# Patient Record
Sex: MALE | Race: WHITE | NOT HISPANIC OR LATINO | Employment: OTHER | URBAN - METROPOLITAN AREA
[De-identification: names, ages, dates, MRNs, and addresses within clinical notes are randomized per-mention and may not be internally consistent; named-entity substitution may affect disease eponyms.]

---

## 2017-01-23 ENCOUNTER — GENERIC CONVERSION - ENCOUNTER (OUTPATIENT)
Dept: OTHER | Facility: OTHER | Age: 62
End: 2017-01-23

## 2017-02-21 ENCOUNTER — ALLSCRIPTS OFFICE VISIT (OUTPATIENT)
Dept: OTHER | Facility: OTHER | Age: 62
End: 2017-02-21

## 2017-02-21 DIAGNOSIS — Z12.5 ENCOUNTER FOR SCREENING FOR MALIGNANT NEOPLASM OF PROSTATE: ICD-10-CM

## 2017-02-21 DIAGNOSIS — J45.909 UNCOMPLICATED ASTHMA: ICD-10-CM

## 2017-02-21 DIAGNOSIS — E78.2 MIXED HYPERLIPIDEMIA: ICD-10-CM

## 2017-02-22 ENCOUNTER — ALLSCRIPTS OFFICE VISIT (OUTPATIENT)
Dept: OTHER | Facility: OTHER | Age: 62
End: 2017-02-22

## 2017-05-09 ENCOUNTER — LAB CONVERSION - ENCOUNTER (OUTPATIENT)
Dept: OTHER | Facility: OTHER | Age: 62
End: 2017-05-09

## 2017-05-09 LAB
A/G RATIO (HISTORICAL): 2.2 (CALC) (ref 1–2.5)
ALBUMIN SERPL BCP-MCNC: 4.4 G/DL (ref 3.6–5.1)
ALP SERPL-CCNC: 66 U/L (ref 40–115)
ALT SERPL W P-5'-P-CCNC: 25 U/L (ref 9–46)
AST SERPL W P-5'-P-CCNC: 17 U/L (ref 10–35)
BASOPHILS # BLD AUTO: 0.7 %
BASOPHILS # BLD AUTO: 48 CELLS/UL (ref 0–200)
BILIRUB SERPL-MCNC: 0.6 MG/DL (ref 0.2–1.2)
BUN SERPL-MCNC: 15 MG/DL (ref 7–25)
BUN/CREA RATIO (HISTORICAL): NORMAL (CALC) (ref 6–22)
CALCIUM SERPL-MCNC: 9.7 MG/DL (ref 8.6–10.3)
CHLORIDE SERPL-SCNC: 104 MMOL/L (ref 98–110)
CHOLEST SERPL-MCNC: 188 MG/DL (ref 125–200)
CHOLEST/HDLC SERPL: 4.7 (CALC)
CO2 SERPL-SCNC: 27 MMOL/L (ref 20–31)
CREAT SERPL-MCNC: 0.86 MG/DL (ref 0.7–1.33)
DEPRECATED RDW RBC AUTO: 13.6 % (ref 11–15)
EGFR AFRICAN AMERICAN (HISTORICAL): 110 ML/MIN/1.73M2
EGFR-AMERICAN CALC (HISTORICAL): 95 ML/MIN/1.73M2
EOSINOPHIL # BLD AUTO: 173 CELLS/UL (ref 15–500)
EOSINOPHIL # BLD AUTO: 2.5 %
GAMMA GLOBULIN (HISTORICAL): 2 G/DL (CALC) (ref 1.9–3.7)
GLUCOSE (HISTORICAL): 99 MG/DL (ref 65–99)
HCT VFR BLD AUTO: 44.5 % (ref 38.5–50)
HDLC SERPL-MCNC: 40 MG/DL
HGB BLD-MCNC: 14.6 G/DL (ref 13.2–17.1)
LDL CHOLESTEROL (HISTORICAL): 108 MG/DL (CALC)
LYMPHOCYTES # BLD AUTO: 1318 CELLS/UL (ref 850–3900)
LYMPHOCYTES # BLD AUTO: 19.1 %
MCH RBC QN AUTO: 29.7 PG (ref 27–33)
MCHC RBC AUTO-ENTMCNC: 32.9 G/DL (ref 32–36)
MCV RBC AUTO: 90.2 FL (ref 80–100)
MONOCYTES # BLD AUTO: 559 CELLS/UL (ref 200–950)
MONOCYTES (HISTORICAL): 8.1 %
NEUTROPHILS # BLD AUTO: 4802 CELLS/UL (ref 1500–7800)
NEUTROPHILS # BLD AUTO: 69.6 %
NON-HDL-CHOL (CHOL-HDL) (HISTORICAL): 148 MG/DL (CALC)
PLATELET # BLD AUTO: 185 THOUSAND/UL (ref 140–400)
PMV BLD AUTO: 9.4 FL (ref 7.5–12.5)
POTASSIUM SERPL-SCNC: 4.4 MMOL/L (ref 3.5–5.3)
PROSTATE SPECIFIC ANTIGEN TOTAL (HISTORICAL): 3.8 NG/ML
RBC # BLD AUTO: 4.94 MILLION/UL (ref 4.2–5.8)
SODIUM SERPL-SCNC: 140 MMOL/L (ref 135–146)
TOTAL PROTEIN (HISTORICAL): 6.4 G/DL (ref 6.1–8.1)
TRIGL SERPL-MCNC: 201 MG/DL
WBC # BLD AUTO: 6.9 THOUSAND/UL (ref 3.8–10.8)

## 2017-05-31 ENCOUNTER — GENERIC CONVERSION - ENCOUNTER (OUTPATIENT)
Dept: OTHER | Facility: OTHER | Age: 62
End: 2017-05-31

## 2017-06-13 ENCOUNTER — ALLSCRIPTS OFFICE VISIT (OUTPATIENT)
Dept: OTHER | Facility: OTHER | Age: 62
End: 2017-06-13

## 2017-06-27 ENCOUNTER — ALLSCRIPTS OFFICE VISIT (OUTPATIENT)
Dept: OTHER | Facility: OTHER | Age: 62
End: 2017-06-27

## 2017-06-28 ENCOUNTER — GENERIC CONVERSION - ENCOUNTER (OUTPATIENT)
Dept: OTHER | Facility: OTHER | Age: 62
End: 2017-06-28

## 2017-07-12 ENCOUNTER — ALLSCRIPTS OFFICE VISIT (OUTPATIENT)
Dept: OTHER | Facility: OTHER | Age: 62
End: 2017-07-12

## 2017-07-25 ENCOUNTER — ALLSCRIPTS OFFICE VISIT (OUTPATIENT)
Dept: OTHER | Facility: OTHER | Age: 62
End: 2017-07-25

## 2017-08-08 ENCOUNTER — ALLSCRIPTS OFFICE VISIT (OUTPATIENT)
Dept: OTHER | Facility: OTHER | Age: 62
End: 2017-08-08

## 2017-08-22 ENCOUNTER — ALLSCRIPTS OFFICE VISIT (OUTPATIENT)
Dept: OTHER | Facility: OTHER | Age: 62
End: 2017-08-22

## 2017-09-05 ENCOUNTER — ALLSCRIPTS OFFICE VISIT (OUTPATIENT)
Dept: OTHER | Facility: OTHER | Age: 62
End: 2017-09-05

## 2017-09-19 ENCOUNTER — ALLSCRIPTS OFFICE VISIT (OUTPATIENT)
Dept: OTHER | Facility: OTHER | Age: 62
End: 2017-09-19

## 2017-10-03 ENCOUNTER — ALLSCRIPTS OFFICE VISIT (OUTPATIENT)
Dept: OTHER | Facility: OTHER | Age: 62
End: 2017-10-03

## 2017-10-10 ENCOUNTER — GENERIC CONVERSION - ENCOUNTER (OUTPATIENT)
Dept: OTHER | Facility: OTHER | Age: 62
End: 2017-10-10

## 2017-10-17 ENCOUNTER — ALLSCRIPTS OFFICE VISIT (OUTPATIENT)
Dept: OTHER | Facility: OTHER | Age: 62
End: 2017-10-17

## 2018-01-10 NOTE — PROGRESS NOTES
Chief Complaint  Patient was here today for allergy shot      Active Problems    1  Acid reflux disease (530 81) (K21 9)   2  Actinic keratosis (702 0) (L57 0)   3  Allergic rhinitis (477 9) (J30 9)   4  Anxiety, generalized (300 02) (F41 1)   5  Asthma (493 90) (J45 909)   6  Basal cell carcinoma of right forehead (173 31) (C44 319)   7  Benign prostatic hypertrophy (600 00) (N40 0)   8  Changing skin lesion (709 9) (L98 9)   9  Diverticulosis (562 10) (K57 90)   10  History of hip replacement (V43 64) (Z96 649)   11  Mixed hyperlipidemia (272 2) (E78 2)   12  Need for diphtheria-tetanus-pertussis (Tdap) vaccine (V06 1) (Z23)   13  Need for influenza vaccination (V04 81) (Z23)   14  Prostate cancer screening (V76 44) (Z12 5)   15  Pure hypercholesterolemia (272 0) (E78 00)   16  Screening for skin condition (V82 0) (Z13 89)   17  Seborrheic keratosis (702 19) (L82 1)    Current Meds   1  Aspirin 81 MG TABS; Therapy: (Recorded:91Hhq1619) to Recorded   2  Atorvastatin Calcium 20 MG Oral Tablet; Take 1 tablet daily; Therapy: 85ARK3192 to (Last Rx:02Wfl7048)  Requested for: 76BMS5934 Ordered   3  Calcium TABS; Therapy: (Carey Piedra) to Recorded   4  Citalopram Hydrobromide 20 MG Oral Tablet; Take 1 tablet daily; Therapy: 98TNL6797 to (Last TO:93TNW7457)  Requested for: 04ODK1493 Ordered   5  EpiPen 2-Papi 0 3 MG/0 3ML Injection Solution Auto-injector; use as directed; Therapy: 63Tcl5228 to (Evaluate:29Kvi4247)  Requested for: 58Nrs4194; Last   Rx:74Uvq2851 Ordered   6  Fluticasone Propionate 50 MCG/ACT Nasal Suspension; Therapy: (Demaris Fouzia) to Recorded   7  Ipratropium Bromide 0 06 % Nasal Solution; Therapy: (Demaris Fouzia) to Recorded   8  LORazepam 0 5 MG Oral Tablet; 1 tid prn anxiety; Therapy: 24PCV0039 to (Last Rx:11Apr2017) Ordered   9  Omeprazole 40 MG Oral Capsule Delayed Release; take 1 capsule daily;    Therapy: 00QWA3249 to (Last Rx:24Apr2017)  Requested for: 24Apr2017 Ordered   10  Oxycodone-Acetaminophen 5-325 MG Oral Tablet; TAKE 1 TABLET EVERY 4 TO 6    HOURS AS NEEDED FOR PAIN; Last Rx:68Nhs7030 Ordered   11  Pulmicort 0 5 MG/2ML Inhalation Suspension; Therapy: (Reji Jo) to Recorded   12  Pulmicort Flexhaler 180 MCG/ACT Inhalation Aerosol Powder Breath Activated; Therapy: 68YMX7896 to (Evaluate:28Xfg1316) Recorded   13  Singulair 10 MG Oral Tablet; Therapy: (Reji Deysi) to Recorded   14  Tamsulosin HCl - 0 4 MG Oral Capsule Recorded    Allergies    1  No Known Drug Allergies    2  Dust   3  Dust Mite   4  Grass   5  Mold   6  Ragweed   7  Trees    Results/Data  Allergy Injection single injection 43Rxk5605 10:40AM Evalee Areas     Test Name Result Flag Reference   Allergy Injection #1 0 55cc     Allergy Injection #1 Site right upper arm     Allergy Inj #1 Reaction no reaction     Allergy Injection #1 0 55cc     Allergy Injection #1 Site right upper arm     Allergy Inj #1 Reaction no reaction               Plan  Allergic rhinitis    · Allergy Injection single injection; Status:Resulted - Requires Verification,Retrospective  By Protocol Authorization;   Done: 35WHA6960 10:40AM    Future Appointments    Date/Time Provider Specialty Site   07/25/2017 10:15 AM [facility], Nurse Schedule  [address]   07/26/2017 09:15 AM MANNY Traore   Dermatology [facility] INPATIENT REHABILITATION     Signatures   Electronically signed by : MANNY Dickerson ; Jul 12 2017 11:34AM EST

## 2018-01-10 NOTE — PROGRESS NOTES
Chief Complaint  Pt  in office today for his allergy injection  Active Problems    1  Acid reflux disease (530 81) (K21 9)   2  Actinic keratosis (702 0) (L57 0)   3  Allergic rhinitis (477 9) (J30 9)   4  Anxiety, generalized (300 02) (F41 1)   5  Asthma (493 90) (J45 909)   6  Basal cell carcinoma of right forehead (173 31) (C44 319)   7  Benign prostatic hypertrophy (600 00) (N40 0)   8  Changing skin lesion (709 9) (L98 9)   9  Diverticulosis (562 10) (K57 90)   10  History of hip replacement (V43 64) (Z96 649)   11  Mixed hyperlipidemia (272 2) (E78 2)   12  Need for diphtheria-tetanus-pertussis (Tdap) vaccine (V06 1) (Z23)   13  Need for influenza vaccination (V04 81) (Z23)   14  Prostate cancer screening (V76 44) (Z12 5)   15  Pure hypercholesterolemia (272 0) (E78 00)   16  Screening for skin condition (V82 0) (Z13 89)   17  Seborrheic keratosis (702 19) (L82 1)    Current Meds   1  Aspirin 81 MG TABS; Therapy: (Recorded:31Wun4082) to Recorded   2  Atorvastatin Calcium 20 MG Oral Tablet; Take 1 tablet daily; Therapy: 96JSL6855 to (Last Rx:91Dke9240)  Requested for: 31JXJ3159 Ordered   3  Calcium TABS; Therapy: (Shereen Dubois) to Recorded   4  Citalopram Hydrobromide 20 MG Oral Tablet; Take 1 tablet daily; Therapy: 85WQH4119 to (Last Rx:57Fko3025)  Requested for: 69Cvi3748 Ordered   5  EpiPen 2-Papi 0 3 MG/0 3ML Injection Solution Auto-injector; use as directed; Therapy: 55Zht4358 to (Evaluate:67Cxf3074)  Requested for: 66Ppt1007; Last   Rx:39Jbe3884 Ordered   6  Fluticasone Propionate 50 MCG/ACT Nasal Suspension; Therapy: (Mellisa Jolly) to Recorded   7  Ipratropium Bromide 0 06 % Nasal Solution; Therapy: (Mellisa Jolly) to Recorded   8  LORazepam 0 5 MG Oral Tablet; 1 tid prn anxiety; Therapy: 44YVL5770 to (Last Rx:11Apr2017) Ordered   9  Omeprazole 40 MG Oral Capsule Delayed Release; take 1 capsule daily;    Therapy: 60BMY1773 to (Last Rx:24Apr2017)  Requested for: 66Uxu0053 Ordered   10  Oxycodone-Acetaminophen 5-325 MG Oral Tablet; TAKE 1 TABLET EVERY 4 TO 6    HOURS AS NEEDED FOR PAIN; Last Rx:09Kga0061 Ordered   11  Pulmicort 0 5 MG/2ML Inhalation Suspension (Budesonide); Therapy: (Glena Catching) to Recorded   12  Pulmicort Flexhaler 180 MCG/ACT Inhalation Aerosol Powder Breath Activated; Therapy: 61YLC0646 to (Evaluate:69Hza0480) Recorded   13  Singulair 10 MG Oral Tablet (Montelukast Sodium); Therapy: (Glena Catching) to Recorded   14  Tamsulosin HCl - 0 4 MG Oral Capsule Recorded    Allergies    1  No Known Drug Allergies    2  Dust   3  Dust Mite   4  Grass   5  Mold   6  Ragweed   7  Trees    Results/Data  Allergy Injection single injection 10VSA5619 11:02AM Jackson Frame     Test Name Result Flag Reference   Allergy Injection #1 Vial 1     Allergy Injection #1 Site R upper arm     Allergy Inj #1 Reaction none while in office     Allergy Injection #1 Vial 1     Allergy Injection #1 Site R upper arm     Allergy Inj #1 Reaction none while in office               Plan  Allergic rhinitis    · Allergy Injection single injection; Status:Resulted - Requires Verification,Retrospective  By Protocol Authorization;   Done: 44JSF5849 11:02AM    Future Appointments    Date/Time Provider Specialty Site   08/08/2017 10:15 AM [facility], Nurse Schedule  [address]   07/26/2017 09:15 AM MANNY Partida   Dermatology [facility] INPATIENT REHABILITATION     Signatures   Electronically signed by : MANNY Gonzalez ; Jul 25 2017 11:23AM EST

## 2018-01-10 NOTE — PROGRESS NOTES
Chief Complaint  allergy injection      Active Problems    1  Acid reflux disease (530 81) (K21 9)   2  Actinic keratosis (702 0) (L57 0)   3  Allergic rhinitis (477 9) (J30 9)   4  Anxiety, generalized (300 02) (F41 1)   5  Asthma (493 90) (J45 909)   6  Basal cell carcinoma of right forehead (173 31) (C44 319)   7  Benign prostatic hypertrophy (600 00) (N40 0)   8  Changing skin lesion (709 9) (L98 9)   9  Diverticulosis (562 10) (K57 90)   10  History of hip replacement (V43 64) (Z96 649)   11  Mixed hyperlipidemia (272 2) (E78 2)   12  Need for diphtheria-tetanus-pertussis (Tdap) vaccine (V06 1) (Z23)   13  Need for influenza vaccination (V04 81) (Z23)   14  Prostate cancer screening (V76 44) (Z12 5)   15  Pure hypercholesterolemia (272 0) (E78 00)   16  Screening for skin condition (V82 0) (Z13 89)   17  Seborrheic keratosis (702 19) (L82 1)    Current Meds   1  Aspirin 81 MG TABS; Therapy: (Recorded:69Lro1014) to Recorded   2  Atorvastatin Calcium 20 MG Oral Tablet; Take 1 tablet daily; Therapy: 38NGM8187 to (Last Rx:27Ghw3289)  Requested for: 22SPX5350 Ordered   3  Calcium TABS; Therapy: (Eugenie Flavors) to Recorded   4  Citalopram Hydrobromide 20 MG Oral Tablet; Take 1 tablet daily; Therapy: 84UOE2173 to (Last Rx:35Bzu0758)  Requested for: 68Pno5492 Ordered   5  EpiPen 2-Papi 0 3 MG/0 3ML Injection Solution Auto-injector; use as directed; Therapy: 03Hto0381 to (Evaluate:86Szs4749)  Requested for: 43Bbw5452; Last   Rx:91Ojo3734 Ordered   6  Fluticasone Propionate 50 MCG/ACT Nasal Suspension; Therapy: (Orlean Drop) to Recorded   7  Ipratropium Bromide 0 06 % Nasal Solution; Therapy: (Orlean Drop) to Recorded   8  LORazepam 0 5 MG Oral Tablet; 1 tid prn anxiety; Therapy: 64HUR4473 to (Last Rx:78Kqp3368) Ordered   9  Omeprazole 40 MG Oral Capsule Delayed Release; take 1 capsule daily; Therapy: 24JON2948 to (Last Rx:24Apr2017)  Requested for: 24Apr2017 Ordered   10  Oxycodone-Acetaminophen 5-325 MG Oral Tablet; TAKE 1 TABLET EVERY 4 TO 6    HOURS AS NEEDED FOR PAIN; Last Rx:22Ltc2251 Ordered   11  Pulmicort 0 5 MG/2ML Inhalation Suspension (Budesonide); Therapy: (Jaime Aid) to Recorded   12  Pulmicort Flexhaler 180 MCG/ACT Inhalation Aerosol Powder Breath Activated; Therapy: 17BVK6939 to (Evaluate:34Pmi5562) Recorded   13  Singulair 10 MG Oral Tablet (Montelukast Sodium); Therapy: (Jaime Aid) to Recorded   14  Tamsulosin HCl - 0 4 MG Oral Capsule Recorded    Allergies    1  No Known Drug Allergies    2  Dust   3  Dust Mite   4  Grass   5  Mold   6  Ragweed   7  Trees    Results/Data  Allergy Injection single injection 60Ghr1800 10:33AM Ehsan Brown     Test Name Result Flag Reference   Allergy Injection #1 Vial 1     Allergy Injection #1 Site R upper arm     Allergy Inj #1 Reaction no reaction         Plan  Allergic rhinitis    · Allergy Injection single injection; Status:Complete - Retrospective By Protocol  Authorization;   Done: 42XDS4079 10:33AM    Future Appointments    Date/Time Provider Specialty Site   09/19/2017 10:15 AM [facility], Nurse Schedule  [address]   02/26/2018 03:15 PM MANNY Galeana   Dermatology  [address] [facility] INPATIENT REHABILITATION     Signatures   Electronically signed by : MANNY Cates ; Sep  5 2017 11:22AM EST

## 2018-01-11 NOTE — PROGRESS NOTES
Chief Complaint  PATIENT IN OFFICE TODAY FOR ALLERGY INJECTION      Active Problems    1  Acid reflux disease (530 81) (K21 9)   2  Actinic keratosis (702 0) (L57 0)   3  Allergic rhinitis (477 9) (J30 9)   4  Anxiety, generalized (300 02) (F41 1)   5  Asthma (493 90) (J45 909)   6  Basal cell carcinoma of right forehead (173 31) (C44 319)   7  Benign prostatic hypertrophy (600 00) (N40 0)   8  Changing skin lesion (709 9) (L98 9)   9  Diverticulosis (562 10) (K57 90)   10  History of hip replacement (V43 64) (Z96 649)   11  Mixed hyperlipidemia (272 2) (E78 2)   12  Need for diphtheria-tetanus-pertussis (Tdap) vaccine (V06 1) (Z23)   13  Need for influenza vaccination (V04 81) (Z23)   14  Prostate cancer screening (V76 44) (Z12 5)   15  Pure hypercholesterolemia (272 0) (E78 00)   16  Screening for skin condition (V82 0) (Z13 89)   17  Seborrheic keratosis (702 19) (L82 1)    Current Meds   1  Aspirin 81 MG TABS; Therapy: (Recorded:57Ikj5909) to Recorded   2  Atorvastatin Calcium 20 MG Oral Tablet; Take 1 tablet daily; Therapy: 24TJI0341 to (Last Rx:90Owq8316)  Requested for: 79CSL3955 Ordered   3  Calcium TABS; Therapy: (Jake Jarrell) to Recorded   4  Citalopram Hydrobromide 20 MG Oral Tablet; Take 1 tablet daily; Therapy: 54FAB7991 to (Last GH:12OSK2644)  Requested for: 02RPH1702 Ordered   5  EpiPen 2-Papi 0 3 MG/0 3ML Injection Solution Auto-injector; use as directed; Therapy: 10Tbu5937 to (Evaluate:19Fyc8316)  Requested for: 54Cwc5971; Last   Rx:10Ypp9924 Ordered   6  Fluticasone Propionate 50 MCG/ACT Nasal Suspension; Therapy: (Drury Jeans) to Recorded   7  Ipratropium Bromide 0 06 % Nasal Solution; Therapy: (Drury Jeans) to Recorded   8  LORazepam 0 5 MG Oral Tablet; 1 tid prn anxiety; Therapy: 52NCO6046 to (Last Rx:11Apr2017) Ordered   9  Omeprazole 40 MG Oral Capsule Delayed Release; take 1 capsule daily;    Therapy: 51KXL1125 to (Last Rx:24Apr2017)  Requested for: 24Apr2017 Ordered   10  Oxycodone-Acetaminophen 5-325 MG Oral Tablet; TAKE 1 TABLET EVERY 4 TO 6    HOURS AS NEEDED FOR PAIN; Last Rx:26Ybc5377 Ordered   11  Pulmicort 0 5 MG/2ML Inhalation Suspension (Budesonide); Therapy: (Charmian Cart) to Recorded   12  Pulmicort Flexhaler 180 MCG/ACT Inhalation Aerosol Powder Breath Activated; Therapy: 14GDC1953 to (Evaluate:41Brz3809) Recorded   13  Singulair 10 MG Oral Tablet (Montelukast Sodium); Therapy: (Charmian Cart) to Recorded   14  Tamsulosin HCl - 0 4 MG Oral Capsule Recorded    Allergies    1  No Known Drug Allergies    2  Dust   3  Dust Mite   4  Grass   5  Mold   6  Ragweed   7  Trees    Results/Data  Allergy Injection single injection 27Jun2017 10:22AM Velasquez Minium     Test Name Result Flag Reference   Allergy Injection #1 T-G-RW-M-MM     Allergy Injection #1 Site LEFT UPPER ARM     Allergy Injection #1 T-G-RW-M-MM     Allergy Injection #1 Site LEFT UPPER ARM               Plan  Allergic rhinitis    · Allergy Injection single injection; Status:Resulted - Requires Verification,Retrospective  By Protocol Authorization;   Done: 58HRD1569 10:22AM    Future Appointments    Date/Time Provider Specialty Site   07/11/2017 10:15 AM [address] [facility], Nurse Schedule  [address]   07/26/2017 09:15 AM MANNY Prescott   Dermatology  [facility] REHABILITATION     Signatures   Electronically signed by : MANNY Sánchez ; Jun 27 2017 11:03AM EST

## 2018-01-13 VITALS
BODY MASS INDEX: 28.14 KG/M2 | WEIGHT: 190 LBS | TEMPERATURE: 98.3 F | DIASTOLIC BLOOD PRESSURE: 64 MMHG | HEIGHT: 69 IN | HEART RATE: 60 BPM | OXYGEN SATURATION: 98 % | SYSTOLIC BLOOD PRESSURE: 112 MMHG

## 2018-01-13 NOTE — PROGRESS NOTES
Chief Complaint  Pt  in office today for an allergy injection  Active Problems    1  Acid reflux disease (530 81) (K21 9)   2  Actinic keratosis (702 0) (L57 0)   3  Allergic rhinitis (477 9) (J30 9)   4  Anxiety, generalized (300 02) (F41 1)   5  Asthma (493 90) (J45 909)   6  Basal cell carcinoma of right forehead (173 31) (C44 319)   7  Benign prostatic hypertrophy (600 00) (N40 0)   8  Changing skin lesion (709 9) (L98 9)   9  Diverticulosis (562 10) (K57 90)   10  History of hip replacement (V43 64) (Z96 649)   11  Mixed hyperlipidemia (272 2) (E78 2)   12  Need for diphtheria-tetanus-pertussis (Tdap) vaccine (V06 1) (Z23)   13  Need for influenza vaccination (V04 81) (Z23)   14  Prostate cancer screening (V76 44) (Z12 5)   15  Pure hypercholesterolemia (272 0) (E78 00)   16  Screening for skin condition (V82 0) (Z13 89)   17  Seborrheic keratosis (702 19) (L82 1)    Current Meds   1  Aspirin 81 MG TABS; Therapy: (Recorded:86Fdo7419) to Recorded   2  Atorvastatin Calcium 20 MG Oral Tablet; Take 1 tablet daily; Therapy: 84XHE4543 to (Last Rx:81Sfj7987)  Requested for: 61ABL9226 Ordered   3  Calcium TABS; Therapy: (Carol Gonzales) to Recorded   4  Citalopram Hydrobromide 20 MG Oral Tablet; Take 1 tablet daily; Therapy: 23VST1263 to (Last HM:77MJA9140)  Requested for: 79ARW2541 Ordered   5  EpiPen 2-Papi 0 3 MG/0 3ML Injection Solution Auto-injector; use as directed; Therapy: 97Isi3189 to (Evaluate:01Nru8071)  Requested for: 52Iba8809; Last   Rx:86Kgb7446 Ordered   6  Fluticasone Propionate 50 MCG/ACT Nasal Suspension; Therapy: (Ester Wetzel) to Recorded   7  Ipratropium Bromide 0 06 % Nasal Solution; Therapy: (Thersa Damari) to Recorded   8  LORazepam 0 5 MG Oral Tablet; 1 tid prn anxiety; Therapy: 46KQY6678 to (Last Rx:11Apr2017) Ordered   9  Omeprazole 40 MG Oral Capsule Delayed Release; take 1 capsule daily;    Therapy: 10AZY7236 to (Last Rx:24Apr2017)  Requested for: 27Wro3747 Ordered   10  Oxycodone-Acetaminophen 5-325 MG Oral Tablet; TAKE 1 TABLET EVERY 4 TO 6    HOURS AS NEEDED FOR PAIN; Last Rx:73Tle4905 Ordered   11  Pulmicort 0 5 MG/2ML Inhalation Suspension; Therapy: (Hallie Vinayak) to Recorded   12  Pulmicort Flexhaler 180 MCG/ACT Inhalation Aerosol Powder Breath Activated; Therapy: 83ABQ6057 to (Evaluate:07Cpf7636) Recorded   13  Singulair 10 MG Oral Tablet; Therapy: (Hallie Vinayak) to Recorded   14  Tamsulosin HCl - 0 4 MG Oral Capsule Recorded    Allergies    1  No Known Drug Allergies    2  Dust   3  Dust Mite   4  Grass   5  Mold   6  Ragweed   7  Trees    Results/Data  Allergy Injection single injection 13Jun2017 12:00AM Funmilayo Omerland     Test Name Result Flag Reference   Allergy Injection #1 T-G-RW-M     Allergy Injection #1 Site right upper arm     Allergy Inj #1 Reaction      no reaction while in office   Allergy Injection #1 T-G-RW-M     Allergy Injection #1 Site right upper arm     Allergy Inj #1 Reaction      no reaction while in office             Plan  Allergic rhinitis    · Allergy Injection single injection; Status:Resulted - Requires Verification,Retrospective  By Protocol Authorization;   Done: 80GPV9548 12:00AM    Future Appointments    Date/Time Provider Specialty Site   06/27/2017 10:15 AM [facility], Nurse Schedule  [address]   07/26/2017 09:15 AM MANNY Watters   Dermatology [facility]     Signatures   Electronically signed by : Magda Grant DO; Jun 13 2017 11:35AM EST                       (Co-author)

## 2018-01-14 NOTE — PROGRESS NOTES
Chief Complaint  PT  IN OFFICE TODAY FOR HIS ALLERGY INJECTION  Active Problems    1  Acid reflux disease (530 81) (K21 9)   2  Actinic keratosis (702 0) (L57 0)   3  Allergic rhinitis (477 9) (J30 9)   4  Anxiety, generalized (300 02) (F41 1)   5  Asthma (493 90) (J45 909)   6  Basal cell carcinoma of right forehead (173 31) (C44 319)   7  Benign prostatic hypertrophy (600 00) (N40 0)   8  Changing skin lesion (709 9) (L98 9)   9  Diverticulosis (562 10) (K57 90)   10  History of hip replacement (V43 64) (Z96 649)   11  Mixed hyperlipidemia (272 2) (E78 2)   12  Need for diphtheria-tetanus-pertussis (Tdap) vaccine (V06 1) (Z23)   13  Need for influenza vaccination (V04 81) (Z23)   14  Prostate cancer screening (V76 44) (Z12 5)   15  Pure hypercholesterolemia (272 0) (E78 00)   16  Screening for skin condition (V82 0) (Z13 89)   17  Seborrheic keratosis (702 19) (L82 1)    Current Meds   1  Aspirin 81 MG TABS; Therapy: (Recorded:48Zax5679) to Recorded   2  Atorvastatin Calcium 20 MG Oral Tablet; Take 1 tablet daily; Therapy: 41DZP5031 to (Last Rx:11Hmu5161)  Requested for: 54NKB3639 Ordered   3  Calcium TABS; Therapy: (Shereen Dubois) to Recorded   4  Citalopram Hydrobromide 20 MG Oral Tablet; Take 1 tablet daily; Therapy: 64XIN1525 to (Last Rx:56Tcn4442)  Requested for: 55Wbx7445 Ordered   5  EpiPen 2-Papi 0 3 MG/0 3ML Injection Solution Auto-injector; use as directed; Therapy: 81Nau3373 to (Evaluate:73Fuu4333)  Requested for: 78Ypx2387; Last   Rx:53Ghi8993 Ordered   6  Fluticasone Propionate 50 MCG/ACT Nasal Suspension; Therapy: (Sjuata Herrera) to Recorded   7  Ipratropium Bromide 0 06 % Nasal Solution; Therapy: (Sujata Herrera) to Recorded   8  LORazepam 0 5 MG Oral Tablet; 1 tid prn anxiety; Therapy: 36TQB4003 to (Last Rx:11Apr2017) Ordered   9  Omeprazole 40 MG Oral Capsule Delayed Release; take 1 capsule daily;    Therapy: 53HAM2328 to (Last Rx:24Apr2017)  Requested for: 36Dhd3642 Ordered   10  Oxycodone-Acetaminophen 5-325 MG Oral Tablet; TAKE 1 TABLET EVERY 4 TO 6    HOURS AS NEEDED FOR PAIN; Last Rx:78Gxn7564 Ordered   11  Pulmicort 0 5 MG/2ML Inhalation Suspension; Therapy: (0486 61 38 26) to Recorded   12  Pulmicort Flexhaler 180 MCG/ACT Inhalation Aerosol Powder Breath Activated; Therapy: 46CXK1652 to (Evaluate:23Kwr7998) Recorded   13  Singulair 10 MG Oral Tablet; Therapy: (0486 61 38 26) to Recorded   14  Tamsulosin HCl - 0 4 MG Oral Capsule Recorded    Allergies    1  No Known Drug Allergies    2  Dust   3  Dust Mite   4  Grass   5  Mold   6  Ragweed   7  Trees    Results/Data  Allergy Injection single injection 18Fzh6092 10:08AM Herlinda Vaughn     Test Name Result Flag Reference   Allergy Injection #1 T-G-RW-MM     Allergy Injection #1 Site RIGHT UPPER ARM     Allergy Inj #1 Reaction NONE WHILE IN OFFICE     Allergy Injection #1 T-G-RW-MM     Allergy Injection #1 Site RIGHT UPPER ARM     Allergy Inj #1 Reaction NONE WHILE IN OFFICE               Plan  Allergic rhinitis    · Allergy Injection single injection; Status:Resulted - Requires Verification,Retrospective  By Protocol Authorization;   Done: 02VWV6767 10:08AM    Future Appointments    Date/Time Provider Specialty Site   08/22/2017 10:15 AM [facility], Nurse Schedule  [address]   08/22/2017 03:45 PM MANNY Abad   Dermatology [facility] INPATIENT REHABILITATION     Signatures   Electronically signed by : MANNY Jimenez ; Aug  8 2017 11:09AM EST

## 2018-01-15 NOTE — PROGRESS NOTES
Chief Complaint  Patient is here today for allergy injection  Reaction resulted in chart  No complaint of pain  Active Problems    1  Acid reflux disease (530 81) (K21 9)   2  Actinic keratosis (702 0) (L57 0)   3  Allergic rhinitis (477 9) (J30 9)   4  Anxiety, generalized (300 02) (F41 1)   5  Asthma (493 90) (J45 909)   6  Basal cell carcinoma of right forehead (173 31) (C44 319)   7  Benign prostatic hypertrophy (600 00) (N40 0)   8  Changing skin lesion (709 9) (L98 9)   9  Diverticulosis (562 10) (K57 90)   10  History of hip replacement (V43 64) (Z96 649)   11  Mixed hyperlipidemia (272 2) (E78 2)   12  Need for diphtheria-tetanus-pertussis (Tdap) vaccine (V06 1) (Z23)   13  Need for influenza vaccination (V04 81) (Z23)   14  Prostate cancer screening (V76 44) (Z12 5)   15  Pure hypercholesterolemia (272 0) (E78 00)   16  Screening for skin condition (V82 0) (Z13 89)   17  Seborrheic keratosis (702 19) (L82 1)    Current Meds   1  Aspirin 81 MG TABS; Therapy: (Recorded:44Eti2460) to Recorded   2  Atorvastatin Calcium 20 MG Oral Tablet; Take 1 tablet daily; Therapy: 43OIW9385 to (Last Rx:59Ucy2135)  Requested for: 15GBU0462 Ordered   3  Calcium TABS; Therapy: (Jake Jarrell) to Recorded   4  Citalopram Hydrobromide 20 MG Oral Tablet; Take 1 tablet daily; Therapy: 87MXC7479 to (Last Rx:24Ael0142)  Requested for: 30Rdy6216 Ordered   5  EpiPen 2-Papi 0 3 MG/0 3ML Injection Solution Auto-injector; use as directed; Therapy: 07Pwa3469 to (Evaluate:10Gey9269)  Requested for: 91Auo2019; Last   Rx:82Itx3363 Ordered   6  Fluticasone Propionate 50 MCG/ACT Nasal Suspension; Therapy: (Drury Jeans) to Recorded   7  Ipratropium Bromide 0 06 % Nasal Solution; Therapy: (Drury Jeans) to Recorded   8  LORazepam 0 5 MG Oral Tablet; 1 tid prn anxiety; Therapy: 68KDU5956 to (Last Rx:11Apr2017) Ordered   9  Omeprazole 40 MG Oral Capsule Delayed Release; take 1 capsule daily;    Therapy: 58YFG1077 to (Last Rx:24Apr2017)  Requested for: 24Apr2017 Ordered   10  Oxycodone-Acetaminophen 5-325 MG Oral Tablet; TAKE 1 TABLET EVERY 4 TO 6    HOURS AS NEEDED FOR PAIN; Last Rx:47Pzu6882 Ordered   11  Pulmicort 0 5 MG/2ML Inhalation Suspension; Therapy: (Viola Lydia) to Recorded   12  Pulmicort Flexhaler 180 MCG/ACT Inhalation Aerosol Powder Breath Activated; Therapy: 42XCW7513 to (Evaluate:13Viv2926) Recorded   13  Singulair 10 MG Oral Tablet; Therapy: (Viola Lydia) to Recorded   14  Tamsulosin HCl - 0 4 MG Oral Capsule Recorded    Allergies    1  No Known Drug Allergies    2  Dust   3  Dust Mite   4  Grass   5  Mold   6  Ragweed   7  Trees    Results/Data  Allergy Injection single injection 05Zbv7654 10:31AM Guru Dumont     Test Name Result Flag Reference   Allergy Injection #1 T-GRASS-RW-MOLD-MITE     Allergy Injection #1 Site L UPPER ARM     Allergy Inj #1 Reaction      NO REACTION WHILE IN OFFICE       Plan  Allergic rhinitis    · Allergy Injection single injection; Status:Complete;   Done: 43Ett7977 10:31AM    Future Appointments    Date/Time Provider Specialty Site   09/05/2017 10:15 AM [facility], Nurse Schedule  [address]   08/22/2017 03:45 PM MANNY Alicea   Dermatology [facility]     Signatures   Electronically signed by : Ragini Man DO; Aug 22 2017 11:19AM EST                       (Co-author)

## 2018-01-15 NOTE — PROGRESS NOTES
Assessment   1  Actinic keratosis (702 0) (L57 0)   2  Screening for skin condition (V82 0) (Z13 89)   3  Seborrheic keratosis (702 19) (L82 1)   4  H/O nonmelanoma skin cancer (V10 83) (Z85 828)    Plan    · Use a sun block product with an SPF of 15 or more ; Status:Complete;   Done:    71Wyj0318   · Wound care as instructed ; Status:Complete;   Done: 23RCB5936   · Follow-up visit in 6 months Evaluation and Treatment  Follow-up  Status: Complete     Done: 54Rnu5111    Discussion/Summary   Discussion Summary- [facility]:      Assessment #1: Actinic keratosis  Care Plan:      Patient advised that these are precancers should resolve cryosurgery and not to let us know sunblock recommended  Assessment #2: Seborrheic keratosis  Care Plan:      Patient reassured these are normal growths acquire with age no treatment needed  Assessment #3: History of skin cancer  Care Plan:      No recurrence nothing else atypical noted sunblock recommended followup in 6 months  Assessment #4: Screening for dermatologic disorders  Care Plan:      Nothing else noted on complete exam followup in 6 months  Chief Complaint   Chief Complaint Free Text Note Form: 6 month full body exam      History of Present Illness   HPI: 71-year-old male presents for follow-up for previous history of skin cancer concerned regarding some scaling areas on his forehead and scalp      Review of Systems   Complete Male Dermatology [facility] Patient:      Constitutional: Denies constitutional symptoms  Eyes: Denies eye symptoms  ENT:  denies ear symptoms, nasal symptoms, mouth or throat symptoms  Cardiovascular: Denies cardiovascular symptoms  Respiratory: Denies respiratory symptoms  Gastrointestinal: Denies gastrointestinal symptoms  Musculoskeletal: Denies musculoskeletal symptoms  Integumentary: Denies skin, hair and nail symptoms  Neurological: Denies neurologic symptoms  Psychiatric: Denies psychiatric symptoms  Endocrine: Denies endocrine symptoms  Hematologic/Lymphatic: Denies hematologic symptoms  Active Problems   1  Acid reflux disease (530 81) (K21 9)   2  Actinic keratosis (702 0) (L57 0)   3  Allergic rhinitis (477 9) (J30 9)   4  Anxiety, generalized (300 02) (F41 1)   5  Asthma (493 90) (J45 909)   6  Basal cell carcinoma of right forehead (173 31) (C44 319)   7  Benign prostatic hypertrophy (600 00) (N40 0)   8  Changing skin lesion (709 9) (L98 9)   9  Diverticulosis (562 10) (K57 90)   10  History of hip replacement (V43 64) (Z96 649)   11  Mixed hyperlipidemia (272 2) (E78 2)   12  Need for diphtheria-tetanus-pertussis (Tdap) vaccine (V06 1) (Z23)   13  Need for influenza vaccination (V04 81) (Z23)   14  Prostate cancer screening (V76 44) (Z12 5)   15  Pure hypercholesterolemia (272 0) (E78 00)   16  Screening for skin condition (V82 0) (Z13 89)   17  Seborrheic keratosis (702 19) (L82 1)    Past Medical History   1  History of Cardiovascular Stress Test   2  History of Colon polyp (211 3) (K63 5)   3  H/O nonmelanoma skin cancer (V10 83) (L21 135)   4  History of colonoscopy (V45 89) (Z98 890)   5  History of endoscopy (V45 89) (Z98 890)   6  History of neoplasm of uncertain behavior of skin (V13 3) (Z86 03)   7  History of sebaceous cyst (V13 3) (Z87 2)   8  History of seborrheic keratosis (V13 3) (Z87 2)   9  History of viral warts (V12 09) (Z86 19)   10  History of Inflamed seborrheic keratosis (702 11) (L82 0)   11  History of Malignant Skin Neoplasm (V10 83)   12  History of Nocturia (788 43) (R35 1)   13  History of Otitis media (382 9) (H66 90)   14  History of Pneumothorax, left (512 89) (J93 9)   15  History of Screening for skin condition (V82 0) (Z13 89)   16  History of Screening for skin condition (V82 0) (Z13 89)   17  History of Seasonal allergies (477 9) (J30 2)   18  History of Skin Cancer (V10 83)   19   History of Squamous Cell Carcinoma Of The Skin Of The Neck (173 42)   20  History of Testicular hypofunction (257 2) (E29 1)  Past Medical History Reviewed- Derm:    The past medical history was reviewed  Surgical History   1  History of Cardiac Cath Procedure Summary   2  History of Rhinoplasty  Surgical History Reviewed Barrett Allen- Derm:    Surgical History reviewed      Family History   Mother    1  Denied: Family history of mental disorder   2  Denied: Family history of substance abuse  Father    3  Family history of Colon cancer   4  Denied: Family history of mental disorder   5  Denied: Family history of substance abuse  Family History Reviewed- Derm:    Family History was reviewed      Social History    · Never A Smoker   · No alcohol use   · No drug use  Social History Reviewed Barrett Allen- Derm: The social history was reviewed      Current Meds    1  Aspirin 81 MG TABS; Therapy: (Recorded:08Ijc9953) to Recorded   2  Atorvastatin Calcium 20 MG Oral Tablet; Take 1 tablet daily; Therapy: 09AOC8428 to (Last Rx:62Lbd7773)  Requested for: 08ZCA8727 Ordered   3  Calcium TABS; Therapy: (Ti Rudolph) to Recorded   4  Citalopram Hydrobromide 20 MG Oral Tablet; Take 1 tablet daily; Therapy: 68RBT2627 to (Last Rx:23Zsl9038)  Requested for: 28Bdy8608 Ordered   5  EpiPen 2-Papi 0 3 MG/0 3ML Injection Solution Auto-injector; use as directed; Therapy: 25Ajt4049 to (Evaluate:65Cvf4070)  Requested for: 20Xtc2703; Last     Rx:08Eha7070 Ordered   6  Fluticasone Propionate 50 MCG/ACT Nasal Suspension; Therapy: (Ori Damon) to Recorded   7  Ipratropium Bromide 0 06 % Nasal Solution; Therapy: (Ori Damon) to Recorded   8  LORazepam 0 5 MG Oral Tablet; 1 tid prn anxiety; Therapy: 76BZO8530 to (Last Rx:01Efj1160) Ordered   9  Omeprazole 40 MG Oral Capsule Delayed Release; take 1 capsule daily; Therapy: 24KKA5533 to (Last Rx:19Hbb7127)  Requested for: 24Apr2017 Ordered   10   Oxycodone-Acetaminophen 5-325 MG Oral Tablet; TAKE 1 TABLET EVERY 4 TO 6      HOURS AS NEEDED FOR PAIN; Last Rx:81Yfi7105 Ordered   11  Pulmicort 0 5 MG/2ML Inhalation Suspension; Therapy: (Faith Torre) to Recorded   12  Pulmicort Flexhaler 180 MCG/ACT Inhalation Aerosol Powder Breath Activated; Therapy: 75FIF6575 to (Evaluate:87Lgp1220) Recorded   13  Singulair 10 MG Oral Tablet; Therapy: (Faith Torre) to Recorded   14  Tamsulosin HCl - 0 4 MG Oral Capsule Recorded  Medication List Reviewed: The medication list was reviewed and updated today  Allergies   1  No Known Drug Allergies  2  Dust   3  Dust Mite   4  Grass   5  Mold   6  Ragweed   7  Trees    Physical Exam        Constitutional      General appearance: Appears healthy and well developed  Lymphatic      No visible disturbance  Musculoskeletal      Digits and nails: No clubbing, cyanosis or edema  Cutaneous and nail exam normal        Skin      Scalp skin texture and hair distribution: Abnormal        Head: Abnormal        Neck: Normal turgor, no rashes, no lesions  Chest: Normal turgor, no rashes, no lesions  Abdomen: Normal turgor, no rashes, no lesions  Back: Normal turgor, no rashes, no lesions  Right upper extremity: Normal turgor, no rashes, no lesions  Left upper extremity: Normal turgor, no rashes, no lesions  Right lower extremity: Normal turgor, no rashes, no lesions  Left lower extremity: Normal turgor, no rashes, no lesions  Examination for skin lesions: Abnormal   Skin Lesions: Actinic Keratosis: on area number 2 on the diagram       Neuro/Psych      Alert and oriented x 3  Displays comfort and cooperation during encounterl   Affect is normal        Finding Scaling erythematous areas noted above normal keratotic papules with greasy stuck on appearance previous sites of skin cancer well-healed without recurrence nothing else atypical noted on exam       Procedure Procedure: destruction of lesion  Indications for the procedure include actinic keratosis  Risks, benefits, alternatives, infection risk, bleeding risk, risk of allergic reaction and the risk of scarring were discussed with the patient--   verbal consent was obtained prior to the procedure  Procedure Note:      The lesion location: See Map  Destruction Technique: cryotherapy with liquid nitrogen application-- and-- 89-65 seconds via cryospray--   Destruction of 2 lesions  Post-Procedure:      Patient Status: the patient tolerated the procedure well  Complications: there were no complications  Future Appointments      Date/Time Provider Specialty Site   09/05/2017 10:15 AM [facility], Nurse Schedule  [address]   02/26/2018 03:15 PM MANNY Mahmood   Dermatology [facility]     Signatures    Electronically signed by : MANNY Tyler ; Aug 22 2017  5:03PM EST                       (Author)

## 2018-01-16 NOTE — PROGRESS NOTES
Chief Complaint  Patient here today for allergy injection  Administered in office      Active Problems    1  Acid reflux disease (530 81) (K21 9)   2  Actinic keratosis (702 0) (L57 0)   3  Allergic rhinitis (477 9) (J30 9)   4  Anxiety, generalized (300 02) (F41 1)   5  Asthma (493 90) (J45 909)   6  Basal cell carcinoma of right forehead (173 31) (C44 319)   7  Benign prostatic hypertrophy (600 00) (N40 0)   8  Changing skin lesion (709 9) (L98 9)   9  Diverticulosis (562 10) (K57 90)   10  History of hip replacement (V43 64) (Z96 649)   11  Mixed hyperlipidemia (272 2) (E78 2)   12  Need for diphtheria-tetanus-pertussis (Tdap) vaccine (V06 1) (Z23)   13  Need for influenza vaccination (V04 81) (Z23)   14  Prostate cancer screening (V76 44) (Z12 5)   15  Pure hypercholesterolemia (272 0) (E78 00)   16  Screening for skin condition (V82 0) (Z13 89)   17  Seborrheic keratosis (702 19) (L82 1)    Current Meds   1  Aspirin 81 MG TABS; Therapy: (Recorded:34Ewa3606) to Recorded   2  Atorvastatin Calcium 20 MG Oral Tablet; Take 1 tablet daily; Therapy: 14TPT9153 to (Last Rx:44Tkx5135)  Requested for: 92FDI2360 Ordered   3  Calcium TABS; Therapy: (Wil Luna) to Recorded   4  Citalopram Hydrobromide 20 MG Oral Tablet; Take 1 tablet daily; Therapy: 61EMV0660 to (Last Rx:06Yot0176)  Requested for: 40Yzz4031 Ordered   5  EpiPen 2-Papi 0 3 MG/0 3ML Injection Solution Auto-injector; use as directed; Therapy: 58Pno5169 to (Evaluate:06Bvr2371)  Requested for: 71Mnr8747; Last   Rx:15Ggo2416 Ordered   6  Fluticasone Propionate 50 MCG/ACT Nasal Suspension; Therapy: (Ten Jimenez) to Recorded   7  Ipratropium Bromide 0 06 % Nasal Solution; Therapy: (Ten Jimenez) to Recorded   8  LORazepam 0 5 MG Oral Tablet; 1 tid prn anxiety; Therapy: 14QHF5579 to (Last Rx:30Aug2017) Ordered   9  Omeprazole 40 MG Oral Capsule Delayed Release; take 1 capsule daily;    Therapy: 66FQV8033 to (Last Rx:24Apr2017) Requested for: 82Rao9214 Ordered   10  Oxycodone-Acetaminophen 5-325 MG Oral Tablet; TAKE 1 TABLET EVERY 4 TO 6    HOURS AS NEEDED FOR PAIN; Last Rx:21Rfs0228 Ordered   11  Pulmicort 0 5 MG/2ML Inhalation Suspension; Therapy: (Rosi Cedillo) to Recorded   12  Pulmicort Flexhaler 180 MCG/ACT Inhalation Aerosol Powder Breath Activated; Therapy: 84QQO9549 to (Evaluate:87Hth0120) Recorded   13  Singulair 10 MG Oral Tablet; Therapy: (Rosi Cedillo) to Recorded   14  Tamsulosin HCl - 0 4 MG Oral Capsule Recorded    Allergies    1  No Known Drug Allergies    2  Dust   3  Dust Mite   4  Grass   5  Mold   6  Ragweed   7  Trees    Future Appointments    Date/Time Provider Specialty Site   02/26/2018 03:15 PM MANNY Traore   Dermatology [facility]     Signatures   Electronically signed by : MANNY Dickerson ; Sep 19 2017 11:13AM EST

## 2018-01-18 NOTE — PROGRESS NOTES
Chief Complaint  Pt  in office today for his allergy shot and his influenza vaccine      Active Problems    1  Acid reflux disease (530 81) (K21 9)   2  Actinic keratosis (702 0) (L57 0)   3  Allergic rhinitis (477 9) (J30 9)   4  Anxiety, generalized (300 02) (F41 1)   5  Asthma (493 90) (J45 909)   6  Basal cell carcinoma of right forehead (173 31) (C44 319)   7  Benign prostatic hypertrophy (600 00) (N40 0)   8  Changing skin lesion (709 9) (L98 9)   9  Diverticulosis (562 10) (K57 90)   10  History of hip replacement (V43 64) (Z96 649)   11  Mixed hyperlipidemia (272 2) (E78 2)   12  Need for diphtheria-tetanus-pertussis (Tdap) vaccine (V06 1) (Z23)   13  Need for influenza vaccination (V04 81) (Z23)   14  Prostate cancer screening (V76 44) (Z12 5)   15  Pure hypercholesterolemia (272 0) (E78 00)   16  Screening for skin condition (V82 0) (Z13 89)   17  Seborrheic keratosis (702 19) (L82 1)    Current Meds   1  Aspirin 81 MG TABS; Therapy: (Recorded:77Kht7255) to Recorded   2  Atorvastatin Calcium 20 MG Oral Tablet; Take 1 tablet daily; Therapy: 84XQL8528 to (Last Rx:90Gus4438)  Requested for: 85RIS7833 Ordered   3  Calcium TABS; Therapy: (Tori Tejeda) to Recorded   4  Citalopram Hydrobromide 20 MG Oral Tablet; Take 1 tablet daily; Therapy: 05DNL6109 to (Last Rx:06Fpf5481)  Requested for: 99Eny2037 Ordered   5  EpiPen 2-Papi 0 3 MG/0 3ML Injection Solution Auto-injector; use as directed; Therapy: 53Ayo0017 to (Evaluate:77Tqq0631)  Requested for: 08Vil6646; Last   Rx:25Tvv8889 Ordered   6  Fluticasone Propionate 50 MCG/ACT Nasal Suspension; Therapy: (Spenser Ceja) to Recorded   7  Ipratropium Bromide 0 06 % Nasal Solution; Therapy: (Spenser Ceja) to Recorded   8  LORazepam 0 5 MG Oral Tablet; 1 tid prn anxiety; Therapy: 29EWT6355 to (Last Rx:30Aug2017) Ordered   9  Omeprazole 40 MG Oral Capsule Delayed Release; take 1 capsule daily;    Therapy: 29FHA4140 to (Last Rx:24Apr2017) Requested for: 24Apr2017 Ordered   10  Oxycodone-Acetaminophen 5-325 MG Oral Tablet; TAKE 1 TABLET EVERY 4 TO 6    HOURS AS NEEDED FOR PAIN; Last Rx:87Paa6543 Ordered   11  Pulmicort 0 5 MG/2ML Inhalation Suspension (Budesonide); Therapy: (Demetrioa Eric) to Recorded   12  Pulmicort Flexhaler 180 MCG/ACT Inhalation Aerosol Powder Breath Activated; Therapy: 36FBZ6913 to (Evaluate:94Jby4591) Recorded   13  Singulair 10 MG Oral Tablet (Montelukast Sodium); Therapy: (Demetrioa [place]) to Recorded   14  Tamsulosin HCl - 0 4 MG Oral Capsule Recorded    Allergies    1  No Known Drug Allergies    2  Dust   3  Dust Mite   4  Grass   5  Mold   6  Ragweed   7  Trees    Results/Data  Allergy Injection single injection 15KBF6593 12:00AM Escalon Eddy     Test Name Result Flag Reference   Allergy Injection #1 Vial 1     Allergy Injection #1 Site Right upper arm     Allergy Inj #1 Reaction      no reaction while in office   Allergy Injection #1 Vial 1     Allergy Injection #1 Site Right upper arm     Allergy Inj #1 Reaction      no reaction while in office             Plan  Allergic rhinitis    · Allergy Injection single injection; Status:Resulted - Requires Verification,Retrospective  By Protocol Authorization;   Done: 19RGG7019 12:00AM  Need for influenza vaccination    · Fluzone Quadrivalent 0 5 ML Intramuscular Suspension Prefilled Syringe    Future Appointments    Date/Time Provider Specialty Site   02/26/2018 03:15 PM MANNY Mcpherson   Dermatology  [facility]     Signatures   Electronically signed by : MANNY Munson ; Oct 17 2017  1:19PM EST

## 2018-01-18 NOTE — PROGRESS NOTES
Chief Complaint  Patient is here today for allergy injection  Administered in right upper arm SQ  Last dose completed and new vial is needed  Patient aware  Active Problems    1  Acid reflux disease (530 81) (K21 9)   2  Actinic keratosis (702 0) (L57 0)   3  Allergic rhinitis (477 9) (J30 9)   4  Anxiety, generalized (300 02) (F41 1)   5  Asthma (493 90) (J45 909)   6  Basal cell carcinoma of right forehead (173 31) (C44 319)   7  Benign prostatic hypertrophy (600 00) (N40 0)   8  Changing skin lesion (709 9) (L98 9)   9  Diverticulosis (562 10) (K57 90)   10  History of hip replacement (V43 64) (Z96 649)   11  Mixed hyperlipidemia (272 2) (E78 2)   12  Need for diphtheria-tetanus-pertussis (Tdap) vaccine (V06 1) (Z23)   13  Need for influenza vaccination (V04 81) (Z23)   14  Prostate cancer screening (V76 44) (Z12 5)   15  Pure hypercholesterolemia (272 0) (E78 00)   16  Screening for skin condition (V82 0) (Z13 89)   17  Seborrheic keratosis (702 19) (L82 1)    Current Meds   1  Aspirin 81 MG TABS; Therapy: (Recorded:32Ngj9006) to Recorded   2  Atorvastatin Calcium 20 MG Oral Tablet; Take 1 tablet daily; Therapy: 34AIA7020 to (Last Rx:72Qws1730)  Requested for: 38NEG5885 Ordered   3  Calcium TABS; Therapy: (Melissa Gambino) to Recorded   4  Citalopram Hydrobromide 20 MG Oral Tablet; Take 1 tablet daily; Therapy: 76YPV6522 to (Last Rx:70Eub8221)  Requested for: 75Qwb0510 Ordered   5  EpiPen 2-Papi 0 3 MG/0 3ML Injection Solution Auto-injector; use as directed; Therapy: 85Elb7404 to (Evaluate:06Bih5891)  Requested for: 94Qxx2686; Last   Rx:99Yud4186 Ordered   6  Fluticasone Propionate 50 MCG/ACT Nasal Suspension; Therapy: (407 906 542) to Recorded   7  Ipratropium Bromide 0 06 % Nasal Solution; Therapy: (787 426 542) to Recorded   8  LORazepam 0 5 MG Oral Tablet; 1 tid prn anxiety; Therapy: 51NAM5751 to (Last Rx:30Aug2017) Ordered   9   Omeprazole 40 MG Oral Capsule Delayed Release; take 1 capsule daily; Therapy: 92EUR9214 to (Last Rx:82Xwj0164)  Requested for: 14Dbe5336 Ordered   10  Oxycodone-Acetaminophen 5-325 MG Oral Tablet; TAKE 1 TABLET EVERY 4 TO 6    HOURS AS NEEDED FOR PAIN; Last Rx:92Vhq1030 Ordered   11  Pulmicort 0 5 MG/2ML Inhalation Suspension; Therapy: (Kelsey Linear) to Recorded   12  Pulmicort Flexhaler 180 MCG/ACT Inhalation Aerosol Powder Breath Activated; Therapy: 72LDU2514 to (Evaluate:04Ddk7653) Recorded   13  Singulair 10 MG Oral Tablet; Therapy: (Kelsey Linear) to Recorded   14  Tamsulosin HCl - 0 4 MG Oral Capsule Recorded    Allergies    1  No Known Drug Allergies    2  Dust   3  Dust Mite   4  Grass   5  Mold   6  Ragweed   7  Trees    Plan   Allergy Injection single injection; Status:Resulted - Requires Verification,Retrospective By Protocol Authorization;   Done: 51LWF3612 12:00AM  HLP:58EPR5155; Last Updated By:Shannan Dozier; 10/3/2017 1:20:14 PM;Ordered; For:Allergic rhinitis; Ordered By:Ishmael Bella; Future Appointments    Date/Time Provider Specialty Site   10/17/2017 10:15 AM [facility], Nurse Schedule  [address]   02/26/2018 03:15 PM MANNY Smith   Dermatology [address] INPATIENT REHABILITATION     Signatures   Electronically signed by : MANNY Muir ; Oct  3 2017  1:25PM EST                       (Author)

## 2018-02-26 ENCOUNTER — OFFICE VISIT (OUTPATIENT)
Dept: DERMATOLOGY | Facility: CLINIC | Age: 63
End: 2018-02-26
Payer: COMMERCIAL

## 2018-02-26 DIAGNOSIS — Z85.828 HISTORY OF SKIN CANCER: ICD-10-CM

## 2018-02-26 DIAGNOSIS — L57.0 ACTINIC KERATOSIS: ICD-10-CM

## 2018-02-26 DIAGNOSIS — Z13.89 SCREENING FOR SKIN CONDITION: Primary | ICD-10-CM

## 2018-02-26 DIAGNOSIS — L82.1 SEBORRHEIC KERATOSIS: ICD-10-CM

## 2018-02-26 PROCEDURE — 17000 DESTRUCT PREMALG LESION: CPT | Performed by: DERMATOLOGY

## 2018-02-26 PROCEDURE — 17003 DESTRUCT PREMALG LES 2-14: CPT | Performed by: DERMATOLOGY

## 2018-02-26 PROCEDURE — 99213 OFFICE O/P EST LOW 20 MIN: CPT | Performed by: DERMATOLOGY

## 2018-02-26 RX ORDER — EPINEPHRINE 0.3 MG/.3ML
INJECTION SUBCUTANEOUS
COMMUNITY
Start: 2016-08-08 | End: 2021-03-23 | Stop reason: ALTCHOICE

## 2018-02-26 RX ORDER — LORAZEPAM 0.5 MG/1
TABLET ORAL
COMMUNITY
Start: 2014-10-13 | End: 2018-04-23 | Stop reason: SDUPTHER

## 2018-02-26 RX ORDER — MONTELUKAST SODIUM 10 MG/1
TABLET ORAL
COMMUNITY
Start: 2018-01-21 | End: 2018-09-25 | Stop reason: CLARIF

## 2018-02-26 RX ORDER — OMEPRAZOLE 40 MG/1
1 CAPSULE, DELAYED RELEASE ORAL DAILY
COMMUNITY
Start: 2014-10-13 | End: 2018-04-20 | Stop reason: SDUPTHER

## 2018-02-26 RX ORDER — IPRATROPIUM BROMIDE 42 UG/1
SPRAY, METERED NASAL
COMMUNITY
End: 2022-05-10 | Stop reason: SDUPTHER

## 2018-02-26 RX ORDER — MONTELUKAST SODIUM 10 MG/1
10 TABLET ORAL
COMMUNITY
End: 2021-12-09

## 2018-02-26 RX ORDER — FLUTICASONE PROPIONATE 50 MCG
1 SPRAY, SUSPENSION (ML) NASAL DAILY
COMMUNITY

## 2018-02-26 RX ORDER — BACLOFEN 10 MG/1
5 TABLET ORAL
COMMUNITY
Start: 2016-10-20 | End: 2021-03-23 | Stop reason: ALTCHOICE

## 2018-02-26 RX ORDER — TAMSULOSIN HYDROCHLORIDE 0.4 MG/1
0.4 CAPSULE ORAL 2 TIMES DAILY
COMMUNITY

## 2018-02-26 RX ORDER — BUDESONIDE 0.5 MG/2ML
INHALANT ORAL
COMMUNITY
End: 2021-02-04 | Stop reason: ALTCHOICE

## 2018-02-26 RX ORDER — ATORVASTATIN CALCIUM 20 MG/1
1 TABLET, FILM COATED ORAL DAILY
COMMUNITY
Start: 2014-10-13 | End: 2018-03-26 | Stop reason: SDUPTHER

## 2018-02-26 RX ORDER — CITALOPRAM 20 MG/1
1 TABLET ORAL DAILY
COMMUNITY
Start: 2014-10-20 | End: 2018-04-23 | Stop reason: SDUPTHER

## 2018-02-26 NOTE — PROGRESS NOTES
[address] [facility] OF [address] [facility] DERMATOLOGY  [address] [place] [address]     MRN: [id number] : 1955  Encounter: [id number]  Patient Information: Mal Memory  Chief complaint:  6 month skin check    History of present illness:  46-year-old male with previous history of both skin cancer and actinic keratosis presents for overall checkup  No specific complaints noted  No past medical history on file  No past surgical history on file  Social History   History   Alcohol use Not on file     History   Drug use: Unknown     History   Smoking Status    Never Smoker   Smokeless Tobacco    Never Used     No family history on file  Meds/Allergies   Allergies   Allergen Reactions    Dust Mite Extract     Molds & Smuts     Pollen Extract     Short Ragweed Pollen Ext     Tree Extract        Meds:  Prior to Admission medications    Medication Sig Start Date End Date Taking?  Authorizing Provider   aspirin 81 MG tablet Take by mouth   Yes Historical Provider, MD   atorvastatin (LIPITOR) 20 mg tablet Take 1 tablet by mouth daily 10/13/14  Yes Historical Provider, MD   baclofen 10 mg tablet Take 5 mg by mouth 10/20/16  Yes Historical Provider, MD   budesonide (PULMICORT) 0 5 mg/2 mL nebulizer solution Inhale   Yes Historical Provider, MD   budesonide (PULMICORT) 180 MCG/ACT inhaler Inhale 2 puffs   Yes Historical Provider, MD   Calcium Carb-Cholecalciferol (CALCIUM 1000 + D) 1000-800 MG-UNIT TABS Take by mouth   Yes Historical Provider, MD   citalopram (CeleXA) 20 mg tablet Take 1 tablet by mouth daily 10/20/14  Yes Historical Provider, MD   EPINEPHrine (EPIPEN 2-LEENA) 0 3 mg/0 3 mL SOAJ Inject as directed 16  Yes Historical Provider, MD   fluticasone (FLONASE) 50 mcg/act nasal spray into each nostril   Yes Historical Provider, MD   ipratropium (ATROVENT) 0 06 % nasal spray into each nostril   Yes Historical Provider, MD   LORazepam (ATIVAN) 0 5 mg tablet Take by mouth 10/13/14  Yes Historical Provider, MD   montelukast (SINGULAIR) 10 mg tablet  1/21/18  Yes Historical Provider, MD   montelukast (SINGULAIR) 10 mg tablet Take by mouth   Yes Historical Provider, MD   omeprazole (PriLOSEC) 40 MG capsule Take 1 capsule by mouth daily 10/13/14  Yes Historical Provider, MD   tamsulosin (FLOMAX) 0 4 mg Take by mouth   Yes Historical Provider, MD       Subjective:     Review of Systems:    General: negative for - chills, fatigue, fever,  weight gain or weight loss  Psychological: negative for - anxiety, behavioral disorder, concentration difficulties, decreased libido, depression, irritability, memory difficulties, mood swings, sleep disturbances or suicidal ideation  ENT: negative for - hearing difficulties , nasal congestion, nasal discharge, oral lesions, sinus pain, sneezing, sore throat  Allergy and Immunology: negative for - hives, insect bite sensitivity,  Hematological and Lymphatic: negative for - bleeding problems, blood clots,bruising, swollen lymph nodes  Endocrine: negative for - hair pattern changes, hot flashes, malaise/lethargy, mood swings, palpitations, polydipsia/polyuria, skin changes, temperature intolerance or unexpected weight change  Respiratory: negative for - cough, hemoptysis, orthopnea, shortness of breath, or wheezing  Cardiovascular: negative for - chest pain, dyspnea on exertion, edema,  Gastrointestinal: negative for - abdominal pain, nausea/vomiting  Genito-Urinary: negative for - dysuria, incontinence, irregular/heavy menses or urinary frequency/urgency  Musculoskeletal: negative for - gait disturbance, joint pain, joint stiffness, joint swelling, muscle pain, muscular weakness  Dermatological:  As in HPI  Neurological: negative for confusion, dizziness, headaches, impaired coordination/balance, memory loss, numbness/tingling, seizures, speech problems, tremors or weakness       Objective: There were no vitals taken for this visit      Physical Exam:    General Appearance:    Alert, cooperative, no distress   Head:    Normocephalic, without obvious abnormality, atraumatic           Skin:   A full skin exam was performed including scalp, head scalp, eyes, ears, nose, lips, neck, chest, axilla, abdomen, back, buttocks, bilateral upper extremities, bilateral lower extremities, hands, feet, fingers, toes, fingernails, and toenails previous site of skin cancer well healed without recurrence scaling erythematous areas noted below normal keratotic papules with greasy stuck on appearance nothing else remarkable noted on complete exam   Physical Exam   HENT:   Head:         Cryotherapy Procedure Note    Pre-operative Diagnosis: actinic keratosis    Plan:  1  Instructed to keep the area dry and clean thereafter  Apply petrolatum if area gets crusty  2  Recommended that the patient use acetaminophen  as needed for pain  Locations:  Scalp and forehead    Indications: Destruction of actinic keratosis X2  Patient informed of risks (permanent scarring, infection, light or dark discoloration, bleeding, infection, weakness, numbness and recurrence of the lesion) and benefits of the procedure and verbal informed consent obtained  The areas are treated with liquid nitrogen therapy, frozen until ice ball extended 2 mm beyond lesion, allowed to thaw, and treated again  The patient tolerated procedure well  The patient was instructed on post-op care, warned that there may be blister formation, redness and pain  Recommend OTC analgesia as needed for pain  Condition:  Stable    Complications:  none  Assessment:     1  Screening for skin condition     2  History of skin cancer     3  Actinic keratosis     4  Seborrheic keratosis           Plan:   Actinic Keratosis:  Patient advised lesions are precancers  These should resolve with cryosurgery if not to let us know sun block recommended    Seborrheic keratosis patient reassured these are normal growths we acquire with age no treatment needed  History of skin cancer in no recurrence nothing else atypical sunblock recommended follow-up in 6 months  Screening for dermatologic disorders nothing else of concern noted on complete exam follow-up in 6 months    Candy Blankenship MD  2/26/2018,3:48 PM    Portions of the record may have been created with voice recognition software   Occasional wrong word or "sound a like" substitutions may have occurred due to the inherent limitations of voice recognition software   Read the chart carefully and recognize, using context, where substitutions have occurred

## 2018-02-26 NOTE — PATIENT INSTRUCTIONS
Actinic Keratosis:  Patient advised lesions are precancers  These should resolve with cryosurgery if not to let us know sun block recommended    Seborrheic keratosis patient reassured these are normal growths we acquire with age no treatment needed  History of skin cancer in no recurrence nothing else atypical sunblock recommended follow-up in 6 months  Screening for dermatologic disorders nothing else of concern noted on complete exam follow-up in 6 months

## 2018-03-26 DIAGNOSIS — E78.5 HYPERLIPIDEMIA, UNSPECIFIED HYPERLIPIDEMIA TYPE: Primary | ICD-10-CM

## 2018-03-26 RX ORDER — ATORVASTATIN CALCIUM 20 MG/1
TABLET, FILM COATED ORAL
Qty: 90 TABLET | Refills: 2 | Status: SHIPPED | OUTPATIENT
Start: 2018-03-26 | End: 2018-12-23 | Stop reason: SDUPTHER

## 2018-04-20 DIAGNOSIS — K21.9 GASTROESOPHAGEAL REFLUX DISEASE, ESOPHAGITIS PRESENCE NOT SPECIFIED: Primary | ICD-10-CM

## 2018-04-20 RX ORDER — OMEPRAZOLE 40 MG/1
CAPSULE, DELAYED RELEASE ORAL
Qty: 90 CAPSULE | Refills: 1 | Status: SHIPPED | OUTPATIENT
Start: 2018-04-20 | End: 2018-10-17 | Stop reason: SDUPTHER

## 2018-04-23 ENCOUNTER — TELEPHONE (OUTPATIENT)
Dept: FAMILY MEDICINE CLINIC | Facility: CLINIC | Age: 63
End: 2018-04-23

## 2018-04-23 DIAGNOSIS — F41.9 ANXIETY: Primary | ICD-10-CM

## 2018-04-23 RX ORDER — CITALOPRAM 20 MG/1
20 TABLET ORAL DAILY
Qty: 90 TABLET | Refills: 0 | Status: SHIPPED | OUTPATIENT
Start: 2018-04-23 | End: 2018-07-30 | Stop reason: SDUPTHER

## 2018-04-23 RX ORDER — LORAZEPAM 0.5 MG/1
0.5 TABLET ORAL EVERY 6 HOURS PRN
Qty: 30 TABLET | Refills: 0 | Status: SHIPPED | OUTPATIENT
Start: 2018-04-23 | End: 2018-05-29 | Stop reason: SDUPTHER

## 2018-04-23 NOTE — TELEPHONE ENCOUNTER
Please refill citalopram HBR 20 mg 1 daily  90 day supply to [facility] and can you call in a week to [facility]  Lorazepam patient wants 10 mg instead of 5 mg and [facility] now?

## 2018-05-29 DIAGNOSIS — F41.9 ANXIETY: ICD-10-CM

## 2018-05-29 RX ORDER — LORAZEPAM 0.5 MG/1
0.5 TABLET ORAL EVERY 6 HOURS PRN
Qty: 30 TABLET | Refills: 0 | Status: SHIPPED | OUTPATIENT
Start: 2018-05-29 | End: 2018-06-28 | Stop reason: SDUPTHER

## 2018-05-29 RX ORDER — LORAZEPAM 0.5 MG/1
1 TABLET ORAL EVERY 6 HOURS PRN
Qty: 90 TABLET | Refills: 0 | OUTPATIENT
Start: 2018-05-29

## 2018-05-29 NOTE — TELEPHONE ENCOUNTER
Pt scheduled an appt for Thursday  He would like to get his prescriptions send to [facility] pharmacy

## 2018-05-29 NOTE — TELEPHONE ENCOUNTER
Pt called and needs a refill for lorazepam  Pt also ask for a 90-day supply and wants to know if he can increase to 1mg  Site check-dileep husain

## 2018-05-31 ENCOUNTER — OFFICE VISIT (OUTPATIENT)
Dept: FAMILY MEDICINE CLINIC | Facility: CLINIC | Age: 63
End: 2018-05-31
Payer: COMMERCIAL

## 2018-05-31 VITALS
HEIGHT: 69 IN | BODY MASS INDEX: 27.31 KG/M2 | HEART RATE: 57 BPM | DIASTOLIC BLOOD PRESSURE: 90 MMHG | TEMPERATURE: 97.8 F | SYSTOLIC BLOOD PRESSURE: 130 MMHG | WEIGHT: 184.4 LBS | OXYGEN SATURATION: 97 %

## 2018-05-31 DIAGNOSIS — F41.9 ANXIETY: ICD-10-CM

## 2018-05-31 DIAGNOSIS — E78.5 HYPERLIPIDEMIA, UNSPECIFIED HYPERLIPIDEMIA TYPE: Primary | ICD-10-CM

## 2018-05-31 DIAGNOSIS — J30.9 ALLERGIC RHINITIS, UNSPECIFIED SEASONALITY, UNSPECIFIED TRIGGER: ICD-10-CM

## 2018-05-31 DIAGNOSIS — K21.9 GASTROESOPHAGEAL REFLUX DISEASE, ESOPHAGITIS PRESENCE NOT SPECIFIED: ICD-10-CM

## 2018-05-31 DIAGNOSIS — F41.1 ANXIETY, GENERALIZED: ICD-10-CM

## 2018-05-31 DIAGNOSIS — J45.909 UNCOMPLICATED ASTHMA, UNSPECIFIED ASTHMA SEVERITY, UNSPECIFIED WHETHER PERSISTENT: ICD-10-CM

## 2018-05-31 DIAGNOSIS — Z12.5 PROSTATE CANCER SCREENING: ICD-10-CM

## 2018-05-31 PROCEDURE — 99214 OFFICE O/P EST MOD 30 MIN: CPT | Performed by: FAMILY MEDICINE

## 2018-05-31 PROCEDURE — 3008F BODY MASS INDEX DOCD: CPT | Performed by: FAMILY MEDICINE

## 2018-05-31 PROCEDURE — 1036F TOBACCO NON-USER: CPT | Performed by: FAMILY MEDICINE

## 2018-05-31 RX ORDER — ASPIRIN 325 MG
325 TABLET ORAL DAILY
COMMUNITY
Start: 2016-04-25 | End: 2021-02-04 | Stop reason: SDUPTHER

## 2018-05-31 RX ORDER — OXYCODONE HYDROCHLORIDE AND ACETAMINOPHEN 5; 325 MG/1; MG/1
1 TABLET ORAL
COMMUNITY
End: 2021-12-01 | Stop reason: ALTCHOICE

## 2018-05-31 RX ORDER — ERGOCALCIFEROL (VITAMIN D2) 1250 MCG
50000 CAPSULE ORAL
COMMUNITY
Start: 2016-04-25 | End: 2019-08-06

## 2018-05-31 RX ORDER — OXYCODONE HYDROCHLORIDE 5 MG/1
5-10 TABLET ORAL EVERY 4 HOURS
COMMUNITY
Start: 2016-04-25 | End: 2021-12-01 | Stop reason: ALTCHOICE

## 2018-05-31 NOTE — PROGRESS NOTES
Assessment/Plan:           Problem List Items Addressed This Visit     Acid reflux disease     Continue Prilosec         Allergic rhinitis     Continue Singulair         Anxiety, generalized     Continue Ativan as needed         Asthma     Continue Pulmicort         HLD (hyperlipidemia) - Primary    Relevant Orders    CBC and differential    Comprehensive metabolic panel    Lipid panel      Other Visit Diagnoses     Anxiety        Prostate cancer screening        Relevant Orders    PSA, Total Screen            Subjective:      Patient ID: Milana Whaley is a 63 y o  male  Patient comes in for annual physical         The following portions of the patient's history were reviewed and updated as appropriate: allergies, current medications, past family history, past medical history, past social history, past surgical history and problem list     Review of Systems   Constitutional: Negative  HENT: Positive for rhinorrhea and sinus pressure  Respiratory: Negative  Cardiovascular: Negative  Objective:      /90   Pulse 57   Temp 97 8 °F (36 6 °C)   Ht 5' 8 5" (1 74 m)   Wt 83 6 kg (184 lb 6 4 oz)   SpO2 97%   BMI 27 63 kg/m²          Physical Exam   Constitutional: He is oriented to person, place, and time  He appears well-developed and well-nourished  HENT:   Head: Normocephalic and atraumatic  Right Ear: Tympanic membrane normal    Left Ear: Tympanic membrane normal    Mouth/Throat: Oropharynx is clear and moist    Paranasal sinuses pale and edematous bilateral   Eyes: EOM are normal  Pupils are equal, round, and reactive to light  Neck: Neck supple  Cardiovascular: Normal rate, regular rhythm and normal heart sounds  Pulmonary/Chest: Effort normal and breath sounds normal    Abdominal: Soft  Bowel sounds are normal    Musculoskeletal: Normal range of motion  Neurological: He is alert and oriented to person, place, and time  Skin: Skin is warm and dry     Psychiatric: He has a normal mood and affect   Thought content normal

## 2018-06-26 ENCOUNTER — APPOINTMENT (OUTPATIENT)
Dept: LAB | Facility: CLINIC | Age: 63
End: 2018-06-26
Payer: COMMERCIAL

## 2018-06-26 DIAGNOSIS — Z12.5 PROSTATE CANCER SCREENING: ICD-10-CM

## 2018-06-26 DIAGNOSIS — E78.5 HYPERLIPIDEMIA, UNSPECIFIED HYPERLIPIDEMIA TYPE: ICD-10-CM

## 2018-06-26 LAB
ALBUMIN SERPL BCP-MCNC: 4 G/DL (ref 3.5–5)
ALP SERPL-CCNC: 58 U/L (ref 46–116)
ALT SERPL W P-5'-P-CCNC: 41 U/L (ref 12–78)
ANION GAP SERPL CALCULATED.3IONS-SCNC: 7 MMOL/L (ref 4–13)
AST SERPL W P-5'-P-CCNC: 19 U/L (ref 5–45)
BASOPHILS # BLD AUTO: 0.05 THOUSANDS/ΜL (ref 0–0.1)
BASOPHILS NFR BLD AUTO: 1 % (ref 0–1)
BILIRUB SERPL-MCNC: 0.4 MG/DL (ref 0.2–1)
BUN SERPL-MCNC: 16 MG/DL (ref 5–25)
CALCIUM SERPL-MCNC: 9.2 MG/DL (ref 8.3–10.1)
CHLORIDE SERPL-SCNC: 106 MMOL/L (ref 100–108)
CHOLEST SERPL-MCNC: 185 MG/DL (ref 50–200)
CO2 SERPL-SCNC: 25 MMOL/L (ref 21–32)
CREAT SERPL-MCNC: 0.84 MG/DL (ref 0.6–1.3)
EOSINOPHIL # BLD AUTO: 0.16 THOUSAND/ΜL (ref 0–0.61)
EOSINOPHIL NFR BLD AUTO: 3 % (ref 0–6)
ERYTHROCYTE [DISTWIDTH] IN BLOOD BY AUTOMATED COUNT: 12.9 % (ref 11.6–15.1)
GFR SERPL CREATININE-BSD FRML MDRD: 95 ML/MIN/1.73SQ M
GLUCOSE P FAST SERPL-MCNC: 94 MG/DL (ref 65–99)
HCT VFR BLD AUTO: 43 % (ref 36.5–49.3)
HDLC SERPL-MCNC: 45 MG/DL (ref 40–60)
HGB BLD-MCNC: 14.2 G/DL (ref 12–17)
IMM GRANULOCYTES # BLD AUTO: 0.01 THOUSAND/UL (ref 0–0.2)
IMM GRANULOCYTES NFR BLD AUTO: 0 % (ref 0–2)
LDLC SERPL CALC-MCNC: 117 MG/DL (ref 0–100)
LYMPHOCYTES # BLD AUTO: 1.21 THOUSANDS/ΜL (ref 0.6–4.47)
LYMPHOCYTES NFR BLD AUTO: 20 % (ref 14–44)
MCH RBC QN AUTO: 29.5 PG (ref 26.8–34.3)
MCHC RBC AUTO-ENTMCNC: 33 G/DL (ref 31.4–37.4)
MCV RBC AUTO: 89 FL (ref 82–98)
MONOCYTES # BLD AUTO: 0.71 THOUSAND/ΜL (ref 0.17–1.22)
MONOCYTES NFR BLD AUTO: 12 % (ref 4–12)
NEUTROPHILS # BLD AUTO: 3.87 THOUSANDS/ΜL (ref 1.85–7.62)
NEUTS SEG NFR BLD AUTO: 64 % (ref 43–75)
NONHDLC SERPL-MCNC: 140 MG/DL
NRBC BLD AUTO-RTO: 0 /100 WBCS
PLATELET # BLD AUTO: 194 THOUSANDS/UL (ref 149–390)
PMV BLD AUTO: 10.6 FL (ref 8.9–12.7)
POTASSIUM SERPL-SCNC: 4 MMOL/L (ref 3.5–5.3)
PROT SERPL-MCNC: 6.9 G/DL (ref 6.4–8.2)
PSA SERPL-MCNC: 4 NG/ML (ref 0–4)
RBC # BLD AUTO: 4.82 MILLION/UL (ref 3.88–5.62)
SODIUM SERPL-SCNC: 138 MMOL/L (ref 136–145)
TRIGL SERPL-MCNC: 117 MG/DL
WBC # BLD AUTO: 6.01 THOUSAND/UL (ref 4.31–10.16)

## 2018-06-26 PROCEDURE — 80053 COMPREHEN METABOLIC PANEL: CPT

## 2018-06-26 PROCEDURE — 85025 COMPLETE CBC W/AUTO DIFF WBC: CPT

## 2018-06-26 PROCEDURE — G0103 PSA SCREENING: HCPCS

## 2018-06-26 PROCEDURE — 80061 LIPID PANEL: CPT

## 2018-06-26 PROCEDURE — 36415 COLL VENOUS BLD VENIPUNCTURE: CPT

## 2018-06-28 DIAGNOSIS — F41.9 ANXIETY: ICD-10-CM

## 2018-06-28 RX ORDER — LORAZEPAM 0.5 MG/1
0.5 TABLET ORAL EVERY 6 HOURS PRN
Qty: 30 TABLET | Refills: 0 | Status: SHIPPED | OUTPATIENT
Start: 2018-06-28 | End: 2018-09-13 | Stop reason: SDUPTHER

## 2018-07-30 DIAGNOSIS — F41.9 ANXIETY: ICD-10-CM

## 2018-07-30 RX ORDER — CITALOPRAM 20 MG/1
20 TABLET ORAL DAILY
Qty: 90 TABLET | Refills: 0 | Status: SHIPPED | OUTPATIENT
Start: 2018-07-30 | End: 2018-10-18 | Stop reason: SDUPTHER

## 2018-09-13 ENCOUNTER — TELEPHONE (OUTPATIENT)
Dept: FAMILY MEDICINE CLINIC | Facility: CLINIC | Age: 63
End: 2018-09-13

## 2018-09-13 DIAGNOSIS — F41.9 ANXIETY: ICD-10-CM

## 2018-09-13 RX ORDER — LORAZEPAM 0.5 MG/1
0.5 TABLET ORAL EVERY 6 HOURS PRN
Qty: 30 TABLET | Refills: 3 | Status: SHIPPED | OUTPATIENT
Start: 2018-09-13 | End: 2019-02-28 | Stop reason: SDUPTHER

## 2018-09-25 ENCOUNTER — OFFICE VISIT (OUTPATIENT)
Dept: DERMATOLOGY | Facility: CLINIC | Age: 63
End: 2018-09-25
Payer: COMMERCIAL

## 2018-09-25 DIAGNOSIS — Z13.89 SCREENING FOR SKIN CONDITION: ICD-10-CM

## 2018-09-25 DIAGNOSIS — Z85.828 HISTORY OF SKIN CANCER: ICD-10-CM

## 2018-09-25 DIAGNOSIS — L82.1 SEBORRHEIC KERATOSIS: Primary | ICD-10-CM

## 2018-09-25 PROCEDURE — 99213 OFFICE O/P EST LOW 20 MIN: CPT | Performed by: DERMATOLOGY

## 2018-09-25 NOTE — PATIENT INSTRUCTIONS
Seborrheic keratosis patient reassured these are normal growths we acquire with age no treatment needed  History of skin cancer in no recurrence nothing else atypical sunblock recommended follow-up in 6 months  Screening for dermatologic disorders nothing else of concern noted on complete exam follow-up in 6 months if alls well at next visit will plan yearly checkups after that

## 2018-09-25 NOTE — PROGRESS NOTES
[address] [facility] DERMATOLOGY  [address] [place] [facility] [address]  [phone]     MRN: [id number] : 1955  Encounter: [id number]  Patient Information: Lesa Amanda  Chief complaint:Six-month checkup    History of present illness:  66year old male presents for overall skin check concerned regarding potential skin cancer with previous history no specific concerns noted  Past Medical History:   Diagnosis Date    Colon polyp     H/O nonmelanoma skin cancer     last assessed-2017    Inflamed seborrheic keratosis     Malignant neoplasm of skin     last assessed-2014    Neoplasm of uncertain behavior of skin     Nocturia     Pneumothorax, left     Seasonal allergies     Sebaceous cyst     Squamous cell carcinoma of skin of neck     Testicular hypofunction     Viral warts      Past Surgical History:   Procedure Laterality Date    CARDIAC CATHETERIZATION      CARDIOVASCULAR STRESS TEST  2010    COLONOSCOPY  10/08/2008    RHINOPLASTY       Social History   History   Alcohol Use No     History   Drug Use No     History   Smoking Status    Never Smoker   Smokeless Tobacco    Never Used     Family History   Problem Relation Age of Onset    Colon cancer Father      Meds/Allergies   Allergies   Allergen Reactions    Dust Mite Extract     Molds & Smuts     Pollen Extract     Short Ragweed Pollen Ext     Tree Extract        Meds:  Prior to Admission medications    Medication Sig Start Date End Date Taking?  Authorizing Provider   aspirin 81 MG tablet Take by mouth   Yes Historical Provider, MD   atorvastatin (LIPITOR) 20 mg tablet TAKE 1 TABLET DAILY 3/26/18  Yes Brendon Sauceda MD   budesonide (PULMICORT) 0 5 mg/2 mL nebulizer solution Inhale   Yes Historical Provider, MD   budesonide (PULMICORT) 180 MCG/ACT inhaler Inhale 2 puffs   Yes Historical Provider, MD   Calcium Carb-Cholecalciferol (CALCIUM 1000 + D) 1000-800 MG-UNIT TABS Take by mouth   Yes Historical Provider, MD   citalopram (CeleXA) 20 mg tablet Take 1 tablet (20 mg total) by mouth daily 7/30/18  Yes Sadi Mccartney MD   EPINEPHrine (EPIPEN 2-LEENA) 0 3 mg/0 3 mL SOAJ Inject as directed 8/8/16  Yes Historical Provider, MD   fluticasone (FLONASE) 50 mcg/act nasal spray into each nostril   Yes Historical Provider, MD   ipratropium (ATROVENT) 0 06 % nasal spray into each nostril   Yes Historical Provider, MD   LORazepam (ATIVAN) 0 5 mg tablet Take 1 tablet (0 5 mg total) by mouth every 6 (six) hours as needed for anxiety 9/13/18  Yes Sadi Mccartney MD   montelukast (SINGULAIR) 10 mg tablet Take by mouth   Yes Historical Provider, MD   omeprazole (PriLOSEC) 40 MG capsule TAKE 1 CAPSULE DAILY 4/20/18  Yes Sadi Mccartney MD   tamsulosin (FLOMAX) 0 4 mg Take by mouth   Yes Historical Provider, MD   aspirin 325 mg tablet Take 325 mg by mouth 4/25/16   Historical Provider, MD   baclofen 10 mg tablet Take 5 mg by mouth 10/20/16   Historical Provider, MD   ergocalciferol (ERGOCALCIFEROL) 65551 units capsule Take 50,000 Units by mouth 4/25/16   Historical Provider, MD   oxyCODONE (ROXICODONE) 5 mg immediate release tablet Take 5-10 mg by mouth every 4 (four) hours 4/25/16   Historical Provider, MD   oxyCODONE-acetaminophen (PERCOCET) 5-325 mg per tablet Take 1 tablet by mouth    Historical Provider, MD   montelukast (SINGULAIR) 10 mg tablet  1/21/18 9/25/18  Historical Provider, MD       Subjective:     Review of Systems:    General: negative for - chills, fatigue, fever,  weight gain or weight loss  Psychological: negative for - anxiety, behavioral disorder, concentration difficulties, decreased libido, depression, irritability, memory difficulties, mood swings, sleep disturbances or suicidal ideation  ENT: negative for - hearing difficulties , nasal congestion, nasal discharge, oral lesions, sinus pain, sneezing, sore throat  Allergy and Immunology: negative for - hives, insect bite sensitivity,  Hematological and Lymphatic: negative for - bleeding problems, blood clots,bruising, swollen lymph nodes  Endocrine: negative for - hair pattern changes, hot flashes, malaise/lethargy, mood swings, palpitations, polydipsia/polyuria, skin changes, temperature intolerance or unexpected weight change  Respiratory: negative for - cough, hemoptysis, orthopnea, shortness of breath, or wheezing  Cardiovascular: negative for - chest pain, dyspnea on exertion, edema,  Gastrointestinal: negative for - abdominal pain, nausea/vomiting  Genito-Urinary: negative for - dysuria, incontinence, irregular/heavy menses or urinary frequency/urgency  Musculoskeletal: negative for - gait disturbance, joint pain, joint stiffness, joint swelling, muscle pain, muscular weakness  Dermatological:  As in HPI  Neurological: negative for confusion, dizziness, headaches, impaired coordination/balance, memory loss, numbness/tingling, seizures, speech problems, tremors or weakness       Objective: There were no vitals taken for this visit  Physical Exam:    General Appearance:    Alert, cooperative, no distress   Head:    Normocephalic, without obvious abnormality, atraumatic           Skin:   A full skin exam was performed including scalp, head scalp, eyes, ears, nose, lips, neck, chest, axilla, abdomen, back, buttocks, bilateral upper extremities, bilateral lower extremities, hands, feet, fingers, toes, fingernails, and toenails  Normal keratotic papules greasy stuck on appearance nothing else atypical noted on exam previous sites of skin cancer well healed withou     Assessment:     1  Seborrheic keratosis     2  Screening for skin condition     3   History of skin cancer           Plan:   Seborrheic keratosis patient reassured these are normal growths we acquire with age no treatment needed  History of skin cancer in no recurrence nothing else atypical sunblock recommended follow-up in 6 months  Screening for dermatologic disorders nothing else of concern noted on complete exam follow-up in 6 months if alls well at next visit will plan yearly checkups after that    Kellie Arauz MD  9/25/2018,3:02 PM    Portions of the record may have been created with voice recognition software   Occasional wrong word or "sound a like" substitutions may have occurred due to the inherent limitations of voice recognition software   Read the chart carefully and recognize, using context, where substitutions have occurred

## 2018-10-17 DIAGNOSIS — K21.9 GASTROESOPHAGEAL REFLUX DISEASE, ESOPHAGITIS PRESENCE NOT SPECIFIED: ICD-10-CM

## 2018-10-17 RX ORDER — OMEPRAZOLE 40 MG/1
CAPSULE, DELAYED RELEASE ORAL
Qty: 90 CAPSULE | Refills: 1 | Status: SHIPPED | OUTPATIENT
Start: 2018-10-17 | End: 2019-04-15 | Stop reason: SDUPTHER

## 2018-10-18 DIAGNOSIS — F41.9 ANXIETY: ICD-10-CM

## 2018-10-18 RX ORDER — CITALOPRAM 20 MG/1
20 TABLET ORAL DAILY
Qty: 90 TABLET | Refills: 0 | Status: SHIPPED | OUTPATIENT
Start: 2018-10-18 | End: 2019-01-15 | Stop reason: SDUPTHER

## 2018-12-23 DIAGNOSIS — E78.5 HYPERLIPIDEMIA, UNSPECIFIED HYPERLIPIDEMIA TYPE: ICD-10-CM

## 2018-12-24 RX ORDER — ATORVASTATIN CALCIUM 20 MG/1
TABLET, FILM COATED ORAL
Qty: 90 TABLET | Refills: 2 | Status: SHIPPED | OUTPATIENT
Start: 2018-12-24 | End: 2019-09-23 | Stop reason: SDUPTHER

## 2019-01-15 DIAGNOSIS — F41.9 ANXIETY: ICD-10-CM

## 2019-01-15 RX ORDER — CITALOPRAM 20 MG/1
20 TABLET ORAL DAILY
Qty: 90 TABLET | Refills: 0 | Status: SHIPPED | OUTPATIENT
Start: 2019-01-15 | End: 2019-04-18 | Stop reason: SDUPTHER

## 2019-02-28 DIAGNOSIS — F41.9 ANXIETY: ICD-10-CM

## 2019-02-28 RX ORDER — LORAZEPAM 0.5 MG/1
0.5 TABLET ORAL EVERY 6 HOURS PRN
Qty: 30 TABLET | Refills: 3 | Status: SHIPPED | OUTPATIENT
Start: 2019-02-28 | End: 2019-08-06 | Stop reason: SDUPTHER

## 2019-02-28 RX ORDER — LORAZEPAM 0.5 MG/1
0.5 TABLET ORAL EVERY 6 HOURS PRN
Qty: 30 TABLET | Refills: 3 | Status: SHIPPED | OUTPATIENT
Start: 2019-02-28 | End: 2019-02-28 | Stop reason: SDUPTHER

## 2019-02-28 NOTE — TELEPHONE ENCOUNTER
Patient left message on RX line at 121  Patient requested a call from office when message was rec'd  Patient was aware refill is being forwarded to Dr Lindsay Confer

## 2019-03-25 ENCOUNTER — OFFICE VISIT (OUTPATIENT)
Dept: DERMATOLOGY | Facility: CLINIC | Age: 64
End: 2019-03-25
Payer: COMMERCIAL

## 2019-03-25 DIAGNOSIS — Z13.89 SCREENING FOR SKIN CONDITION: ICD-10-CM

## 2019-03-25 DIAGNOSIS — L21.9 SEBORRHEIC DERMATITIS: ICD-10-CM

## 2019-03-25 DIAGNOSIS — L82.1 SEBORRHEIC KERATOSIS: Primary | ICD-10-CM

## 2019-03-25 DIAGNOSIS — Z85.828 HISTORY OF SKIN CANCER: ICD-10-CM

## 2019-03-25 PROCEDURE — 99213 OFFICE O/P EST LOW 20 MIN: CPT | Performed by: DERMATOLOGY

## 2019-03-25 RX ORDER — DESONIDE 0.5 MG/G
CREAM TOPICAL 2 TIMES DAILY
Qty: 15 G | Refills: 3 | Status: SHIPPED | OUTPATIENT
Start: 2019-03-25 | End: 2021-02-04 | Stop reason: ALTCHOICE

## 2019-03-25 NOTE — PROGRESS NOTES
[address] [facility] DERMATOLOGY  [address]  [phone]  [phone]     MRN: [id number] : 1955  Encounter: [id number]  Patient Information: Arletta Canal  Chief complaint:  Itchy area on left cheek and six-month checkup for skin cancer    History of present illness:  62-year-old male presents for concerns regarding itching on his left cheek that has been going on for about a month patient denies anything new anything that he has been putting on it that may have contributed to this area patient notes that the area has been rashy to some extent  Patient has been using some Eucerin cream on the area  Past Medical History:   Diagnosis Date    Colon polyp     H/O nonmelanoma skin cancer     last assessed-2017    Inflamed seborrheic keratosis     Malignant neoplasm of skin     last assessed-2014    Neoplasm of uncertain behavior of skin     Nocturia     Pneumothorax, left     Seasonal allergies     Sebaceous cyst     Squamous cell carcinoma of skin of neck     Testicular hypofunction     Viral warts      Past Surgical History:   Procedure Laterality Date    CARDIAC CATHETERIZATION      CARDIOVASCULAR STRESS TEST  2010    COLONOSCOPY  10/08/2008    RHINOPLASTY       Social History   Social History     Substance and Sexual Activity   Alcohol Use No     Social History     Substance and Sexual Activity   Drug Use No     Social History     Tobacco Use   Smoking Status Never Smoker   Smokeless Tobacco Never Used     Family History   Problem Relation Age of Onset    Colon cancer Father      Meds/Allergies   Allergies   Allergen Reactions    Dust Mite Extract     Molds & Smuts     Pollen Extract     Short Ragweed Pollen Ext     Tree Extract        Meds:  Prior to Admission medications    Medication Sig Start Date End Date Taking?  Authorizing Provider   aspirin 81 MG tablet Take by mouth   Yes Historical Provider, MD atorvastatin (LIPITOR) 20 mg tablet TAKE 1 TABLET DAILY 12/24/18  Yes Abimael Orr MD   budesonide (PULMICORT) 0 5 mg/2 mL nebulizer solution Inhale   Yes Historical Provider, MD   budesonide (PULMICORT) 180 MCG/ACT inhaler Inhale 2 puffs   Yes Historical Provider, MD   Calcium Carb-Cholecalciferol (CALCIUM 1000 + D) 1000-800 MG-UNIT TABS Take by mouth   Yes Historical Provider, MD   citalopram (CeleXA) 20 mg tablet Take 1 tablet (20 mg total) by mouth daily 1/15/19  Yes Abimael Orr MD   EPINEPHrine (EPIPEN 2-LEENA) 0 3 mg/0 3 mL SOAJ Inject as directed 8/8/16  Yes Historical Provider, MD   fluticasone (FLONASE) 50 mcg/act nasal spray into each nostril   Yes Historical Provider, MD   ipratropium (ATROVENT) 0 06 % nasal spray into each nostril   Yes Historical Provider, MD   montelukast (SINGULAIR) 10 mg tablet Take by mouth   Yes Historical Provider, MD   omeprazole (PriLOSEC) 40 MG capsule TAKE 1 CAPSULE DAILY 10/17/18  Yes Abimael Orr MD   tamsulosin (FLOMAX) 0 4 mg Take by mouth   Yes Historical Provider, MD   aspirin 325 mg tablet Take 325 mg by mouth 4/25/16   Historical Provider, MD   baclofen 10 mg tablet Take 5 mg by mouth 10/20/16   Historical Provider, MD   ergocalciferol (ERGOCALCIFEROL) 81145 units capsule Take 50,000 Units by mouth 4/25/16   Historical Provider, MD   LORazepam (ATIVAN) 0 5 mg tablet Take 1 tablet (0 5 mg total) by mouth every 6 (six) hours as needed for anxiety  Patient not taking: Reported on 3/25/2019 2/28/19   Abimael Orr MD   oxyCODONE (ROXICODONE) 5 mg immediate release tablet Take 5-10 mg by mouth every 4 (four) hours 4/25/16   Historical Provider, MD   oxyCODONE-acetaminophen (PERCOCET) 5-325 mg per tablet Take 1 tablet by mouth    Historical Provider, MD       Subjective:     Review of Systems:    General: negative for - chills, fatigue, fever,  weight gain or weight loss  Psychological: negative for - anxiety, behavioral disorder, concentration difficulties, decreased libido, depression, irritability, memory difficulties, mood swings, sleep disturbances or suicidal ideation  ENT: negative for - hearing difficulties , nasal congestion, nasal discharge, oral lesions, sinus pain, sneezing, sore throat  Allergy and Immunology: negative for - hives, insect bite sensitivity,  Hematological and Lymphatic: negative for - bleeding problems, blood clots,bruising, swollen lymph nodes  Endocrine: negative for - hair pattern changes, hot flashes, malaise/lethargy, mood swings, palpitations, polydipsia/polyuria, skin changes, temperature intolerance or unexpected weight change  Respiratory: negative for - cough, hemoptysis, orthopnea, shortness of breath, or wheezing  Cardiovascular: negative for - chest pain, dyspnea on exertion, edema,  Gastrointestinal: negative for - abdominal pain, nausea/vomiting  Genito-Urinary: negative for - dysuria, incontinence, irregular/heavy menses or urinary frequency/urgency  Musculoskeletal: negative for - gait disturbance, joint pain, joint stiffness, joint swelling, muscle pain, muscular weakness  Dermatological:  As in HPI  Neurological: negative for confusion, dizziness, headaches, impaired coordination/balance, memory loss, numbness/tingling, seizures, speech problems, tremors or weakness       Objective: There were no vitals taken for this visit      Physical Exam:    General Appearance:    Alert, cooperative, no distress   Head:    Normocephalic, without obvious abnormality, atraumatic           Skin:   A full skin exam was performed including scalp, head scalp, eyes, ears, nose, lips, neck, chest, axilla, abdomen, back, buttocks, bilateral upper extremities, bilateral lower extremities, hands, feet, fingers, toes, fingernails, and toenails previous site of skin cancers well healed without recurrence mild erythema scaling noted on the left cheek also little bit in the meatus of the ear normal keratotic papules greasy stuck on appearance nothing else remarkable noted on exam     Assessment:     1  Seborrheic keratosis     2  Seborrheic dermatitis     3  Screening for skin condition     4  History of skin cancer           Plan:   Seborrheic dermatitis versus irritant dermatitis will go ahead treat with topical steroids if no improvement will consider other option  Seborrheic keratosis patient reassured these are normal growths we acquire with age no treatment needed  History of skin cancer in no recurrence nothing else atypical sunblock recommended follow-up in 1 year  Screening for dermatologic disorders nothing else of concern noted on complete exam follow-up in 1 year    Glenn Nascimento MD  3/25/2019,1:08 PM    Portions of the record may have been created with voice recognition software   Occasional wrong word or "sound a like" substitutions may have occurred due to the inherent limitations of voice recognition software   Read the chart carefully and recognize, using context, where substitutions have occurred

## 2019-04-15 DIAGNOSIS — K21.9 GASTROESOPHAGEAL REFLUX DISEASE, ESOPHAGITIS PRESENCE NOT SPECIFIED: ICD-10-CM

## 2019-04-15 RX ORDER — OMEPRAZOLE 40 MG/1
CAPSULE, DELAYED RELEASE ORAL
Qty: 90 CAPSULE | Refills: 1 | Status: SHIPPED | OUTPATIENT
Start: 2019-04-15 | End: 2019-10-14 | Stop reason: SDUPTHER

## 2019-04-18 DIAGNOSIS — F41.9 ANXIETY: ICD-10-CM

## 2019-04-18 RX ORDER — CITALOPRAM 20 MG/1
20 TABLET ORAL DAILY
Qty: 90 TABLET | Refills: 0 | Status: SHIPPED | OUTPATIENT
Start: 2019-04-18 | End: 2019-07-24 | Stop reason: SDUPTHER

## 2019-07-22 DIAGNOSIS — F41.9 ANXIETY: ICD-10-CM

## 2019-07-22 RX ORDER — CITALOPRAM 20 MG/1
20 TABLET ORAL DAILY
Qty: 90 TABLET | Refills: 0 | OUTPATIENT
Start: 2019-07-22

## 2019-07-24 DIAGNOSIS — F41.9 ANXIETY: ICD-10-CM

## 2019-07-24 RX ORDER — CITALOPRAM 20 MG/1
20 TABLET ORAL DAILY
Qty: 30 TABLET | Refills: 0 | Status: SHIPPED | OUTPATIENT
Start: 2019-07-24 | End: 2019-08-06 | Stop reason: SDUPTHER

## 2019-07-24 NOTE — TELEPHONE ENCOUNTER
T/c from pt wondering why citalopram was denied refill  It is because an appt is needed for refill  Attempted to tell patient but lost him on his cell phone  Just heard static  Asked pt if he could hear me to call back

## 2019-08-06 ENCOUNTER — OFFICE VISIT (OUTPATIENT)
Dept: FAMILY MEDICINE CLINIC | Facility: CLINIC | Age: 64
End: 2019-08-06
Payer: COMMERCIAL

## 2019-08-06 VITALS
SYSTOLIC BLOOD PRESSURE: 118 MMHG | HEART RATE: 68 BPM | OXYGEN SATURATION: 97 % | DIASTOLIC BLOOD PRESSURE: 70 MMHG | HEIGHT: 69 IN | WEIGHT: 186 LBS | TEMPERATURE: 96.8 F | BODY MASS INDEX: 27.55 KG/M2

## 2019-08-06 DIAGNOSIS — E78.5 HYPERLIPIDEMIA, UNSPECIFIED HYPERLIPIDEMIA TYPE: ICD-10-CM

## 2019-08-06 DIAGNOSIS — Z00.00 HEALTH MAINTENANCE EXAMINATION: Primary | ICD-10-CM

## 2019-08-06 DIAGNOSIS — F41.9 ANXIETY: ICD-10-CM

## 2019-08-06 DIAGNOSIS — R97.20 ELEVATED PSA: ICD-10-CM

## 2019-08-06 DIAGNOSIS — J45.909 UNCOMPLICATED ASTHMA, UNSPECIFIED ASTHMA SEVERITY, UNSPECIFIED WHETHER PERSISTENT: ICD-10-CM

## 2019-08-06 DIAGNOSIS — N40.1 BENIGN PROSTATIC HYPERPLASIA WITH URINARY FREQUENCY: ICD-10-CM

## 2019-08-06 DIAGNOSIS — E55.9 VITAMIN D DEFICIENCY: ICD-10-CM

## 2019-08-06 DIAGNOSIS — K21.9 GASTROESOPHAGEAL REFLUX DISEASE, ESOPHAGITIS PRESENCE NOT SPECIFIED: ICD-10-CM

## 2019-08-06 DIAGNOSIS — J30.9 ALLERGIC RHINITIS, UNSPECIFIED SEASONALITY, UNSPECIFIED TRIGGER: ICD-10-CM

## 2019-08-06 DIAGNOSIS — R35.0 BENIGN PROSTATIC HYPERPLASIA WITH URINARY FREQUENCY: ICD-10-CM

## 2019-08-06 PROCEDURE — 99396 PREV VISIT EST AGE 40-64: CPT | Performed by: FAMILY MEDICINE

## 2019-08-06 RX ORDER — CITALOPRAM 40 MG/1
40 TABLET ORAL DAILY
Qty: 90 TABLET | Refills: 5 | Status: SHIPPED | OUTPATIENT
Start: 2019-08-06 | End: 2019-08-23 | Stop reason: SDUPTHER

## 2019-08-06 RX ORDER — LORAZEPAM 0.5 MG/1
0.5 TABLET ORAL EVERY 6 HOURS PRN
Qty: 90 TABLET | Refills: 3 | Status: SHIPPED | OUTPATIENT
Start: 2019-08-06 | End: 2021-02-10 | Stop reason: SDUPTHER

## 2019-08-06 NOTE — PROGRESS NOTES
Assessment/Plan:  Return visit in 1 year for annual physical   We will call regarding results of his blood testing  He is current with colonoscopy     Diagnoses and all orders for this visit:    Health maintenance examination    Hyperlipidemia, unspecified hyperlipidemia type  -     CBC and differential; Future  -     Comprehensive metabolic panel; Future  -     Lipid panel; Future    Uncomplicated asthma, unspecified asthma severity, unspecified whether persistent    Allergic rhinitis, unspecified seasonality, unspecified trigger    Gastroesophageal reflux disease, esophagitis presence not specified    Benign prostatic hyperplasia with urinary frequency    Elevated PSA    Vitamin D deficiency  -     Vitamin D 25 hydroxy; Future    Anxiety  -     LORazepam (ATIVAN) 0 5 mg tablet; Take 1 tablet (0 5 mg total) by mouth every 6 (six) hours as needed for anxiety  -     citalopram (CeleXA) 40 mg tablet; Take 1 tablet (40 mg total) by mouth daily        Acid reflux disease  Continue Prilosec 40 mg daily    Anxiety  Celexa is increased to 40 mg daily    Hyperlipidemia  Continue atorvastatin 20 mg daily    Benign prostatic hyperplasia  Continue Flomax 0 4 mg daily        Subjective:      Patient ID: Burton Antoine is a 60 y o  male  Patient comes in for annual checkup  He complains of increased anxiety  He recently saw Urology for BPH and elevated PSA  He continues to follow with allergist for asthma and allergies  The following portions of the patient's history were reviewed and updated as appropriate:   He has a past medical history of Colon polyp, H/O nonmelanoma skin cancer, Inflamed seborrheic keratosis, Malignant neoplasm of skin, Neoplasm of uncertain behavior of skin, Nocturia, Pneumothorax, left, Seasonal allergies, Sebaceous cyst, Squamous cell carcinoma of skin of neck, Testicular hypofunction, and Viral warts  ,  does not have any pertinent problems on file  ,   has a past surgical history that includes Cardiovascular stress test (08/27/2010); Colonoscopy (10/08/2008); Rhinoplasty; and Cardiac catheterization  ,  family history includes Colon cancer in his father  ,   reports that he has never smoked  He has never used smokeless tobacco  He reports that he does not drink alcohol or use drugs  ,  is allergic to dust mite extract; molds & smuts; pollen extract; short ragweed pollen ext; and tree extract     Current Outpatient Medications   Medication Sig Dispense Refill    aspirin 325 mg tablet Take 325 mg by mouth daily       atorvastatin (LIPITOR) 20 mg tablet TAKE 1 TABLET DAILY 90 tablet 2    budesonide (PULMICORT) 180 MCG/ACT inhaler Inhale 2 puffs      Calcium Carb-Cholecalciferol (CALCIUM 1000 + D) 1000-800 MG-UNIT TABS Take by mouth      citalopram (CeleXA) 40 mg tablet Take 1 tablet (40 mg total) by mouth daily 90 tablet 5    desonide (DESOWEN) 0 05 % cream Apply topically 2 (two) times a day Applied to rash twice a day until resolved 15 g 3    fluticasone (FLONASE) 50 mcg/act nasal spray into each nostril      ipratropium (ATROVENT) 0 06 % nasal spray into each nostril      LORazepam (ATIVAN) 0 5 mg tablet Take 1 tablet (0 5 mg total) by mouth every 6 (six) hours as needed for anxiety 90 tablet 3    montelukast (SINGULAIR) 10 mg tablet Take 10 mg by mouth daily at bedtime       omeprazole (PriLOSEC) 40 MG capsule TAKE 1 CAPSULE DAILY 90 capsule 1    tamsulosin (FLOMAX) 0 4 mg Take 0 4 mg by mouth daily with dinner       aspirin 81 MG tablet Take by mouth      baclofen 10 mg tablet Take 5 mg by mouth      budesonide (PULMICORT) 0 5 mg/2 mL nebulizer solution Inhale      EPINEPHrine (EPIPEN 2-LEENA) 0 3 mg/0 3 mL SOAJ Inject as directed      oxyCODONE (ROXICODONE) 5 mg immediate release tablet Take 5-10 mg by mouth every 4 (four) hours      oxyCODONE-acetaminophen (PERCOCET) 5-325 mg per tablet Take 1 tablet by mouth       No current facility-administered medications for this visit  Review of Systems   HENT: Positive for rhinorrhea  Respiratory: Positive for cough  Cardiovascular: Negative  Gastrointestinal: Negative  Psychiatric/Behavioral: The patient is nervous/anxious  Objective:  Vitals:    08/06/19 1047   BP: 118/70   Pulse: 68   Temp: (!) 96 8 °F (36 °C)   SpO2: 97%   Weight: 84 4 kg (186 lb)   Height: 5' 8 5" (1 74 m)     Body mass index is 27 87 kg/m²  Physical Exam   Constitutional: He is oriented to person, place, and time  He appears well-developed and well-nourished  HENT:   Head: Normocephalic and atraumatic  Right Ear: Tympanic membrane and external ear normal    Left Ear: Tympanic membrane and external ear normal    Mouth/Throat: Oropharynx is clear and moist    Eyes: Pupils are equal, round, and reactive to light  EOM are normal    Neck: Neck supple  Cardiovascular: Normal rate, regular rhythm and normal heart sounds  Pulmonary/Chest: Effort normal and breath sounds normal    Abdominal: Soft  Bowel sounds are normal    Musculoskeletal: Normal range of motion  Neurological: He is alert and oriented to person, place, and time  Skin: Skin is warm and dry  Psychiatric: He has a normal mood and affect  Thought content normal      BMI Counseling: Body mass index is 27 87 kg/m²  Discussed the patient's BMI with him  The BMI is above average  BMI counseling and education was provided to the patient  Nutrition recommendations include decreasing overall calorie intake

## 2019-08-23 DIAGNOSIS — F41.9 ANXIETY: ICD-10-CM

## 2019-08-23 RX ORDER — CITALOPRAM 40 MG/1
40 TABLET ORAL DAILY
Qty: 90 TABLET | Refills: 5 | Status: SHIPPED | OUTPATIENT
Start: 2019-08-23 | End: 2020-11-04 | Stop reason: SDUPTHER

## 2019-08-31 LAB
25(OH)D3 SERPL-MCNC: 25 NG/ML (ref 30–100)
ALBUMIN SERPL-MCNC: 4.4 G/DL (ref 3.6–5.1)
ALBUMIN/GLOB SERPL: 2.1 (CALC) (ref 1–2.5)
ALP SERPL-CCNC: 66 U/L (ref 40–115)
ALT SERPL-CCNC: 32 U/L (ref 9–46)
AST SERPL-CCNC: 18 U/L (ref 10–35)
BASOPHILS # BLD AUTO: 60 CELLS/UL (ref 0–200)
BASOPHILS NFR BLD AUTO: 0.8 %
BILIRUB SERPL-MCNC: 0.6 MG/DL (ref 0.2–1.2)
BUN SERPL-MCNC: 14 MG/DL (ref 7–25)
BUN/CREAT SERPL: NORMAL (CALC) (ref 6–22)
CALCIUM SERPL-MCNC: 9.7 MG/DL (ref 8.6–10.3)
CHLORIDE SERPL-SCNC: 103 MMOL/L (ref 98–110)
CHOLEST SERPL-MCNC: 186 MG/DL
CHOLEST/HDLC SERPL: 4.4 (CALC)
CO2 SERPL-SCNC: 25 MMOL/L (ref 20–32)
CREAT SERPL-MCNC: 0.96 MG/DL (ref 0.7–1.25)
EOSINOPHIL # BLD AUTO: 158 CELLS/UL (ref 15–500)
EOSINOPHIL NFR BLD AUTO: 2.1 %
ERYTHROCYTE [DISTWIDTH] IN BLOOD BY AUTOMATED COUNT: 12.8 % (ref 11–15)
GLOBULIN SER CALC-MCNC: 2.1 G/DL (CALC) (ref 1.9–3.7)
GLUCOSE SERPL-MCNC: 88 MG/DL (ref 65–99)
HCT VFR BLD AUTO: 45 % (ref 38.5–50)
HDLC SERPL-MCNC: 42 MG/DL
HGB BLD-MCNC: 15.1 G/DL (ref 13.2–17.1)
LDLC SERPL CALC-MCNC: 116 MG/DL (CALC)
LYMPHOCYTES # BLD AUTO: 1328 CELLS/UL (ref 850–3900)
LYMPHOCYTES NFR BLD AUTO: 17.7 %
MCH RBC QN AUTO: 29.7 PG (ref 27–33)
MCHC RBC AUTO-ENTMCNC: 33.6 G/DL (ref 32–36)
MCV RBC AUTO: 88.4 FL (ref 80–100)
MONOCYTES # BLD AUTO: 668 CELLS/UL (ref 200–950)
MONOCYTES NFR BLD AUTO: 8.9 %
NEUTROPHILS # BLD AUTO: 5288 CELLS/UL (ref 1500–7800)
NEUTROPHILS NFR BLD AUTO: 70.5 %
NONHDLC SERPL-MCNC: 144 MG/DL (CALC)
PLATELET # BLD AUTO: 203 THOUSAND/UL (ref 140–400)
PMV BLD REES-ECKER: 10.9 FL (ref 7.5–12.5)
POTASSIUM SERPL-SCNC: 4.4 MMOL/L (ref 3.5–5.3)
PROT SERPL-MCNC: 6.5 G/DL (ref 6.1–8.1)
RBC # BLD AUTO: 5.09 MILLION/UL (ref 4.2–5.8)
SL AMB EGFR AFRICAN AMERICAN: 98 ML/MIN/1.73M2
SL AMB EGFR NON AFRICAN AMERICAN: 84 ML/MIN/1.73M2
SODIUM SERPL-SCNC: 137 MMOL/L (ref 135–146)
TRIGL SERPL-MCNC: 160 MG/DL
WBC # BLD AUTO: 7.5 THOUSAND/UL (ref 3.8–10.8)

## 2019-09-23 DIAGNOSIS — E78.5 HYPERLIPIDEMIA, UNSPECIFIED HYPERLIPIDEMIA TYPE: ICD-10-CM

## 2019-09-23 RX ORDER — ATORVASTATIN CALCIUM 20 MG/1
TABLET, FILM COATED ORAL
Qty: 90 TABLET | Refills: 4 | Status: SHIPPED | OUTPATIENT
Start: 2019-09-23 | End: 2020-11-23

## 2019-10-14 DIAGNOSIS — K21.9 GASTROESOPHAGEAL REFLUX DISEASE, ESOPHAGITIS PRESENCE NOT SPECIFIED: ICD-10-CM

## 2019-10-14 RX ORDER — OMEPRAZOLE 40 MG/1
CAPSULE, DELAYED RELEASE ORAL
Qty: 90 CAPSULE | Refills: 4 | Status: SHIPPED | OUTPATIENT
Start: 2019-10-14 | End: 2020-11-23

## 2020-01-30 ENCOUNTER — TELEPHONE (OUTPATIENT)
Dept: INTERNAL MEDICINE CLINIC | Facility: CLINIC | Age: 65
End: 2020-01-30

## 2020-01-30 NOTE — TELEPHONE ENCOUNTER
Patient requested his dermatology path reports be sent to [facility]    Which have been faxed & confirmed received

## 2020-11-04 DIAGNOSIS — F41.9 ANXIETY: ICD-10-CM

## 2020-11-05 RX ORDER — CITALOPRAM 40 MG/1
40 TABLET ORAL DAILY
Qty: 90 TABLET | Refills: 0 | Status: SHIPPED | OUTPATIENT
Start: 2020-11-05 | End: 2021-02-15 | Stop reason: SDUPTHER

## 2020-11-23 DIAGNOSIS — E78.5 HYPERLIPIDEMIA, UNSPECIFIED HYPERLIPIDEMIA TYPE: ICD-10-CM

## 2020-11-23 DIAGNOSIS — K21.9 GASTROESOPHAGEAL REFLUX DISEASE: ICD-10-CM

## 2020-11-23 RX ORDER — OMEPRAZOLE 40 MG/1
CAPSULE, DELAYED RELEASE ORAL
Qty: 90 CAPSULE | Refills: 0 | Status: SHIPPED | OUTPATIENT
Start: 2020-11-23 | End: 2021-01-14 | Stop reason: SDUPTHER

## 2020-11-23 RX ORDER — ATORVASTATIN CALCIUM 20 MG/1
TABLET, FILM COATED ORAL
Qty: 90 TABLET | Refills: 0 | Status: SHIPPED | OUTPATIENT
Start: 2020-11-23 | End: 2020-11-27 | Stop reason: SDUPTHER

## 2020-11-27 ENCOUNTER — TELEPHONE (OUTPATIENT)
Dept: FAMILY MEDICINE CLINIC | Facility: CLINIC | Age: 65
End: 2020-11-27

## 2020-11-27 DIAGNOSIS — E78.5 HYPERLIPIDEMIA, UNSPECIFIED HYPERLIPIDEMIA TYPE: ICD-10-CM

## 2020-11-27 RX ORDER — ATORVASTATIN CALCIUM 20 MG/1
20 TABLET, FILM COATED ORAL DAILY
Qty: 30 TABLET | Refills: 0 | Status: SHIPPED | OUTPATIENT
Start: 2020-11-27 | End: 2020-12-07

## 2020-12-05 DIAGNOSIS — E78.5 HYPERLIPIDEMIA, UNSPECIFIED HYPERLIPIDEMIA TYPE: ICD-10-CM

## 2020-12-07 RX ORDER — ATORVASTATIN CALCIUM 20 MG/1
TABLET, FILM COATED ORAL
Qty: 30 TABLET | Refills: 0 | Status: SHIPPED | OUTPATIENT
Start: 2020-12-07 | End: 2021-03-05 | Stop reason: SDUPTHER

## 2021-01-14 ENCOUNTER — OFFICE VISIT (OUTPATIENT)
Dept: FAMILY MEDICINE CLINIC | Facility: CLINIC | Age: 66
End: 2021-01-14
Payer: COMMERCIAL

## 2021-01-14 VITALS
DIASTOLIC BLOOD PRESSURE: 82 MMHG | HEART RATE: 76 BPM | WEIGHT: 182 LBS | OXYGEN SATURATION: 98 % | SYSTOLIC BLOOD PRESSURE: 124 MMHG | TEMPERATURE: 97.8 F | BODY MASS INDEX: 26.96 KG/M2 | HEIGHT: 69 IN

## 2021-01-14 DIAGNOSIS — Z12.11 COLON CANCER SCREENING: ICD-10-CM

## 2021-01-14 DIAGNOSIS — F41.9 ANXIETY: ICD-10-CM

## 2021-01-14 DIAGNOSIS — R97.20 ELEVATED PSA: ICD-10-CM

## 2021-01-14 DIAGNOSIS — Z00.00 HEALTH MAINTENANCE EXAMINATION: Primary | ICD-10-CM

## 2021-01-14 DIAGNOSIS — Z13.1 SCREENING FOR DIABETES MELLITUS (DM): ICD-10-CM

## 2021-01-14 DIAGNOSIS — K21.9 GASTROESOPHAGEAL REFLUX DISEASE: ICD-10-CM

## 2021-01-14 DIAGNOSIS — R35.0 BENIGN PROSTATIC HYPERPLASIA WITH URINARY FREQUENCY: ICD-10-CM

## 2021-01-14 DIAGNOSIS — R10.9 ABDOMINAL PAIN, UNSPECIFIED ABDOMINAL LOCATION: ICD-10-CM

## 2021-01-14 DIAGNOSIS — J45.909 UNCOMPLICATED ASTHMA, UNSPECIFIED ASTHMA SEVERITY, UNSPECIFIED WHETHER PERSISTENT: ICD-10-CM

## 2021-01-14 DIAGNOSIS — E78.5 HYPERLIPIDEMIA, UNSPECIFIED HYPERLIPIDEMIA TYPE: ICD-10-CM

## 2021-01-14 DIAGNOSIS — K21.9 GASTROESOPHAGEAL REFLUX DISEASE, UNSPECIFIED WHETHER ESOPHAGITIS PRESENT: ICD-10-CM

## 2021-01-14 DIAGNOSIS — E55.9 VITAMIN D DEFICIENCY: ICD-10-CM

## 2021-01-14 DIAGNOSIS — N40.1 BENIGN PROSTATIC HYPERPLASIA WITH URINARY FREQUENCY: ICD-10-CM

## 2021-01-14 PROCEDURE — 3725F SCREEN DEPRESSION PERFORMED: CPT | Performed by: FAMILY MEDICINE

## 2021-01-14 PROCEDURE — 99396 PREV VISIT EST AGE 40-64: CPT | Performed by: FAMILY MEDICINE

## 2021-01-14 RX ORDER — OMEPRAZOLE 40 MG/1
40 CAPSULE, DELAYED RELEASE ORAL DAILY
Qty: 90 CAPSULE | Refills: 0 | Status: ON HOLD | OUTPATIENT
Start: 2021-01-14 | End: 2021-05-06

## 2021-01-14 NOTE — PROGRESS NOTES
BMI Counseling: Body mass index is 27 27 kg/m²  The BMI is above normal  Nutrition recommendations include decreasing portion sizes and moderation in carbohydrate intake  Exercise recommendations include exercising 3-5 times per week  No pharmacotherapy was ordered  Assessment/Plan:  Return visit in 1 year's time for annual physical   We will call with results of his testing  Problem List Items Addressed This Visit        Digestive    Acid reflux disease    Relevant Medications    omeprazole (PriLOSEC) 40 MG capsule       Respiratory    Asthma     Continue Pulmicort            Genitourinary    Benign prostatic hyperplasia     Continue Flomax 0 4 mg daily            Other    Hyperlipidemia     Continue Lipitor 20 mg daily         Relevant Orders    CBC and differential    Comprehensive metabolic panel    Lipid panel    Health maintenance examination - Primary    Elevated PSA     Continue follow-up with Urology         Vitamin D deficiency     Continue vitamin D3 1000 units daily         Relevant Orders    Vitamin D 25 hydroxy    Anxiety      Other Visit Diagnoses     Gastroesophageal reflux disease        Relevant Medications    omeprazole (PriLOSEC) 40 MG capsule    Abdominal pain, unspecified abdominal location        Relevant Orders    Ambulatory referral to Gastroenterology    US abdomen complete    Colon cancer screening        Relevant Orders    Ambulatory referral to Gastroenterology    Screening for diabetes mellitus (DM)        Relevant Orders    Hemoglobin A1C            Subjective:      Patient ID: Jinny Stewart is a 63 y o  male  Patient comes in for annual checkup  He complains of mostly upper abdominal pain with constipation  Spicy foods tends to make his pain worse despite taking Prilosec 40 mg daily        The following portions of the patient's history were reviewed and updated as appropriate:   Past Medical History:  He has a past medical history of Colon polyp, H/O nonmelanoma skin cancer, Inflamed seborrheic keratosis, Malignant neoplasm of skin, Neoplasm of uncertain behavior of skin, Nocturia, Pneumothorax, left, Seasonal allergies, Sebaceous cyst, Squamous cell carcinoma of skin of neck, Testicular hypofunction, and Viral warts  ,  _______________________________________________________________________  Medical Problems:  does not have any pertinent problems on file ,  _______________________________________________________________________  Past Surgical History:   has a past surgical history that includes Cardiovascular stress test (08/27/2010); Colonoscopy (10/08/2008); Rhinoplasty; and Cardiac catheterization  ,  _______________________________________________________________________  Family History:  family history includes Colon cancer in his father ,  _______________________________________________________________________  Social History:   reports that he has never smoked  He has never used smokeless tobacco  He reports that he does not drink alcohol or use drugs  ,  _______________________________________________________________________  Allergies:  is allergic to dust mite extract; molds & smuts; pollen extract; short ragweed pollen ext; and tree extract     _______________________________________________________________________  Current Outpatient Medications   Medication Sig Dispense Refill    aspirin 81 MG tablet Take by mouth      atorvastatin (LIPITOR) 20 mg tablet TAKE 1 TABLET BY MOUTH EVERY DAY 30 tablet 0    budesonide (PULMICORT) 180 MCG/ACT inhaler Inhale 2 puffs      Calcium Carb-Cholecalciferol (CALCIUM 1000 + D) 1000-800 MG-UNIT TABS Take by mouth      citalopram (CeleXA) 40 mg tablet Take 1 tablet (40 mg total) by mouth daily 90 tablet 0    desonide (DESOWEN) 0 05 % cream Apply topically 2 (two) times a day Applied to rash twice a day until resolved 15 g 3    fluticasone (FLONASE) 50 mcg/act nasal spray into each nostril      ipratropium (ATROVENT) 0 06 % nasal spray into each nostril      LORazepam (ATIVAN) 0 5 mg tablet Take 1 tablet (0 5 mg total) by mouth every 6 (six) hours as needed for anxiety 90 tablet 3    montelukast (SINGULAIR) 10 mg tablet Take 10 mg by mouth daily at bedtime       omeprazole (PriLOSEC) 40 MG capsule Take 1 capsule (40 mg total) by mouth daily 90 capsule 0    tamsulosin (FLOMAX) 0 4 mg Take 0 4 mg by mouth daily with dinner       aspirin 325 mg tablet Take 325 mg by mouth daily       baclofen 10 mg tablet Take 5 mg by mouth      budesonide (PULMICORT) 0 5 mg/2 mL nebulizer solution Inhale      EPINEPHrine (EPIPEN 2-LEENA) 0 3 mg/0 3 mL SOAJ Inject as directed      oxyCODONE (ROXICODONE) 5 mg immediate release tablet Take 5-10 mg by mouth every 4 (four) hours      oxyCODONE-acetaminophen (PERCOCET) 5-325 mg per tablet Take 1 tablet by mouth       No current facility-administered medications for this visit       _______________________________________________________________________  Review of Systems   Constitutional: Negative  Respiratory: Negative  Gastrointestinal: Positive for abdominal pain, anal bleeding and constipation  Neurological: Negative  Objective:  Vitals:    01/14/21 1308   BP: 124/82   Pulse: 76   Temp: 97 8 °F (36 6 °C)   SpO2: 98%   Weight: 82 6 kg (182 lb)   Height: 5' 8 5" (1 74 m)     Body mass index is 27 27 kg/m²  Physical Exam  Constitutional:       Appearance: Normal appearance  He is well-developed  HENT:      Head: Normocephalic and atraumatic  Right Ear: External ear normal       Left Ear: External ear normal    Eyes:      Extraocular Movements: Extraocular movements intact  Pupils: Pupils are equal, round, and reactive to light  Neck:      Musculoskeletal: Neck supple  Cardiovascular:      Rate and Rhythm: Normal rate and regular rhythm  Heart sounds: Normal heart sounds     Pulmonary:      Effort: Pulmonary effort is normal       Breath sounds: Normal breath sounds  Abdominal:      General: Bowel sounds are normal       Palpations: Abdomen is soft  Comments: Mild diffuse abdominal tenderness   Musculoskeletal: Normal range of motion  Skin:     General: Skin is warm and dry  Neurological:      Mental Status: He is alert and oriented to person, place, and time  Psychiatric:         Thought Content:  Thought content normal

## 2021-01-16 ENCOUNTER — LAB (OUTPATIENT)
Dept: LAB | Facility: CLINIC | Age: 66
End: 2021-01-16
Payer: COMMERCIAL

## 2021-01-16 DIAGNOSIS — Z13.1 SCREENING FOR DIABETES MELLITUS (DM): ICD-10-CM

## 2021-01-16 DIAGNOSIS — E55.9 VITAMIN D DEFICIENCY: ICD-10-CM

## 2021-01-16 DIAGNOSIS — E78.5 HYPERLIPIDEMIA, UNSPECIFIED HYPERLIPIDEMIA TYPE: ICD-10-CM

## 2021-01-16 LAB
25(OH)D3 SERPL-MCNC: 34.9 NG/ML (ref 30–100)
ALBUMIN SERPL BCP-MCNC: 3.9 G/DL (ref 3.5–5)
ALP SERPL-CCNC: 65 U/L (ref 46–116)
ALT SERPL W P-5'-P-CCNC: 29 U/L (ref 12–78)
ANION GAP SERPL CALCULATED.3IONS-SCNC: 4 MMOL/L (ref 4–13)
AST SERPL W P-5'-P-CCNC: 13 U/L (ref 5–45)
BASOPHILS # BLD AUTO: 0.05 THOUSANDS/ΜL (ref 0–0.1)
BASOPHILS NFR BLD AUTO: 1 % (ref 0–1)
BILIRUB SERPL-MCNC: 0.55 MG/DL (ref 0.2–1)
BUN SERPL-MCNC: 17 MG/DL (ref 5–25)
CALCIUM SERPL-MCNC: 9.5 MG/DL (ref 8.3–10.1)
CHLORIDE SERPL-SCNC: 111 MMOL/L (ref 100–108)
CHOLEST SERPL-MCNC: 170 MG/DL (ref 50–200)
CO2 SERPL-SCNC: 26 MMOL/L (ref 21–32)
CREAT SERPL-MCNC: 0.82 MG/DL (ref 0.6–1.3)
EOSINOPHIL # BLD AUTO: 0.16 THOUSAND/ΜL (ref 0–0.61)
EOSINOPHIL NFR BLD AUTO: 2 % (ref 0–6)
ERYTHROCYTE [DISTWIDTH] IN BLOOD BY AUTOMATED COUNT: 12.4 % (ref 11.6–15.1)
EST. AVERAGE GLUCOSE BLD GHB EST-MCNC: 97 MG/DL
GFR SERPL CREATININE-BSD FRML MDRD: 94 ML/MIN/1.73SQ M
GLUCOSE P FAST SERPL-MCNC: 93 MG/DL (ref 65–99)
HBA1C MFR BLD: 5 %
HCT VFR BLD AUTO: 46 % (ref 36.5–49.3)
HDLC SERPL-MCNC: 41 MG/DL
HGB BLD-MCNC: 15.1 G/DL (ref 12–17)
IMM GRANULOCYTES # BLD AUTO: 0.03 THOUSAND/UL (ref 0–0.2)
IMM GRANULOCYTES NFR BLD AUTO: 0 % (ref 0–2)
LDLC SERPL CALC-MCNC: 95 MG/DL (ref 0–100)
LYMPHOCYTES # BLD AUTO: 1.27 THOUSANDS/ΜL (ref 0.6–4.47)
LYMPHOCYTES NFR BLD AUTO: 16 % (ref 14–44)
MCH RBC QN AUTO: 30.3 PG (ref 26.8–34.3)
MCHC RBC AUTO-ENTMCNC: 32.8 G/DL (ref 31.4–37.4)
MCV RBC AUTO: 92 FL (ref 82–98)
MONOCYTES # BLD AUTO: 0.78 THOUSAND/ΜL (ref 0.17–1.22)
MONOCYTES NFR BLD AUTO: 10 % (ref 4–12)
NEUTROPHILS # BLD AUTO: 5.5 THOUSANDS/ΜL (ref 1.85–7.62)
NEUTS SEG NFR BLD AUTO: 71 % (ref 43–75)
NONHDLC SERPL-MCNC: 129 MG/DL
NRBC BLD AUTO-RTO: 0 /100 WBCS
PLATELET # BLD AUTO: 193 THOUSANDS/UL (ref 149–390)
PMV BLD AUTO: 11.4 FL (ref 8.9–12.7)
POTASSIUM SERPL-SCNC: 3.9 MMOL/L (ref 3.5–5.3)
PROT SERPL-MCNC: 6.8 G/DL (ref 6.4–8.2)
RBC # BLD AUTO: 4.99 MILLION/UL (ref 3.88–5.62)
SODIUM SERPL-SCNC: 141 MMOL/L (ref 136–145)
TRIGL SERPL-MCNC: 172 MG/DL
WBC # BLD AUTO: 7.79 THOUSAND/UL (ref 4.31–10.16)

## 2021-01-16 PROCEDURE — 80061 LIPID PANEL: CPT

## 2021-01-16 PROCEDURE — 82306 VITAMIN D 25 HYDROXY: CPT

## 2021-01-16 PROCEDURE — 83036 HEMOGLOBIN GLYCOSYLATED A1C: CPT

## 2021-01-16 PROCEDURE — 85025 COMPLETE CBC W/AUTO DIFF WBC: CPT

## 2021-01-16 PROCEDURE — 80053 COMPREHEN METABOLIC PANEL: CPT

## 2021-01-16 PROCEDURE — 36415 COLL VENOUS BLD VENIPUNCTURE: CPT

## 2021-01-21 ENCOUNTER — PREP FOR PROCEDURE (OUTPATIENT)
Dept: GASTROENTEROLOGY | Facility: CLINIC | Age: 66
End: 2021-01-21

## 2021-01-21 ENCOUNTER — HOSPITAL ENCOUNTER (OUTPATIENT)
Dept: ULTRASOUND IMAGING | Facility: CLINIC | Age: 66
Discharge: HOME | End: 2021-01-21
Payer: COMMERCIAL

## 2021-01-21 ENCOUNTER — OFFICE VISIT (OUTPATIENT)
Dept: GASTROENTEROLOGY | Facility: CLINIC | Age: 66
End: 2021-01-21
Payer: COMMERCIAL

## 2021-01-21 VITALS
BODY MASS INDEX: 26.66 KG/M2 | DIASTOLIC BLOOD PRESSURE: 64 MMHG | WEIGHT: 180 LBS | HEART RATE: 60 BPM | SYSTOLIC BLOOD PRESSURE: 110 MMHG | HEIGHT: 69 IN

## 2021-01-21 DIAGNOSIS — K59.04 CHRONIC IDIOPATHIC CONSTIPATION: ICD-10-CM

## 2021-01-21 DIAGNOSIS — K21.9 GASTROESOPHAGEAL REFLUX DISEASE, UNSPECIFIED WHETHER ESOPHAGITIS PRESENT: ICD-10-CM

## 2021-01-21 DIAGNOSIS — R10.33 PERIUMBILICAL ABDOMINAL PAIN: ICD-10-CM

## 2021-01-21 DIAGNOSIS — K86.2 PANCREATIC CYST: Primary | ICD-10-CM

## 2021-01-21 DIAGNOSIS — R10.9 ABDOMINAL PAIN, UNSPECIFIED ABDOMINAL LOCATION: ICD-10-CM

## 2021-01-21 DIAGNOSIS — K62.5 RECTAL BLEED: ICD-10-CM

## 2021-01-21 PROCEDURE — 1036F TOBACCO NON-USER: CPT | Performed by: PHYSICIAN ASSISTANT

## 2021-01-21 PROCEDURE — 76700 US EXAM ABDOM COMPLETE: CPT

## 2021-01-21 PROCEDURE — 3008F BODY MASS INDEX DOCD: CPT | Performed by: PHYSICIAN ASSISTANT

## 2021-01-21 PROCEDURE — 99203 OFFICE O/P NEW LOW 30 MIN: CPT | Performed by: PHYSICIAN ASSISTANT

## 2021-01-21 NOTE — PROGRESS NOTES
[facility] - Outpatient Consultation  [facility] 63 y o  male MRN: [id number]  Encounter: [id number]          ASSESSMENT AND PLAN:      1  Pancreatic cyst  Likely benign  Will f/u US with MRI    2  Gastroesophageal reflux disease, unspecified whether esophagitis present  Controlled with Omeprazole 40mg daily - has never had an EGD - will schedule  Began the discussion of long term PPI side effects - will have a more in depth discussion after his procedure    3  Chronic idiopathic constipation  Mild  Recommended increase water, fiber and exercise  Start Colace 100mg daily  He is due for a colonoscopy - will schedule    4  Periumbilical abdominal pain  Likely related to #3  US reviewed    5  Rectal bleeding  3 episodes in the past 2 days  Suspect hemorrhoidal  Will update colonoscopy      ______________________________________________________________________    HPI:  82-year-old male with mild constipation now complicated by rectal bleeding presents for evaluation  He reports a long history of constipation  He states he has a bowel movement every 1-2 days but always has to strain  He was eating more salads recently and this seemed to help his bowel movements but he stopped as he noticed increased bloating  He recently has been having some periumbilical abdominal pain  He describes it as a mild ache  It is exacerbated by eating  Because of this his family doctor ordered an ultrasound of his abdomen  This showed 2 small pancreatic cysts  His gallbladder was reported as normal   He has a long history of acid reflux for which he takes omeprazole 40 mg daily  This does seem to control the symptom  He has never had an upper endoscopy  He denies dysphagia, hematemesis or melena  He reports over the past 2 days he has had 2 bowel movements with straining both of which he noted blood    The 1st bowel movement had a large amount of blood in the stool and around the stool in the water   The 2nd and 3rd bowel movements had smaller amounts of blood  He has never had rectal bleeding in the past   His last colonoscopy was approximately 7 years ago and he believes that he had polyps removed at that time  He denies any recent unexpected weight loss, fevers or chills  REVIEW OF SYSTEMS:    CONSTITUTIONAL: Denies any fever, chills, rigors, and weight loss  HEENT: No earache or tinnitus  Denies hearing loss or visual disturbances  CARDIOVASCULAR: No chest pain or palpitations  RESPIRATORY: Denies any cough, hemoptysis, shortness of breath or dyspnea on exertion  GASTROINTESTINAL: As noted in the History of Present Illness  GENITOURINARY: No problems with urination  Denies any hematuria or dysuria  NEUROLOGIC: No dizziness or vertigo, denies headaches  MUSCULOSKELETAL: Denies any muscle or joint pain  SKIN: Denies skin rashes or itching  ENDOCRINE: Denies excessive thirst  Denies intolerance to heat or cold  PSYCHOSOCIAL: Denies depression or anxiety  Denies any recent memory loss         Historical Information   Past Medical History:   Diagnosis Date    Colon polyp     Fatty liver     H/O nonmelanoma skin cancer     last assessed-8/22/2017    Inflamed seborrheic keratosis     Malignant neoplasm of skin     last assessed-1/21/2014    Neoplasm of uncertain behavior of skin     Nocturia     Pneumothorax, left     Seasonal allergies     Sebaceous cyst     Squamous cell carcinoma of skin of neck     Testicular hypofunction     Viral warts      Past Surgical History:   Procedure Laterality Date    CARDIAC CATHETERIZATION      CARDIOVASCULAR STRESS TEST  08/27/2010    COLONOSCOPY  10/08/2008    RHINOPLASTY       Social History   Social History     Substance and Sexual Activity   Alcohol Use No     Social History     Substance and Sexual Activity   Drug Use No     Social History     Tobacco Use   Smoking Status Never Smoker   Smokeless Tobacco Never Used     Family History   Problem Relation Age of Onset    Colon cancer Father        Meds/Allergies       Current Outpatient Medications:     aspirin 325 mg tablet    aspirin 81 MG tablet    atorvastatin (LIPITOR) 20 mg tablet    baclofen 10 mg tablet    budesonide (PULMICORT) 0 5 mg/2 mL nebulizer solution    budesonide (PULMICORT) 180 MCG/ACT inhaler    Calcium Carb-Cholecalciferol (CALCIUM 1000 + D) 1000-800 MG-UNIT TABS    citalopram (CeleXA) 40 mg tablet    desonide (DESOWEN) 0 05 % cream    EPINEPHrine (EPIPEN 2-LEENA) 0 3 mg/0 3 mL SOAJ    fluticasone (FLONASE) 50 mcg/act nasal spray    ipratropium (ATROVENT) 0 06 % nasal spray    LORazepam (ATIVAN) 0 5 mg tablet    montelukast (SINGULAIR) 10 mg tablet    omeprazole (PriLOSEC) 40 MG capsule    oxyCODONE (ROXICODONE) 5 mg immediate release tablet    oxyCODONE-acetaminophen (PERCOCET) 5-325 mg per tablet    tamsulosin (FLOMAX) 0 4 mg    Allergies   Allergen Reactions    Dust Mite Extract     Molds & Smuts     Pollen Extract     Short Ragweed Pollen Ext     Tree Extract            Objective     Blood pressure 110/64, pulse 60, height 5' 8 5" (1 74 m), weight 81 6 kg (180 lb)  Body mass index is 26 97 kg/m²  PHYSICAL EXAM:      General Appearance:   Alert, cooperative, no distress   HEENT:   Normocephalic, atraumatic, anicteric      Neck:  Supple, symmetrical, trachea midline   Lungs:   Clear to auscultation bilaterally; no rales, rhonchi or wheezing; respirations unlabored    Heart[de-identified]   Regular rate and rhythm; no murmur, rub, or gallop  Abdomen:   Soft, non-tender, non-distended; normal bowel sounds; no masses, no organomegaly    Genitalia:   Deferred    Rectal:   Deferred    Extremities:  No cyanosis, clubbing or edema    Pulses:  2+ and symmetric    Skin:  No jaundice, rashes, or lesions    Lymph nodes:  No palpable cervical lymphadenopathy        Lab Results:   No visits with results within 1 Day(s) from this visit     Latest known visit with results is:   Lab on 01/16/2021   Component Date Value    Vit D, 25-Hydroxy 01/16/2021 34 9     WBC 01/16/2021 7 79     RBC 01/16/2021 4 99     Hemoglobin 01/16/2021 15 1     Hematocrit 01/16/2021 46 0     MCV 01/16/2021 92     MCH 01/16/2021 30 3     MCHC 01/16/2021 32 8     RDW 01/16/2021 12 4     MPV 01/16/2021 11 4     Platelets 47/10/4052 193     nRBC 01/16/2021 0     Neutrophils Relative 01/16/2021 71     Immat GRANS % 01/16/2021 0     Lymphocytes Relative 01/16/2021 16     Monocytes Relative 01/16/2021 10     Eosinophils Relative 01/16/2021 2     Basophils Relative 01/16/2021 1     Neutrophils Absolute 01/16/2021 5 50     Immature Grans Absolute 01/16/2021 0 03     Lymphocytes Absolute 01/16/2021 1 27     Monocytes Absolute 01/16/2021 0 78     Eosinophils Absolute 01/16/2021 0 16     Basophils Absolute 01/16/2021 0 05     Sodium 01/16/2021 141     Potassium 01/16/2021 3 9     Chloride 01/16/2021 111*    CO2 01/16/2021 26     ANION GAP 01/16/2021 4     BUN 01/16/2021 17     Creatinine 01/16/2021 0 82     Glucose, Fasting 01/16/2021 93     Calcium 01/16/2021 9 5     AST 01/16/2021 13     ALT 01/16/2021 29     Alkaline Phosphatase 01/16/2021 65     Total Protein 01/16/2021 6 8     Albumin 01/16/2021 3 9     Total Bilirubin 01/16/2021 0 55     eGFR 01/16/2021 94     Cholesterol 01/16/2021 170     Triglycerides 01/16/2021 172*    HDL, Direct 01/16/2021 41     LDL Calculated 01/16/2021 95     Non-HDL-Chol (CHOL-HDL) 01/16/2021 129     Hemoglobin A1C 01/16/2021 5 0     EAG 01/16/2021 97          Radiology Results:   Us Abdomen Complete    Result Date: 1/21/2021  Narrative: ABDOMEN ULTRASOUND, COMPLETE INDICATION:   R10 9: Unspecified abdominal pain  COMPARISON: None TECHNIQUE:   Real-time ultrasound of the abdomen was performed with a curvilinear transducer with both volumetric sweeps and still imaging techniques   FINDINGS: PANCREAS:  1 1 x 0 8 cm thinly septated cyst versus 2 smaller adjacent cysts in the head of the pancreas  AORTA AND IVC:  Visualized portions are normal for patient age  LIVER: Size:  Within normal range  The liver measures 17 8 cm in the midclavicular line  Contour:  Surface contour is smooth  Parenchyma: There is mild diffuse increased echogenicity with smooth echotexture, without significant beam attenuation or loss of periportal echogenicity  Most consistent with mild hepatic steatosis  Minimal fat sparing adjacent to the gallbladder fossa  No evidence of suspicious mass  Limited imaging of the main portal vein shows it to be patent and hepatopetal  BILIARY: The gallbladder is normal in caliber  No wall thickening or pericholecystic fluid  No stones or sludge identified  No sonographic Walter's sign  No intrahepatic biliary dilatation  CBD measures 5 mm  No choledocholithiasis  KIDNEY: Right kidney measures 11 8 x 5 7 cm  Within normal limits  Left kidney measures 13 3 x 5 6 cm  Several simple cysts, the largest of which measures 4 3 cm  Otherwise within normal limits  SPLEEN: Measures 10 9 x 11 1 x 5 cm  Within normal limits  ASCITES:  None  Impression: Hepatic steatosis  Left renal simple cysts, the largest of which measures 4 3 cm  1 1 cm thinly septated cyst or 2 smaller adjacent cysts in the head of the pancreas  For simple cyst(s) less than 1 5 cm, recommend yearly followup 5 times, then every 2 year for 2 times  If cyst(s) stable after 9 years, no further followups  Recommend next followup in 1 year   Preferred imaging modality: abdomen MRI and MRCP with and without IV contrast, or triple phase abdomen CT with IV contrast, or abdomen MRI and MRCP without IV contrast  The recommendations regarding pancreatic findings assumes that patient does not have family history of pancreatic cancer nor have any symptoms potentially attributable to pancreatic cystic lesions (hyperamylasemia, recent-onset diabetes, severe epigastric pain, weight loss, steatorrhea, or jaundice ) If these conditions are not true, then management should be deferred to judgement of specialists such as gastroenterologists or oncologic surgeons  Recommendations are based on recent consensus statements on management of pancreatic cystic lesions from [address] [place] [address] [employer], [employer], the journal Pancreatology, and our own institutional consensus  This study demonstrates a finding requiring imaging follow-up and was documented as such in Epic   Workstation performed: [id number]

## 2021-02-01 ENCOUNTER — HOSPITAL ENCOUNTER (OUTPATIENT)
Dept: MRI IMAGING | Facility: HOSPITAL | Age: 66
Discharge: HOME | End: 2021-02-01
Payer: COMMERCIAL

## 2021-02-01 DIAGNOSIS — K86.2 PANCREATIC CYST: ICD-10-CM

## 2021-02-01 PROCEDURE — G1004 CDSM NDSC: HCPCS

## 2021-02-01 PROCEDURE — 74183 MRI ABD W/O CNTR FLWD CNTR: CPT

## 2021-02-01 PROCEDURE — A9585 GADOBUTROL INJECTION: HCPCS | Performed by: PHYSICIAN ASSISTANT

## 2021-02-01 PROCEDURE — 76377 3D RENDER W/INTRP POSTPROCES: CPT

## 2021-02-01 RX ADMIN — GADOBUTROL 8 ML: 604.72 INJECTION INTRAVENOUS at 10:40

## 2021-02-03 ENCOUNTER — ANESTHESIA EVENT (OUTPATIENT)
Dept: GASTROENTEROLOGY | Facility: HOSPITAL | Age: 66
End: 2021-02-03

## 2021-02-04 ENCOUNTER — TELEPHONE (OUTPATIENT)
Dept: GASTROENTEROLOGY | Facility: CLINIC | Age: 66
End: 2021-02-04

## 2021-02-04 ENCOUNTER — HOSPITAL ENCOUNTER (OUTPATIENT)
Dept: GASTROENTEROLOGY | Facility: HOSPITAL | Age: 66
Setting detail: OUTPATIENT SURGERY
Discharge: HOME | End: 2021-02-04
Attending: INTERNAL MEDICINE | Admitting: INTERNAL MEDICINE
Payer: COMMERCIAL

## 2021-02-04 ENCOUNTER — ANESTHESIA (OUTPATIENT)
Dept: GASTROENTEROLOGY | Facility: HOSPITAL | Age: 66
End: 2021-02-04

## 2021-02-04 ENCOUNTER — TELEPHONE (OUTPATIENT)
Dept: CARDIOLOGY CLINIC | Facility: CLINIC | Age: 66
End: 2021-02-04

## 2021-02-04 VITALS — HEART RATE: 67 BPM

## 2021-02-04 VITALS
HEART RATE: 65 BPM | TEMPERATURE: 97.8 F | RESPIRATION RATE: 16 BRPM | WEIGHT: 175.49 LBS | HEIGHT: 68 IN | BODY MASS INDEX: 26.6 KG/M2 | OXYGEN SATURATION: 97 % | DIASTOLIC BLOOD PRESSURE: 65 MMHG | SYSTOLIC BLOOD PRESSURE: 141 MMHG

## 2021-02-04 DIAGNOSIS — R00.1 BRADYCARDIA: Primary | ICD-10-CM

## 2021-02-04 DIAGNOSIS — I34.1 MVP (MITRAL VALVE PROLAPSE): Primary | ICD-10-CM

## 2021-02-04 DIAGNOSIS — K86.2 PANCREATIC CYST: Primary | ICD-10-CM

## 2021-02-04 DIAGNOSIS — I48.91 ATRIAL FIBRILLATION, NEW ONSET (HCC): Primary | ICD-10-CM

## 2021-02-04 DIAGNOSIS — K62.5 RECTAL BLEED: ICD-10-CM

## 2021-02-04 DIAGNOSIS — R42 DIZZINESS: ICD-10-CM

## 2021-02-04 DIAGNOSIS — K86.2 PANCREATIC CYST: ICD-10-CM

## 2021-02-04 PROCEDURE — 88305 TISSUE EXAM BY PATHOLOGIST: CPT | Performed by: PATHOLOGY

## 2021-02-04 PROCEDURE — 43239 EGD BIOPSY SINGLE/MULTIPLE: CPT | Performed by: INTERNAL MEDICINE

## 2021-02-04 PROCEDURE — 88112 CYTOPATH CELL ENHANCE TECH: CPT | Performed by: PATHOLOGY

## 2021-02-04 PROCEDURE — 93005 ELECTROCARDIOGRAM TRACING: CPT

## 2021-02-04 PROCEDURE — 45385 COLONOSCOPY W/LESION REMOVAL: CPT | Performed by: INTERNAL MEDICINE

## 2021-02-04 RX ORDER — LIDOCAINE HYDROCHLORIDE 10 MG/ML
INJECTION, SOLUTION EPIDURAL; INFILTRATION; INTRACAUDAL; PERINEURAL AS NEEDED
Status: DISCONTINUED | OUTPATIENT
Start: 2021-02-04 | End: 2021-02-04

## 2021-02-04 RX ORDER — SODIUM CHLORIDE, SODIUM LACTATE, POTASSIUM CHLORIDE, CALCIUM CHLORIDE 600; 310; 30; 20 MG/100ML; MG/100ML; MG/100ML; MG/100ML
INJECTION, SOLUTION INTRAVENOUS CONTINUOUS PRN
Status: DISCONTINUED | OUTPATIENT
Start: 2021-02-04 | End: 2021-02-04

## 2021-02-04 RX ORDER — PROPOFOL 10 MG/ML
INJECTION, EMULSION INTRAVENOUS AS NEEDED
Status: DISCONTINUED | OUTPATIENT
Start: 2021-02-04 | End: 2021-02-04

## 2021-02-04 RX ORDER — EPHEDRINE SULFATE 50 MG/ML
INJECTION INTRAVENOUS AS NEEDED
Status: DISCONTINUED | OUTPATIENT
Start: 2021-02-04 | End: 2021-02-04

## 2021-02-04 RX ORDER — LIDOCAINE HYDROCHLORIDE 20 MG/ML
INJECTION, SOLUTION EPIDURAL; INFILTRATION; INTRACAUDAL; PERINEURAL AS NEEDED
Status: DISCONTINUED | OUTPATIENT
Start: 2021-02-04 | End: 2021-02-04

## 2021-02-04 RX ADMIN — PROPOFOL 30 MG: 10 INJECTION, EMULSION INTRAVENOUS at 14:49

## 2021-02-04 RX ADMIN — EPHEDRINE SULFATE 5 MG: 50 INJECTION INTRAVENOUS at 14:53

## 2021-02-04 RX ADMIN — PROPOFOL 30 MG: 10 INJECTION, EMULSION INTRAVENOUS at 15:05

## 2021-02-04 RX ADMIN — PROPOFOL 30 MG: 10 INJECTION, EMULSION INTRAVENOUS at 14:59

## 2021-02-04 RX ADMIN — SODIUM CHLORIDE, SODIUM LACTATE, POTASSIUM CHLORIDE, AND CALCIUM CHLORIDE: .6; .31; .03; .02 INJECTION, SOLUTION INTRAVENOUS at 14:36

## 2021-02-04 RX ADMIN — LIDOCAINE HYDROCHLORIDE 100 MG: 20 INJECTION, SOLUTION EPIDURAL; INFILTRATION; INTRACAUDAL; PERINEURAL at 14:40

## 2021-02-04 RX ADMIN — PHENYLEPHRINE HYDROCHLORIDE 100 MCG: 10 INJECTION INTRAVENOUS at 15:22

## 2021-02-04 RX ADMIN — PROPOFOL 30 MG: 10 INJECTION, EMULSION INTRAVENOUS at 14:51

## 2021-02-04 RX ADMIN — PROPOFOL 20 MG: 10 INJECTION, EMULSION INTRAVENOUS at 14:42

## 2021-02-04 RX ADMIN — PHENYLEPHRINE HYDROCHLORIDE 100 MCG: 10 INJECTION INTRAVENOUS at 15:06

## 2021-02-04 RX ADMIN — PROPOFOL 30 MG: 10 INJECTION, EMULSION INTRAVENOUS at 15:08

## 2021-02-04 RX ADMIN — PHENYLEPHRINE HYDROCHLORIDE 100 MCG: 10 INJECTION INTRAVENOUS at 15:09

## 2021-02-04 RX ADMIN — PROPOFOL 30 MG: 10 INJECTION, EMULSION INTRAVENOUS at 14:53

## 2021-02-04 RX ADMIN — PROPOFOL 30 MG: 10 INJECTION, EMULSION INTRAVENOUS at 14:56

## 2021-02-04 RX ADMIN — PROPOFOL 30 MG: 10 INJECTION, EMULSION INTRAVENOUS at 15:11

## 2021-02-04 RX ADMIN — PROPOFOL 30 MG: 10 INJECTION, EMULSION INTRAVENOUS at 15:02

## 2021-02-04 RX ADMIN — PROPOFOL 20 MG: 10 INJECTION, EMULSION INTRAVENOUS at 14:45

## 2021-02-04 RX ADMIN — PROPOFOL 180 MG: 10 INJECTION, EMULSION INTRAVENOUS at 14:40

## 2021-02-04 NOTE — ANESTHESIA PREPROCEDURE EVALUATION
Procedure:  COLONOSCOPY  EGD    Relevant Problems   CARDIO   (+) Hyperlipidemia      GI/HEPATIC   (+) Acid reflux disease      /RENAL   (+) Benign prostatic hyperplasia      NEURO/PSYCH   (+) Anxiety   (+) Anxiety, generalized   (+) History of skin cancer      PULMONARY   (+) Asthma        Physical Exam    Airway    Mallampati score: III  TM Distance: >3 FB  Neck ROM: full     Dental       Cardiovascular  Cardiovascular exam normal    Pulmonary  Pulmonary exam normal     Other Findings       History of skin cancer   Acid reflux disease   Allergic rhinitis   Anxiety, generalized   Asthma   Benign prostatic hyperplasia   Hyperlipidemia   Health maintenance examination   Elevated PSA   Vitamin D deficiency   Anxiety       Anesthesia Plan  ASA Score- 2     Anesthesia Type- IV sedation with anesthesia with ASA Monitors  Additional Monitors:   Airway Plan:     Comment: Patient had a irregular HR seemed very irregularly irregular was captured by 12 lead EKG seemed like frequent PVC's with RBBB, patient was also reporting some presyncopal episodes , cardiology was ok with his rhythm for now after hydration, patient is now in SB with HR of 50 with stable BP, Patient need to F/U with cardiology later after the procedure , so for now he was cleared by cardiology for today    Plan Factors-    Chart reviewed  EKG reviewed  Existing labs reviewed  Patient summary reviewed  Induction- intravenous  Postoperative Plan-     Informed Consent- Anesthetic plan and risks discussed with patient  I personally reviewed this patient with the CRNA  Discussed and agreed on the Anesthesia Plan with the CRNA             1   Pancreatic cyst  Likely benign  Will f/u US with MRI     2  Gastroesophageal reflux disease, unspecified whether esophagitis present  Controlled with Omeprazole 40mg daily - has never had an EGD - will schedule  Began the discussion of long term PPI side effects - will have a more in depth discussion after his procedure     3  Chronic idiopathic constipation  Mild  Recommended increase water, fiber and exercise  Start Colace 100mg daily  He is due for a colonoscopy - will schedule     4  Periumbilical abdominal pain  Likely related to #3  US reviewed     5   Rectal bleeding  3 episodes in the past 2 days  Suspect hemorrhoidal  Will update colonoscopy

## 2021-02-04 NOTE — TELEPHONE ENCOUNTER
----- Message from BREANNA Teran sent at 2/4/2021  2:03 PM EST -----  I ordered event monitor, 2 weeks also echocardiogram

## 2021-02-04 NOTE — H&P
History and Physical -  [facility]  Bhavesh Lopez 63 y o  male MRN: [id number]      HPI: Bhavesh Lopez is a 63y o  year old male who presents for evaluation of gastroesophageal reflux disease, constipation, rectal bleeding      REVIEW OF SYSTEMS: Per the HPI, and otherwise unremarkable  Historical Information   Past Medical History:   Diagnosis Date    Colon polyp     Fatty liver     H/O nonmelanoma skin cancer     last assessed-8/22/2017    Inflamed seborrheic keratosis     Malignant neoplasm of skin     last assessed-1/21/2014    Neoplasm of uncertain behavior of skin     Nocturia     Pneumothorax, left     Seasonal allergies     Sebaceous cyst     Squamous cell carcinoma of skin of neck     Testicular hypofunction     Viral warts      Past Surgical History:   Procedure Laterality Date    CARDIAC CATHETERIZATION      CARDIOVASCULAR STRESS TEST  08/27/2010    COLONOSCOPY  10/08/2008    RHINOPLASTY       Social History   Social History     Substance and Sexual Activity   Alcohol Use No     Social History     Substance and Sexual Activity   Drug Use No     Social History     Tobacco Use   Smoking Status Never Smoker   Smokeless Tobacco Never Used     Family History   Problem Relation Age of Onset    Colon cancer Father        Meds/Allergies     (Not in a hospital admission)      Allergies   Allergen Reactions    Dust Mite Extract     Molds & Smuts     Pollen Extract     Short Ragweed Pollen Ext     Tree Extract        Objective     There were no vitals taken for this visit  PHYSICAL EXAM    Gen: NAD  CV: RRR  CHEST: Clear  ABD: soft, NT/ND  EXT: no edema      ASSESSMENT/PLAN:  This is a 63y o  year old male here for esophagogastroduodenoscopy, colonoscopy, and he is stable and optimized for his procedure

## 2021-02-04 NOTE — DISCHARGE INSTRUCTIONS
PLEASE FOLLOW UP WITH CARDIOLOGIST ABOUT YOU EKG IRREGULARITY   MAKE AN APPOINTMENT WITH DR MANISHA DICKINSON  [phone]  [facility]  [address]  [address]  [place] [address]            Upper Endoscopy   WHAT YOU NEED TO KNOW:   An upper endoscopy is also called an upper gastrointestinal (GI) endoscopy, or an esophagogastroduodenoscopy (EGD)  You may feel bloated, gassy, or have some abdominal discomfort after your procedure  Your throat may be sore for 24 to 36 hours  You may burp or pass gas from air that is still inside your body  DISCHARGE INSTRUCTIONS:   Call 911 for any of the following:   · You have sudden chest pain or trouble breathing  Seek care immediately if:   · You feel dizzy or faint  · You have trouble swallowing  · Your bowel movements are very dark or black  · Your abdomen is hard and firm and you have severe pain  · You vomit blood  Contact your healthcare provider if:   · You feel full or bloated and cannot burp or pass gas  · You have not had a bowel movement for 3 days after your procedure  · You have neck pain  · You have a fever or chills  · You have nausea or are vomiting  · You have a rash or hives  · You have questions or concerns about your endoscopy  Relieve a sore throat:  Suck on throat lozenges or crushed ice  Gargle with a small amount of warm salt water  Mix 1 teaspoon of salt and 1 cup of warm water to make salt water  Relieve gas and discomfort from bloating:  Lie on your right side with a heating pad on your abdomen  Take short walks to help pass gas  Eat small meals until bloating is relieved  Rest after your procedure: You have been given medicine to relax you  Do not  drive or make important decisions until the day after your procedure  Return to your normal activity as directed  You can usually return to work the day after your procedure    Follow up with your healthcare provider as directed: Write down your questions so you remember to ask them during your visits  © 2017 [employer] is for End User's use only and may not be sold, redistributed or otherwise used for commercial purposes  All illustrations and images included in CareNotes® are the copyrighted property of [employer]  or [employer]aden [employer]  The above information is an  only  It is not intended as medical advice for individual conditions or treatments  Talk to your doctor, nurse or pharmacist before following any medical regimen to see if it is safe and effective for you  Colonoscopy   WHAT YOU NEED TO KNOW:   A colonoscopy is a procedure to examine the inside of your colon (intestine) with a scope  Polyps or tissue growths may have been removed during your colonoscopy  It is normal to feel bloated and to have some abdominal discomfort  You should be passing gas  If you have hemorrhoids or you had polyps removed, you may have a small amount of bleeding  DISCHARGE INSTRUCTIONS:   Seek care immediately if:   · You have a large amount of bright red blood in your bowel movements  · Your abdomen is hard and firm and you have severe pain  · You have sudden trouble breathing  Contact your healthcare provider if:   · You develop a rash or hives  · You have a fever within 24 hours of your procedure       · You have not had a bowel movement for 3 days after your procedure  · You have questions or concerns about your condition or care  Activity:   · Do not lift, strain, or run  for 3 days after your procedure  · Rest after your procedure  You have been given medicine to relax you  Do not  drive or make important decisions until the day after your procedure  Return to your normal activity as directed  · Relieve gas and discomfort from bloating  by lying on your right side with a heating pad on your abdomen  You may need to take short walks to help the gas move out   Eat small meals until bloating is relieved  If you had polyps removed: For 7 days after your procedure:  · Do not  take aspirin  · Do not  go on long car rides  Follow up with your healthcare provider as directed:  Write down your questions so you remember to ask them during your visits  © 2017 [employer] is for End User's use only and may not be sold, redistributed or otherwise used for commercial purposes  All illustrations and images included in CareNotes® are the copyrighted property of [employer]  or Jaden Grace  The above information is an  only  It is not intended as medical advice for individual conditions or treatments  Talk to your doctor, nurse or pharmacist before following any medical regimen to see if it is safe and effective for you

## 2021-02-04 NOTE — ANESTHESIA POSTPROCEDURE EVALUATION
Post-Op Assessment Note    CV Status:  Stable  Pain Score: 0    Pain management: adequate     Mental Status:  Alert and awake   Hydration Status:  Euvolemic   PONV Controlled:  Controlled   Airway Patency:  Patent and adequate      Post Op Vitals Reviewed: Yes      Staff: CRNA         No complications documented      BP   101/61   Temp      Pulse 64   Resp   20   SpO2   98

## 2021-02-04 NOTE — PERIOPERATIVE NURSING NOTE
Pt cardiac monitor presented with irregular rhythm  Anesthesia involved  EKG taken, stated heart block     Umberto Irineo, RN  02/04/21

## 2021-02-04 NOTE — TELEPHONE ENCOUNTER
----- Message from Swetha Harris PA-C sent at 2/4/2021  9:31 AM EST -----  pls place 1 year mri/mrcp recall thanks

## 2021-02-04 NOTE — CONSULTS
Consultation - Cardiology   Saniya Brower 63 y o  male MRN: [id number]  Unit/Bed#:  Encounter: [id number]    Assessment/Plan   1  Sinus bradycardia with bifascicular block   Complains of dizziness  No syncope  EKG showed sinus bradycardia with bifascicular block  No significant event on telemetry  Reported cardiac catheterization in 15 years ago was normal    Order cardiac event monitor  Advised to follow-up with cardiology as an outpatient in 2 weeks  2  Mitral valve prolapse  Last echocardiogram was 15 years ago  Will follow-up with echocardiogram as an outpatient  3  Hyperlipidemia  Continue statin and diet control      History of Present Illness   Physician Requesting Consult: Yovanny Prom, DO  Reason for Consult / Principal Problem:  irregular rhythm  HPI: Saniya Brower is a 63y o  year old male with past medical history of hyperlipidemia, BPH, asthma and GERD who was found to have irregular rhythm on cardiac monitor  Patient reports he has dizziness and lightheadedness when he gets up quickly, resolved by sitting down  Denies any chest pain, palpitation, diaphoresis, presyncopal or syncopal episodes  He had similar episode in his previous colonoscopy which he attributes to prep  He states he does not drink enough water  He has SOB which he attributes to asthma  He reports he is not physically active so that he is not sure his SOB is related exertion or not  He does not have significant smoking history  He reports he had cardiac catheterization in 15 years ago which was normal  He had Echocardiogram at the same time which reported he had mitral valve prolapse without regurgitation  No history of hypertension or CAD  Consults    Review of Systems   Constitutional: Positive for activity change  Negative for appetite change, diaphoresis, fever and unexpected weight change  HENT: Negative  Eyes: Negative  Respiratory: Positive for shortness of breath   Negative for apnea, cough, choking, chest tightness, wheezing and stridor  Cardiovascular: Negative for chest pain, palpitations and leg swelling  Gastrointestinal: Negative  Genitourinary: Negative  Musculoskeletal: Negative  Skin: Negative  Neurological: Positive for dizziness and light-headedness  Negative for tremors, syncope, weakness and numbness  Hematological: Negative  Psychiatric/Behavioral: Negative          Historical Information   Past Medical History:   Diagnosis Date    Asthma     allergy induced    Colon polyp     Fatty liver     H/O nonmelanoma skin cancer     last assessed-8/22/2017    Hyperlipidemia     Inflamed seborrheic keratosis     Malignant neoplasm of skin     last assessed-1/21/2014    Neoplasm of uncertain behavior of skin     Nocturia     Pneumothorax, left     Seasonal allergies     Sebaceous cyst     Squamous cell carcinoma of skin of neck     Testicular hypofunction     Viral warts      Past Surgical History:   Procedure Laterality Date    CARDIAC CATHETERIZATION      CARDIOVASCULAR STRESS TEST  08/27/2010    COLONOSCOPY  10/08/2008    LUNG SURGERY      for pneumothorax    PARTIAL HIP ARTHROPLASTY      RHINOPLASTY       Social History     Substance and Sexual Activity   Alcohol Use No    Comment: quit march 2020     Social History     Substance and Sexual Activity   Drug Use No     E-Cigarette/Vaping    E-Cigarette Use Never User      E-Cigarette/Vaping Substances    Nicotine No     THC No     CBD No     Flavoring No     Other No     Unknown No      Social History     Tobacco Use   Smoking Status Never Smoker   Smokeless Tobacco Never Used     Family History:   Family History   Problem Relation Age of Onset    Colon cancer Father        Meds/Allergies   all current active meds have been reviewed  Allergies   Allergen Reactions    Dust Mite Extract     Molds & Smuts     Pollen Extract     Short Ragweed Pollen Ext     Tree Extract        Objective   Vitals: Blood pressure 147/75, pulse (!) 52, temperature 97 8 °F (36 6 °C), temperature source Temporal, resp  rate 22, height 5' 8" (1 727 m), weight 79 6 kg (175 lb 7 8 oz), SpO2 97 %  Orthostatic Blood Pressures      Most Recent Value   Blood Pressure  147/75 filed at 02/04/2021 1415          No intake or output data in the 24 hours ending 02/04/21 1423    Invasive Devices     Peripheral Intravenous Line            Peripheral IV 02/04/21 Right Antecubital less than 1 day                Physical Exam  Vitals signs and nursing note reviewed  Constitutional:       Appearance: Normal appearance  He is well-developed  HENT:      Head: Normocephalic and atraumatic  Eyes:      General: No scleral icterus  Conjunctiva/sclera: Conjunctivae normal    Neck:      Musculoskeletal: Neck supple  Cardiovascular:      Rate and Rhythm: Normal rate and regular rhythm  Pulses: Normal pulses  Heart sounds: Normal heart sounds  No murmur  Pulmonary:      Effort: Pulmonary effort is normal  No respiratory distress  Breath sounds: Normal breath sounds  Abdominal:      General: There is no distension  Palpations: Abdomen is soft  Tenderness: There is no abdominal tenderness  Musculoskeletal:      Right lower leg: No edema  Left lower leg: No edema  Skin:     General: Skin is warm and dry  Neurological:      General: No focal deficit present  Mental Status: He is alert  Psychiatric:         Mood and Affect: Mood normal          Behavior: Behavior normal          Lab Results: I have personally reviewed pertinent lab results  Imaging: I have personally reviewed pertinent reports  EKG:  Sinus bradycardia with bifascicular block    Code Status: No Order  Advance Directive and Living Will:      Power of :    POLST:      Counseling / Coordination of Care  Total floor / unit time spent today 30 minutes    Greater than 50% of total time was spent with the patient and / or family counseling and / or coordination of care  A description of the counseling / coordination of care: as above

## 2021-02-04 NOTE — PERIOPERATIVE NURSING NOTE
Patient evaluated by Dr Texie Hashimoto for irregular heart rhythm  Per Dr Texie Hashimoto, patient okay to have egd and colonoscopy today  Patient transferred to [facility] for procedure  Dr Stephanie Aleman and Dr De Jesus Needs aware

## 2021-02-05 ENCOUNTER — TELEPHONE (OUTPATIENT)
Dept: CARDIOLOGY CLINIC | Facility: CLINIC | Age: 66
End: 2021-02-05

## 2021-02-05 ENCOUNTER — TELEPHONE (OUTPATIENT)
Dept: GASTROENTEROLOGY | Facility: CLINIC | Age: 66
End: 2021-02-05

## 2021-02-05 NOTE — TELEPHONE ENCOUNTER
Leigh, I ordered the Event Monitor today for the patient and had it sent to the patient's house  Thank you

## 2021-02-05 NOTE — TELEPHONE ENCOUNTER
Spoke with pt and gave him the number to have his echo scheduled  Clinical~Has pt been signed up with [facility] for the monitor?

## 2021-02-05 NOTE — TELEPHONE ENCOUNTER
----- Message from River Reynolds PA-C sent at 2/4/2021  2:03 PM EST -----  I ordered event monitor, echocardiogram    Per Dr Ike Ley okay to overbook 2-3 weeks after patient has event monitor and echocardiogram

## 2021-02-05 NOTE — TELEPHONE ENCOUNTER
Your first available is in April, patient is wanting to know if he can wait that long  He feels it was more urgent  Please advise

## 2021-02-05 NOTE — TELEPHONE ENCOUNTER
Dr Keely Banuelos - patient called for results of colonoscopy and MRI, an other questions   Please call Gustavo Mendezr at [phone] ty

## 2021-02-05 NOTE — TELEPHONE ENCOUNTER
----- Message from Joana Leonard MD sent at 2/4/2021 10:21 PM EST -----  Hi please schedule this patient an appt with me first available   Thanks

## 2021-02-05 NOTE — TELEPHONE ENCOUNTER
Called pt to schedule first available which is April & he said that he thought it was more urgent than that  He would like to know if he can wait this long?  Please advise

## 2021-02-05 NOTE — TELEPHONE ENCOUNTER
Two different messages were sent for pt  The one from Ba Ely states he can be overbooked after testing  Will call back & makes sure he has testing scheduled & then make appt

## 2021-02-08 ENCOUNTER — TREATMENT (OUTPATIENT)
Dept: GASTROENTEROLOGY | Facility: CLINIC | Age: 66
End: 2021-02-08

## 2021-02-08 ENCOUNTER — TELEPHONE (OUTPATIENT)
Dept: GASTROENTEROLOGY | Facility: CLINIC | Age: 66
End: 2021-02-08

## 2021-02-08 DIAGNOSIS — B37.81 CANDIDA ESOPHAGITIS (HCC): Primary | ICD-10-CM

## 2021-02-08 LAB
ATRIAL RATE: 57 BPM
PR INTERVAL: 168 MS
QRS AXIS: -61 DEGREES
QRSD INTERVAL: 158 MS
QT INTERVAL: 496 MS
QTC INTERVAL: 482 MS
T WAVE AXIS: 14 DEGREES
VENTRICULAR RATE: 57 BPM

## 2021-02-08 PROCEDURE — 93010 ELECTROCARDIOGRAM REPORT: CPT | Performed by: INTERNAL MEDICINE

## 2021-02-08 RX ORDER — FLUCONAZOLE 100 MG/1
100 TABLET ORAL DAILY
Qty: 7 TABLET | Refills: 0 | Status: SHIPPED | OUTPATIENT
Start: 2021-02-08 | End: 2021-02-15

## 2021-02-08 NOTE — TELEPHONE ENCOUNTER
----- Message from Mckenna Diallo DO sent at 2/8/2021 10:34 AM EST -----  Please call results to the patient  The patient has Candida esophagitis  I have called in fluconazole to the patient's pharmacy  He will take 100 mg daily for 7 days

## 2021-02-08 NOTE — TELEPHONE ENCOUNTER
Called pt and advised    Pt wanted to know results of tissue exam and MRI  Advised pt tissue exam did not result yet and will route a message to the provider for the results of the MRI  Routing to pa

## 2021-02-09 ENCOUNTER — TELEPHONE (OUTPATIENT)
Dept: GASTROENTEROLOGY | Facility: CLINIC | Age: 66
End: 2021-02-09

## 2021-02-09 NOTE — TELEPHONE ENCOUNTER
Results in, contacted pt about tissue exam result, pt wants MRI results and wants to speak to Lee Ann Barbosa only  Routing to pa

## 2021-02-09 NOTE — TELEPHONE ENCOUNTER
Called pt and advised  Pt wants to know results of MRI and wants to speak to Terrie Boas only    Routing to pa  Thank you

## 2021-02-09 NOTE — TELEPHONE ENCOUNTER
----- Message from Graciela Ramirez DO sent at 2/8/2021  3:38 PM EST -----  Please call the patient with the biopsy and polyp results  Biopsies of the stomach was benign and negative for Helicobacter pylori  The polyp of the stomach was a benign polyp  The polyps of the colon were precancerous polyps and removed completely    The patient will be due for repeat colonoscopy in 3 years

## 2021-02-09 NOTE — TELEPHONE ENCOUNTER
Ilene Avila - patient is waiting to hear from Ilene Avila about his results   Please call Radha Allison at [phone] ty

## 2021-02-09 NOTE — TELEPHONE ENCOUNTER
Informed of all results  He is still tending towards constipation - he is only taking colace every other day - I asked him to increase this to daily or start miralax

## 2021-02-10 DIAGNOSIS — F41.9 ANXIETY: ICD-10-CM

## 2021-02-10 RX ORDER — LORAZEPAM 0.5 MG/1
0.5 TABLET ORAL EVERY 6 HOURS PRN
Qty: 90 TABLET | Refills: 3 | Status: SHIPPED | OUTPATIENT
Start: 2021-02-10 | End: 2022-05-31 | Stop reason: SDUPTHER

## 2021-02-10 NOTE — TELEPHONE ENCOUNTER
Medication:  PDMP   08/06/2019  1  08/06/2019  LORAZEPAM 0 5 MG TABLET  90 0  22  BR LINDA  [id number]  PENNS ([id number])  0   [place]    07/29/2019  1  02/28/2019  LORAZEPAM 0 5 MG TABLET  30 0  7  BR LINDA  [id number]  PENNS ([id number])  3   [place]    06/04/2019  1  02/28/2019  LORAZEPAM 0 5 MG TABLET  30 0  7  BR LINDA  [id number]  PENNS ([id number])  2   Comm [facility]    04/07/2019  1  02/28/2019  LORAZEPAM 0 5 MG TABLET  30 0  7  BR LINDA  [id number]  PENNS ([id number])  1   Comm [facility]       Active agreement on file -No

## 2021-02-11 LAB
ATRIAL RATE: 110 BPM
P AXIS: 87 DEGREES
PR INTERVAL: 216 MS
QRS AXIS: -59 DEGREES
QRSD INTERVAL: 162 MS
QT INTERVAL: 500 MS
QTC INTERVAL: 500 MS
T WAVE AXIS: 17 DEGREES
VENTRICULAR RATE: 60 BPM

## 2021-02-11 PROCEDURE — 93010 ELECTROCARDIOGRAM REPORT: CPT | Performed by: INTERNAL MEDICINE

## 2021-02-15 ENCOUNTER — TELEPHONE (OUTPATIENT)
Dept: CARDIOLOGY CLINIC | Facility: CLINIC | Age: 66
End: 2021-02-15

## 2021-02-15 DIAGNOSIS — F41.9 ANXIETY: ICD-10-CM

## 2021-02-15 RX ORDER — CITALOPRAM 40 MG/1
40 TABLET ORAL DAILY
Qty: 90 TABLET | Refills: 0 | Status: SHIPPED | OUTPATIENT
Start: 2021-02-15 | End: 2021-05-07 | Stop reason: HOSPADM

## 2021-02-18 ENCOUNTER — HOSPITAL ENCOUNTER (OUTPATIENT)
Dept: NON INVASIVE DIAGNOSTICS | Facility: CLINIC | Age: 66
Discharge: HOME | End: 2021-02-18
Payer: COMMERCIAL

## 2021-02-18 DIAGNOSIS — I34.1 MVP (MITRAL VALVE PROLAPSE): ICD-10-CM

## 2021-02-18 PROCEDURE — 93306 TTE W/DOPPLER COMPLETE: CPT

## 2021-02-18 PROCEDURE — 93306 TTE W/DOPPLER COMPLETE: CPT | Performed by: INTERNAL MEDICINE

## 2021-02-19 ENCOUNTER — TELEPHONE (OUTPATIENT)
Dept: CARDIOLOGY CLINIC | Facility: CLINIC | Age: 66
End: 2021-02-19

## 2021-03-03 ENCOUNTER — CLINICAL SUPPORT (OUTPATIENT)
Dept: CARDIOLOGY CLINIC | Facility: CLINIC | Age: 66
End: 2021-03-03
Payer: COMMERCIAL

## 2021-03-03 ENCOUNTER — TELEPHONE (OUTPATIENT)
Dept: CARDIOLOGY CLINIC | Facility: CLINIC | Age: 66
End: 2021-03-03

## 2021-03-03 DIAGNOSIS — R00.1 BRADYCARDIA: Primary | ICD-10-CM

## 2021-03-03 PROCEDURE — 99211 OFF/OP EST MAY X REQ PHY/QHP: CPT

## 2021-03-03 NOTE — PROGRESS NOTES
Patient came into the office to have a Biotel Holter Monitor put on  Patient denies any chest pain, SOB, Numbness, Dizziness and leg swelling     WT: 175 lb   BP: 136/70  Oxygen: 97  Pulse: 70    The Biotel Holter Monitor was install and patient was given instructions on how to use the device        Thank you

## 2021-03-03 NOTE — TELEPHONE ENCOUNTER
Spoke with the patient and informed him to bring the device into the office so we can install it properly and patient made an appointment for 3pm today  Thank you

## 2021-03-03 NOTE — Clinical Note
Dr Albaro Fontenot,     This patient was seen our office for a nurse visit to have a Holter Monitor and Denita Back was the one that ordered it on your behalf  I just need this nurse visit signed by you because Live Robb was not able to sign it      Thank you,    Jaskaran

## 2021-03-05 DIAGNOSIS — E78.5 HYPERLIPIDEMIA, UNSPECIFIED HYPERLIPIDEMIA TYPE: ICD-10-CM

## 2021-03-05 RX ORDER — ATORVASTATIN CALCIUM 20 MG/1
20 TABLET, FILM COATED ORAL DAILY
Qty: 30 TABLET | Refills: 0 | Status: SHIPPED | OUTPATIENT
Start: 2021-03-05 | End: 2021-03-29

## 2021-03-11 ENCOUNTER — TELEPHONE (OUTPATIENT)
Dept: CARDIOLOGY CLINIC | Facility: CLINIC | Age: 66
End: 2021-03-11

## 2021-03-11 NOTE — TELEPHONE ENCOUNTER
----- Message from Nena Jeffrey MD sent at 3/11/2021 11:42 AM EST -----      ----- Message -----  From: Tabatha Quiñones MA  Sent: 3/11/2021  11:30 AM EST  To: MD Dr Zbigniew Dorado, This patient was seen our office for a nurse visit to have a Holter Monitor and Denita [id number] was the one that ordered it on your behalf  I just need this nurse visit signed by you because Fidel Martínez was not able to sign it  Thank Rhiannon Eng

## 2021-03-17 ENCOUNTER — CLINICAL SUPPORT (OUTPATIENT)
Dept: CARDIOLOGY CLINIC | Facility: CLINIC | Age: 66
End: 2021-03-17
Payer: COMMERCIAL

## 2021-03-17 DIAGNOSIS — R00.1 BRADYCARDIA: ICD-10-CM

## 2021-03-17 PROCEDURE — 93228 REMOTE 30 DAY ECG REV/REPORT: CPT | Performed by: INTERNAL MEDICINE

## 2021-03-21 NOTE — RESULT ENCOUNTER NOTE
Justin Dubon,     When you get a chance can you please review this patient's event monitor  Looks like he is having runs of AF/FL vs AT with block and wenckebach with 2:1 conduction  I think he'll need a device and rate control and likely anticoagulation  He has no prior documented history of AF and not on rate control  If you agree I can have my staff get him an appt with you       Thanks,   Laisha Castro

## 2021-03-21 NOTE — RESULT ENCOUNTER NOTE
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

## 2021-03-22 ENCOUNTER — TELEPHONE (OUTPATIENT)
Dept: CARDIOLOGY CLINIC | Facility: CLINIC | Age: 66
End: 2021-03-22

## 2021-03-22 NOTE — TELEPHONE ENCOUNTER
----- Message from Deretha Cowden, MD sent at 3/19/2021  4:23 PM EDT -----  Please schedule this patient an appt with me to review his recent event monitor results  Please place in a cancellation spot or overbook next week

## 2021-03-23 ENCOUNTER — OFFICE VISIT (OUTPATIENT)
Dept: CARDIOLOGY CLINIC | Facility: CLINIC | Age: 66
End: 2021-03-23
Payer: COMMERCIAL

## 2021-03-23 VITALS
HEIGHT: 68 IN | DIASTOLIC BLOOD PRESSURE: 82 MMHG | BODY MASS INDEX: 27.46 KG/M2 | SYSTOLIC BLOOD PRESSURE: 116 MMHG | WEIGHT: 181.2 LBS | HEART RATE: 52 BPM | OXYGEN SATURATION: 97 % | RESPIRATION RATE: 18 BRPM

## 2021-03-23 DIAGNOSIS — I71.2 THORACIC AORTIC ANEURYSM WITHOUT RUPTURE (HCC): ICD-10-CM

## 2021-03-23 DIAGNOSIS — I48.92 ATRIAL FLUTTER, UNSPECIFIED TYPE (HCC): Primary | ICD-10-CM

## 2021-03-23 PROCEDURE — 99215 OFFICE O/P EST HI 40 MIN: CPT | Performed by: INTERNAL MEDICINE

## 2021-03-23 PROCEDURE — 3008F BODY MASS INDEX DOCD: CPT | Performed by: INTERNAL MEDICINE

## 2021-03-23 PROCEDURE — 1036F TOBACCO NON-USER: CPT | Performed by: INTERNAL MEDICINE
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
## 2021-03-24 ENCOUNTER — TELEPHONE (OUTPATIENT)
Dept: CARDIOLOGY CLINIC | Facility: CLINIC | Age: 66
End: 2021-03-24

## 2021-03-24 NOTE — TELEPHONE ENCOUNTER
Pt called and would like to come  a copy of his heart monitor report     Please call pt when report is ready for pickup

## 2021-03-25 ENCOUNTER — HOSPITAL ENCOUNTER (OUTPATIENT)
Dept: CT IMAGING | Facility: CLINIC | Age: 66
Discharge: HOME | End: 2021-03-25
Payer: COMMERCIAL

## 2021-03-25 DIAGNOSIS — I71.2 THORACIC AORTIC ANEURYSM WITHOUT RUPTURE (HCC): ICD-10-CM

## 2021-03-25 PROCEDURE — G1004 CDSM NDSC: HCPCS

## 2021-03-25 PROCEDURE — 71250 CT THORAX DX C-: CPT

## 2021-03-29 DIAGNOSIS — E78.5 HYPERLIPIDEMIA, UNSPECIFIED HYPERLIPIDEMIA TYPE: ICD-10-CM

## 2021-03-29 RX ORDER — ATORVASTATIN CALCIUM 20 MG/1
TABLET, FILM COATED ORAL
Qty: 30 TABLET | Refills: 0 | Status: SHIPPED | OUTPATIENT
Start: 2021-03-29 | End: 2021-04-22

## 2021-04-02 ENCOUNTER — TELEPHONE (OUTPATIENT)
Dept: CARDIOLOGY CLINIC | Facility: CLINIC | Age: 66
End: 2021-04-02

## 2021-04-02 NOTE — TELEPHONE ENCOUNTER
----- Message from Harpal Venegas MD sent at 4/1/2021  4:04 PM EDT -----  Please let him know there was no evidence of aneurysm on his ultrasound

## 2021-04-02 NOTE — TELEPHONE ENCOUNTER
Spoke with the patient and gave him the results to the Ultrasound and patient understood  Thank you

## 2021-04-02 NOTE — TELEPHONE ENCOUNTER
----- Message from David Son MD sent at 4/1/2021  4:04 PM EDT -----  Please let him know there was no evidence of aneurysm on his ultrasound

## 2021-04-06 ENCOUNTER — CONSULT (OUTPATIENT)
Dept: CARDIOLOGY CLINIC | Facility: CLINIC | Age: 66
End: 2021-04-06
Payer: COMMERCIAL

## 2021-04-06 VITALS
OXYGEN SATURATION: 98 % | BODY MASS INDEX: 27.16 KG/M2 | DIASTOLIC BLOOD PRESSURE: 60 MMHG | SYSTOLIC BLOOD PRESSURE: 112 MMHG | WEIGHT: 179.2 LBS | HEART RATE: 71 BPM | HEIGHT: 68 IN

## 2021-04-06 DIAGNOSIS — I47.1 ATRIAL TACHYCARDIA (HCC): Primary | ICD-10-CM

## 2021-04-06 DIAGNOSIS — R00.1 SYMPTOMATIC SINUS BRADYCARDIA: ICD-10-CM

## 2021-04-06 DIAGNOSIS — J45.909 UNCOMPLICATED ASTHMA, UNSPECIFIED ASTHMA SEVERITY, UNSPECIFIED WHETHER PERSISTENT: ICD-10-CM

## 2021-04-06 DIAGNOSIS — I45.2 BIFASCICULAR BLOCK: ICD-10-CM

## 2021-04-06 DIAGNOSIS — I48.92 ATRIAL FLUTTER, UNSPECIFIED TYPE (HCC): ICD-10-CM

## 2021-04-06 DIAGNOSIS — E78.2 MIXED HYPERLIPIDEMIA: ICD-10-CM

## 2021-04-06 DIAGNOSIS — I49.5 SICK SINUS SYNDROME (HCC): ICD-10-CM

## 2021-04-06 PROBLEM — I47.19 ATRIAL TACHYCARDIA: Status: ACTIVE | Noted: 2021-04-06

## 2021-04-06 PROCEDURE — 99205 OFFICE O/P NEW HI 60 MIN: CPT | Performed by: INTERNAL MEDICINE

## 2021-04-06 PROCEDURE — 3008F BODY MASS INDEX DOCD: CPT | Performed by: INTERNAL MEDICINE

## 2021-04-06 PROCEDURE — 1036F TOBACCO NON-USER: CPT | Performed by: INTERNAL MEDICINE

## 2021-04-06 PROCEDURE — 93000 ELECTROCARDIOGRAM COMPLETE: CPT | Performed by: INTERNAL MEDICINE
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
## 2021-04-12 ENCOUNTER — TELEPHONE (OUTPATIENT)
Dept: CARDIOLOGY CLINIC | Facility: CLINIC | Age: 66
End: 2021-04-12

## 2021-04-12 NOTE — TELEPHONE ENCOUNTER
Dr Drew Backbone, on your recommendation for this patient for Dual chamber PM implant, left side you mentioned "make dure of left side venous patency" means the patient needs to have a venogram?   Please let us know to include the referral for that appointment  Thank you

## 2021-04-13 ENCOUNTER — PREP FOR PROCEDURE (OUTPATIENT)
Dept: INTERVENTIONAL RADIOLOGY/VASCULAR | Facility: CLINIC | Age: 66
End: 2021-04-13

## 2021-04-13 DIAGNOSIS — I49.5 SICK SINUS SYNDROME (HCC): Primary | ICD-10-CM

## 2021-04-13 DIAGNOSIS — R00.1 SYMPTOMATIC SINUS BRADYCARDIA: ICD-10-CM

## 2021-04-13 DIAGNOSIS — I47.1 ATRIAL TACHYCARDIA (HCC): Primary | ICD-10-CM

## 2021-04-14 ENCOUNTER — TELEPHONE (OUTPATIENT)
Dept: CARDIOLOGY CLINIC | Facility: CLINIC | Age: 66
End: 2021-04-14

## 2021-04-14 DIAGNOSIS — I49.5 SICK SINUS SYNDROME (HCC): Primary | ICD-10-CM

## 2021-04-14 NOTE — TELEPHONE ENCOUNTER
COVID Pre-Visit Screening     1  Is this a family member screening? Yes  2  Have you traveled outside of your state in the past 2 weeks? No  3  Do you presently have a fever or flu-like symptoms? No  4  Do you have symptoms of an upper respiratory infection like runny nose, sore throat, or cough? No  5  Are you suffering from new headache that you have not had in the past?  No  6  Do you have/have you experienced any new shortness of breath recently? No  7  Do you have any new diarrhea, nausea or vomiting? No  8  Have you been in contact with anyone who has been sick or diagnosed with COVID-19? No  9  Do you have any new loss of taste or smell? No  10  Are you able to wear a mask without a valve for the entire visit? Yes       Patient schedule for Dual chamber PM/medication admision  on 5/5/21 at [facility] with Dr Shyla Lake  Patient aware of general instructions and blood test require  Please Real Reasons can you check for insurance approval     Kamaljit Barefoot can you please check price for Dofetilide/sotalol for admission after procedure  Thank you

## 2021-04-15 ENCOUNTER — HOSPITAL ENCOUNTER (OUTPATIENT)
Dept: INTERVENTIONAL RADIOLOGY/VASCULAR | Facility: HOSPITAL | Age: 66
Discharge: HOME | End: 2021-04-15
Attending: STUDENT IN AN ORGANIZED HEALTH CARE EDUCATION/TRAINING PROGRAM
Payer: COMMERCIAL

## 2021-04-15 DIAGNOSIS — I47.1 ATRIAL TACHYCARDIA (HCC): ICD-10-CM

## 2021-04-15 PROCEDURE — 75820 VEIN X-RAY ARM/LEG: CPT

## 2021-04-15 PROCEDURE — 36005 INJECTION EXT VENOGRAPHY: CPT

## 2021-04-15 PROCEDURE — NC001 PR NO CHARGE: Performed by: STUDENT IN AN ORGANIZED HEALTH CARE EDUCATION/TRAINING PROGRAM

## 2021-04-15 RX ADMIN — IOHEXOL 30 ML: 350 INJECTION, SOLUTION INTRAVENOUS at 14:10

## 2021-04-15 NOTE — TELEPHONE ENCOUNTER
Dofetilide 500 mcg BID no prior authorization needed   180 for 90  $225 00        Sotalol 160 mg BID No prior authorzation needed    180 for 90 days $0

## 2021-04-15 NOTE — BRIEF OP NOTE (RAD/CATH)
INTERVENTIONAL RADIOLOGY PROCEDURE NOTE    Date: 4/15/2021    Procedure: IR OTHER    Preoperative diagnosis:   1  Atrial tachycardia (HCC)         Postoperative diagnosis: Same  Surgeon: Barrington Weathers MD     Assistant: None  No qualified resident was available  Blood loss: 0 ml    Specimens: none    Findings:   Venogram documenting L central venous patency  Complications: None immediate      Anesthesia: local

## 2021-04-19 ENCOUNTER — TRANSCRIBE ORDERS (OUTPATIENT)
Dept: LAB | Facility: CLINIC | Age: 66
End: 2021-04-19

## 2021-04-19 DIAGNOSIS — E66.9 OBESITY, UNSPECIFIED CLASSIFICATION, UNSPECIFIED OBESITY TYPE, UNSPECIFIED WHETHER SERIOUS COMORBIDITY PRESENT: ICD-10-CM

## 2021-04-19 DIAGNOSIS — H34.233 RETINAL ARTERY BRANCH OCCLUSION OF BOTH EYES: ICD-10-CM

## 2021-04-19 DIAGNOSIS — E78.5 HYPERLIPIDEMIA, UNSPECIFIED HYPERLIPIDEMIA TYPE: ICD-10-CM

## 2021-04-19 DIAGNOSIS — F17.200 TOBACCO USE DISORDER: ICD-10-CM

## 2021-04-19 DIAGNOSIS — F41.9 ANXIETY: ICD-10-CM

## 2021-04-20 ENCOUNTER — APPOINTMENT (OUTPATIENT)
Dept: LAB | Facility: CLINIC | Age: 66
End: 2021-04-20
Payer: COMMERCIAL

## 2021-04-20 LAB
ALBUMIN SERPL BCP-MCNC: 4 G/DL (ref 3.5–5)
ALP SERPL-CCNC: 68 U/L (ref 46–116)
ALT SERPL W P-5'-P-CCNC: 29 U/L (ref 12–78)
ANION GAP SERPL CALCULATED.3IONS-SCNC: 4 MMOL/L (ref 4–13)
AST SERPL W P-5'-P-CCNC: 12 U/L (ref 5–45)
BASOPHILS # BLD AUTO: 0.07 THOUSANDS/ΜL (ref 0–0.1)
BASOPHILS NFR BLD AUTO: 1 % (ref 0–1)
BILIRUB SERPL-MCNC: 0.62 MG/DL (ref 0.2–1)
BUN SERPL-MCNC: 20 MG/DL (ref 5–25)
CALCIUM SERPL-MCNC: 9.5 MG/DL (ref 8.3–10.1)
CHLORIDE SERPL-SCNC: 108 MMOL/L (ref 100–108)
CO2 SERPL-SCNC: 27 MMOL/L (ref 21–32)
CREAT SERPL-MCNC: 0.86 MG/DL (ref 0.6–1.3)
EOSINOPHIL # BLD AUTO: 0.18 THOUSAND/ΜL (ref 0–0.61)
EOSINOPHIL NFR BLD AUTO: 2 % (ref 0–6)
ERYTHROCYTE [DISTWIDTH] IN BLOOD BY AUTOMATED COUNT: 12.7 % (ref 11.6–15.1)
GFR SERPL CREATININE-BSD FRML MDRD: 92 ML/MIN/1.73SQ M
GLUCOSE P FAST SERPL-MCNC: 104 MG/DL (ref 65–99)
HCT VFR BLD AUTO: 47.6 % (ref 36.5–49.3)
HGB BLD-MCNC: 15.8 G/DL (ref 12–17)
IMM GRANULOCYTES # BLD AUTO: 0.04 THOUSAND/UL (ref 0–0.2)
IMM GRANULOCYTES NFR BLD AUTO: 1 % (ref 0–2)
LYMPHOCYTES # BLD AUTO: 1.68 THOUSANDS/ΜL (ref 0.6–4.47)
LYMPHOCYTES NFR BLD AUTO: 20 % (ref 14–44)
MCH RBC QN AUTO: 30.9 PG (ref 26.8–34.3)
MCHC RBC AUTO-ENTMCNC: 33.2 G/DL (ref 31.4–37.4)
MCV RBC AUTO: 93 FL (ref 82–98)
MONOCYTES # BLD AUTO: 0.91 THOUSAND/ΜL (ref 0.17–1.22)
MONOCYTES NFR BLD AUTO: 11 % (ref 4–12)
NEUTROPHILS # BLD AUTO: 5.63 THOUSANDS/ΜL (ref 1.85–7.62)
NEUTS SEG NFR BLD AUTO: 65 % (ref 43–75)
NRBC BLD AUTO-RTO: 0 /100 WBCS
PLATELET # BLD AUTO: 215 THOUSANDS/UL (ref 149–390)
PMV BLD AUTO: 10.8 FL (ref 8.9–12.7)
POTASSIUM SERPL-SCNC: 4 MMOL/L (ref 3.5–5.3)
PROT SERPL-MCNC: 7.3 G/DL (ref 6.4–8.2)
RBC # BLD AUTO: 5.12 MILLION/UL (ref 3.88–5.62)
SODIUM SERPL-SCNC: 139 MMOL/L (ref 136–145)
WBC # BLD AUTO: 8.51 THOUSAND/UL (ref 4.31–10.16)

## 2021-04-20 PROCEDURE — 85025 COMPLETE CBC W/AUTO DIFF WBC: CPT

## 2021-04-20 PROCEDURE — 80053 COMPREHEN METABOLIC PANEL: CPT

## 2021-04-20 PROCEDURE — 36415 COLL VENOUS BLD VENIPUNCTURE: CPT

## 2021-04-22 DIAGNOSIS — E78.5 HYPERLIPIDEMIA, UNSPECIFIED HYPERLIPIDEMIA TYPE: ICD-10-CM

## 2021-04-22 RX ORDER — ATORVASTATIN CALCIUM 20 MG/1
TABLET, FILM COATED ORAL
Qty: 30 TABLET | Refills: 0 | Status: SHIPPED | OUTPATIENT
Start: 2021-04-22 | End: 2021-05-14 | Stop reason: SDUPTHER

## 2021-04-27 NOTE — TELEPHONE ENCOUNTER
He does not need auth for his Pacer Implant [id number] per Renata Avelar at [address] [place] [address] Road  Ref# [id number]    His Sotalol admission was approved    Auth# [id number]  5/5/21-5/9/21 per Leola Sterling at [address] [place] [address]

## 2021-05-04 ENCOUNTER — TELEPHONE (OUTPATIENT)
Dept: SURGERY | Facility: HOSPITAL | Age: 66
End: 2021-05-04

## 2021-05-04 RX ORDER — CEFAZOLIN SODIUM 2 G/50ML
2000 SOLUTION INTRAVENOUS ONCE
Status: CANCELLED | OUTPATIENT
Start: 2021-05-04 | End: 2021-05-04

## 2021-05-05 ENCOUNTER — APPOINTMENT (INPATIENT)
Dept: RADIOLOGY | Facility: HOSPITAL | Age: 66
DRG: 243 | End: 2021-05-05
Payer: COMMERCIAL

## 2021-05-05 ENCOUNTER — ANESTHESIA EVENT (OUTPATIENT)
Dept: NON INVASIVE DIAGNOSTICS | Facility: HOSPITAL | Age: 66
DRG: 243 | End: 2021-05-05
Payer: COMMERCIAL

## 2021-05-05 ENCOUNTER — HOSPITAL ENCOUNTER (INPATIENT)
Dept: NON INVASIVE DIAGNOSTICS | Facility: HOSPITAL | Age: 66
LOS: 2 days | Discharge: HOME | DRG: 243 | End: 2021-05-07
Attending: INTERNAL MEDICINE | Admitting: INTERNAL MEDICINE
Payer: COMMERCIAL

## 2021-05-05 DIAGNOSIS — F41.1 ANXIETY, GENERALIZED: ICD-10-CM

## 2021-05-05 DIAGNOSIS — I47.1 ATRIAL TACHYCARDIA (HCC): ICD-10-CM

## 2021-05-05 DIAGNOSIS — I49.5 SICK SINUS SYNDROME (HCC): ICD-10-CM

## 2021-05-05 DIAGNOSIS — I49.5 TACHY-BRADY SYNDROME (HCC): Primary | ICD-10-CM

## 2021-05-05 DIAGNOSIS — R00.1 SYMPTOMATIC SINUS BRADYCARDIA: ICD-10-CM

## 2021-05-05 LAB
ANION GAP SERPL CALCULATED.3IONS-SCNC: 4 MMOL/L (ref 4–13)
ATRIAL RATE: 122 BPM
ATRIAL RATE: 75 BPM
BUN SERPL-MCNC: 14 MG/DL (ref 5–25)
CALCIUM SERPL-MCNC: 9.6 MG/DL (ref 8.3–10.1)
CHLORIDE SERPL-SCNC: 109 MMOL/L (ref 100–108)
CO2 SERPL-SCNC: 26 MMOL/L (ref 21–32)
CREAT SERPL-MCNC: 0.78 MG/DL (ref 0.6–1.3)
ERYTHROCYTE [DISTWIDTH] IN BLOOD BY AUTOMATED COUNT: 12.8 % (ref 11.6–15.1)
GFR SERPL CREATININE-BSD FRML MDRD: 96 ML/MIN/1.73SQ M
GLUCOSE P FAST SERPL-MCNC: 97 MG/DL (ref 65–99)
GLUCOSE SERPL-MCNC: 97 MG/DL (ref 65–140)
HCT VFR BLD AUTO: 46.1 % (ref 36.5–49.3)
HGB BLD-MCNC: 15.3 G/DL (ref 12–17)
INR PPP: 1.01 (ref 0.84–1.19)
MCH RBC QN AUTO: 30.1 PG (ref 26.8–34.3)
MCHC RBC AUTO-ENTMCNC: 33.2 G/DL (ref 31.4–37.4)
MCV RBC AUTO: 91 FL (ref 82–98)
PLATELET # BLD AUTO: 177 THOUSANDS/UL (ref 149–390)
PMV BLD AUTO: 10.4 FL (ref 8.9–12.7)
POTASSIUM SERPL-SCNC: 3.9 MMOL/L (ref 3.5–5.3)
PROTHROMBIN TIME: 13.3 SECONDS (ref 11.6–14.5)
QRS AXIS: -61 DEGREES
QRS AXIS: -65 DEGREES
QRSD INTERVAL: 154 MS
QRSD INTERVAL: 156 MS
QT INTERVAL: 462 MS
QT INTERVAL: 472 MS
QTC INTERVAL: 515 MS
QTC INTERVAL: 527 MS
RBC # BLD AUTO: 5.08 MILLION/UL (ref 3.88–5.62)
SODIUM SERPL-SCNC: 139 MMOL/L (ref 136–145)
T WAVE AXIS: 4 DEGREES
T WAVE AXIS: 8 DEGREES
VENTRICULAR RATE: 75 BPM
VENTRICULAR RATE: 75 BPM
WBC # BLD AUTO: 7.57 THOUSAND/UL (ref 4.31–10.16)

## 2021-05-05 PROCEDURE — 02HK3JZ INSERTION OF PACEMAKER LEAD INTO RIGHT VENTRICLE, PERCUTANEOUS APPROACH: ICD-10-PCS | Performed by: INTERNAL MEDICINE

## 2021-05-05 PROCEDURE — NC001 PR NO CHARGE: Performed by: INTERNAL MEDICINE

## 2021-05-05 PROCEDURE — C1898 LEAD, PMKR, OTHER THAN TRANS: HCPCS

## 2021-05-05 PROCEDURE — 93005 ELECTROCARDIOGRAM TRACING: CPT

## 2021-05-05 PROCEDURE — 33208 INSRT HEART PM ATRIAL & VENT: CPT | Performed by: INTERNAL MEDICINE

## 2021-05-05 PROCEDURE — 85610 PROTHROMBIN TIME: CPT | Performed by: PHYSICIAN ASSISTANT

## 2021-05-05 PROCEDURE — 80048 BASIC METABOLIC PNL TOTAL CA: CPT | Performed by: PHYSICIAN ASSISTANT

## 2021-05-05 PROCEDURE — C1769 GUIDE WIRE: HCPCS | Performed by: INTERNAL MEDICINE

## 2021-05-05 PROCEDURE — 02H63JZ INSERTION OF PACEMAKER LEAD INTO RIGHT ATRIUM, PERCUTANEOUS APPROACH: ICD-10-PCS | Performed by: INTERNAL MEDICINE

## 2021-05-05 PROCEDURE — 0JH606Z INSERTION OF PACEMAKER, DUAL CHAMBER INTO CHEST SUBCUTANEOUS TISSUE AND FASCIA, OPEN APPROACH: ICD-10-PCS | Performed by: INTERNAL MEDICINE

## 2021-05-05 PROCEDURE — C1892 INTRO/SHEATH,FIXED,PEEL-AWAY: HCPCS | Performed by: INTERNAL MEDICINE

## 2021-05-05 PROCEDURE — 71045 X-RAY EXAM CHEST 1 VIEW: CPT

## 2021-05-05 PROCEDURE — 93010 ELECTROCARDIOGRAM REPORT: CPT | Performed by: INTERNAL MEDICINE

## 2021-05-05 PROCEDURE — 85027 COMPLETE CBC AUTOMATED: CPT | Performed by: PHYSICIAN ASSISTANT

## 2021-05-05 PROCEDURE — C1785 PMKR, DUAL, RATE-RESP: HCPCS

## 2021-05-05 RX ORDER — FLUTICASONE PROPIONATE 50 MCG
1 SPRAY, SUSPENSION (ML) NASAL DAILY
Status: DISCONTINUED | OUTPATIENT
Start: 2021-05-05 | End: 2021-05-07 | Stop reason: HOSPADM

## 2021-05-05 RX ORDER — CITALOPRAM 20 MG/1
20 TABLET ORAL DAILY
Status: DISCONTINUED | OUTPATIENT
Start: 2021-05-05 | End: 2021-05-05

## 2021-05-05 RX ORDER — CEFAZOLIN SODIUM 2 G/50ML
2000 SOLUTION INTRAVENOUS ONCE
Status: DISCONTINUED | OUTPATIENT
Start: 2021-05-05 | End: 2021-05-07 | Stop reason: HOSPADM

## 2021-05-05 RX ORDER — SODIUM CHLORIDE 9 MG/ML
INJECTION, SOLUTION INTRAVENOUS CONTINUOUS PRN
Status: DISCONTINUED | OUTPATIENT
Start: 2021-05-05 | End: 2021-05-05

## 2021-05-05 RX ORDER — PANTOPRAZOLE SODIUM 40 MG/1
40 TABLET, DELAYED RELEASE ORAL
Status: DISCONTINUED | OUTPATIENT
Start: 2021-05-05 | End: 2021-05-07 | Stop reason: HOSPADM

## 2021-05-05 RX ORDER — LORAZEPAM 0.5 MG/1
0.5 TABLET ORAL EVERY 6 HOURS PRN
Status: DISCONTINUED | OUTPATIENT
Start: 2021-05-05 | End: 2021-05-07 | Stop reason: HOSPADM

## 2021-05-05 RX ORDER — CEFAZOLIN SODIUM 2 G/50ML
SOLUTION INTRAVENOUS AS NEEDED
Status: DISCONTINUED | OUTPATIENT
Start: 2021-05-05 | End: 2021-05-05

## 2021-05-05 RX ORDER — ASPIRIN 81 MG/1
81 TABLET, CHEWABLE ORAL DAILY
Status: DISCONTINUED | OUTPATIENT
Start: 2021-05-05 | End: 2021-05-07 | Stop reason: HOSPADM

## 2021-05-05 RX ORDER — SOTALOL HYDROCHLORIDE 80 MG/1
120 TABLET ORAL EVERY 12 HOURS SCHEDULED
Status: DISCONTINUED | OUTPATIENT
Start: 2021-05-05 | End: 2021-05-05

## 2021-05-05 RX ORDER — ACETAMINOPHEN 325 MG/1
650 TABLET ORAL EVERY 4 HOURS PRN
Status: DISCONTINUED | OUTPATIENT
Start: 2021-05-05 | End: 2021-05-07 | Stop reason: HOSPADM

## 2021-05-05 RX ORDER — MONTELUKAST SODIUM 10 MG/1
10 TABLET ORAL
Status: DISCONTINUED | OUTPATIENT
Start: 2021-05-05 | End: 2021-05-07 | Stop reason: HOSPADM

## 2021-05-05 RX ORDER — OXYCODONE HYDROCHLORIDE 5 MG/1
5 TABLET ORAL EVERY 4 HOURS
Status: DISCONTINUED | OUTPATIENT
Start: 2021-05-05 | End: 2021-05-07 | Stop reason: HOSPADM

## 2021-05-05 RX ORDER — FLUTICASONE PROPIONATE 110 UG/1
2 AEROSOL, METERED RESPIRATORY (INHALATION) EVERY 12 HOURS SCHEDULED
Status: DISCONTINUED | OUTPATIENT
Start: 2021-05-05 | End: 2021-05-07 | Stop reason: HOSPADM

## 2021-05-05 RX ORDER — CITALOPRAM 20 MG/1
20 TABLET ORAL DAILY
Status: DISCONTINUED | OUTPATIENT
Start: 2021-05-05 | End: 2021-05-07 | Stop reason: HOSPADM

## 2021-05-05 RX ORDER — TAMSULOSIN HYDROCHLORIDE 0.4 MG/1
0.4 CAPSULE ORAL
Status: DISCONTINUED | OUTPATIENT
Start: 2021-05-05 | End: 2021-05-07 | Stop reason: HOSPADM

## 2021-05-05 RX ORDER — LIDOCAINE HYDROCHLORIDE 10 MG/ML
INJECTION, SOLUTION EPIDURAL; INFILTRATION; INTRACAUDAL; PERINEURAL CODE/TRAUMA/SEDATION MEDICATION
Status: COMPLETED | OUTPATIENT
Start: 2021-05-05 | End: 2021-05-05

## 2021-05-05 RX ORDER — IPRATROPIUM BROMIDE 42 UG/1
1 SPRAY, METERED NASAL 2 TIMES DAILY
Status: DISCONTINUED | OUTPATIENT
Start: 2021-05-05 | End: 2021-05-05

## 2021-05-05 RX ORDER — SOTALOL HYDROCHLORIDE 80 MG/1
120 TABLET ORAL EVERY 12 HOURS SCHEDULED
Status: DISCONTINUED | OUTPATIENT
Start: 2021-05-05 | End: 2021-05-06

## 2021-05-05 RX ORDER — ATORVASTATIN CALCIUM 20 MG/1
20 TABLET, FILM COATED ORAL DAILY
Status: DISCONTINUED | OUTPATIENT
Start: 2021-05-05 | End: 2021-05-07 | Stop reason: HOSPADM

## 2021-05-05 RX ORDER — PROPOFOL 10 MG/ML
INJECTION, EMULSION INTRAVENOUS CONTINUOUS PRN
Status: DISCONTINUED | OUTPATIENT
Start: 2021-05-05 | End: 2021-05-05

## 2021-05-05 RX ORDER — GENTAMICIN SULFATE 40 MG/ML
INJECTION, SOLUTION INTRAMUSCULAR; INTRAVENOUS CODE/TRAUMA/SEDATION MEDICATION
Status: COMPLETED | OUTPATIENT
Start: 2021-05-05 | End: 2021-05-05

## 2021-05-05 RX ADMIN — LIDOCAINE HYDROCHLORIDE 30 ML: 10 INJECTION, SOLUTION EPIDURAL; INFILTRATION; INTRACAUDAL; PERINEURAL at 08:21

## 2021-05-05 RX ADMIN — CITALOPRAM HYDROBROMIDE 20 MG: 20 TABLET ORAL at 18:24

## 2021-05-05 RX ADMIN — ACETAMINOPHEN 650 MG: 325 TABLET, FILM COATED ORAL at 22:25

## 2021-05-05 RX ADMIN — TAMSULOSIN HYDROCHLORIDE 0.4 MG: 0.4 CAPSULE ORAL at 18:23

## 2021-05-05 RX ADMIN — ACETAMINOPHEN 650 MG: 325 TABLET, FILM COATED ORAL at 14:33

## 2021-05-05 RX ADMIN — IOHEXOL 20 ML: 350 INJECTION, SOLUTION INTRAVENOUS at 08:27

## 2021-05-05 RX ADMIN — ACETAMINOPHEN 650 MG: 325 TABLET, FILM COATED ORAL at 18:41

## 2021-05-05 RX ADMIN — GENTAMICIN SULFATE 80 MG: 40 INJECTION, SOLUTION INTRAMUSCULAR; INTRAVENOUS at 08:56

## 2021-05-05 RX ADMIN — MONTELUKAST SODIUM 10 MG: 10 TABLET, FILM COATED ORAL at 22:25

## 2021-05-05 RX ADMIN — ATORVASTATIN CALCIUM 20 MG: 20 TABLET, FILM COATED ORAL at 18:21

## 2021-05-05 RX ADMIN — LORAZEPAM 0.5 MG: 0.5 TABLET ORAL at 22:25

## 2021-05-05 RX ADMIN — SOTALOL HYDROCHLORIDE TABLES AF 120 MG: 80 TABLET ORAL at 18:21

## 2021-05-05 RX ADMIN — CEFAZOLIN SODIUM 2000 MG: 2 SOLUTION INTRAVENOUS at 07:49

## 2021-05-05 RX ADMIN — ASPIRIN 81 MG: 81 TABLET, CHEWABLE ORAL at 18:23

## 2021-05-05 RX ADMIN — PROPOFOL 60 MCG/KG/MIN: 10 INJECTION, EMULSION INTRAVENOUS at 07:49

## 2021-05-05 RX ADMIN — SODIUM CHLORIDE: 9 INJECTION, SOLUTION INTRAVENOUS at 07:49

## 2021-05-05 NOTE — ANESTHESIA POSTPROCEDURE EVALUATION
Post-Op Assessment Note    CV Status:  Stable  Pain Score: 0    Pain management: adequate     Mental Status:  Alert   Hydration Status:  Euvolemic   PONV Controlled:  None   Airway Patency:  Patent      Post Op Vitals Reviewed: Yes      Staff: CRNA, Anesthesiologist         No complications documented      BP   105/64   Temp   97 5   Pulse  60   Resp   12   SpO2   97

## 2021-05-05 NOTE — INTERVAL H&P NOTE
Please see recent consultation with Dr Quin Aguiar for full details  Briefly this patient is a pleasant 61-year-old male with preserved LV systolic function and hyperlipidemia  In 02/2021, he presented for an elective EGD and was noted to have an irregular rhythm on telemetry  He was later seen in cardiac consultation by Dr Jordy Birmingham, and review of rhythm showed sinus bradycardia, bifascicular block, and PACs  He underwent an outpatient event monitor, which showed SVT with atrial tachycardia versus atrial flutter, as well as sinus bradycardia, pauses, and periods of 2:1 heart block  He was thus seen in EP consultation to discuss ongoing rhythm management  After discussing different treatment options, dual-chamber pacemaker implantation was recommended along with sotalol initiation to help suppress his tachyarrhythmias  He presents today to undergo this procedure  No significant changes since he was last seen, physical exam unchanged  He continues to have an irregular rhythm with ectopy noted on telemetry      Vitals:    05/05/21 0655   BP: 138/72   Pulse: 82   Resp: 18   Temp: 97 6 °F (36 4 °C)   SpO2: 97%

## 2021-05-05 NOTE — PROCEDURES
PPM - His lead in LPF region    History and physical were reviewed  Patient was examined and history was reviewed  No change in patient's condition Since history and physical has been completed        The pre- operative diagnosis:  Sick sinus syndrome, Tachy-juwan syndrome  Wenckebach, Two-to-one heart block  RBBB+ LAFB-bifascicular block     Atrial arrhythmia-paroxysmal atrial tachycardia  Mixed hyperlipidemia  Asthma  Morning fatigue and daytime sleepiness          Postoperative diagnosis:  Sick sinus syndrome, Tachy-juwan syndrome  Wenckebach, Two-to-one heart block  RBBB+ LAFB-bifascicular block     Atrial arrhythmia-paroxysmal atrial tachycardia  Mixed hyperlipidemia  Asthma  Morning fatigue and daytime sleepiness        Procedure:  Dual chamber PPM  RA lead   RV lead - His lead - with LPF capture         Surgeon: Vitaly Bearden    Assistants -none    Specimens - none    Estimated blood loss- 10 ml    Findings-none    Complications none    Anesthesia-  Anesthesia by anaesthesiologist , local lidocaine by myself      Details of the device                Description of procedure: The patient was seen before the procedure  The details of the procedure was explained and patient agreed to the same  Appropriate consent was signed  The patient was brought to the electrophysiologic laboratory  Proper time out was done  Sterile dressing and draping was done  Local lidocaine was infiltrated, In the infraclavicular region at the site of device implant  Initial incision was made by a sharp number 10 blade  Thereafter electrocautery and blunt dissection was used to form a prepectoral pocket  Appropriate hemostasis was taken care of      20 mL of radiocontrast, followed by 20 mL of saline, was injected in a peripheral vein on the side of the implant  Under direct visualization, the axillary vein was  Punctured,extra-thoracic   A single guidewire was inserted, and taking all the way to the inferior vena cava to confirm that it is on the right side  We also confirmed by the left anterior oblique view that the wire is in the right side  Thereafter a second access was obtained using the fluoroscopic image of the venogram as a roadmap  Wire was once again taken to the inferior vena cava, and position checked in Greek views To confirm the appropriate position  A 7F sheath passed over first wire  The atrial lead was placed in the ventricle for back up pacing if needed  A 7F sheath was passed over the second wire  A His sheath and lead was passed through the sheath  Mapping of His bundle done  Then went distally and inferiorly and left posterior fascicle (LPF) was mapped   Position of the lead was confirmed in both WOODY and Greek views Appropriate sensing and thresholds were noted  Lead was screwed in LPF position with non selective capture  The sheath was slit and removed  Once again the lead was tested for appropriate sensing, impedance and threshold  The lead was tied down to the prepectoral fascia and muscle  The patient was paced from the LPF  for second part of procedure       The atrial lead in the ventricle was pulled back into the atrium  Wth the curved stylet in it, was maneuvered into the right atrial appendage  Appropriate sensing and thresholds were noted  The lead was screwed in  The sheath was removed  The lead was sutured to the pectoral muscle and fascia      The pocket was washed with large amounts of antibiotic saline  Appropriate hemostasis was taken care of  The lead was connected to the generator  The generator and leads were placed inside the pocket  The generator was tied down  Thereafter the device was interrogated and proper sensing and thresholds through the device were confirmed  The pocket was closed in 3 separate layers with intermittent sutures  Dressing was done with Steri-Strips, Aquacell         Summary of the procedure:   The patient came in to the laboratory for dual -chamber device placement   The device has been placed and patient tolerated the procedure well

## 2021-05-05 NOTE — PROGRESS NOTES
17-Jul-2019 Patient was complaining that his incision site was pulsating but it has resolved  Tylenol administered and ice pack for some soreness  Bebeto Valdivia with EP aware  Will continue to monitor

## 2021-05-05 NOTE — ANESTHESIA PREPROCEDURE EVALUATION
Procedure:  CARDIAC EPS/PACER IMPLANT    Relevant Problems   CARDIO   (+) Atrial tachycardia (HCC)   (+) Bifascicular block   (+) Hyperlipidemia   (+) Sick sinus syndrome (HCC)   (+) Symptomatic sinus bradycardia      GI/HEPATIC   (+) Acid reflux disease      /RENAL   (+) Benign prostatic hyperplasia      NEURO/PSYCH   (+) Anxiety   (+) Anxiety, generalized   (+) History of skin cancer      PULMONARY   (+) Asthma      2/2021: normal biventricular systolic function, DD1    Cr 0 78, hgb 15 3, plt 177       Anesthesia Plan  ASA Score- 3     Anesthesia Type- IV sedation with anesthesia with ASA Monitors  Additional Monitors:   Airway Plan:     Comment: IV sedation, GA back up; standard ASA monitors  Risks and benefits discussed with patient; patient consented and agrees to proceed  I saw and evaluated the patient  If seen with CRNA, we have discussed the anesthetic plan and I am in agreement that the plan is appropriate for the patient          Plan Factors-    Chart reviewed  Imaging results reviewed  Existing labs reviewed  Induction- intravenous  Postoperative Plan-     Informed Consent- Anesthetic plan and risks discussed with patient  I personally reviewed this patient with the CRNA  Discussed and agreed on the Anesthesia Plan with the CRNA  Jes Samson

## 2021-05-06 ENCOUNTER — APPOINTMENT (INPATIENT)
Dept: RADIOLOGY | Facility: HOSPITAL | Age: 66
DRG: 243 | End: 2021-05-06
Payer: COMMERCIAL

## 2021-05-06 DIAGNOSIS — K21.9 GASTROESOPHAGEAL REFLUX DISEASE: ICD-10-CM

## 2021-05-06 LAB
ANION GAP SERPL CALCULATED.3IONS-SCNC: 4 MMOL/L (ref 4–13)
ATRIAL RATE: 102 BPM
ATRIAL RATE: 42 BPM
ATRIAL RATE: 62 BPM
BUN SERPL-MCNC: 14 MG/DL (ref 5–25)
CALCIUM SERPL-MCNC: 9.1 MG/DL (ref 8.3–10.1)
CHLORIDE SERPL-SCNC: 105 MMOL/L (ref 100–108)
CO2 SERPL-SCNC: 29 MMOL/L (ref 21–32)
CREAT SERPL-MCNC: 0.68 MG/DL (ref 0.6–1.3)
ERYTHROCYTE [DISTWIDTH] IN BLOOD BY AUTOMATED COUNT: 12.9 % (ref 11.6–15.1)
GFR SERPL CREATININE-BSD FRML MDRD: 102 ML/MIN/1.73SQ M
GLUCOSE SERPL-MCNC: 91 MG/DL (ref 65–140)
HCT VFR BLD AUTO: 44.3 % (ref 36.5–49.3)
HGB BLD-MCNC: 14.9 G/DL (ref 12–17)
MCH RBC QN AUTO: 30.4 PG (ref 26.8–34.3)
MCHC RBC AUTO-ENTMCNC: 33.6 G/DL (ref 31.4–37.4)
MCV RBC AUTO: 90 FL (ref 82–98)
P AXIS: 88 DEGREES
PLATELET # BLD AUTO: 164 THOUSANDS/UL (ref 149–390)
PMV BLD AUTO: 10.6 FL (ref 8.9–12.7)
POTASSIUM SERPL-SCNC: 3.8 MMOL/L (ref 3.5–5.3)
PR INTERVAL: 188 MS
QRS AXIS: -53 DEGREES
QRS AXIS: -54 DEGREES
QRS AXIS: -65 DEGREES
QRSD INTERVAL: 154 MS
QRSD INTERVAL: 158 MS
QRSD INTERVAL: 164 MS
QT INTERVAL: 432 MS
QT INTERVAL: 458 MS
QT INTERVAL: 518 MS
QTC INTERVAL: 469 MS
QTC INTERVAL: 504 MS
QTC INTERVAL: 550 MS
RBC # BLD AUTO: 4.9 MILLION/UL (ref 3.88–5.62)
SODIUM SERPL-SCNC: 138 MMOL/L (ref 136–145)
T WAVE AXIS: -14 DEGREES
T WAVE AXIS: 102 DEGREES
T WAVE AXIS: 109 DEGREES
VENTRICULAR RATE: 68 BPM
VENTRICULAR RATE: 71 BPM
VENTRICULAR RATE: 73 BPM
WBC # BLD AUTO: 7.46 THOUSAND/UL (ref 4.31–10.16)

## 2021-05-06 PROCEDURE — 85027 COMPLETE CBC AUTOMATED: CPT | Performed by: PHYSICIAN ASSISTANT

## 2021-05-06 PROCEDURE — 93010 ELECTROCARDIOGRAM REPORT: CPT | Performed by: INTERNAL MEDICINE

## 2021-05-06 PROCEDURE — 80048 BASIC METABOLIC PNL TOTAL CA: CPT | Performed by: PHYSICIAN ASSISTANT

## 2021-05-06 PROCEDURE — 93005 ELECTROCARDIOGRAM TRACING: CPT

## 2021-05-06 PROCEDURE — 71046 X-RAY EXAM CHEST 2 VIEWS: CPT

## 2021-05-06 PROCEDURE — 99024 POSTOP FOLLOW-UP VISIT: CPT | Performed by: INTERNAL MEDICINE

## 2021-05-06 RX ORDER — OMEPRAZOLE 40 MG/1
CAPSULE, DELAYED RELEASE ORAL
Qty: 90 CAPSULE | Refills: 0 | Status: SHIPPED | OUTPATIENT
Start: 2021-05-06 | End: 2021-07-28

## 2021-05-06 RX ORDER — SOTALOL HYDROCHLORIDE 80 MG/1
80 TABLET ORAL EVERY 12 HOURS SCHEDULED
Status: DISCONTINUED | OUTPATIENT
Start: 2021-05-06 | End: 2021-05-07 | Stop reason: HOSPADM

## 2021-05-06 RX ADMIN — ATORVASTATIN CALCIUM 20 MG: 20 TABLET, FILM COATED ORAL at 09:23

## 2021-05-06 RX ADMIN — PANTOPRAZOLE SODIUM 40 MG: 40 TABLET, DELAYED RELEASE ORAL at 06:40

## 2021-05-06 RX ADMIN — ENOXAPARIN SODIUM 40 MG: 40 INJECTION SUBCUTANEOUS at 16:24

## 2021-05-06 RX ADMIN — SOTALOL HYDROCHLORIDE TABLES AF 120 MG: 80 TABLET ORAL at 06:40

## 2021-05-06 RX ADMIN — FLUTICASONE PROPIONATE 1 SPRAY: 50 SPRAY, METERED NASAL at 09:26

## 2021-05-06 RX ADMIN — ACETAMINOPHEN 650 MG: 325 TABLET, FILM COATED ORAL at 11:39

## 2021-05-06 RX ADMIN — ACETAMINOPHEN 650 MG: 325 TABLET, FILM COATED ORAL at 06:40

## 2021-05-06 RX ADMIN — ACETAMINOPHEN 650 MG: 325 TABLET, FILM COATED ORAL at 21:19

## 2021-05-06 RX ADMIN — LORAZEPAM 0.5 MG: 0.5 TABLET ORAL at 21:19

## 2021-05-06 RX ADMIN — ASPIRIN 81 MG: 81 TABLET, CHEWABLE ORAL at 09:23

## 2021-05-06 RX ADMIN — TAMSULOSIN HYDROCHLORIDE 0.4 MG: 0.4 CAPSULE ORAL at 16:24

## 2021-05-06 RX ADMIN — MONTELUKAST SODIUM 10 MG: 10 TABLET, FILM COATED ORAL at 21:19

## 2021-05-06 RX ADMIN — ACETAMINOPHEN 650 MG: 325 TABLET, FILM COATED ORAL at 16:24

## 2021-05-06 RX ADMIN — SOTALOL HYDROCHLORIDE TABLES AF 80 MG: 80 TABLET ORAL at 19:11

## 2021-05-06 RX ADMIN — CITALOPRAM HYDROBROMIDE 20 MG: 20 TABLET ORAL at 09:23

## 2021-05-06 NOTE — UTILIZATION REVIEW
Initial Clinical Review    Admission: Date/Time/Statement:   Admission Orders (From admission, onward)     Ordered        05/05/21 1011  Inpatient Admission  Once                   Orders Placed This Encounter   Procedures    Inpatient Admission     Standing Status:   Standing     Number of Occurrences:   1     Order Specific Question:   Level of Care     Answer:   Med Surg [16]     Order Specific Question:   Bed request comments     Answer:   telemetry     Order Specific Question:   Estimated length of stay     Answer:   More than 2 Midnights     Order Specific Question:   Certification     Answer:   I certify that inpatient services are medically necessary for this patient for a duration of greater than two midnights  See H&P and MD Progress Notes for additional information about the patient's course of treatment  Initial Presentation: 63year old male, presented to [facility] from home via walk in  Admitted as Inpatient due to  SSS, Tachy-Roberto Syndrome  > Dual Chamber pacemaker implantation along with Initiation of Sotalol therapy to help suppress tachyarrhythmias  Date: 05/05/2021   Procedure Date:  05/05/2021  Procedure:  Dual chamber PPM  RA lead   RV lead - His lead - with LPF capture   Anesthesia-  Anesthesia by anaesthesiologist , local lidocaine by attending  Summary of the procedure: The patient came in to the laboratory for dual -chamber device placement  The device has been placed and patient tolerated the procedure well    Day 2: 05/06/2021  QTc is slightly prolonged after dose of sotalol this morning, thus will decrease to 80 mg Q12 hours beginning this evening  Monitor on telemetry         Triage Vitals   Temperature Pulse Respirations Blood Pressure SpO2   05/05/21 0655 05/05/21 0655 05/05/21 0655 05/05/21 0655 05/05/21 0655   97 6 °F (36 4 °C) 82 18 138/72 97 %      Temp Source Heart Rate Source Patient Position - Orthostatic VS BP Location FiO2 (%)   05/05/21 0655 05/05/21 3905 -- -- --   Tympanic Monitor         Pain Score       21 1433       3          Wt Readings from Last 1 Encounters:   21 81 3 kg (179 lb 3 2 oz)     Additional Vital Signs:   Date/Time  Temp  Pulse  Resp  BP  MAP (mmHg)  SpO2  O2 Device   21 06:45:59  97 8 °F (36 6 °C)  56  --  132/64  87  95 %  --   21 03:21:08  97 9 °F (36 6 °C)  47Abnormal   20  126/77  93  95 %  --   21 19:22:54  --  54Abnormal   --  117/76  90  94 %  None (Room air)   21 16:01:42  98 8 °F (37 1 °C)  42Abnormal   --  126/77  93  96 %  --   21 1350  --  --  --  136/71  93  --  --   21 1340  --  --  --  127/70  89  --  --   21 1310  --  --  --  129/84  99  --  --   21 1245  --  --  --  126/85  99  --  --   21 1215  --  --  --  124/73  90  --  --   21 1120  --  36Abnormal   --  --  --  94 %  --   21 1115  --  49Abnormal   --  --  --  94 %  --   21 1110  --  40Abnormal   --  --  --  95 %  --   21 1100  --  56  --  138/74  95  95 %  --   21 1045  --  36Abnormal   --  88/43Abnormal   58  95 %  --   21 1030  --  37Abnormal   --  111/96  101  94 %  --   21 1020  --  --  --  124/106Abnormal   112  --  --   21 1015  --  41Abnormal   --  --  --  93 %  --   21 1010  --  59  --  112/78  89  93 %  --   21 1000  --  36Abnormal   --  --  --  93 %  --   21 0950  --  38Abnormal   --  --  --  93 %  --   21 0945  --  37Abnormal   --  --  --  94 %  --   21 0940  --  36Abnormal   --  111/63  79  94 %  --     2021 @ 1414  Chest X:  No acute cardiopulmonary disease  2021 @ 0703  EC, Sinus rhythm with 2nd degree A-V block (Mobitz I),  QTc 527  Left axis deviation   Right bundle branch block  2021 @ 0846  EC  Atrial-paced rhythm with frequent Premature atrial complexes, [id number]  Right bundle branch block, Left anterior fascicular block, ** Bifascicular block **  2021 @ 0825  EC, AV dual-paced rhythm with occasional ventricular-paced complexes, QTc 550    Pertinent Labs/Diagnostic Test Results:     Results from last 7 days   Lab Units 05/06/21  0538 05/05/21  0647   WBC Thousand/uL 7 46 7 57   HEMOGLOBIN g/dL 14 9 15 3   HEMATOCRIT % 44 3 46 1   PLATELETS Thousands/uL 164 177     Results from last 7 days   Lab Units 05/06/21  0538 05/05/21  0647   SODIUM mmol/L 138 139   POTASSIUM mmol/L 3 8 3 9   CHLORIDE mmol/L 105 109*   CO2 mmol/L 29 26   ANION GAP mmol/L 4 4   BUN mg/dL 14 14   CREATININE mg/dL 0 68 0 78   EGFR ml/min/1 73sq m 102 96   CALCIUM mg/dL 9 1 9 6     Results from last 7 days   Lab Units 05/06/21  0538 05/05/21  0647   GLUCOSE RANDOM mg/dL 91 97     Results from last 7 days   Lab Units 05/05/21  0647   PROTIME seconds 13 3   INR  1 01     Past Medical History:   Diagnosis Date    Asthma     allergy induced    Colon polyp     Fatty liver     H/O nonmelanoma skin cancer     last assessed-8/22/2017    Hyperlipidemia     Inflamed seborrheic keratosis     Malignant neoplasm of skin     last assessed-1/21/2014    Neoplasm of uncertain behavior of skin     Nocturia     Pneumothorax, left     Seasonal allergies     Sebaceous cyst     Squamous cell carcinoma of skin of neck     Testicular hypofunction     Viral warts      Admitting Diagnosis: Sick sinus syndrome (HCC) [I49 5]  Age/Sex: 63 y o  male  Admission Orders:  Scheduled Medications:  aspirin, 81 mg, Oral, Daily  atorvastatin, 20 mg, Oral, Daily  cefazolin, 2,000 mg, Intravenous, Once  citalopram, 20 mg, Oral, Daily  fluticasone, 1 spray, Each Nare, Daily  fluticasone, 2 puff, Inhalation, Q12H WENDI  montelukast, 10 mg, Oral, HS  oxyCODONE, 5 mg, Oral, Q4H  pantoprazole, 40 mg, Oral, Early Morning  sotalol AF, 80 mg, Oral, Q12H WENDI  tamsulosin, 0 4 mg, Oral, Daily With Dinner      Continuous IV Infusions:     PRN Meds:  acetaminophen, 650 mg, Oral, Q4H PRN  LORazepam, 0 5 mg, Oral, Q6H PRN      Telemetry  Alex[facility]re SCDs      Network Utilization Review Department  ATTENTION: Please call with any questions or concerns to [phone] and carefully listen to the prompts so that you are directed to the right person  All voicemails are confidential   Lora Turner all requests for admission clinical reviews, approved or denied determinations and any other requests to dedicated fax number below belonging to the campus where the patient is receiving treatment   List of dedicated fax numbers for the Facilities:  [address] DENIALS (Administrative/Medical Necessity) [phone]   [address] (Maternity/NICU/Pediatrics) [phone]   [address] [place] [address] [address] [place] [facility] [place] [address] [id number] [place] [address] Jada Kasandra King [address] [facility] [phone]

## 2021-05-06 NOTE — PLAN OF CARE
Problem: PAIN - ADULT  Goal: Verbalizes/displays adequate comfort level or baseline comfort level  Description: Interventions:  - Encourage patient to monitor pain and request assistance  - Assess pain using appropriate pain scale  - Administer analgesics based on type and severity of pain and evaluate response  - Implement non-pharmacological measures as appropriate and evaluate response  - Consider cultural and social influences on pain and pain management  - Notify physician/advanced practitioner if interventions unsuccessful or patient reports new pain  Outcome: Progressing     Problem: INFECTION - ADULT  Goal: Absence or prevention of progression during hospitalization  Description: INTERVENTIONS:  - Assess and monitor for signs and symptoms of infection  - Monitor lab/diagnostic results  - Monitor all insertion sites, i e  indwelling lines, tubes, and drains  - Monitor endotracheal if appropriate and nasal secretions for changes in amount and color  - [place] appropriate cooling/warming therapies per order  - Administer medications as ordered  - Instruct and encourage patient and family to use good hand hygiene technique  - Identify and instruct in appropriate isolation precautions for identified infection/condition  Outcome: Progressing  Goal: Absence of fever/infection during neutropenic period  Description: INTERVENTIONS:  - Monitor WBC    Outcome: Progressing     Problem: SAFETY ADULT  Goal: Patient will remain free of falls  Description: INTERVENTIONS:  - Assess patient frequently for physical needs  -  Identify cognitive and physical deficits and behaviors that affect risk of falls    -  [place] fall precautions as indicated by assessment   - Educate patient/family on patient safety including physical limitations  - Instruct patient to call for assistance with activity based on assessment  - Modify environment to reduce risk of injury  - Consider OT/PT consult to assist with strengthening/mobility  Outcome: Progressing  Goal: Maintain or return to baseline ADL function  Description: INTERVENTIONS:  -  Assess patient's ability to carry out ADLs; assess patient's baseline for ADL function and identify physical deficits which impact ability to perform ADLs (bathing, care of mouth/teeth, toileting, grooming, dressing, etc )  - Assess/evaluate cause of self-care deficits   - Assess range of motion  - Assess patient's mobility; develop plan if impaired  - Assess patient's need for assistive devices and provide as appropriate  - Encourage maximum independence but intervene and supervise when necessary  - Involve family in performance of ADLs  - Assess for home care needs following discharge   - Consider OT consult to assist with ADL evaluation and planning for discharge  - Provide patient education as appropriate  Outcome: Progressing  Goal: Maintain or return mobility status to optimal level  Description: INTERVENTIONS:  - Assess patient's baseline mobility status (ambulation, transfers, stairs, etc )    - Identify cognitive and physical deficits and behaviors that affect mobility  - Identify mobility aids required to assist with transfers and/or ambulation (gait belt, sit-to-stand, lift, walker, cane, etc )  - [place] fall precautions as indicated by assessment  - Record patient progress and toleration of activity level on Mobility SBAR; progress patient to next Phase/Stage  - Instruct patient to call for assistance with activity based on assessment  - Consider rehabilitation consult to assist with strengthening/weightbearing, etc   Outcome: Progressing     Problem: DISCHARGE PLANNING  Goal: Discharge to home or other facility with appropriate resources  Description: INTERVENTIONS:  - Identify barriers to discharge w/patient and caregiver  - Arrange for needed discharge resources and transportation as appropriate  - Identify discharge learning needs (meds, wound care, etc )  - Arrange for interpretive services to assist at discharge as needed  - Refer to Case Management Department for coordinating discharge planning if the patient needs post-hospital services based on physician/advanced practitioner order or complex needs related to functional status, cognitive ability, or social support system  Outcome: Progressing     Problem: Knowledge Deficit  Goal: Patient/family/caregiver demonstrates understanding of disease process, treatment plan, medications, and discharge instructions  Description: Complete learning assessment and assess knowledge base    Interventions:  - Provide teaching at level of understanding  - Provide teaching via preferred learning methods  Outcome: Progressing

## 2021-05-06 NOTE — PROGRESS NOTES
Progress Note - Electrophysiology-Cardiology (EP)   Dodie Dinero 63 y o  male MRN: [id number]  Unit/Bed#: CW2 214-01 Encounter: [id number]      Assessment:  1  Tachy-juwan syndrome with baseline sinus bradycardia as well as frequent SVT/PACs, status post [employer]onic dual-chamber pacemaker implantation 5/5/2021   A ) no clear history of atrial fibrillation, possible atrial flutter versus atrial tachycardia noted on prior monitor   B ) being started on sotalol antiarrhythmic therapy this admission  2  Preserved LV systolic function with EF 60% per echo 02/2021  3  Hyperlipidemia  4  Anxiety      Plan:  QTc is slightly prolonged after dose of sotalol this morning, thus will decrease to 80 mg Q12 hours beginning this evening  Continue to monitor telemetry, vital signs, and electrolytes throughout admission  Device interrogation today shows appropriate device function, including lead sensing, thresholds, and impedances  Incision is clean, dry, intact without swelling, hematoma, redness, bleeding, drainage, or signs of infection  Chest xray this morning shows appropriate lead placement without obvious pneumothorax, official read still pending  Review of telemetry shows somewhat frequent RV pacing  While he is programmed in MVP mode, his frequent atrial ectopy may be throwing off this algorithm  Thus, will reprogram to DDDR mode to try to minimize RV pacing  Will add subQ Lovenox for DVT prophylaxis  Subjective/Objective   Chief Complaint: No acute complaints    Subjective:  Patient feeling well today, denies significant chest pain or pressure, shortness of breath, dizziness, lightheadedness, or palpitations  He has mild soreness at the device site  Objective:     Vitals: /64   Pulse 56   Temp 97 8 °F (36 6 °C)   Resp 20   SpO2 95%   There were no vitals filed for this visit    Orthostatic Blood Pressures      Most Recent Value   Blood Pressure  132/64 filed at 05/06/2021 6888 Intake/Output Summary (Last 24 hours) at 5/6/2021 1012  Last data filed at 5/6/2021 0831  Gross per 24 hour   Intake 160 ml   Output 950 ml   Net -790 ml       Invasive Devices     None                             Scheduled Meds:  Current Facility-Administered Medications   Medication Dose Route Frequency Provider Last Rate    acetaminophen  650 mg Oral Q4H PRN Deliah Cons, BREANNA      aspirin  81 mg Oral Daily Deliah Cons, Gregjose de jesus JamesPallavi      atorvastatin  20 mg Oral Daily Deliah Cons, Gregjose de jesus JamesPallavi      cefazolin  2,000 mg Intravenous Once Deliah Cons, Gregjose de jesus JamesPallavi      citalopram  20 mg Oral Daily Deliah Cons, BREANNA      fluticasone  1 spray Each Nare Daily Deliah Cons, Gregjose de jesus [place]      fluticasone  2 puff Inhalation Q12H [address] Deliah Cons, Sarah Beth Olivo      LORazepam  0 5 mg Oral Q6H PRN Deliah Cons, BREANNA      montelukast  10 mg Oral HS Deliah Cons, Sarah Beth Jamesamento      oxyCODONE  5 mg Oral Q4H Deliah Cons, Sarah Beth Jamesamento      pantoprazole  40 mg Oral Early Morning Deliah Cons, BREANNA      sotalol AF  80 mg Oral Q12H [address] Deliah Cons, Sarah Beth Jamesamento      tamsulosin  0 4 mg Oral Daily With BREANNA Camilo       Continuous Infusions:   PRN Meds:   acetaminophen    LORazepam    Review of Systems: Cardiovascular ROS: positive for - pain at device site  negative for - dyspnea on exertion or palpitations    Physical Exam:   GEN: NAD, alert and oriented x 3, well appearing  SKIN: dry without significant lesions or rashes  HEENT: NCAT, PERRL, EOMs intact  NECK: No JVD appreciated  CARDIOVASCULAR: irregular, normal S1, S2 without murmurs, rubs, or gallops appreciated  LUNGS: Clear to auscultation bilaterally without wheezes, rhonchi, or rales  ABDOMEN: Soft, nontender, nondistended  EXTREMITIES/VASCULAR: perfused without clubbing, cyanosis, or edema b/l  PSYCH: Normal mood and affect  NEURO: CN ll-Xll grossly intact                Lab Results: I have personally reviewed pertinent lab results      Results from last 7 days   Lab Units 21  0538 21  0647   WBC Thousand/uL 7 46 7 57   HEMOGLOBIN g/dL 14 9 15 3   HEMATOCRIT % 44 3 46 1   PLATELETS Thousands/uL 164 177     Results from last 7 days   Lab Units 21  0538 21  0647   POTASSIUM mmol/L 3 8 3 9   CHLORIDE mmol/L 105 109*   CO2 mmol/L 29 26   BUN mg/dL 14 14   CREATININE mg/dL 0 68 0 78   CALCIUM mg/dL 9 1 9 6     Results from last 7 days   Lab Units 21  0647   INR  1 01             Imaging: I have personally reviewed pertinent reports  Results for orders placed during the hospital encounter of 21   Echo complete with contrast if indicated    Narrative [facility] [address], [address] [facility]  [phone]    Transthoracic Echocardiogram  2D, M-mode, Doppler, and Color Doppler    Study date:  2021    Patient: Humberto Noe  MR number: [id number]  Account number: [de-identified]  : 1955  Age: 63 years  Gender: Male  Status: Outpatient  Location: [facility]  Height: 68 in  Weight: 174 7 lb  BP: 141/ 65 mmHg    Indications: Mitral Valve Prolapse  Diagnoses: I34 1 - Nonrheumatic mitral (valve) prolapse    Sonographer:  ANI Sofia  Primary Physician:  Rodrigue Ruiz MD  Referring Physician:  BREANNA Rodriguez  Group:  [facility]  Interpreting Physician:  Kathrine Cam MD    SUMMARY    LEFT VENTRICLE:  Systolic function was normal  Ejection fraction was estimated to be 60 %  There were no regional wall motion abnormalities  Doppler parameters were consistent with abnormal left ventricular relaxation (grade 1 diastolic dysfunction)  RIGHT VENTRICLE:  The ventricle was mildly to moderately dilated  MITRAL VALVE:  There was trace regurgitation  TRICUSPID VALVE:  There was trace regurgitation  PULMONIC VALVE:  There was trace regurgitation  AORTA:  The root exhibited dilatation   (4 3 cm)    HISTORY: PRIOR HISTORY: Anxiety, Asthma, Bradycardia  PROCEDURE: The study was performed in the [address] [place] [address]  This was a routine study  The transthoracic approach was used  The study included complete 2D imaging, M-mode, complete spectral Doppler, and color Doppler  The  heart rate was 75 bpm, at the start of the study  Images were obtained from the parasternal, apical, subcostal, and suprasternal notch acoustic windows  Image quality was adequate  LEFT VENTRICLE: Size was normal  Systolic function was normal  Ejection fraction was estimated to be 60 %  There were no regional wall motion abnormalities  Wall thickness was normal  DOPPLER: Doppler parameters were consistent with  abnormal left ventricular relaxation (grade 1 diastolic dysfunction)  RIGHT VENTRICLE: The ventricle was mildly to moderately dilated  Systolic function was normal  Wall thickness was normal     LEFT ATRIUM: Size was normal     RIGHT ATRIUM: Size was normal     MITRAL VALVE: Valve structure was normal  There was normal leaflet separation  DOPPLER: The transmitral velocity was within the normal range  There was no evidence for stenosis  There was trace regurgitation  AORTIC VALVE: The valve was trileaflet  Leaflets exhibited normal thickness and normal cuspal separation  DOPPLER: Transaortic velocity was within the normal range  There was no evidence for stenosis  There was no regurgitation  TRICUSPID VALVE: The valve structure was normal  There was normal leaflet separation  DOPPLER: The transtricuspid velocity was within the normal range  There was no evidence for stenosis  There was trace regurgitation  PULMONIC VALVE: Leaflets exhibited normal thickness, no calcification, and normal cuspal separation  DOPPLER: The transpulmonic velocity was within the normal range  There was trace regurgitation  PERICARDIUM: There was no pericardial effusion  The pericardium was normal in appearance  AORTA: The root exhibited dilatation   (4 3 cm)    SYSTEMIC VEINS: IVC: The inferior vena cava was normal in size and course   Respirophasic changes were normal     SYSTEM MEASUREMENT TABLES    2D  %FS: 37 %  Ao Diam: 4 29 cm  EDV(Teich): 121 82 ml  EF(Teich): 66 61 %  ESV(Teich): 40 68 ml  IVSd: 1 13 cm  LA Area: 16 85 cm2  LA Diam: 4 3 cm  LVEDV MOD A4C: 86 06 ml  LVEF MOD A4C: 56 88 %  LVESV MOD A4C: 37 11 ml  LVIDd: 5 06 cm  LVIDs: 3 19 cm  LVLd A4C: 7 41 cm  LVLs A4C: 6 33 cm  LVPWd: 0 96 cm  RA Area: 14 72 cm2  RVIDd: 3 57 cm  SV MOD A4C: 48 95 ml  SV(Teich): 81 15 ml    CW  AV MaxP 21 mmHg  AV Vmax: 1 03 m/s  MR Vmax: 2 59 m/s  MR maxP 41 mmHg  TR MaxP 19 mmHg  TR Vmax: 2 13 m/s    MM  TAPSE: 1 98 cm    PW  E' Sept: 0 1 m/s  E/E' Sept: 4 81  LVOT Vmax: 0 94 m/s  LVOT maxPG: 3 53 mmHg  MV A Shay: 0 67 m/s  MV Dec Baca: 2 83 m/s2  MV DecT: 177 44 ms  MV E Shay: 0 5 m/s  MV E/A Ratio: 0 76  MV PHT: 51 46 ms  MVA By PHT: 4 28 cm2    [facility] [employer] Accredited Echocardiography Laboratory    Prepared and electronically signed by    Marcin Robertson MD  Signed 2021 12:30:51         EKG:         TELEMETRY:  Normal sinus rhythm with periods of increased atrial ectopy, occasional atrial pacing with intact conduction, other times A-V sequential pacing    VTE Pharmacologic Prophylaxis: Enoxaparin (Lovenox)  VTE Mechanical Prophylaxis: sequential compression device

## 2021-05-06 NOTE — CASE MANAGEMENT
LOS: Day 1  Bundle: pt is not a bundle  Readmission risk: pt is not a 30 day readmit    CM reviewed role with pt at bedside  Pt reported his sister Monty Valladares as his emergency contact and POA ([phone])  Pt reported that he lives alone in a 2 level town home with [address] and [address] between floors  Pt reported that he was IPTA with ADLs, pt drives and works  No reported use of DME in the home  Pt confirmed hx of VNA 5 years ago with [facility], no reported hx of STR or OPPT  PCP is Dr Dev Humphrey; pharmacy [facility] choice [facility] in [place]  No reported hx of MH or D&A  Pt reported his friend Eliezer Nunez brought him in for this visit and will transport home when medically stable  CM reviewed d/c planning process including the following: identifying help at home, patient preference for d/c planning needs, Discharge Lounge, [facility] Meds to Bed program, availability of treatment team to discuss questions or concerns patient and/or family may have regarding understanding medications and recognizing signs and symptoms once discharged  CM also encouraged patient to follow up with all recommended appointments after discharge  Patient advised of importance for patient and family to participate in managing patients medical well being  Patient/caregiver received discharge checklist  Content reviewed  Patient/caregiver encouraged to participate in discharge plan of care prior to discharge home

## 2021-05-07 VITALS
DIASTOLIC BLOOD PRESSURE: 67 MMHG | HEART RATE: 46 BPM | TEMPERATURE: 97.7 F | SYSTOLIC BLOOD PRESSURE: 125 MMHG | RESPIRATION RATE: 18 BRPM | OXYGEN SATURATION: 99 %

## 2021-05-07 LAB
ATRIAL RATE: 69 BPM
QRS AXIS: -69 DEGREES
QRSD INTERVAL: 156 MS
QT INTERVAL: 488 MS
QTC INTERVAL: 522 MS
T WAVE AXIS: -7 DEGREES
VENTRICULAR RATE: 69 BPM

## 2021-05-07 PROCEDURE — 93010 ELECTROCARDIOGRAM REPORT: CPT | Performed by: INTERNAL MEDICINE

## 2021-05-07 PROCEDURE — 99024 POSTOP FOLLOW-UP VISIT: CPT | Performed by: INTERNAL MEDICINE

## 2021-05-07 PROCEDURE — 93005 ELECTROCARDIOGRAM TRACING: CPT

## 2021-05-07 RX ORDER — CITALOPRAM 20 MG/1
20 TABLET ORAL DAILY
Qty: 30 TABLET | Refills: 0 | Status: SHIPPED | OUTPATIENT
Start: 2021-05-08 | End: 2021-05-30

## 2021-05-07 RX ORDER — SOTALOL HYDROCHLORIDE 80 MG/1
80 TABLET ORAL EVERY 12 HOURS SCHEDULED
Qty: 60 TABLET | Refills: 3 | Status: SHIPPED | OUTPATIENT
Start: 2021-05-07 | End: 2021-07-30

## 2021-05-07 RX ADMIN — SOTALOL HYDROCHLORIDE TABLES AF 80 MG: 80 TABLET ORAL at 06:32

## 2021-05-07 RX ADMIN — ATORVASTATIN CALCIUM 20 MG: 20 TABLET, FILM COATED ORAL at 08:51

## 2021-05-07 RX ADMIN — ASPIRIN 81 MG: 81 TABLET, CHEWABLE ORAL at 08:51

## 2021-05-07 RX ADMIN — PANTOPRAZOLE SODIUM 40 MG: 40 TABLET, DELAYED RELEASE ORAL at 06:32

## 2021-05-07 RX ADMIN — FLUTICASONE PROPIONATE 1 SPRAY: 50 SPRAY, METERED NASAL at 08:51

## 2021-05-07 RX ADMIN — ACETAMINOPHEN 650 MG: 325 TABLET, FILM COATED ORAL at 06:32

## 2021-05-07 RX ADMIN — CITALOPRAM HYDROBROMIDE 20 MG: 20 TABLET ORAL at 08:51

## 2021-05-07 NOTE — DISCHARGE INSTRUCTIONS
Please refer to post pacemaker implantation discharge instructions and restrictions and your pacemaker booklet/temporary card  Keep incision dry for one week  Do not use lotions/powders/creams on incision  Leave outer bandage in place for 1 week - it is water proof, and as long as it is fully adhered to your skin you may shower with it  If it appears as though the bandage is coming off and/or there is any communication to the area of device incision, please then keep the whole area dry for the remaining week  After 1 week, please remove by pulling all edges away from the center of the bandage  No overhead reaching/pushing/pulling/lifting greater than 5-10lbs with left arm for six weeks  Please call the office if you notice redness, swelling, bleeding, or drainage from incision or if you develop fevers  AFTER PACEMAKER CARE:    If you have any questions, please call [phone] to speak with a nurse (8:30am-4pm, or [phone] after hours)  For appointments, please call [phone]  WHAT YOU SHOULD KNOW:   A pacemaker is a small, battery-powered device that is placed under your skin in your upper chest area with wires placed through a vein that lead directly into the heart  It helps regulate your heart rate and prevent your heart from beating too slowly  AFTER YOU LEAVE:     Medicines:     · Pain medicine: You may need medicine to take away or decrease pain  ¨ Learn how to take your medicine  Ask what medicine and how much you should take  Be sure you know how, when, and how often to take it  Usually Over the counter pain medicine is sufficient to control pain (Acetominophen or Ibuprofen) Ask your doctor if you may take these  If this does not control your pain, narcotic pain killers may be prescribed, please call if you need prescription  ¨ Do not wait until the pain is severe before you take your medicine  Tell caregivers if your pain does not decrease      ¨ Pain medicine can make you dizzy or sleepy  Prevent falls by calling someone when you get out of bed or if you need help  Take your medicine as directed  Call your healthcare provider if you think your medicine is not helping or if you have side effects  Tell him if you are allergic to any medicine  Follow up with your cardiologist after your procedure: You will need a follow-up visit approximately 2 weeks after you leave the hospital  Your cardiologist will check your wound and make sure that your pacemaker is working correctly  Follow the instructions to check your pacemaker: Your cardiologist or primary healthcare provider will check your pacemaker and the battery regularly  He will use a computer to check your pacemaker over the telephone or wireless device which will be given to you  Pacemaker batteries usually last 8 to 10 years  The pacemaker unit will be replaced when the battery gets low  This is a simpler procedure than the original one to implant your pacemaker  Wound care:  Keep your incision dry for one week  Do not use lotions/powders/creams on incision  Leave outer bandage in place for 1 week - it is water proof, and as long as it is fully adhered to your skin you may shower with it  If it appears as though the bandage is coming off and/or there is any communication to the area of device incision, please then keep the whole area dry for the remaining week  After 1 week, please remove by pulling all edges away from the center of the bandage  Please call the office if you notice redness, swelling, bleeding, or drainage from incision or if you develop fevers  Activity:   · Arm movement and lifting:  Be careful using the arm on the side of your pacemaker  Do not move your arm for the first 24 hours after your procedure  Do not  lift your arm above your shoulder or lift more than 10 pounds for one month after your procedure   Avoid pushing, pulling, or repetitive arm movements for one month  This helps the leads stay in place and helps your wound heal  Ask your caregiver when you can drive after your procedure  You may move your arm side to side without lifting above your shoulder, and do not need to wear a sling at home  · Driving: you are ok to drive 48 hours after pacemaker is implanted   · Sports:  Ask your caregiver when it is okay to play tennis, golf, basketball, or any sport that requires you to lift your arms  Do not play full contact sports, such as football, that could damage your pacemaker  Ask your cardiologist or primary healthcare provider how much and what kinds of physical activity are safe for you  Living with a pacemaker:   · Tell all caregivers you have a pacemaker: This includes surgeons, radiologists, and medical technicians  You may want to wear a medical alert ID bracelet or necklace that states that you have a pacemaker  · Carry your pacemaker ID card: Make sure you receive a pacemaker ID card  Carry it with you at all times  It lists important information about your pacemaker  Show it to airport security if you travel  · Avoid electrical interference:  Avoid welding equipment and other equipment with large magnets or electric fields  These things could interfere with how your pacemaker works  Use your cell phone on the ear opposite from your pacemaker  Do not carry your cell phone in your shirt pocket over your chest      · Some Pacemakers are MRI safe  Ask you doctor if it is safe to proceed with MRI and let the radiologist and staff know you have a pacemaker  · Do not touch the skin around your pacemaker: This can cause damage to the lead wires or move the pacemaker unit from where it should be  Contact your cardiologist or primary healthcare provider if:   · The area around your pacemaker has increasing amount of pain after surgery  The pain should improve over first few days after implantation       · The skin around your stitches has increasing redness, swelling, or has drainage  This may mean that you have an infection  · You have a fever  · You have chills, a cough, and feel weak or achy  These are also signs of infection  · Your feet or ankles are more swollen than your baseline  · Your Heart rate is less than 50 beats per minute     Seek care immediately if:   · Your bandage becomes soaked with blood  · Your pacemaker is swelling rapidly    · Your stitches open up  · You feel your heart suddenly beating very slowly or quickly  · You become too weak or dizzy to stand, or you pass out  · Your arm or leg feels warm, tender, and painful  It may look swollen and red  You have chest pain that does not go away with rest or medicine  · You feel lightheaded, short of breath, and have chest pain  · You cough up blood  © 2014 [address] is for End User's use only and may not be sold, redistributed or otherwise used for commercial purposes  All illustrations and images included in CareNotes® are the copyrighted property of [employer] , [employer]  or Jaden Grace  The above information is an  only  It is not intended as medical advice for individual conditions or treatments  Talk to your doctor, nurse or pharmacist before following any medical regimen to see if it is safe and effective for you          Please refer to this medication list and contact our office prior to beginning any new medications while on Sotalol:

## 2021-05-07 NOTE — PLAN OF CARE
Problem: PAIN - ADULT  Goal: Verbalizes/displays adequate comfort level or baseline comfort level  Description: Interventions:  - Encourage patient to monitor pain and request assistance  - Assess pain using appropriate pain scale  - Administer analgesics based on type and severity of pain and evaluate response  - Implement non-pharmacological measures as appropriate and evaluate response  - Consider cultural and social influences on pain and pain management  - Notify physician/advanced practitioner if interventions unsuccessful or patient reports new pain  Outcome: Adequate for Discharge     Problem: INFECTION - ADULT  Goal: Absence or prevention of progression during hospitalization  Description: INTERVENTIONS:  - Assess and monitor for signs and symptoms of infection  - Monitor lab/diagnostic results  - Monitor all insertion sites, i e  indwelling lines, tubes, and drains  - Monitor endotracheal if appropriate and nasal secretions for changes in amount and color  - [place] appropriate cooling/warming therapies per order  - Administer medications as ordered  - Instruct and encourage patient and family to use good hand hygiene technique  - Identify and instruct in appropriate isolation precautions for identified infection/condition  Outcome: Adequate for Discharge  Goal: Absence of fever/infection during neutropenic period  Description: INTERVENTIONS:  - Monitor WBC    Outcome: Adequate for Discharge     Problem: SAFETY ADULT  Goal: Patient will remain free of falls  Description: INTERVENTIONS:  - Assess patient frequently for physical needs  -  Identify cognitive and physical deficits and behaviors that affect risk of falls    -  [place] fall precautions as indicated by assessment   - Educate patient/family on patient safety including physical limitations  - Instruct patient to call for assistance with activity based on assessment  - Modify environment to reduce risk of injury  - Consider OT/PT consult to assist with strengthening/mobility  Outcome: Adequate for Discharge  Goal: Maintain or return to baseline ADL function  Description: INTERVENTIONS:  -  Assess patient's ability to carry out ADLs; assess patient's baseline for ADL function and identify physical deficits which impact ability to perform ADLs (bathing, care of mouth/teeth, toileting, grooming, dressing, etc )  - Assess/evaluate cause of self-care deficits   - Assess range of motion  - Assess patient's mobility; develop plan if impaired  - Assess patient's need for assistive devices and provide as appropriate  - Encourage maximum independence but intervene and supervise when necessary  - Involve family in performance of ADLs  - Assess for home care needs following discharge   - Consider OT consult to assist with ADL evaluation and planning for discharge  - Provide patient education as appropriate  Outcome: Adequate for Discharge  Goal: Maintain or return mobility status to optimal level  Description: INTERVENTIONS:  - Assess patient's baseline mobility status (ambulation, transfers, stairs, etc )    - Identify cognitive and physical deficits and behaviors that affect mobility  - Identify mobility aids required to assist with transfers and/or ambulation (gait belt, sit-to-stand, lift, walker, cane, etc )  - [place] fall precautions as indicated by assessment  - Record patient progress and toleration of activity level on Mobility SBAR; progress patient to next Phase/Stage  - Instruct patient to call for assistance with activity based on assessment  - Consider rehabilitation consult to assist with strengthening/weightbearing, etc   Outcome: Adequate for Discharge     Problem: DISCHARGE PLANNING  Goal: Discharge to home or other facility with appropriate resources  Description: INTERVENTIONS:  - Identify barriers to discharge w/patient and caregiver  - Arrange for needed discharge resources and transportation as appropriate  - Identify discharge learning needs (meds, wound care, etc )  - Arrange for interpretive services to assist at discharge as needed  - Refer to Case Management Department for coordinating discharge planning if the patient needs post-hospital services based on physician/advanced practitioner order or complex needs related to functional status, cognitive ability, or social support system  Outcome: Adequate for Discharge     Problem: Knowledge Deficit  Goal: Patient/family/caregiver demonstrates understanding of disease process, treatment plan, medications, and discharge instructions  Description: Complete learning assessment and assess knowledge base    Interventions:  - Provide teaching at level of understanding  - Provide teaching via preferred learning methods  Outcome: Adequate for Discharge

## 2021-05-07 NOTE — DISCHARGE SUMMARY
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, status post Medtronic dual-chamber pacemaker implantation 5/5/2021              A ) no clear history of atrial fibrillation, possible atrial flutter versus atrial tachycardia noted on prior monitor              B ) being started on sotalol antiarrhythmic therapy this admission  2  Preserved LV systolic function with EF 60% per echo 02/2021  3  Hyperlipidemia  4  Anxiety       Procedures Performed:  Medtronic dual-chamber pacemaker implantation  Orders Placed This Encounter   Procedures    Cardiac eps/pacer implant       Consultants: none    HPI: Please refer to the initial history and physical as well as procedure notes for full details  Briefly, Mina Thrasher is a 63y o  year old male with preserved LV systolic function and hyperlipidemia   In 02/2021, he presented for an elective EGD and was noted to have an irregular rhythm on telemetry  Vitaly Puneet was later seen in cardiac consultation by Dr Tyson Mathews review of rhythm showed sinus bradycardia, bifascicular block, and PACs   He underwent an outpatient event monitor, which showed SVT with atrial tachycardia versus atrial flutter, as well as sinus bradycardia, pauses, and periods of 2:1 heart block   He was thus seen in EP consultation to discuss ongoing rhythm management   After discussing different treatment options, dual-chamber pacemaker implantation was recommended along with sotalol initiation to help suppress his tachyarrhythmias  He presented this hospital admission to undergo this procedure  Hospital Course: Mina Thrasher presented at his baseline state of health   After the procedure was explained in detail and consent was obtained, he underwent the above procedures without complications  Please see operative notes by Dr Dee Zhang for full details  He tolerated the procedure well  CXR immediately following the procedure showed appropriate lead placement without pneumothorax  He was then started on Sotalol 120 mg Q12 daily, and monitored overnight for further observation  He is on Celexa as an outpatient, and it was lowered from 40 mg daily to 20 mg daily upon admission  There were no acute issues or events overnight on POD #1  The following morning he reported mild pain at device site but otherwise denied all cardiac complaints, including chest pain/pressure, dyspnea, palpitations, dizziness, lightheadedness, or syncope  His vital signs were reviewed and labs are stable  Telemetry showed sinus rhythm but with frequent ectopy, atrial tachycardia, and ventricular pacing  Chest xray this morning again showed appropriate device placement without pneumothorax  Device interrogation showed appropriate device function, including lead sensing, threshold, and impedance  His incision was clean, dry, and intact without swelling, hematoma, redness, bleeding, drainage, or signs of infection  His ECG after his second dose of Sotalol showed slightly prolonged QTc, and thus his dose was lowered to 80 mg  He was monitored an additional night for ongoing observation  On POD #2, he remained feeling well without acute issues reported  Telemetry showed normal sinus rhythm with seemingly improved atrial arrhythmia burden  He was still having occasional ventricular pacing, and was changed to DDDR mode earlier this admission as it was felt that MVP mode was thrown off by his frequent atrial ectopy  There were no ventricular arrhythmias noted on telemetry, and EKG showed stable QTC  This was reviewed by Dr Dee Zhang, and he was deemed stable for discharge  Review of Systems   Constitution: Negative for fever and malaise/fatigue  Cardiovascular: Positive for chest pain (Mild pain at device site)  Negative for dyspnea on exertion, irregular heartbeat, leg swelling, near-syncope, orthopnea, palpitations, paroxysmal nocturnal dyspnea and syncope  All other systems reviewed and are negative  Physical exam:  GEN: NAD, alert and oriented x 3, well appearing  SKIN: dry without significant lesions or rashes  HEENT: NCAT, PERRL, EOMs intact  NECK: No JVD appreciated  CARDIOVASCULAR: RRR, normal S1, S2 without murmurs, rubs, or gallops appreciated  LUNGS: Clear to auscultation bilaterally without wheezes, rhonchi, or rales  ABDOMEN: Soft, nontender, nondistended  EXTREMITIES/VASCULAR: perfused without clubbing, cyanosis, or LE edema b/l  PSYCH: Normal mood and affect  NEURO: CN ll-Xll grossly intact    He was given routine post implantation discharge instructions and restrictions, including wound care, and these were explained in detail  He was instructed to keep the incision dry for one week  He was given a one week ECG follow up for ongoing QTc monitoring, a two week follow up appointment with our device clinic for device interrogation and site check, and a one month follow up with Dr Dieudonne Wood  He was instructed to follow up with his primary cardiologist as previously instructed  He was given education regarding sotalol, including the importance of not missing doses, and potential other drug interactions  He was instructed to contact our office or talk to a pharmacist before starting any new medications  In terms of his medications, he will be discharged on sotalol 80 mg every 12 hours  Accordingly, his Celexa was decreased from 40 mg daily to 20 mg daily  Prescriptions were sent in to his home pharmacy, but he knows to contact us if he is not able to tolerate these changes  He will otherwise continue his prior medications as previously instructed  He is stable for discharge at this time with all questions answered   He was discussed in detail with Dr Dieudonne Wood who is in agreement with this discharge summary  Discharge Medications:  See after visit summary for reconciled discharge medications provided to patient and family      Medications Prior to Admission   Medication    aspirin 81 MG tablet    atorvastatin (LIPITOR) 20 mg tablet    budesonide (PULMICORT) 180 MCG/ACT inhaler    Calcium Carb-Cholecalciferol (CALCIUM 1000 + D) 1000-800 MG-UNIT TABS    citalopram (CeleXA) 40 mg tablet    fluticasone (FLONASE) 50 mcg/act nasal spray    ipratropium (ATROVENT) 0 06 % nasal spray    LORazepam (ATIVAN) 0 5 mg tablet    montelukast (SINGULAIR) 10 mg tablet    tamsulosin (FLOMAX) 0 4 mg    CANNABIDIOL PO    oxyCODONE (ROXICODONE) 5 mg immediate release tablet    oxyCODONE-acetaminophen (PERCOCET) 5-325 mg per tablet         Current Facility-Administered Medications   Medication Dose Route Frequency    acetaminophen (TYLENOL) tablet 650 mg  650 mg Oral Q4H PRN    aspirin chewable tablet 81 mg  81 mg Oral Daily    atorvastatin (LIPITOR) tablet 20 mg  20 mg Oral Daily    ceFAZolin (ANCEF) IVPB (premix in dextrose) 2,000 mg 50 mL  2,000 mg Intravenous Once    citalopram (CeleXA) tablet 20 mg  20 mg Oral Daily    enoxaparin (LOVENOX) subcutaneous injection 40 mg  40 mg Subcutaneous Q24H WENDI    fluticasone (FLONASE) 50 mcg/act nasal spray 1 spray  1 spray Each Nare Daily    fluticasone (FLOVENT HFA) 110 MCG/ACT inhaler 2 puff  2 puff Inhalation Q12H [address]    LORazepam (ATIVAN) tablet 0 5 mg  0 5 mg Oral Q6H PRN    montelukast (SINGULAIR) tablet 10 mg  10 mg Oral HS    oxyCODONE (ROXICODONE) IR tablet 5 mg  5 mg Oral Q4H    pantoprazole (PROTONIX) EC tablet 40 mg  40 mg Oral Early Morning    sotalol AF (BETAPACE AF) tablet 80 mg  80 mg Oral Q12H [address]    tamsulosin (FLOMAX) capsule 0 4 mg  0 4 mg Oral Daily With Dinner       [facility] Labs/diagnostics:  CBC with diff:   Results from last 7 days   Lab Units 05/06/21  0538 05/05/21  0647   WBC Thousand/uL 7 46 7 57   HEMOGLOBIN g/dL 14 9 15 3 HEMATOCRIT % 44 3 46 1   MCV fL 90 91   PLATELETS Thousands/uL 164 177   MCH pg 30 4 30 1   MCHC g/dL 33 6 33 2   RDW % 12 9 12 8   MPV fL 10 6 10 4       BMP:  Results from last 7 days   Lab Units 05/06/21  0538 05/05/21  0647   POTASSIUM mmol/L 3 8 3 9   CHLORIDE mmol/L 105 109*   CO2 mmol/L 29 26   BUN mg/dL 14 14   CREATININE mg/dL 0 68 0 78   CALCIUM mg/dL 9 1 9 6       Magnesium:       Coags:   Results from last 7 days   Lab Units 05/05/21  0647   INR  0 16       Complications: none    Condition at Discharge: good     Discharge instructions/Information to patient and family:   See after visit summary for information provided to patient and family  Provisions for Follow-Up Care:  See after visit summary for information related to follow-up care and any pertinent home health orders  Disposition: Home    Planned Readmission: No    Discharge Statement   I spent 45 minutes minutes discharging the patient  This time was spent on the day of discharge  I had direct contact with the patient on the day of discharge  Additional documentation is required if more than 30 minutes were spent on discharge  Evaluating the incision, discussing discharge instructions and restrictions, arranging follow up appointments, discussing medications    Discharge Medications:  See after visit summary for reconciled discharge medications provided to patient and family

## 2021-05-08 ENCOUNTER — TELEPHONE (OUTPATIENT)
Dept: OTHER | Facility: OTHER | Age: 66
End: 2021-05-08

## 2021-05-09 NOTE — TELEPHONE ENCOUNTER
Patient is calling because he just had a pacemaker put in on 5/5 with Dr Yuri Garcia  He just found out that his sister is going to pass away in the next few days and wanted to know if he would be able to drive since he just got his pacemaker put in  It would be about a 2 hour drive       Paged Dr Jean Bates

## 2021-05-10 ENCOUNTER — TRANSITIONAL CARE MANAGEMENT (OUTPATIENT)
Dept: FAMILY MEDICINE CLINIC | Facility: CLINIC | Age: 66
End: 2021-05-10

## 2021-05-10 NOTE — UTILIZATION REVIEW
Notification of Discharge   This is a Notification of Discharge from our facility [address]  Please be advised that this patient has been discharge from our facility  Below you will find the admission and discharge date and time including the patients disposition  UTILIZATION REVIEW CONTACT:  Trish Gusman  Utilization   Network Utilization Review Department  Phone: [phone] x carefully listen to the prompts  All voicemails are confidential   Email: [email or web address]     PHYSICIAN ADVISORY SERVICES:  FOR [id number] REVIEW - MEDICAL NECESSITY DENIAL  Phone: [phone]  Fax: [phone]  Email: [email or web address]     PRESENTATION DATE: 5/5/2021  6:42 AM  OBERVATION ADMISSION DATE:   INPATIENT ADMISSION DATE: 5/5/21 1011   DISCHARGE DATE: 5/7/2021  6:23 PM  DISPOSITION: Home/Self Care Home/Self Care      IMPORTANT INFORMATION:  Send all requests for admission clinical reviews, approved or denied determinations and any other requests to dedicated fax number below belonging to the campus where the patient is receiving treatment   List of dedicated fax numbers:  [address] DENIALS (Administrative/Medical Necessity) [phone]   [address] (Maternity/NICU/Pediatrics) [phone]   [place] [phone]   Abilio Gonzalez [phone]   [address] [place] [address] [phone]   Zonia Leavitt [facility] [address] [facility] [phone]   Patrick Aus [phone]   [address] [facility] [address]  [address] [facility] [address] [facility] [phone]

## 2021-05-10 NOTE — TELEPHONE ENCOUNTER
I spoke with him over the weekend and advised him that he can drive to visit his sister  Precautions regarding the pacemaker were given

## 2021-05-14 DIAGNOSIS — E78.5 HYPERLIPIDEMIA, UNSPECIFIED HYPERLIPIDEMIA TYPE: ICD-10-CM

## 2021-05-14 RX ORDER — ATORVASTATIN CALCIUM 20 MG/1
20 TABLET, FILM COATED ORAL DAILY
Qty: 30 TABLET | Refills: 3 | Status: SHIPPED | OUTPATIENT
Start: 2021-05-14 | End: 2021-08-22

## 2021-05-19 ENCOUNTER — TELEPHONE (OUTPATIENT)
Dept: CARDIOLOGY CLINIC | Facility: CLINIC | Age: 66
End: 2021-05-19

## 2021-05-19 NOTE — TELEPHONE ENCOUNTER
Dr Cheryle Harper had implanted a pacer in pt on 5/05/21 & pt is coming in on 5/20/21 for the 2 week check  Pt had said that Dr Cheryle Harper wanted pt to have an EKG done  I dont see an order for the EKG & pt would like to have it done on 5/20/21 when he comes for the pacer clinic

## 2021-05-20 ENCOUNTER — IN-CLINIC DEVICE VISIT (OUTPATIENT)
Dept: CARDIOLOGY CLINIC | Facility: CLINIC | Age: 66
End: 2021-05-20
Payer: COMMERCIAL

## 2021-05-20 ENCOUNTER — CLINICAL SUPPORT (OUTPATIENT)
Dept: CARDIOLOGY CLINIC | Facility: CLINIC | Age: 66
End: 2021-05-20
Payer: COMMERCIAL

## 2021-05-20 VITALS
SYSTOLIC BLOOD PRESSURE: 124 MMHG | DIASTOLIC BLOOD PRESSURE: 74 MMHG | BODY MASS INDEX: 26.98 KG/M2 | WEIGHT: 178 LBS | HEART RATE: 71 BPM | HEIGHT: 68 IN | OXYGEN SATURATION: 100 %

## 2021-05-20 DIAGNOSIS — Z95.0 PRESENCE OF PERMANENT CARDIAC PACEMAKER: Primary | ICD-10-CM

## 2021-05-20 DIAGNOSIS — I48.92 ATRIAL FLUTTER, UNSPECIFIED TYPE (HCC): Primary | ICD-10-CM

## 2021-05-20 PROCEDURE — 93000 ELECTROCARDIOGRAM COMPLETE: CPT | Performed by: INTERNAL MEDICINE

## 2021-05-20 PROCEDURE — 99024 POSTOP FOLLOW-UP VISIT: CPT | Performed by: INTERNAL MEDICINE

## 2021-05-20 NOTE — PROGRESS NOTES
Patient was in the office today for an EKG as ordered by Dr Moni Epperson    EKG was reviewed by Dr Caruso Roles  Patient advised overall EKG looks good and to keep follow-up appointments in [place]

## 2021-05-20 NOTE — PROGRESS NOTES
MDT DUAL CHAMBER PM (HIS)/ ACTIVE SYSTEM IS MRI CONDITIONAL   DEVICE INTERROGATED IN THE [place] OFFICE:  BATTERY VOLTAGE ADEQUATE (12 5 YR)  AP 51 0%  28 3%    ALL LEAD PARAMETERS WITHIN NORMAL LIMITS   NO HIGH RATE EPISODES   NO PROGRAMMING CHANGES MADE TO DEVICE PARAMETERS   INCISION CLEAN AND DRY WITH EDGES APPROXIMATED  [facility] AND RESTRICTIONS REVIEWED WITH PATIENT   NORMAL DEVICE FUNCTION  Sada Dubon

## 2021-05-29 DIAGNOSIS — F41.1 ANXIETY, GENERALIZED: ICD-10-CM

## 2021-05-30 RX ORDER — CITALOPRAM 20 MG/1
TABLET ORAL
Qty: 30 TABLET | Refills: 0 | Status: SHIPPED | OUTPATIENT
Start: 2021-05-30 | End: 2021-06-24 | Stop reason: SDUPTHER

## 2021-06-01 ENCOUNTER — OFFICE VISIT (OUTPATIENT)
Dept: CARDIOLOGY CLINIC | Facility: CLINIC | Age: 66
End: 2021-06-01
Payer: COMMERCIAL

## 2021-06-01 VITALS
SYSTOLIC BLOOD PRESSURE: 100 MMHG | HEART RATE: 70 BPM | HEIGHT: 68 IN | DIASTOLIC BLOOD PRESSURE: 72 MMHG | WEIGHT: 180.1 LBS | BODY MASS INDEX: 27.29 KG/M2

## 2021-06-01 DIAGNOSIS — E78.2 MIXED HYPERLIPIDEMIA: ICD-10-CM

## 2021-06-01 DIAGNOSIS — R00.1 SYMPTOMATIC SINUS BRADYCARDIA: ICD-10-CM

## 2021-06-01 DIAGNOSIS — I49.5 TACHY-BRADY SYNDROME (HCC): ICD-10-CM

## 2021-06-01 DIAGNOSIS — I47.1 ATRIAL TACHYCARDIA (HCC): ICD-10-CM

## 2021-06-01 DIAGNOSIS — I45.2 BIFASCICULAR BLOCK: ICD-10-CM

## 2021-06-01 DIAGNOSIS — I49.5 SICK SINUS SYNDROME (HCC): Primary | ICD-10-CM

## 2021-06-01 PROCEDURE — 93000 ELECTROCARDIOGRAM COMPLETE: CPT | Performed by: INTERNAL MEDICINE

## 2021-06-01 PROCEDURE — 3008F BODY MASS INDEX DOCD: CPT | Performed by: INTERNAL MEDICINE

## 2021-06-01 PROCEDURE — 99024 POSTOP FOLLOW-UP VISIT: CPT | Performed by: INTERNAL MEDICINE
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
## 2021-06-24 DIAGNOSIS — F41.1 ANXIETY, GENERALIZED: ICD-10-CM

## 2021-06-24 RX ORDER — CITALOPRAM 20 MG/1
20 TABLET ORAL DAILY
Qty: 90 TABLET | Refills: 5 | Status: SHIPPED | OUTPATIENT
Start: 2021-06-24

## 2021-07-06 ENCOUNTER — APPOINTMENT (OUTPATIENT)
Dept: LAB | Facility: CLINIC | Age: 66
End: 2021-07-06
Payer: COMMERCIAL

## 2021-07-06 DIAGNOSIS — R97.20 ELEVATED PROSTATE SPECIFIC ANTIGEN (PSA): ICD-10-CM

## 2021-07-06 PROCEDURE — 84154 ASSAY OF PSA FREE: CPT

## 2021-07-06 PROCEDURE — 36415 COLL VENOUS BLD VENIPUNCTURE: CPT

## 2021-07-06 PROCEDURE — 84153 ASSAY OF PSA TOTAL: CPT

## 2021-07-07 LAB
PSA FREE MFR SERPL: 16.6 %
PSA FREE SERPL-MCNC: 0.73 NG/ML
PSA SERPL-MCNC: 4.4 NG/ML (ref 0–4)

## 2021-07-28 DIAGNOSIS — K21.9 GASTROESOPHAGEAL REFLUX DISEASE: ICD-10-CM

## 2021-07-28 RX ORDER — OMEPRAZOLE 40 MG/1
CAPSULE, DELAYED RELEASE ORAL
Qty: 90 CAPSULE | Refills: 0 | Status: SHIPPED | OUTPATIENT
Start: 2021-07-28 | End: 2021-08-20 | Stop reason: SDUPTHER

## 2021-07-30 DIAGNOSIS — I49.5 TACHY-BRADY SYNDROME (HCC): ICD-10-CM

## 2021-07-30 DIAGNOSIS — I47.1 ATRIAL TACHYCARDIA (HCC): ICD-10-CM

## 2021-07-30 RX ORDER — SOTALOL HYDROCHLORIDE 80 MG/1
TABLET ORAL
Qty: 180 TABLET | Refills: 1 | Status: SHIPPED | OUTPATIENT
Start: 2021-07-30 | End: 2021-12-27

## 2021-08-20 DIAGNOSIS — K21.9 GASTROESOPHAGEAL REFLUX DISEASE: ICD-10-CM

## 2021-08-20 DIAGNOSIS — E78.5 HYPERLIPIDEMIA, UNSPECIFIED HYPERLIPIDEMIA TYPE: ICD-10-CM

## 2021-08-22 RX ORDER — ATORVASTATIN CALCIUM 20 MG/1
TABLET, FILM COATED ORAL
Qty: 90 TABLET | Refills: 1 | Status: SHIPPED | OUTPATIENT
Start: 2021-08-22 | End: 2022-02-10 | Stop reason: SDUPTHER

## 2021-08-22 RX ORDER — OMEPRAZOLE 40 MG/1
40 CAPSULE, DELAYED RELEASE ORAL DAILY
Qty: 90 CAPSULE | Refills: 0 | Status: SHIPPED | OUTPATIENT
Start: 2021-08-22 | End: 2021-12-27

## 2021-08-27 ENCOUNTER — IN-CLINIC DEVICE VISIT (OUTPATIENT)
Dept: CARDIOLOGY CLINIC | Facility: CLINIC | Age: 66
End: 2021-08-27
Payer: COMMERCIAL

## 2021-08-27 DIAGNOSIS — Z95.0 PRESENCE OF PERMANENT CARDIAC PACEMAKER: Primary | ICD-10-CM

## 2021-08-27 PROCEDURE — 93280 PM DEVICE PROGR EVAL DUAL: CPT | Performed by: INTERNAL MEDICINE

## 2021-08-27 NOTE — PROGRESS NOTES
MDT DUAL CHAMBER PM (HIS)/ ACTIVE SYSTEM IS MRI CONDITIONAL   DEVICE INTERROGATED IN THE [place] OFFICE:  BATTERY VOLTAGE ADEQUATE (13 8 YR)   AP 48 0%  16 3%    ALL LEAD PARAMETERS WITHIN NORMAL LIMITS   6 VT AND 1 FAST A&V EPISODE WITH ALL EGMS SHOWING PAT WITH  - 160 BPM   DECREASE MADE TO RA/RV AMPLITUDES TO PROMOTE DEVICE LONGEVITY WHILE MAINTAINING AN APPROPRIATE SAFETY MARGIN   NORMAL DEVICE FUNCTION   RG

## 2021-11-02 ENCOUNTER — TELEPHONE (OUTPATIENT)
Dept: CARDIOLOGY CLINIC | Facility: CLINIC | Age: 66
End: 2021-11-02

## 2021-11-02 ENCOUNTER — APPOINTMENT (EMERGENCY)
Dept: RADIOLOGY | Facility: HOSPITAL | Age: 66
End: 2021-11-02
Payer: COMMERCIAL

## 2021-11-02 ENCOUNTER — HOSPITAL ENCOUNTER (EMERGENCY)
Facility: HOSPITAL | Age: 66
Discharge: HOME | End: 2021-11-02
Attending: EMERGENCY MEDICINE
Payer: COMMERCIAL

## 2021-11-02 VITALS
TEMPERATURE: 97.8 F | RESPIRATION RATE: 18 BRPM | HEART RATE: 72 BPM | SYSTOLIC BLOOD PRESSURE: 147 MMHG | DIASTOLIC BLOOD PRESSURE: 63 MMHG | BODY MASS INDEX: 27.28 KG/M2 | HEIGHT: 68 IN | OXYGEN SATURATION: 96 % | WEIGHT: 180 LBS

## 2021-11-02 DIAGNOSIS — R51.9 ACUTE NONINTRACTABLE HEADACHE, UNSPECIFIED HEADACHE TYPE: ICD-10-CM

## 2021-11-02 DIAGNOSIS — Z45.018 ENCOUNTER FOR INTERROGATION OF CARDIAC PACEMAKER: Primary | ICD-10-CM

## 2021-11-02 LAB
ALBUMIN SERPL BCP-MCNC: 3.8 G/DL (ref 3.5–5)
ALP SERPL-CCNC: 78 U/L (ref 46–116)
ALT SERPL W P-5'-P-CCNC: 26 U/L (ref 12–78)
ANION GAP SERPL CALCULATED.3IONS-SCNC: 7 MMOL/L (ref 4–13)
APTT PPP: 27 SECONDS (ref 23–37)
AST SERPL W P-5'-P-CCNC: 20 U/L (ref 5–45)
BASOPHILS # BLD AUTO: 0.06 THOUSANDS/ΜL (ref 0–0.1)
BASOPHILS NFR BLD AUTO: 1 % (ref 0–1)
BILIRUB SERPL-MCNC: 0.52 MG/DL (ref 0.2–1)
BUN SERPL-MCNC: 17 MG/DL (ref 5–25)
CALCIUM SERPL-MCNC: 9 MG/DL (ref 8.3–10.1)
CHLORIDE SERPL-SCNC: 105 MMOL/L (ref 100–108)
CO2 SERPL-SCNC: 29 MMOL/L (ref 21–32)
CREAT SERPL-MCNC: 0.83 MG/DL (ref 0.6–1.3)
EOSINOPHIL # BLD AUTO: 0.19 THOUSAND/ΜL (ref 0–0.61)
EOSINOPHIL NFR BLD AUTO: 2 % (ref 0–6)
ERYTHROCYTE [DISTWIDTH] IN BLOOD BY AUTOMATED COUNT: 12.5 % (ref 11.6–15.1)
GFR SERPL CREATININE-BSD FRML MDRD: 93 ML/MIN/1.73SQ M
GLUCOSE SERPL-MCNC: 94 MG/DL (ref 65–140)
HCT VFR BLD AUTO: 43.7 % (ref 36.5–49.3)
HGB BLD-MCNC: 14.8 G/DL (ref 12–17)
IMM GRANULOCYTES # BLD AUTO: 0.02 THOUSAND/UL (ref 0–0.2)
IMM GRANULOCYTES NFR BLD AUTO: 0 % (ref 0–2)
INR PPP: 1.06 (ref 0.84–1.19)
LYMPHOCYTES # BLD AUTO: 1.33 THOUSANDS/ΜL (ref 0.6–4.47)
LYMPHOCYTES NFR BLD AUTO: 15 % (ref 14–44)
MCH RBC QN AUTO: 30.8 PG (ref 26.8–34.3)
MCHC RBC AUTO-ENTMCNC: 33.9 G/DL (ref 31.4–37.4)
MCV RBC AUTO: 91 FL (ref 82–98)
MONOCYTES # BLD AUTO: 1.09 THOUSAND/ΜL (ref 0.17–1.22)
MONOCYTES NFR BLD AUTO: 12 % (ref 4–12)
NEUTROPHILS # BLD AUTO: 6.1 THOUSANDS/ΜL (ref 1.85–7.62)
NEUTS SEG NFR BLD AUTO: 70 % (ref 43–75)
NRBC BLD AUTO-RTO: 0 /100 WBCS
PLATELET # BLD AUTO: 181 THOUSANDS/UL (ref 149–390)
PMV BLD AUTO: 10.3 FL (ref 8.9–12.7)
POTASSIUM SERPL-SCNC: 4.3 MMOL/L (ref 3.5–5.3)
PROT SERPL-MCNC: 6.7 G/DL (ref 6.4–8.2)
PROTHROMBIN TIME: 13.4 SECONDS (ref 11.6–14.5)
RBC # BLD AUTO: 4.8 MILLION/UL (ref 3.88–5.62)
SODIUM SERPL-SCNC: 141 MMOL/L (ref 136–145)
TROPONIN I SERPL-MCNC: <0.02 NG/ML
WBC # BLD AUTO: 8.79 THOUSAND/UL (ref 4.31–10.16)

## 2021-11-02 PROCEDURE — 93005 ELECTROCARDIOGRAM TRACING: CPT

## 2021-11-02 PROCEDURE — 71045 X-RAY EXAM CHEST 1 VIEW: CPT

## 2021-11-02 PROCEDURE — 85025 COMPLETE CBC W/AUTO DIFF WBC: CPT | Performed by: EMERGENCY MEDICINE

## 2021-11-02 PROCEDURE — 36415 COLL VENOUS BLD VENIPUNCTURE: CPT | Performed by: EMERGENCY MEDICINE

## 2021-11-02 PROCEDURE — 84484 ASSAY OF TROPONIN QUANT: CPT | Performed by: EMERGENCY MEDICINE

## 2021-11-02 PROCEDURE — 80053 COMPREHEN METABOLIC PANEL: CPT | Performed by: EMERGENCY MEDICINE

## 2021-11-02 PROCEDURE — 99284 EMERGENCY DEPT VISIT MOD MDM: CPT

## 2021-11-02 PROCEDURE — 85730 THROMBOPLASTIN TIME PARTIAL: CPT | Performed by: EMERGENCY MEDICINE

## 2021-11-02 PROCEDURE — 85610 PROTHROMBIN TIME: CPT | Performed by: EMERGENCY MEDICINE

## 2021-11-02 PROCEDURE — 99284 EMERGENCY DEPT VISIT MOD MDM: CPT | Performed by: EMERGENCY MEDICINE

## 2021-11-02 RX ORDER — ACETAMINOPHEN 325 MG/1
975 TABLET ORAL ONCE
Status: COMPLETED | OUTPATIENT
Start: 2021-11-02 | End: 2021-11-02

## 2021-11-02 RX ADMIN — DEXAMETHASONE SODIUM PHOSPHATE 10 MG: 10 INJECTION, SOLUTION INTRAMUSCULAR; INTRAVENOUS at 17:03

## 2021-11-02 RX ADMIN — ACETAMINOPHEN 975 MG: 325 TABLET, FILM COATED ORAL at 15:23

## 2021-11-03 ENCOUNTER — TELEPHONE (OUTPATIENT)
Dept: FAMILY MEDICINE CLINIC | Facility: CLINIC | Age: 66
End: 2021-11-03

## 2021-11-05 LAB
ATRIAL RATE: 71 BPM
P AXIS: 31 DEGREES
PR INTERVAL: 168 MS
QRS AXIS: -74 DEGREES
QRSD INTERVAL: 156 MS
QT INTERVAL: 484 MS
QTC INTERVAL: 525 MS
T WAVE AXIS: 21 DEGREES
VENTRICULAR RATE: 71 BPM

## 2021-11-05 PROCEDURE — 93010 ELECTROCARDIOGRAM REPORT: CPT | Performed by: INTERNAL MEDICINE

## 2021-11-19 ENCOUNTER — NURSE TRIAGE (OUTPATIENT)
Dept: OTHER | Facility: OTHER | Age: 66
End: 2021-11-19

## 2021-11-19 ENCOUNTER — TELEPHONE (OUTPATIENT)
Dept: CARDIOLOGY CLINIC | Facility: CLINIC | Age: 66
End: 2021-11-19

## 2021-11-19 NOTE — TELEPHONE ENCOUNTER
Patient called & stated that he has been experiencing swelling in his ankles & headaches. Pt did state that he was in the ER recently. Pt denies any other symptoms.        Pt would like a cb at [phone]

## 2021-11-22 NOTE — TELEPHONE ENCOUNTER
S/w patient stated that swelling is experienced in the right ankle only. Also experiencing slight dizziness and headaches once in a while. Denied any other symptoms out of the ordinary. Patient stated that swelling is gone in the morning but, as the day goes on swelling occurs.

## 2021-11-22 NOTE — TELEPHONE ENCOUNTER
Please advise him to try using a compression sock on that leg. If he has persistent swelling we may need to scan his leg for blood clot.     Please schedule him a follow up appt in a cancellation spot. OK to see one of the APs also when I am in the office.     If his symptoms do not improve he should go to the ED for evaluation

## 2021-11-22 NOTE — TELEPHONE ENCOUNTER
Spoke to patient inform as per Dr. Rushing. Patient verbally understood.   Can patient please be contacted to arrange follow up visit.

## 2021-12-01 ENCOUNTER — OFFICE VISIT (OUTPATIENT)
Dept: CARDIOLOGY CLINIC | Facility: CLINIC | Age: 66
End: 2021-12-01
Payer: COMMERCIAL

## 2021-12-01 VITALS
HEIGHT: 68 IN | WEIGHT: 185 LBS | SYSTOLIC BLOOD PRESSURE: 106 MMHG | HEART RATE: 61 BPM | OXYGEN SATURATION: 99 % | BODY MASS INDEX: 28.04 KG/M2 | DIASTOLIC BLOOD PRESSURE: 68 MMHG

## 2021-12-01 DIAGNOSIS — I48.92 ATRIAL FLUTTER, UNSPECIFIED TYPE (HCC): ICD-10-CM

## 2021-12-01 DIAGNOSIS — E78.2 MIXED HYPERLIPIDEMIA: ICD-10-CM

## 2021-12-01 DIAGNOSIS — I49.5 SICK SINUS SYNDROME (HCC): Primary | ICD-10-CM

## 2021-12-01 PROCEDURE — 99214 OFFICE O/P EST MOD 30 MIN: CPT | Performed by: PHYSICIAN ASSISTANT

## 2021-12-02 ENCOUNTER — REMOTE DEVICE CLINIC VISIT (OUTPATIENT)
Dept: CARDIOLOGY CLINIC | Facility: CLINIC | Age: 66
End: 2021-12-02
Payer: COMMERCIAL

## 2021-12-02 DIAGNOSIS — Z95.0 CARDIAC PACEMAKER IN SITU: Primary | ICD-10-CM

## 2021-12-02 PROCEDURE — 93296 REM INTERROG EVL PM/IDS: CPT | Performed by: INTERNAL MEDICINE

## 2021-12-02 PROCEDURE — 93294 REM INTERROG EVL PM/LDLS PM: CPT | Performed by: INTERNAL MEDICINE

## 2021-12-27 ENCOUNTER — TELEMEDICINE (OUTPATIENT)
Dept: FAMILY MEDICINE CLINIC | Facility: CLINIC | Age: 66
End: 2021-12-27
Payer: COMMERCIAL

## 2021-12-27 VITALS — HEIGHT: 68 IN | WEIGHT: 177 LBS | BODY MASS INDEX: 26.83 KG/M2

## 2021-12-27 DIAGNOSIS — J06.9 ACUTE URI: Primary | ICD-10-CM

## 2021-12-27 DIAGNOSIS — I47.1 ATRIAL TACHYCARDIA (HCC): ICD-10-CM

## 2021-12-27 DIAGNOSIS — K21.9 GASTROESOPHAGEAL REFLUX DISEASE: ICD-10-CM

## 2021-12-27 DIAGNOSIS — I49.5 TACHY-BRADY SYNDROME (HCC): ICD-10-CM

## 2021-12-27 PROCEDURE — U0003 INFECTIOUS AGENT DETECTION BY NUCLEIC ACID (DNA OR RNA); SEVERE ACUTE RESPIRATORY SYNDROME CORONAVIRUS 2 (SARS-COV-2) (CORONAVIRUS DISEASE [COVID-19]), AMPLIFIED PROBE TECHNIQUE, MAKING USE OF HIGH THROUGHPUT TECHNOLOGIES AS DESCRIBED BY CMS-2020-01-R: HCPCS | Performed by: FAMILY MEDICINE

## 2021-12-27 PROCEDURE — 99213 OFFICE O/P EST LOW 20 MIN: CPT | Performed by: FAMILY MEDICINE

## 2021-12-27 PROCEDURE — U0005 INFEC AGEN DETEC AMPLI PROBE: HCPCS | Performed by: FAMILY MEDICINE

## 2021-12-27 PROCEDURE — 3008F BODY MASS INDEX DOCD: CPT | Performed by: FAMILY MEDICINE

## 2021-12-27 PROCEDURE — 3725F SCREEN DEPRESSION PERFORMED: CPT | Performed by: FAMILY MEDICINE

## 2021-12-27 PROCEDURE — 1036F TOBACCO NON-USER: CPT | Performed by: FAMILY MEDICINE

## 2021-12-27 RX ORDER — SOTALOL HYDROCHLORIDE 80 MG/1
TABLET ORAL
Qty: 180 TABLET | Refills: 1 | Status: SHIPPED | OUTPATIENT
Start: 2021-12-27 | End: 2022-01-21 | Stop reason: SDUPTHER

## 2021-12-27 RX ORDER — METHYLPREDNISOLONE 4 MG/1
TABLET ORAL
Qty: 21 EACH | Refills: 0 | Status: SHIPPED | OUTPATIENT
Start: 2021-12-27 | End: 2022-02-14

## 2021-12-27 RX ORDER — OMEPRAZOLE 40 MG/1
CAPSULE, DELAYED RELEASE ORAL
Qty: 90 CAPSULE | Refills: 0 | Status: SHIPPED | OUTPATIENT
Start: 2021-12-27 | End: 2022-05-04

## 2021-12-27 RX ORDER — DOXYCYCLINE HYCLATE 100 MG/1
100 CAPSULE ORAL EVERY 12 HOURS SCHEDULED
Qty: 20 CAPSULE | Refills: 0 | Status: SHIPPED | OUTPATIENT
Start: 2021-12-27 | End: 2022-01-06

## 2021-12-28 LAB — SARS-COV-2 RNA RESP QL NAA+PROBE: NEGATIVE

## 2022-01-21 DIAGNOSIS — I49.5 TACHY-BRADY SYNDROME (HCC): ICD-10-CM

## 2022-01-21 DIAGNOSIS — I47.1 ATRIAL TACHYCARDIA (HCC): ICD-10-CM

## 2022-01-21 RX ORDER — SOTALOL HYDROCHLORIDE 80 MG/1
80 TABLET ORAL EVERY 12 HOURS
Qty: 180 TABLET | Refills: 3 | Status: SHIPPED | OUTPATIENT
Start: 2022-01-21

## 2022-02-01 ENCOUNTER — OFFICE VISIT (OUTPATIENT)
Dept: DERMATOLOGY | Facility: CLINIC | Age: 67
End: 2022-02-01
Payer: COMMERCIAL

## 2022-02-01 VITALS — WEIGHT: 177 LBS | HEIGHT: 68 IN | BODY MASS INDEX: 26.83 KG/M2 | TEMPERATURE: 97.3 F

## 2022-02-01 DIAGNOSIS — L57.0 ACTINIC KERATOSIS: ICD-10-CM

## 2022-02-01 DIAGNOSIS — L82.1 SEBORRHEIC KERATOSIS: ICD-10-CM

## 2022-02-01 DIAGNOSIS — Z13.89 SCREENING FOR SKIN CONDITION: ICD-10-CM

## 2022-02-01 DIAGNOSIS — L28.0 LICHEN SIMPLEX CHRONICUS: Primary | ICD-10-CM

## 2022-02-01 DIAGNOSIS — Z85.828 HISTORY OF SKIN CANCER: ICD-10-CM

## 2022-02-01 DIAGNOSIS — L98.9 UNKNOWN SKIN LESION: ICD-10-CM

## 2022-02-01 PROCEDURE — 88305 TISSUE EXAM BY PATHOLOGIST: CPT | Performed by: STUDENT IN AN ORGANIZED HEALTH CARE EDUCATION/TRAINING PROGRAM

## 2022-02-01 PROCEDURE — 17000 DESTRUCT PREMALG LESION: CPT | Performed by: DERMATOLOGY

## 2022-02-01 PROCEDURE — 17003 DESTRUCT PREMALG LES 2-14: CPT | Performed by: DERMATOLOGY

## 2022-02-01 PROCEDURE — 11102 TANGNTL BX SKIN SINGLE LES: CPT | Performed by: DERMATOLOGY

## 2022-02-01 PROCEDURE — 99214 OFFICE O/P EST MOD 30 MIN: CPT | Performed by: DERMATOLOGY

## 2022-02-01 RX ORDER — FLUTICASONE PROPIONATE 0.05 %
CREAM (GRAM) TOPICAL 2 TIMES DAILY
Qty: 30 G | Refills: 3 | Status: SHIPPED | OUTPATIENT
Start: 2022-02-01 | End: 2022-08-08

## 2022-02-01 NOTE — PATIENT INSTRUCTIONS
Lichen simplex chronicus patient advise that this is from chronic rubbing and scratching at the area will go ahead treat with a topical corticosteroid to see if we get this process to resolve  Skin lesion possible squamous cell carcinoma await results of biopsy further therapy is necessary may be excision may need  Mohs  Actinic Keratosis:  Patient advised lesions are precancers  These should resolve with cryosurgery if not to let us know sun block recommended  Seborrheic keratosis patient reassured these are normal growths we acquire with age no treatment needed  History of skin cancer in no recurrence nothing else atypical sunblock recommended follow-up in 1 year  Screening for dermatologic disorders nothing else of concern noted on complete exam follow-up in 1 year  Wound care instructions given to patient  Treatment with Cryotherapy    The doctor has treated your skin with nitrogen, which is 320 degrees Fahrenheit below zero  He has given the treated area "frostbite "    Stinging should subside within a few hours  You can take Tylenol for pain, if needed  Over the next few days, it is normal if the area becomes reddened, a blood blister, or swollen with fluid  If the lesion treated was near the eye - you could get a swollen eye over the next few days  Do not panic! This is all temporary, and will resolve with time  There is no special treatment - just keep the area clean  Makeup and BandAids can be used, if preferred  When the area starts to dry up and peel off, using Vaseline can help healing  It usually takes up to a month for it to heal   Some lesions are recurrent and may require repeat treatments  If a lesion has not healed in one month, please don't hesitate to contact us  If you have any further questions that are not answered here, please call us  [phone]    Thank you for allowing us to care for you  TELEMETRY

## 2022-02-01 NOTE — PROGRESS NOTES
[address]  [place]  [place] [id number]  [phone]  [phone]     MRN: [id number] : 1955  Encounter: [id number]  Patient Information: Mal Memory  Chief complaint: yearly check up    History of present illness:  42-year-old male with previous history of skin cancer who had not seen for few years patient seen at [address] [facility] 2 years ago    Patient concerned regarding lesion on his scalp that has been bleeding also a itchy area on his right neck no other concerns noted  Past Medical History:   Diagnosis Date    Asthma     allergy induced    Colon polyp     Fatty liver     H/O nonmelanoma skin cancer     last assessed-2017    Hyperlipidemia     Inflamed seborrheic keratosis     Malignant neoplasm of skin     last assessed-2014    Neoplasm of uncertain behavior of skin     Nocturia     Pneumothorax, left     Seasonal allergies     Sebaceous cyst     Squamous cell carcinoma of skin of neck     Testicular hypofunction     Viral warts      Past Surgical History:   Procedure Laterality Date    CARDIAC CATHETERIZATION      CARDIAC PACEMAKER PLACEMENT  2021    CARDIOVASCULAR STRESS TEST  2010    COLONOSCOPY  10/08/2008    IR OTHER  04/15/2021    LUNG SURGERY      for pneumothorax    PARTIAL HIP ARTHROPLASTY      RHINOPLASTY       Social History   Social History     Substance and Sexual Activity   Alcohol Use No    Comment: quit 2020     Social History     Substance and Sexual Activity   Drug Use No     Social History     Tobacco Use   Smoking Status Never Smoker   Smokeless Tobacco Never Used     Family History   Problem Relation Age of Onset    Colon cancer Father      Meds/Allergies   Allergies   Allergen Reactions    Dust Mite Extract     Molds & Smuts     Pollen Extract     Short Ragweed Pollen Ext     Tree Extract        Meds:  Prior to Admission medications    Medication Sig Start Date End Date Taking?  Authorizing Provider   aspirin 81 MG tablet Take by mouth   Yes Historical Provider, MD   atorvastatin (LIPITOR) 20 mg tablet TAKE 1 TABLET BY MOUTH EVERY DAY 8/22/21  Yes Violet Davis MD   budesonide (PULMICORT) 180 MCG/ACT inhaler Inhale 2 puffs   Yes Historical Provider, MD   Calcium Carb-Cholecalciferol (CALCIUM 1000 + D) 1000-800 MG-UNIT TABS Take by mouth   Yes Historical Provider, MD   citalopram (CeleXA) 20 mg tablet Take 1 tablet (20 mg total) by mouth daily 6/24/21  Yes Violet Davis MD   fluticasone (FLONASE) 50 mcg/act nasal spray into each nostril   Yes Historical Provider, MD   ipratropium (ATROVENT) 0 06 % nasal spray into each nostril   Yes Historical Provider, MD   LORazepam (ATIVAN) 0 5 mg tablet Take 1 tablet (0 5 mg total) by mouth every 6 (six) hours as needed for anxiety 2/10/21  Yes Violet Davis MD   methylPREDNISolone 4 MG tablet therapy pack Use as directed on package 12/27/21  Yes Violet Davis MD   montelukast (SINGULAIR) 10 mg tablet TAKE 1 TABLET BY MOUTH EVERY DAY 12/9/21  Yes Akash Murphy MD   omeprazole (PriLOSEC) 40 MG capsule TAKE 1 CAPSULE BY MOUTH EVERY DAY 12/27/21  Yes Violet Davis MD   predniSONE 20 mg tablet Take 1 tablet (20 mg total) by mouth daily 11/23/21  Yes Akash Murphy MD   sotalol (BETAPACE) 80 mg tablet Take 1 tablet (80 mg total) by mouth every 12 (twelve) hours 1/21/22  Yes Hetal Reyes MD   tamsulosin (FLOMAX) 0 4 mg Take 0 4 mg by mouth daily with dinner    Yes Historical Provider, MD       Subjective:     Review of Systems:    General: negative for - chills, fatigue, fever,  weight gain or weight loss  Psychological: negative for - anxiety, behavioral disorder, concentration difficulties, decreased libido, depression, irritability, memory difficulties, mood swings, sleep disturbances or suicidal ideation  ENT: negative for - hearing difficulties , nasal congestion, nasal discharge, oral lesions, sinus pain, sneezing, sore throat  Allergy and Immunology: negative for - hives, insect bite sensitivity,  Hematological and Lymphatic: negative for - bleeding problems, blood clots,bruising, swollen lymph nodes  Endocrine: negative for - hair pattern changes, hot flashes, malaise/lethargy, mood swings, palpitations, polydipsia/polyuria, skin changes, temperature intolerance or unexpected weight change  Respiratory: negative for - cough, hemoptysis, orthopnea, shortness of breath, or wheezing  Cardiovascular: negative for - chest pain, dyspnea on exertion, edema,  Gastrointestinal: negative for - abdominal pain, nausea/vomiting  Genito-Urinary: negative for - dysuria, incontinence, irregular/heavy menses or urinary frequency/urgency  Musculoskeletal: negative for - gait disturbance, joint pain, joint stiffness, joint swelling, muscle pain, muscular weakness  Dermatological:  As in HPI  Neurological: negative for confusion, dizziness, headaches, impaired coordination/balance, memory loss, numbness/tingling, seizures, speech problems, tremors or weakness       Objective:   Temp (!) 97 3 °F (36 3 °C) (Temporal)   Ht 5' 8" (1 727 m)   Wt 80 3 kg (177 lb)   BMI 26 91 kg/m²     Physical Exam:    General Appearance:    Alert, cooperative, no distress   Head:    Normocephalic, without obvious abnormality, atraumatic           Skin:   A full skin exam was performed including scalp, head scalp, eyes, ears, nose, lips, neck, chest, axilla, abdomen, back, buttocks, bilateral upper extremities, bilateral lower extremities, hands, feet, fingers, toes, fingernails, and toenails * indurated crusted 3 mm papule noted on the mid scalp scaling erythematous areas noted below normal keratotic papules greasy stuck appearance lichenified erythematous scaling patch noted on the right neck nothing else of concern noted      Physical Exam  HENT:      Head:          Shave Biopsy Procedure Note    Pre-operative Diagnosis:  Rule out squamous cell carcinoma    Plan:  1  Instructed to keep the wound dry and covered for 24 and clean thereafter  2  Warning signs of infection were reviewed  3  Recommended that the patient use OTC acetaminophen as needed for pain  4  Return  Pending results of biopsy(ies)    Locations:  Mid scalp    Indications:  Suspicious lesion    Anesthesia: Lidocaine 1% with epinephrine without added sodium bicarbonate    Procedure Details     Patient informed of the risks (including bleeding and infection) and benefits of the   procedure and Verbal informed consent obtained  The lesion and surrounding area were given a sterile prep using alcohol and draped in the usual sterile fashion  A Blue blade razor was used to obtain a specimen  Hemostasis achieved with aluminum chloride  Petrolatum and a sterile dressing applied  The specimen was sent for pathologic examination  The patient tolerated the procedure(s) well  Complications:  None  Cryotherapy Procedure Note    Pre-operative Diagnosis: actinic keratosis    Plan:  1  Instructed to keep the area dry and clean thereafter  Apply petrolatum if area gets crusty  2  Recommended that the patient use acetaminophen  as needed for pain  Locations:  Scalp    Indications: Destruction of  actinic keratoses x5    Patient informed of risks (permanent scarring, infection, light or dark discoloration, bleeding, infection, weakness, numbness and recurrence of the lesion) and benefits of the procedure and verbal informed consent obtained  The areas are treated with liquid nitrogen therapy, frozen until ice ball extended 2 mm beyond lesion, allowed to thaw, and treated again  The patient tolerated procedure well  The patient was instructed on post-op care, warned that there may be blister formation, redness and pain  Recommend OTC analgesia as needed for pain  Condition:  Stable    Complications:  none  Assessment:     1  Lichen simplex chronicus     2   Unknown skin lesion     3  Actinic keratosis     4  Seborrheic keratosis     5  History of skin cancer     6  Screening for skin condition           Plan:   Lichen simplex chronicus patient advise that this is from chronic rubbing and scratching at the area will go ahead treat with a topical corticosteroid to see if we get this process to resolve  Skin lesion possible squamous cell carcinoma await results of biopsy further therapy is necessary may be excision may need  Mohs  Actinic Keratosis:  Patient advised lesions are precancers  These should resolve with cryosurgery if not to let us know sun block recommended  Seborrheic keratosis patient reassured these are normal growths we acquire with age no treatment needed  History of skin cancer in no recurrence nothing else atypical sunblock recommended follow-up in 1 year  Screening for dermatologic disorders nothing else of concern noted on complete exam follow-up in 1 year      Gwenette Gosselin, MD  2/1/2022,8:51 AM    Portions of the record may have been created with voice recognition software   Occasional wrong word or "sound a like" substitutions may have occurred due to the inherent limitations of voice recognition software   Read the chart carefully and recognize, using context, where substitutions have occurred

## 2022-02-07 ENCOUNTER — TELEPHONE (OUTPATIENT)
Dept: DERMATOLOGY | Facility: CLINIC | Age: 67
End: 2022-02-07

## 2022-02-07 NOTE — TELEPHONE ENCOUNTER
Pre- operative Mohs Telephone Scheduling Note    Do you have a pacemaker or defibrillator? yes: Pacemaker     Do you take antibiotics before skin or dental procedures? yes: Dentis   If yes, will likely require pre-operative antibiotics  Ask  the patient why they take the antibiotics (usually because of joint replacement)  Do you have a history of a joint replacements within the past 2 years? yes: Knee replacement    If yes, will likely require pre-operative antibiotics  Ask if orthopaedic surgeon has prescribed pre-operative antibiotics to take before procedures/dental work? Do you take any OTC medications that thin your blood (Aspirin, Aleve, Ibuprofen) or supplements that thin your blood (fish oil, garlic, vitamin E, Ginko Biloba)? yes: ASA    Do you take any prescribed medications that thin your blood (Coumadin, Plavix, Xarelto, Eliquis or another prescribed blood thinner)? no    Do you have an allergy to lidocaine or epinephrine? no    Do you have an allergy to shellfish? no    Do you smoke? no      If yes,  patient to try and stop 2 days before surgery and 7 days after the surgery  Minimizing smoking as much as possible during this time will improve healing and the cosmetic result after surgery  Date scheduled: 02/15/2022 with Dr Fany Haq mid scalp      Coordination of Care with other provider (Oculoplastics, Plastics, ENT) required? no   IF YES, PLEASE FORWARD TO APPROPRIATE PERSONNEL TO HELP COORDINATE  Are there remaining tumors to be scheduled?  no

## 2022-02-09 ENCOUNTER — HOSPITAL ENCOUNTER (OUTPATIENT)
Dept: MRI IMAGING | Facility: HOSPITAL | Age: 67
Discharge: HOME | End: 2022-02-09
Payer: COMMERCIAL

## 2022-02-09 DIAGNOSIS — K86.2 PANCREATIC CYST: ICD-10-CM

## 2022-02-09 PROCEDURE — A9585 GADOBUTROL INJECTION: HCPCS | Performed by: PHYSICIAN ASSISTANT

## 2022-02-09 PROCEDURE — G1004 CDSM NDSC: HCPCS

## 2022-02-09 PROCEDURE — 74183 MRI ABD W/O CNTR FLWD CNTR: CPT

## 2022-02-09 PROCEDURE — 76377 3D RENDER W/INTRP POSTPROCES: CPT

## 2022-02-09 RX ADMIN — GADOBUTROL 8 ML: 604.72 INJECTION INTRAVENOUS at 11:33

## 2022-02-09 NOTE — LETTER
[address] [place] [address]  [address] [facility] [address] [place] [address]      February 22, 2022    MRN: [id number]     Phone: [phone]     Dear Mr Akhil Barrios recently had a(n) MRI performed on 2/9/2022 at  [address] [place] [address] that was requested by Kaila Garsia PA-C  The study was reviewed by a radiologist, which is a physician who specializes in medical imaging  The radiologist issued a report describing his or her findings  In that report there was a finding that the radiologist felt warranted further discussion with your health care provider and that discussion would be beneficial to you  The results were sent to Kaila Garsia PA-C on 02/15/2022  8:38 AM  We recommend that you contact Kaila Garsia PA-C at [phone] or set up an appointment to discuss the results of the imaging test  If you have already heard from Kaila Garsia PA-C regarding the results of your study, you can disregard this letter  This letter is not meant to alarm you, but intended to encourage you to follow-up on your results with the provider that sent you for the imaging study  In addition, we have enclosed answers to frequently asked questions by other patients who have also received a letter to review results with their health care provider (see page two)  Thank you for choosing [facility] for your medical imaging needs  FREQUENTLY ASKED QUESTIONS    1  Why am I receiving this letter? [address] [place] [address] requires us to notify patients who have findings on imaging exams that may require more testing or follow-up with a health professional within the next 3 months          2  How serious is the finding on the imaging test?  This letter is sent to all patients who may need follow-up or more testing within the next 3 months  Receiving this letter does not necessarily mean you have a life-threatening imaging finding or disease  Recommendations in the radiologists imaging report are general in nature and it is up to your healthcare provider to say whether those recommendations make sense for your situation  You are strongly encouraged to talk to your health care provider about the results and ask whether additional steps need to be taken  3  Where can I get a copy of the final report for my recent radiology exam?  To get a full copy of the report you can access your records online at [email or web address] or please contact [address] [place] [address] Records Department at [phone] Monday through Friday between 8 am and 6 pm          4  What do I need to do now? Please contact your health care provider who requested the imaging study to discuss what further actions (if any) are needed  You may have already heard from (your ordering provider) in regard to this test in which case you can disregard this letter  NOTICE IN ACCORDANCE WITH THE [facility] PATIENT TEST RESULT INFORMATION ACT OF 2018    You are receiving this notice as a result of a determination by your diagnostic imaging service that further discussions of your test results are warranted and would be beneficial to you  The complete results of your test or tests have been or will be sent to the health care practitioner that ordered the test or tests  It is recommended that you contact your health care practitioner to discuss your results as soon as possible

## 2022-02-10 DIAGNOSIS — E55.9 VITAMIN D DEFICIENCY: Primary | ICD-10-CM

## 2022-02-10 DIAGNOSIS — Z13.89 SCREENING FOR SKIN CONDITION: ICD-10-CM

## 2022-02-10 DIAGNOSIS — E78.2 MIXED HYPERLIPIDEMIA: ICD-10-CM

## 2022-02-10 DIAGNOSIS — F41.9 ANXIETY: ICD-10-CM

## 2022-02-10 DIAGNOSIS — E78.5 HYPERLIPIDEMIA, UNSPECIFIED HYPERLIPIDEMIA TYPE: ICD-10-CM

## 2022-02-10 DIAGNOSIS — R97.20 ELEVATED PSA: ICD-10-CM

## 2022-02-10 RX ORDER — ATORVASTATIN CALCIUM 20 MG/1
TABLET, FILM COATED ORAL
Qty: 90 TABLET | Refills: 1 | OUTPATIENT
Start: 2022-02-10

## 2022-02-10 RX ORDER — ATORVASTATIN CALCIUM 20 MG/1
20 TABLET, FILM COATED ORAL DAILY
Qty: 90 TABLET | Refills: 1 | Status: SHIPPED | OUTPATIENT
Start: 2022-02-10

## 2022-02-10 NOTE — TELEPHONE ENCOUNTER
T/c from pt -- has been having leg pain and ankle swelling -- scheduled pt in next possible opening with Dr Scott Lind (2/14) - pt also reports he is due for bw and asks that it be entered so he can go before appt

## 2022-02-14 ENCOUNTER — OFFICE VISIT (OUTPATIENT)
Dept: FAMILY MEDICINE CLINIC | Facility: CLINIC | Age: 67
End: 2022-02-14
Payer: COMMERCIAL

## 2022-02-14 VITALS
SYSTOLIC BLOOD PRESSURE: 112 MMHG | OXYGEN SATURATION: 98 % | HEART RATE: 60 BPM | WEIGHT: 191 LBS | BODY MASS INDEX: 28.95 KG/M2 | DIASTOLIC BLOOD PRESSURE: 64 MMHG | HEIGHT: 68 IN

## 2022-02-14 DIAGNOSIS — R97.20 ELEVATED PSA: ICD-10-CM

## 2022-02-14 DIAGNOSIS — I47.1 ATRIAL TACHYCARDIA (HCC): ICD-10-CM

## 2022-02-14 DIAGNOSIS — Z00.00 HEALTH MAINTENANCE EXAMINATION: Primary | ICD-10-CM

## 2022-02-14 DIAGNOSIS — R35.0 BENIGN PROSTATIC HYPERPLASIA WITH URINARY FREQUENCY: ICD-10-CM

## 2022-02-14 DIAGNOSIS — J30.9 ALLERGIC RHINITIS, UNSPECIFIED SEASONALITY, UNSPECIFIED TRIGGER: ICD-10-CM

## 2022-02-14 DIAGNOSIS — Z95.0 PACEMAKER: ICD-10-CM

## 2022-02-14 DIAGNOSIS — J45.909 UNCOMPLICATED ASTHMA, UNSPECIFIED ASTHMA SEVERITY, UNSPECIFIED WHETHER PERSISTENT: ICD-10-CM

## 2022-02-14 DIAGNOSIS — E55.9 VITAMIN D DEFICIENCY: ICD-10-CM

## 2022-02-14 DIAGNOSIS — I49.5 TACHY-BRADY SYNDROME (HCC): ICD-10-CM

## 2022-02-14 DIAGNOSIS — N40.1 BENIGN PROSTATIC HYPERPLASIA WITH URINARY FREQUENCY: ICD-10-CM

## 2022-02-14 DIAGNOSIS — F41.1 ANXIETY, GENERALIZED: ICD-10-CM

## 2022-02-14 DIAGNOSIS — E78.2 MIXED HYPERLIPIDEMIA: ICD-10-CM

## 2022-02-14 DIAGNOSIS — K21.9 GASTROESOPHAGEAL REFLUX DISEASE, UNSPECIFIED WHETHER ESOPHAGITIS PRESENT: ICD-10-CM

## 2022-02-14 PROBLEM — I47.19 ATRIAL TACHYCARDIA: Status: RESOLVED | Noted: 2021-04-06 | Resolved: 2022-02-14

## 2022-02-14 PROBLEM — F41.9 ANXIETY: Status: RESOLVED | Noted: 2019-08-06 | Resolved: 2022-02-14

## 2022-02-14 PROBLEM — Z13.89 SCREENING FOR SKIN CONDITION: Status: RESOLVED | Noted: 2018-02-26 | Resolved: 2022-02-14

## 2022-02-14 PROCEDURE — 99396 PREV VISIT EST AGE 40-64: CPT | Performed by: FAMILY MEDICINE

## 2022-02-14 PROCEDURE — 3725F SCREEN DEPRESSION PERFORMED: CPT | Performed by: FAMILY MEDICINE

## 2022-02-14 PROCEDURE — 1036F TOBACCO NON-USER: CPT | Performed by: FAMILY MEDICINE

## 2022-02-14 NOTE — PROGRESS NOTES
BMI Counseling: Body mass index is 29 04 kg/m²  The BMI is above normal  Nutrition recommendations include decreasing portion sizes and moderation in carbohydrate intake  Exercise recommendations include exercising 3-5 times per week  Rationale for BMI follow-up plan is due to patient being overweight or obese  Depression Screening and Follow-up Plan: Patient was screened for depression during today's encounter  They screened negative with a PHQ-2 score of 0  Assessment/Plan:    Return visit 1 year for annual physical   He is get blood work done this previously prescribed and we will call with results  Problem List Items Addressed This Visit        Digestive    Acid reflux disease       Respiratory    Allergic rhinitis    Asthma       Cardiovascular and Mediastinum    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HCC)     Continue Betapace 80 mg b i d  RESOLVED: Atrial tachycardia (HCC)       Genitourinary    Benign prostatic hyperplasia       Other    Anxiety, generalized     Continue Celexa 20 mg daily and Ativan as needed         Hyperlipidemia     Continue Lipitor 20 mg daily         Health maintenance examination - Primary    Elevated PSA    Vitamin D deficiency    Pacemaker            Subjective:      Patient ID: Mina Thrasher is a 61 y o  male  HPI    The following portions of the patient's history were reviewed and updated as appropriate:   Past Medical History:  He has a past medical history of Asthma, Colon polyp, Fatty liver, H/O nonmelanoma skin cancer, Hyperlipidemia, Inflamed seborrheic keratosis, Malignant neoplasm of skin, Neoplasm of uncertain behavior of skin, Nocturia, Pneumothorax, left, Seasonal allergies, Sebaceous cyst, Squamous cell carcinoma of skin of neck, Testicular hypofunction, and Viral warts  ,  _______________________________________________________________________  Medical Problems:  does not have any pertinent problems on file ,  _______________________________________________________________________  Past Surgical History:   has a past surgical history that includes Cardiovascular stress test (08/27/2010); Colonoscopy (10/08/2008); Rhinoplasty; Cardiac catheterization; Partial hip arthroplasty; Lung surgery; IR other (04/15/2021); and Cardiac pacemaker placement (05/05/2021)  ,  _______________________________________________________________________  Family History:  family history includes Colon cancer in his father ,  _______________________________________________________________________  Social History:   reports that he has never smoked  He has never used smokeless tobacco  He reports that he does not drink alcohol and does not use drugs  ,  _______________________________________________________________________  Allergies:  is allergic to dust mite extract, molds & smuts, pollen extract, short ragweed pollen ext, and tree extract     _______________________________________________________________________  Current Outpatient Medications   Medication Sig Dispense Refill    aspirin 81 MG tablet Take by mouth      atorvastatin (LIPITOR) 20 mg tablet Take 1 tablet (20 mg total) by mouth daily 90 tablet 1    budesonide (PULMICORT) 180 MCG/ACT inhaler Inhale 2 puffs      Calcium Carb-Cholecalciferol (CALCIUM 1000 + D) 1000-800 MG-UNIT TABS Take by mouth      citalopram (CeleXA) 20 mg tablet Take 1 tablet (20 mg total) by mouth daily 90 tablet 5    fluticasone (CUTIVATE) 0 05 % cream Apply topically 2 (two) times a day 30 g 3    fluticasone (FLONASE) 50 mcg/act nasal spray into each nostril      ipratropium (ATROVENT) 0 06 % nasal spray into each nostril      LORazepam (ATIVAN) 0 5 mg tablet Take 1 tablet (0 5 mg total) by mouth every 6 (six) hours as needed for anxiety 90 tablet 3    montelukast (SINGULAIR) 10 mg tablet TAKE 1 TABLET BY MOUTH EVERY DAY 90 tablet 1    omeprazole (PriLOSEC) 40 MG capsule TAKE 1 CAPSULE BY MOUTH EVERY DAY 90 capsule 0    sotalol (BETAPACE) 80 mg tablet Take 1 tablet (80 mg total) by mouth every 12 (twelve) hours 180 tablet 3    tamsulosin (FLOMAX) 0 4 mg Take 0 4 mg by mouth daily with dinner        No current facility-administered medications for this visit      _______________________________________________________________________  Review of Systems      Objective:  Vitals:    02/14/22 1600   BP: 112/64   BP Location: Left arm   Patient Position: Sitting   Cuff Size: Adult   Pulse: 60   SpO2: 98%   Weight: 86 6 kg (191 lb)   Height: 5' 8" (1 727 m)     Body mass index is 29 04 kg/m²       Physical Exam

## 2022-02-15 ENCOUNTER — TELEPHONE (OUTPATIENT)
Dept: GASTROENTEROLOGY | Facility: CLINIC | Age: 67
End: 2022-02-15

## 2022-02-15 ENCOUNTER — PROCEDURE VISIT (OUTPATIENT)
Dept: DERMATOLOGY | Facility: CLINIC | Age: 67
End: 2022-02-15
Payer: COMMERCIAL

## 2022-02-15 VITALS
BODY MASS INDEX: 28.89 KG/M2 | HEIGHT: 68 IN | WEIGHT: 190.6 LBS | DIASTOLIC BLOOD PRESSURE: 70 MMHG | HEART RATE: 60 BPM | SYSTOLIC BLOOD PRESSURE: 118 MMHG | TEMPERATURE: 97.8 F | OXYGEN SATURATION: 97 %

## 2022-02-15 DIAGNOSIS — D04.4 SQUAMOUS CELL CARCINOMA IN SITU (SCCIS) OF SCALP: ICD-10-CM

## 2022-02-15 PROCEDURE — 17311 MOHS 1 STAGE H/N/HF/G: CPT | Performed by: DERMATOLOGY

## 2022-02-15 PROCEDURE — 3008F BODY MASS INDEX DOCD: CPT | Performed by: FAMILY MEDICINE

## 2022-02-15 RX ORDER — AMOXICILLIN 500 MG/1
CAPSULE ORAL
COMMUNITY
Start: 2022-01-24

## 2022-02-15 NOTE — PROGRESS NOTES
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      Past Medical History:   Diagnosis Date    Asthma     allergy induced    Basal cell carcinoma     Colon polyp     Fatty liver     H/O nonmelanoma skin cancer     last assessed-2017    Hyperlipidemia     Inflamed seborrheic keratosis     Malignant neoplasm of skin     last assessed-2014    Neoplasm of uncertain behavior of skin     Nocturia     Pneumothorax, left     Seasonal allergies     Sebaceous cyst     Squamous cell carcinoma of scalp 2022    SCCIS- mid scalp    Squamous cell carcinoma of skin of neck     Testicular hypofunction     Viral warts        Past Surgical History:   Procedure Laterality Date    CARDIAC CATHETERIZATION      CARDIAC PACEMAKER PLACEMENT  2021    CARDIOVASCULAR STRESS TEST  2010    COLONOSCOPY  10/08/2008    IR OTHER  04/15/2021    LUNG SURGERY      for pneumothorax    MOHS SURGERY  02/15/2022    SCCIS mid scalp- Dr Sydney Gaytan           Current Outpatient Medications:     aspirin 81 MG tablet, Take by mouth, Disp: , Rfl:     atorvastatin (LIPITOR) 20 mg tablet, Take 1 tablet (20 mg total) by mouth daily, Disp: 90 tablet, Rfl: 1    budesonide (PULMICORT) 180 MCG/ACT inhaler, Inhale 2 puffs, Disp: , Rfl:     Calcium Carb-Cholecalciferol (CALCIUM 1000 + D) 1000-800 MG-UNIT TABS, Take by mouth, Disp: , Rfl:     citalopram (CeleXA) 20 mg tablet, Take 1 tablet (20 mg total) by mouth daily, Disp: 90 tablet, Rfl: 5    fluticasone (CUTIVATE) 0 05 % cream, Apply topically 2 (two) times a day, Disp: 30 g, Rfl: 3    fluticasone (FLONASE) 50 mcg/act nasal spray, into each nostril, Disp: , Rfl:     ipratropium (ATROVENT) 0 06 % nasal spray, into each nostril, Disp: , Rfl:     LORazepam (ATIVAN) 0 5 mg tablet, Take 1 tablet (0 5 mg total) by mouth every 6 (six) hours as needed for anxiety, Disp: 90 tablet, Rfl: 3    montelukast (SINGULAIR) 10 mg tablet, TAKE 1 TABLET BY MOUTH EVERY DAY, Disp: 90 tablet, Rfl: 1    omeprazole (PriLOSEC) 40 MG capsule, TAKE 1 CAPSULE BY MOUTH EVERY DAY, Disp: 90 capsule, Rfl: 0    sotalol (BETAPACE) 80 mg tablet, Take 1 tablet (80 mg total) by mouth every 12 (twelve) hours, Disp: 180 tablet, Rfl: 3    tamsulosin (FLOMAX) 0 4 mg, Take 0 4 mg by mouth daily with dinner , Disp: , Rfl:     amoxicillin (AMOXIL) 500 mg capsule, Before dental work (Patient not taking: Reported on 2/15/2022 ), Disp: , Rfl:     Allergies   Allergen Reactions    Dust Mite Extract     Molds & Smuts     Pollen Extract     Short Ragweed Pollen Ext     Tree Extract        Physical Exam:   Vitals:    02/15/22 0930   BP: 118/70   Pulse: 60   Temp: 97 8 °F (36 6 °C)   SpO2: 97%     General: Awake, Alert, Oriented x 3, Mood and affect appropriate  Respiratory: Respirations even and unlabored  Cardiovascular: Peripheral pulses intact; no edema  Musculoskeletal Exam: n/a    Assessment: 0 8 cm x 1 0 cm pink scar  Biopsy proven squamous cell carcinoma in situ of the mid scalp      Plan: MOHS    MOHS Procedure Timeout      Most Recent Value   Timeout: 1000   Patient Identity Verified: Yes   Correct Site Verified: Yes   Correct Procedure Verified: Yes          MOHS Diagnosis/Indication/Location/ID      Most Recent Value   Pathology Type Squamous cell carcinoma   Anatomic Site --  [mid scalp]   Indications for MOHS tumor location   MOHS ID [id number]          MOHS Site/Accession/Pre-Post      Most Recent Value   Original Site Identified (as submitted by referring clinician) Photo   Biopsy Accession/Specimen # (as submitted by referring clincian) [id number]   Pre-MOHS Size Length (cm) 0 8   Pre-MOHS Size Width (cm) 1   Post-MOHS Size-Length (cm) 1 3   Post MOHS Size-Width (cm) 1 Anesthetic Used 1% Lidocaine with epinephrine          MOHS Tumor Stage 1 Information      Most Recent Value   Tissue Sections (blocks) 2   Microscopic Exam Section 1: No tumor identified in section  Microscopic Exam Section 2: No tumor identified in section  Tumor Clear After Stage I? Yes                Patient identified, procedure verified, site identified and verified  Time out completed  Surgical removal of the lesion discussed with the patient (risks and benefits, including possibility of scarring, infection, recurrence or potential for further treatment)  I have specifically identified the site with the patient  I have discussed the fact that the patient will have a scar after the procedure regardless of granulation or repair with sutures  I have discussed that the repair options can range from granulation in some cases to linear or curvilinear closures to larger flaps or grafts  There are sometimes flaps or grafts used that require multiples stages of surgery and will not be completed today, rather be completed over a series of appointments  I have discussed that occasionally due to location, size or depth of the lesion I may recommend consultation with and transfer of care for further removal or the reconstruction to another provider such as ophthalmology surgery, plastic surgery, ENT surgery, or surgical oncology  There are cases in which other testing such as imaging with MRI or CT scan or testing of lymph nodes is recommended because of the nature/depth/location of tumor seen during the removal  There is a risk of injury to nerves causing temporary or permanent numbness or the inability to move muscles full such as the inability to lift eyebrows  Questions answered and verbal and written consent was obtained  The tumor qualifies for Mohs based on AUC criteria  Dr Abagail Bamberger served as the surgeon and pathologist during the procedure      SCCIS cleared with 1 stage of mohs and allowed to heal secondarily given superficial nature of the wound and after detailed discussion of repair options  Well tolerated  Wound check prn      Scribe Attestation    I,:  Dmitry Linton am acting as a scribe while in the presence of the attending physician :       I,:  Ronal Ndiaye MD personally performed the services described in this documentation    as scribed in my presence :

## 2022-02-15 NOTE — PATIENT INSTRUCTIONS
Mohs Microscopic Surgery After Care    Your wound will heal via secondary intention, which means no additional surgery was performed after removal of your skin cancer  The wound was left open to heal gradually over time using wound care alone  Wounds left to heal secondarily can take 2-3 months on average to heal   The healing occurs step by step  You will notice that over the first three weeks the area will drain straw colored fluid that may have some blood within it  This is normal  Over the first three weeks, you form a nice healing base called fibrin that serves as the scaffold or map for the rest of your body's healing process  The fibrin is a thick yellow tissue  People often worry that it is pus given the yellow color  Unlike pus, this is a thick layer that cannot be rubbed off  After this, you should expect the wound to "fill in" to the surface of your skin over the course of several weeks  Lastly, a new layer of skin will form over the wound  Until your body forms a good fibrin base (which takes ~3 weeks) you should avoid immersing the wound in water (such as in baths, whirlpools, swimming pools, hot tubs, lakes and oceans)  You however CAN and should wash the area daily, as instructed below  After healing, the scar will initially be red (and often times a deep red to purple color on the legs) but will gradually fade over the course of 1 year to to round scar lighter than the skin surrounding it  We advise you follow the wound care as noted below the entire time it takes for the areas to heal completely  WOUND CARE AFTER YOUR PROCEDURE    1  Try NOT to remove the pressure bandage for 48 hours  Keep the area clean and dry while this bandage is on  2  After removing the bandage for the first time, gently clean the area with soap and water   If the bandage is difficult to remove, getting the bandage wet in the shower will sometimes help soften the adhesive and allow it to be removed more easily  3  You will now need to cleanse this area daily in the shower with gentle soap  There is no need to scrub the area  You will need to apply plain Vaseline ointment (this is over the counter and not a prescription) to the site until it has healed over completely followed by a clean appropriately sized bandage to area  Non stick dressing and paper tape (or Hypafix) are recommended for sensitive skin but a bandaid is fine if it covers the area well  MANAGING YOUR PAIN AFTER SURGERY     You can expect to have some pain after surgery  This is normal  The pain is typically worse the first two days after surgery, and quickly begins to get better  The best strategy for controlling your pain after surgery is around the clock pain control  You can take over the counter Acetaminophen (Tylenol) for discomfort, if no contraindications  If you are taking this at the maximum dose, you can alternate this with Motrin (ibuprofen or Advil) as well  Alternating these medications with each other allows you to maximize your pain control  In addition to Tylenol and Motrin, you can use heating pads or ice packs on your incisions to help reduce your pain  How will I alternate your regular strength over-the-counter pain medication? You will take a dose of pain medication every three hours   Start by taking 650 mg of Tylenol (2 pills of 325 mg)   3 hours later take 600 mg of Motrin (3 pills of 200 mg)   3 hours after taking the Motrin take 650 mg of Tylenol   3 hours after that take 600 mg of Motrin  See example - if your first dose of Tylenol is at 12:00 PM     12:00 PM  Tylenol 650 mg (2 pills of 325 mg)    3:00 PM  Motrin 600 mg (3 pills of 200 mg)    6:00 PM  Tylenol 650 mg (2 pills of 325 mg)    9:00 PM  Motrin 600 mg (3 pills of 200 mg)    Continue alternating every 3 hours      Important:   Do not take more than 4000mg of Tylenol or 3200mg of Motrin in a 24-hour period       What if I still have pain?   If you have pain that is not controlled with the over-the-counter pain medications (Tylenol and Motrin or Advil), don't hesitate to call our staff using the number provided  We will help make sure you are managing your pain in the best way possible, and if necessary, we can provide a prescription for additional pain medication  CALL OUR OFFICE IMMEDIATELY FOR ANY SIGNS OF INFECTION:    This includes fever, chills, increased redness, warmth, tenderness, severe discomfort/pain, or pus or foul smell coming from the wound  [facility] directly at [phone] (SKIN)    IF BLEEDING IS NOTICED:    Place a clean cloth over the area and apply firm pressure for thirty minutes  Check the wound ONLY after 30 minutes of direct pressure; do not cheat and sneak a peak, as that does not count  If bleeding persists after 30 minutes of legitimate direct pressure, then try one more round of direct pressure to the area  Should the bleeding become heavier or not stop after the second attempt, call [facility] directly at [phone] (SKIN)  Your call will get routed to the dermatology surgeon on call even after hours

## 2022-02-15 NOTE — TELEPHONE ENCOUNTER
----- Message from Barbara Blackman PA-C sent at 2/15/2022  8:47 AM EST -----  Please let patient know his MRI is stable    Will plan routine f/u in 1 year

## 2022-02-17 ENCOUNTER — HOSPITAL ENCOUNTER (EMERGENCY)
Facility: HOSPITAL | Age: 67
Discharge: HOME | End: 2022-02-17
Attending: EMERGENCY MEDICINE | Admitting: EMERGENCY MEDICINE
Payer: COMMERCIAL

## 2022-02-17 VITALS
WEIGHT: 190 LBS | DIASTOLIC BLOOD PRESSURE: 63 MMHG | OXYGEN SATURATION: 96 % | RESPIRATION RATE: 18 BRPM | SYSTOLIC BLOOD PRESSURE: 130 MMHG | HEART RATE: 64 BPM | TEMPERATURE: 98.1 F | BODY MASS INDEX: 28.89 KG/M2

## 2022-02-17 DIAGNOSIS — K64.4 EXTERNAL HEMORRHOIDS: ICD-10-CM

## 2022-02-17 DIAGNOSIS — K62.5 RECTAL BLEEDING: Primary | ICD-10-CM

## 2022-02-17 LAB
ANION GAP SERPL CALCULATED.3IONS-SCNC: 8 MMOL/L (ref 4–13)
APTT PPP: 29 SECONDS (ref 23–37)
BASOPHILS # BLD AUTO: 0.06 THOUSANDS/ΜL (ref 0–0.1)
BASOPHILS NFR BLD AUTO: 1 % (ref 0–1)
BUN SERPL-MCNC: 17 MG/DL (ref 5–25)
CALCIUM SERPL-MCNC: 9.4 MG/DL (ref 8.3–10.1)
CHLORIDE SERPL-SCNC: 100 MMOL/L (ref 100–108)
CO2 SERPL-SCNC: 28 MMOL/L (ref 21–32)
CREAT SERPL-MCNC: 0.76 MG/DL (ref 0.6–1.3)
EOSINOPHIL # BLD AUTO: 0.14 THOUSAND/ΜL (ref 0–0.61)
EOSINOPHIL NFR BLD AUTO: 2 % (ref 0–6)
ERYTHROCYTE [DISTWIDTH] IN BLOOD BY AUTOMATED COUNT: 12.8 % (ref 11.6–15.1)
GFR SERPL CREATININE-BSD FRML MDRD: 96 ML/MIN/1.73SQ M
GLUCOSE SERPL-MCNC: 92 MG/DL (ref 65–140)
HCT VFR BLD AUTO: 44 % (ref 36.5–49.3)
HGB BLD-MCNC: 14.4 G/DL (ref 12–17)
IMM GRANULOCYTES # BLD AUTO: 0.04 THOUSAND/UL (ref 0–0.2)
IMM GRANULOCYTES NFR BLD AUTO: 1 % (ref 0–2)
INR PPP: 1.1 (ref 0.84–1.19)
LYMPHOCYTES # BLD AUTO: 1.67 THOUSANDS/ΜL (ref 0.6–4.47)
LYMPHOCYTES NFR BLD AUTO: 21 % (ref 14–44)
MCH RBC QN AUTO: 29.8 PG (ref 26.8–34.3)
MCHC RBC AUTO-ENTMCNC: 32.7 G/DL (ref 31.4–37.4)
MCV RBC AUTO: 91 FL (ref 82–98)
MONOCYTES # BLD AUTO: 0.9 THOUSAND/ΜL (ref 0.17–1.22)
MONOCYTES NFR BLD AUTO: 11 % (ref 4–12)
NEUTROPHILS # BLD AUTO: 5.14 THOUSANDS/ΜL (ref 1.85–7.62)
NEUTS SEG NFR BLD AUTO: 64 % (ref 43–75)
NRBC BLD AUTO-RTO: 0 /100 WBCS
PLATELET # BLD AUTO: 179 THOUSANDS/UL (ref 149–390)
PMV BLD AUTO: 10.2 FL (ref 8.9–12.7)
POTASSIUM SERPL-SCNC: 3.8 MMOL/L (ref 3.5–5.3)
PROTHROMBIN TIME: 13.8 SECONDS (ref 11.6–14.5)
RBC # BLD AUTO: 4.83 MILLION/UL (ref 3.88–5.62)
SODIUM SERPL-SCNC: 136 MMOL/L (ref 136–145)
WBC # BLD AUTO: 7.95 THOUSAND/UL (ref 4.31–10.16)

## 2022-02-17 PROCEDURE — 85610 PROTHROMBIN TIME: CPT | Performed by: PHYSICIAN ASSISTANT

## 2022-02-17 PROCEDURE — 99284 EMERGENCY DEPT VISIT MOD MDM: CPT | Performed by: PHYSICIAN ASSISTANT

## 2022-02-17 PROCEDURE — 36415 COLL VENOUS BLD VENIPUNCTURE: CPT | Performed by: PHYSICIAN ASSISTANT

## 2022-02-17 PROCEDURE — 85025 COMPLETE CBC W/AUTO DIFF WBC: CPT | Performed by: PHYSICIAN ASSISTANT

## 2022-02-17 PROCEDURE — 99285 EMERGENCY DEPT VISIT HI MDM: CPT

## 2022-02-17 PROCEDURE — 80048 BASIC METABOLIC PNL TOTAL CA: CPT | Performed by: PHYSICIAN ASSISTANT

## 2022-02-17 PROCEDURE — 85730 THROMBOPLASTIN TIME PARTIAL: CPT | Performed by: PHYSICIAN ASSISTANT

## 2022-02-17 NOTE — ED PROVIDER NOTES
History  Chief Complaint   Patient presents with    Rectal Bleeding     pt reports rectal bleeding that started 1 hr ago  pt reports hemmohroids  58yo male with a history of hyperlipidemia and asthma presenting for evaluation of rectal bleeding that began about an hour ago  He states he was standing up when he felt a wet spot in his pants and noticed that he was bleeding from his rectum  He reports seeing bright red blood with some clotting  He states he had about 2-3 ounces of bleeding and decided to come to the ED  Patient is otherwise asymptomatic and denies any rectal pain, abdominal pain, dizziness, syncope, chest pain, shortness of breath  He takes a baby aspirin daily for prevention  He does have a history of hemorrhoids  Patient had a colonoscopy about a year ago which showed some polyps  No diverticulosis was mentioned in report  History provided by:  Patient and medical records   used: No    Black or Bloody Stool  Quality:  Bright red  Amount: Moderate  Timing:  Constant  Chronicity:  New  Context: hemorrhoids    Context: not defecation    Similar prior episodes: no    Relieved by:  Nothing  Worsened by:  Nothing  Associated symptoms: no abdominal pain, no dizziness, no fever, no hematemesis, no light-headedness, no loss of consciousness and no vomiting        Prior to Admission Medications   Prescriptions Last Dose Informant Patient Reported? Taking?    Calcium Carb-Cholecalciferol (CALCIUM 1000 + D) 1000-800 MG-UNIT TABS  Self Yes No   Sig: Take by mouth   LORazepam (ATIVAN) 0 5 mg tablet  Self No No   Sig: Take 1 tablet (0 5 mg total) by mouth every 6 (six) hours as needed for anxiety   amoxicillin (AMOXIL) 500 mg capsule   Yes No   Sig: Before dental work   Patient not taking: Reported on 2/15/2022    aspirin 81 MG tablet  Self Yes No   Sig: Take by mouth   atorvastatin (LIPITOR) 20 mg tablet   No No   Sig: Take 1 tablet (20 mg total) by mouth daily   budesonide (PULMICORT) 180 MCG/ACT inhaler  Self Yes No   Sig: Inhale 2 puffs   citalopram (CeleXA) 20 mg tablet  Self No No   Sig: Take 1 tablet (20 mg total) by mouth daily   fluticasone (CUTIVATE) 0 05 % cream   No No   Sig: Apply topically 2 (two) times a day   fluticasone (FLONASE) 50 mcg/act nasal spray  Self Yes No   Sig: into each nostril   ipratropium (ATROVENT) 0 06 % nasal spray  Self Yes No   Sig: into each nostril   montelukast (SINGULAIR) 10 mg tablet  Self No No   Sig: TAKE 1 TABLET BY MOUTH EVERY DAY   omeprazole (PriLOSEC) 40 MG capsule  Self No No   Sig: TAKE 1 CAPSULE BY MOUTH EVERY DAY   sotalol (BETAPACE) 80 mg tablet  Self No No   Sig: Take 1 tablet (80 mg total) by mouth every 12 (twelve) hours   tamsulosin (FLOMAX) 0 4 mg  Self Yes No   Sig: Take 0 4 mg by mouth daily with dinner       Facility-Administered Medications: None       Past Medical History:   Diagnosis Date    Asthma     allergy induced    Basal cell carcinoma     Colon polyp     Fatty liver     H/O nonmelanoma skin cancer     last assessed-8/22/2017    Hyperlipidemia     Inflamed seborrheic keratosis     Malignant neoplasm of skin     last assessed-1/21/2014    Neoplasm of uncertain behavior of skin     Nocturia     Pneumothorax, left     Seasonal allergies     Sebaceous cyst     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/01/2022    SCCIS- mid scalp    Squamous cell carcinoma of skin of neck     Testicular hypofunction     Viral warts        Past Surgical History:   Procedure Laterality Date    CARDIAC CATHETERIZATION      CARDIAC PACEMAKER PLACEMENT  05/05/2021    CARDIAC PACEMAKER PLACEMENT      CARDIOVASCULAR STRESS TEST  08/27/2010    COLONOSCOPY  10/08/2008    IR OTHER  04/15/2021    LUNG SURGERY      for pneumothorax    MOHS SURGERY  02/15/2022    SCCIS mid scalp- Dr Michael Morales [address]          Family History   Problem Relation Age of Onset    Colon cancer Father      I have reviewed and agree with the history as documented  E-Cigarette/Vaping    E-Cigarette Use Never User      E-Cigarette/Vaping Substances    Nicotine No     THC No     CBD No     Flavoring No     Other No     Unknown No      Social History     Tobacco Use    Smoking status: Never Smoker    Smokeless tobacco: Never Used   Vaping Use    Vaping Use: Never used   Substance Use Topics    Alcohol use: No     Comment: quit march 2020    Drug use: No       Review of Systems   Constitutional: Negative for chills and fever  HENT: Negative for drooling and voice change  Eyes: Negative for discharge and redness  Respiratory: Negative for shortness of breath and stridor  Cardiovascular: Negative for chest pain and leg swelling  Gastrointestinal: Positive for blood in stool  Negative for abdominal pain, hematemesis and vomiting  Musculoskeletal: Negative for neck pain and neck stiffness  Skin: Negative for color change and rash  Neurological: Negative for dizziness, seizures, loss of consciousness, syncope and light-headedness  Psychiatric/Behavioral: Negative for confusion  The patient is not nervous/anxious  All other systems reviewed and are negative  Physical Exam  Physical Exam  Vitals and nursing note reviewed  Constitutional:       General: He is not in acute distress  Appearance: He is well-developed  He is not diaphoretic  HENT:      Head: Normocephalic and atraumatic  Right Ear: External ear normal       Left Ear: External ear normal    Eyes:      General: No scleral icterus  Right eye: No discharge  Left eye: No discharge  Conjunctiva/sclera: Conjunctivae normal    Cardiovascular:      Rate and Rhythm: Normal rate and regular rhythm  Heart sounds: Normal heart sounds  No murmur heard  Pulmonary:      Effort: Pulmonary effort is normal  No respiratory distress  Breath sounds: Normal breath sounds  No stridor  No wheezing or rales  Abdominal:      General: Bowel sounds are normal  There is no distension  Palpations: Abdomen is soft  Tenderness: There is no abdominal tenderness  There is no guarding  Genitourinary:     Comments: External hemorrhoids noted with dried blood and clots near rectum  There is a small area of oozing near one of the external hemorrhoids  Musculoskeletal:         General: No deformity  Normal range of motion  Cervical back: Normal range of motion and neck supple  Skin:     General: Skin is warm and dry  Neurological:      Mental Status: He is alert  He is not disoriented  GCS: GCS eye subscore is 4  GCS verbal subscore is 5  GCS motor subscore is 6     Psychiatric:         Behavior: Behavior normal          Vital Signs  ED Triage Vitals [02/17/22 1728]   Temperature Pulse Respirations Blood Pressure SpO2   98 1 °F (36 7 °C) 62 18 120/61 96 %      Temp Source Heart Rate Source Patient Position - Orthostatic VS BP Location FiO2 (%)   Oral Monitor Sitting Left arm --      Pain Score       --           Vitals:    02/17/22 1728 02/17/22 1916   BP: 120/61 130/63   Pulse: 62 64   Patient Position - Orthostatic VS: Sitting Lying         Visual Acuity      ED Medications  Medications - No data to display    Diagnostic Studies  Results Reviewed     Procedure Component Value Units Date/Time    Protime-INR [[id number]]  (Normal) Collected: 02/17/22 1834    Lab Status: Final result Specimen: Blood from Arm, Left Updated: 02/17/22 1850     Protime 13 8 seconds      INR 1 10    APTT [[id number]]  (Normal) Collected: 02/17/22 1834    Lab Status: Final result Specimen: Blood from Arm, Left Updated: 02/17/22 1850     PTT 29 seconds     Basic metabolic panel [[id number]] Collected: 02/17/22 1834    Lab Status: Final result Specimen: Blood from Arm, Left Updated: 02/17/22 1849     Sodium 136 mmol/L      Potassium 3 8 mmol/L      Chloride 100 mmol/L      CO2 28 mmol/L      ANION GAP 8 mmol/L      BUN 17 mg/dL Creatinine 0 76 mg/dL      Glucose 92 mg/dL      Calcium 9 4 mg/dL      eGFR 96 ml/min/1 73sq m     Narrative:      National Kidney Disease Foundation guidelines for Chronic Kidney Disease (CKD):     Stage 1 with normal or high GFR (GFR > 90 mL/min/1 73 square meters)    Stage 2 Mild CKD (GFR = 60-89 mL/min/1 73 square meters)    Stage 3A Moderate CKD (GFR = 45-59 mL/min/1 73 square meters)    Stage 3B Moderate CKD (GFR = 30-44 mL/min/1 73 square meters)    Stage 4 Severe CKD (GFR = 15-29 mL/min/1 73 square meters)    Stage 5 End Stage CKD (GFR <15 mL/min/1 73 square meters)  Note: GFR calculation is accurate only with a steady state creatinine    CBC and differential [[id number]] Collected: 02/17/22 1834    Lab Status: Final result Specimen: Blood from Arm, Left Updated: 02/17/22 1841     WBC 7 95 Thousand/uL      RBC 4 83 Million/uL      Hemoglobin 14 4 g/dL      Hematocrit 44 0 %      MCV 91 fL      MCH 29 8 pg      MCHC 32 7 g/dL      RDW 12 8 %      MPV 10 2 fL      Platelets 010 Thousands/uL      nRBC 0 /100 WBCs      Neutrophils Relative 64 %      Immat GRANS % 1 %      Lymphocytes Relative 21 %      Monocytes Relative 11 %      Eosinophils Relative 2 %      Basophils Relative 1 %      Neutrophils Absolute 5 14 Thousands/µL      Immature Grans Absolute 0 04 Thousand/uL      Lymphocytes Absolute 1 67 Thousands/µL      Monocytes Absolute 0 90 Thousand/µL      Eosinophils Absolute 0 14 Thousand/µL      Basophils Absolute 0 06 Thousands/µL                  No orders to display              Procedures  Procedures         ED Course  ED Course as of 02/18/22 1040   Thu Feb 17, 2022   1847 Hemoglobin: 14 4                               SBIRT 22yo+      Most Recent Value   SBIRT (25 yo +)    In order to provide better care to our patients, we are screening all of our patients for alcohol and drug use  Would it be okay to ask you these screening questions?  Yes Filed at: 02/17/2022 1835   Initial Alcohol Screen: US AUDIT-C     1  How often do you have a drink containing alcohol? 0 Filed at: 02/17/2022 1835   2  How many drinks containing alcohol do you have on a typical day you are drinking? 0 Filed at: 02/17/2022 1835   3a  Male UNDER 65: How often do you have five or more drinks on one occasion? 0 Filed at: 02/17/2022 1835   3b  FEMALE Any Age, or MALE 65+: How often do you have 4 or more drinks on one occassion? 0 Filed at: 02/17/2022 1835   Audit-C Score 0 Filed at: 02/17/2022 1835   OSBALDO: How many times in the past year have you    Used an illegal drug or used a prescription medication for non-medical reasons? Never Filed at: 02/17/2022 1835                    MDM  Number of Diagnoses or Management Options  External hemorrhoids: new and requires workup  Rectal bleeding: new and requires workup  Diagnosis management comments: 60yo male presenting for bright red rectal bleeding that began 1 hour ago  Not related to defecation  No CP/SOB/dizziness/syncope  He is afebrile and hemodynamically stable  On exam, he has external hemorrhoids present and there is a small amount of oozing near one of the hemorrhoids  Initial ED plan: Check CBC, BMP, coags  Final assessment: Labs unremarkable including normal hemoglobin  BUN and coags also normal  No indication for further workup at this time  Discussed with patient that hemorrhoidal bleeding is usually self-limited  Will prescribe Proctofoam  Advised close GI follow-up  ED return precautions discussed  Patient expressed understanding and is agreeable to plan  Patient discharged in stable condition           Amount and/or Complexity of Data Reviewed  Clinical lab tests: ordered and reviewed  Review and summarize past medical records: yes    Risk of Complications, Morbidity, and/or Mortality  Presenting problems: moderate  Diagnostic procedures: moderate  Management options: moderate    Patient Progress  Patient progress: stable      Disposition  Final diagnoses:   Rectal bleeding   External hemorrhoids     Time reflects when diagnosis was documented in both MDM as applicable and the Disposition within this note     Time User Action Codes Description Comment    2/17/2022  7:04 PM [address] [place] [address] [place], [place] [K62 [id number]] Rectal bleeding     2/17/2022  7:04 PM Autumn Ankureva Joe Add [K64 4] External hemorrhoids       ED Disposition     ED Disposition Condition Date/Time Comment    Discharge Stable Thu Feb 17, 2022  7:05 PM Link [facility]arded discharge to home/self care              Follow-up Information     Follow up With Specialties Details Why Contact Info Additional Information    Marbella Morton MD Family Medicine Schedule an appointment as soon as possible for a visit   25 [facility] [address] [facility] [place] [facility] Schedule an appointment as soon as possible for a visit   [address]  1121 [facility] [address]  1205 [facility] [address]  [address] [place] [address], [facility] Emergency Department Emergency Medicine  If symptoms worsen 34 [facility] 109 [facility] Emergency Department, [address] [place], [place], [address]          Discharge Medication List as of 2/17/2022  7:11 PM      START taking these medications    Details   hydrocortisone-pramoxine (PROCTOFOAM-HC) 1-1 % FOAM rectal foam Insert 1 applicator into the rectum 2 (two) times a day, Starting Thu 2/17/2022, Normal         CONTINUE these medications which have NOT CHANGED    Details   amoxicillin (AMOXIL) 500 mg capsule Before dental work, Starting Mon 1/24/2022, Historical Med      aspirin 81 MG tablet Take by mouth, Historical Med      atorvastatin (LIPITOR) 20 mg tablet Take 1 tablet (20 mg total) by mouth daily, Starting Thu 2/10/2022, Normal budesonide (PULMICORT) 180 MCG/ACT inhaler Inhale 2 puffs, Historical Med      Calcium Carb-Cholecalciferol (CALCIUM 1000 + D) 1000-800 MG-UNIT TABS Take by mouth, Historical Med      citalopram (CeleXA) 20 mg tablet Take 1 tablet (20 mg total) by mouth daily, Starting Thu 6/24/2021, Normal      fluticasone (CUTIVATE) 0 05 % cream Apply topically 2 (two) times a day, Starting Tue 2/1/2022, Normal      fluticasone (FLONASE) 50 mcg/act nasal spray into each nostril, Historical Med      ipratropium (ATROVENT) 0 06 % nasal spray into each nostril, Historical Med      LORazepam (ATIVAN) 0 5 mg tablet Take 1 tablet (0 5 mg total) by mouth every 6 (six) hours as needed for anxiety, Starting Wed 2/10/2021, Normal      montelukast (SINGULAIR) 10 mg tablet TAKE 1 TABLET BY MOUTH EVERY DAY, Normal      omeprazole (PriLOSEC) 40 MG capsule TAKE 1 CAPSULE BY MOUTH EVERY DAY, Normal      sotalol (BETAPACE) 80 mg tablet Take 1 tablet (80 mg total) by mouth every 12 (twelve) hours, Starting Fri 1/21/2022, Normal      tamsulosin (FLOMAX) 0 4 mg Take 0 4 mg by mouth daily with dinner , Historical Med             No discharge procedures on file      PDMP Review     None          ED Provider  Electronically Signed by           Luan Kwong PA-C  02/18/22 5676

## 2022-02-18 ENCOUNTER — VBI (OUTPATIENT)
Dept: FAMILY MEDICINE CLINIC | Facility: CLINIC | Age: 67
End: 2022-02-18

## 2022-02-18 NOTE — TELEPHONE ENCOUNTER
Galdino Swartz    ED Visit Information     Ed visit date: 2-   Diagnosis Description: Rectal bleeding     External hemorrhoids      In Network? Yes [facility]  Discharge status: Home  Discharged with meds ? Yes  Number of ED visits to date: 1  ED Severity:3     Outreach Information    Outreach successful: Yes 1  Date letter mailed:n/a  Date Finalized: 2-    Care Coordination    Follow up appointment with specialilty: no   Patient stated he will call to schedule   Transportation issues ? NA    Value Base Outreach    Outreach type: 3 Day Outreach  Patient refsued the answer questions: Yes  Emergent necessity warranted by diagnosis: No  [facility] PCP: Yes        02/18/2022 10:16 AM Phone (VBI) Lona Rossi (Self) [phone] (H) Remove  Call Complete    By Tk Zamora MA    Stated this was second call he received, did not want to get into details

## 2022-02-18 NOTE — DISCHARGE INSTRUCTIONS
Use Proctofoam as prescribed  Please call tomorrow morning to schedule a follow-up with gastroenterology  Return to the ER with any worsening symptoms, dizziness, passing out, shortness of breath

## 2022-02-19 ENCOUNTER — APPOINTMENT (OUTPATIENT)
Dept: LAB | Facility: CLINIC | Age: 67
End: 2022-02-19
Payer: COMMERCIAL

## 2022-02-19 DIAGNOSIS — R97.20 ELEVATED PSA: ICD-10-CM

## 2022-02-19 DIAGNOSIS — E78.2 MIXED HYPERLIPIDEMIA: ICD-10-CM

## 2022-02-19 DIAGNOSIS — E55.9 VITAMIN D DEFICIENCY: ICD-10-CM

## 2022-02-19 LAB
25(OH)D3 SERPL-MCNC: 25.6 NG/ML (ref 30–100)
ALBUMIN SERPL BCP-MCNC: 3.9 G/DL (ref 3.5–5)
ALP SERPL-CCNC: 64 U/L (ref 46–116)
ALT SERPL W P-5'-P-CCNC: 29 U/L (ref 12–78)
ANION GAP SERPL CALCULATED.3IONS-SCNC: 4 MMOL/L (ref 4–13)
AST SERPL W P-5'-P-CCNC: 15 U/L (ref 5–45)
BASOPHILS # BLD AUTO: 0.09 THOUSANDS/ΜL (ref 0–0.1)
BASOPHILS NFR BLD AUTO: 1 % (ref 0–1)
BILIRUB SERPL-MCNC: 0.41 MG/DL (ref 0.2–1)
BUN SERPL-MCNC: 21 MG/DL (ref 5–25)
CALCIUM SERPL-MCNC: 9.6 MG/DL (ref 8.3–10.1)
CHLORIDE SERPL-SCNC: 107 MMOL/L (ref 100–108)
CHOLEST SERPL-MCNC: 185 MG/DL
CO2 SERPL-SCNC: 28 MMOL/L (ref 21–32)
CREAT SERPL-MCNC: 0.86 MG/DL (ref 0.6–1.3)
EOSINOPHIL # BLD AUTO: 0.14 THOUSAND/ΜL (ref 0–0.61)
EOSINOPHIL NFR BLD AUTO: 2 % (ref 0–6)
ERYTHROCYTE [DISTWIDTH] IN BLOOD BY AUTOMATED COUNT: 12.6 % (ref 11.6–15.1)
GFR SERPL CREATININE-BSD FRML MDRD: 91 ML/MIN/1.73SQ M
GLUCOSE P FAST SERPL-MCNC: 107 MG/DL (ref 65–99)
HCT VFR BLD AUTO: 43.6 % (ref 36.5–49.3)
HDLC SERPL-MCNC: 49 MG/DL
HGB BLD-MCNC: 14.5 G/DL (ref 12–17)
IMM GRANULOCYTES # BLD AUTO: 0.04 THOUSAND/UL (ref 0–0.2)
IMM GRANULOCYTES NFR BLD AUTO: 1 % (ref 0–2)
LDLC SERPL CALC-MCNC: 117 MG/DL (ref 0–100)
LYMPHOCYTES # BLD AUTO: 1.42 THOUSANDS/ΜL (ref 0.6–4.47)
LYMPHOCYTES NFR BLD AUTO: 18 % (ref 14–44)
MCH RBC QN AUTO: 30.6 PG (ref 26.8–34.3)
MCHC RBC AUTO-ENTMCNC: 33.3 G/DL (ref 31.4–37.4)
MCV RBC AUTO: 92 FL (ref 82–98)
MONOCYTES # BLD AUTO: 0.85 THOUSAND/ΜL (ref 0.17–1.22)
MONOCYTES NFR BLD AUTO: 11 % (ref 4–12)
NEUTROPHILS # BLD AUTO: 5.53 THOUSANDS/ΜL (ref 1.85–7.62)
NEUTS SEG NFR BLD AUTO: 67 % (ref 43–75)
NONHDLC SERPL-MCNC: 136 MG/DL
NRBC BLD AUTO-RTO: 0 /100 WBCS
PLATELET # BLD AUTO: 208 THOUSANDS/UL (ref 149–390)
PMV BLD AUTO: 10.8 FL (ref 8.9–12.7)
POTASSIUM SERPL-SCNC: 4.1 MMOL/L (ref 3.5–5.3)
PROT SERPL-MCNC: 6.8 G/DL (ref 6.4–8.2)
PSA SERPL-MCNC: 4.9 NG/ML (ref 0–4)
RBC # BLD AUTO: 4.74 MILLION/UL (ref 3.88–5.62)
SODIUM SERPL-SCNC: 139 MMOL/L (ref 136–145)
TRIGL SERPL-MCNC: 96 MG/DL
WBC # BLD AUTO: 8.07 THOUSAND/UL (ref 4.31–10.16)

## 2022-02-19 PROCEDURE — 85025 COMPLETE CBC W/AUTO DIFF WBC: CPT

## 2022-02-19 PROCEDURE — 36415 COLL VENOUS BLD VENIPUNCTURE: CPT

## 2022-02-19 PROCEDURE — 84153 ASSAY OF PSA TOTAL: CPT

## 2022-02-19 PROCEDURE — 80061 LIPID PANEL: CPT | Performed by: PHYSICIAN ASSISTANT

## 2022-02-19 PROCEDURE — 80053 COMPREHEN METABOLIC PANEL: CPT

## 2022-02-19 PROCEDURE — 82306 VITAMIN D 25 HYDROXY: CPT

## 2022-02-21 DIAGNOSIS — R97.20 ELEVATED PSA: Primary | ICD-10-CM

## 2022-03-03 ENCOUNTER — REMOTE DEVICE CLINIC VISIT (OUTPATIENT)
Dept: CARDIOLOGY CLINIC | Facility: CLINIC | Age: 67
End: 2022-03-03
Payer: COMMERCIAL

## 2022-03-03 DIAGNOSIS — Z95.0 CARDIAC PACEMAKER IN SITU: Primary | ICD-10-CM

## 2022-03-03 PROCEDURE — 93296 REM INTERROG EVL PM/IDS: CPT | Performed by: INTERNAL MEDICINE

## 2022-03-03 PROCEDURE — 93294 REM INTERROG EVL PM/LDLS PM: CPT | Performed by: INTERNAL MEDICINE

## 2022-03-03 NOTE — PROGRESS NOTES
Results for orders placed or performed in visit on 03/03/22   Cardiac EP device report    Narrative    MDT DUAL CHAMBER PM (HIS)/ ACTIVE SYSTEM IS MRI CONDITIONAL  CARELINK TRANSMISSION: BATTERY STATUS "13 YRS " AP 49%  11%  ALL AVAILABLE LEAD PARAMETERS WITHIN NORMAL LIMITS  NO SIGNIFICANT HIGH RATE EPISODES  NORMAL DEVICE FUNCTION   NC         Current Outpatient Medications:     amoxicillin (AMOXIL) 500 mg capsule, Before dental work (Patient not taking: Reported on 2/15/2022 ), Disp: , Rfl:     aspirin 81 MG tablet, Take by mouth, Disp: , Rfl:     atorvastatin (LIPITOR) 20 mg tablet, Take 1 tablet (20 mg total) by mouth daily, Disp: 90 tablet, Rfl: 1    budesonide (PULMICORT) 180 MCG/ACT inhaler, Inhale 2 puffs, Disp: , Rfl:     Calcium Carb-Cholecalciferol (CALCIUM 1000 + D) 1000-800 MG-UNIT TABS, Take by mouth, Disp: , Rfl:     citalopram (CeleXA) 20 mg tablet, Take 1 tablet (20 mg total) by mouth daily, Disp: 90 tablet, Rfl: 5    fluticasone (CUTIVATE) 0 05 % cream, Apply topically 2 (two) times a day, Disp: 30 g, Rfl: 3    fluticasone (FLONASE) 50 mcg/act nasal spray, into each nostril, Disp: , Rfl:     hydrocortisone-pramoxine (PROCTOFOAM-HC) 1-1 % FOAM rectal foam, Insert 1 applicator into the rectum 2 (two) times a day, Disp: 10 g, Rfl: 0    ipratropium (ATROVENT) 0 06 % nasal spray, into each nostril, Disp: , Rfl:     LORazepam (ATIVAN) 0 5 mg tablet, Take 1 tablet (0 5 mg total) by mouth every 6 (six) hours as needed for anxiety, Disp: 90 tablet, Rfl: 3    montelukast (SINGULAIR) 10 mg tablet, TAKE 1 TABLET BY MOUTH EVERY DAY, Disp: 90 tablet, Rfl: 1    omeprazole (PriLOSEC) 40 MG capsule, TAKE 1 CAPSULE BY MOUTH EVERY DAY, Disp: 90 capsule, Rfl: 0    sotalol (BETAPACE) 80 mg tablet, Take 1 tablet (80 mg total) by mouth every 12 (twelve) hours, Disp: 180 tablet, Rfl: 3    tamsulosin (FLOMAX) 0 4 mg, Take 0 4 mg by mouth daily with dinner , Disp: , Rfl:

## 2022-04-27 ENCOUNTER — TELEPHONE (OUTPATIENT)
Dept: CARDIOLOGY CLINIC | Facility: CLINIC | Age: 67
End: 2022-04-27

## 2022-04-27 NOTE — TELEPHONE ENCOUNTER
Form received from Dr Angelita Johnson office for dental clearance on patient  Scanned into chart and given to Yolanda Barajas for Dr Caroline Gardner to complete

## 2022-04-29 NOTE — TELEPHONE ENCOUNTER
Patient scheduled for pre-op cardiology clearance with Dr Patrizia Green on 5/9/22 @ 2:20    Per Dr Patrizia Green, ok to schedule in same day time slot

## 2022-05-04 DIAGNOSIS — K21.9 GASTROESOPHAGEAL REFLUX DISEASE: ICD-10-CM

## 2022-05-04 RX ORDER — OMEPRAZOLE 40 MG/1
CAPSULE, DELAYED RELEASE ORAL
Qty: 90 CAPSULE | Refills: 0 | Status: SHIPPED | OUTPATIENT
Start: 2022-05-04

## 2022-05-09 ENCOUNTER — OFFICE VISIT (OUTPATIENT)
Dept: CARDIOLOGY CLINIC | Facility: CLINIC | Age: 67
End: 2022-05-09
Payer: COMMERCIAL

## 2022-05-09 VITALS
HEART RATE: 74 BPM | BODY MASS INDEX: 29.25 KG/M2 | DIASTOLIC BLOOD PRESSURE: 72 MMHG | SYSTOLIC BLOOD PRESSURE: 124 MMHG | RESPIRATION RATE: 16 BRPM | HEIGHT: 68 IN | OXYGEN SATURATION: 97 % | WEIGHT: 193 LBS

## 2022-05-09 DIAGNOSIS — Z01.810 ENCOUNTER FOR PREPROCEDURAL CARDIOVASCULAR EXAMINATION: ICD-10-CM

## 2022-05-09 DIAGNOSIS — I48.92 ATRIAL FLUTTER, UNSPECIFIED TYPE (HCC): Primary | ICD-10-CM

## 2022-05-09 PROCEDURE — 99215 OFFICE O/P EST HI 40 MIN: CPT | Performed by: INTERNAL MEDICINE

## 2022-05-09 PROCEDURE — 93000 ELECTROCARDIOGRAM COMPLETE: CPT | Performed by: INTERNAL MEDICINE

## 2022-05-09 PROCEDURE — 1036F TOBACCO NON-USER: CPT | Performed by: INTERNAL MEDICINE

## 2022-05-09 PROCEDURE — 3008F BODY MASS INDEX DOCD: CPT | Performed by: INTERNAL MEDICINE
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
## 2022-05-10 NOTE — TELEPHONE ENCOUNTER
Dental clearance filled out and faxed to Dr Phoebe Archuleta at [phone]  Fax confirmation received, scanned in pt chart

## 2022-05-31 DIAGNOSIS — F41.9 ANXIETY: ICD-10-CM

## 2022-06-01 RX ORDER — LORAZEPAM 0.5 MG/1
0.5 TABLET ORAL EVERY 6 HOURS PRN
Qty: 90 TABLET | Refills: 3 | Status: SHIPPED | OUTPATIENT
Start: 2022-06-01

## 2022-06-01 NOTE — TELEPHONE ENCOUNTER
Pt called to check on status of refill --- was sent to [facility] almost 24 hours prior with no response -- rerouted directly to pcp -- also tried to find pt on pdmp, however, pt name and  does not pull any records

## 2022-06-02 ENCOUNTER — REMOTE DEVICE CLINIC VISIT (OUTPATIENT)
Dept: CARDIOLOGY CLINIC | Facility: CLINIC | Age: 67
End: 2022-06-02
Payer: COMMERCIAL

## 2022-06-02 DIAGNOSIS — Z95.0 PRESENCE OF PERMANENT CARDIAC PACEMAKER: Primary | ICD-10-CM

## 2022-06-02 PROCEDURE — 93296 REM INTERROG EVL PM/IDS: CPT | Performed by: INTERNAL MEDICINE

## 2022-06-02 PROCEDURE — 93294 REM INTERROG EVL PM/LDLS PM: CPT | Performed by: INTERNAL MEDICINE

## 2022-06-02 NOTE — PROGRESS NOTES
Results for orders placed or performed in visit on 06/02/22   Cardiac EP device report    Narrative    MDT DUAL CHAMBER PM (HIS)/ ACTIVE SYSTEM IS MRI CONDITIONAL  CARELINK TRANSMISSION: BATTERY VOLTAGE ADEQUATE  (13 3 YRS) AP 50%  8%  ALL AVAILABLE LEAD PARAMETERS WITHIN NORMAL LIMITS1 VHR EPISODE DETECTED , SVT NOTED  NORMAL DEVICE FUNCTION  ----MATTHEWS

## 2022-07-11 ENCOUNTER — APPOINTMENT (OUTPATIENT)
Dept: LAB | Facility: CLINIC | Age: 67
End: 2022-07-11
Payer: MEDICARE

## 2022-07-11 DIAGNOSIS — R97.20 ELEVATED PROSTATE SPECIFIC ANTIGEN (PSA): ICD-10-CM

## 2022-07-11 LAB — PSA SERPL-MCNC: 5.5 NG/ML (ref 0–4)

## 2022-07-11 PROCEDURE — 84153 ASSAY OF PSA TOTAL: CPT

## 2022-08-11 DIAGNOSIS — E78.5 HYPERLIPIDEMIA, UNSPECIFIED HYPERLIPIDEMIA TYPE: ICD-10-CM

## 2022-08-11 RX ORDER — ATORVASTATIN CALCIUM 20 MG/1
TABLET, FILM COATED ORAL
Qty: 90 TABLET | Refills: 1 | Status: SHIPPED | OUTPATIENT
Start: 2022-08-11

## 2022-08-22 DIAGNOSIS — F41.1 ANXIETY, GENERALIZED: ICD-10-CM

## 2022-08-22 RX ORDER — CITALOPRAM 20 MG/1
TABLET ORAL
Qty: 90 TABLET | Refills: 5 | Status: SHIPPED | OUTPATIENT
Start: 2022-08-22

## 2022-08-31 DIAGNOSIS — K21.9 GASTROESOPHAGEAL REFLUX DISEASE: ICD-10-CM

## 2022-08-31 RX ORDER — OMEPRAZOLE 40 MG/1
40 CAPSULE, DELAYED RELEASE ORAL DAILY
Qty: 90 CAPSULE | Refills: 0 | Status: SHIPPED | OUTPATIENT
Start: 2022-08-31

## 2022-09-02 ENCOUNTER — IN-CLINIC DEVICE VISIT (OUTPATIENT)
Dept: CARDIOLOGY CLINIC | Facility: CLINIC | Age: 67
End: 2022-09-02
Payer: MEDICARE

## 2022-09-02 DIAGNOSIS — Z95.0 PRESENCE OF PERMANENT CARDIAC PACEMAKER: Primary | ICD-10-CM

## 2022-09-02 PROCEDURE — 93280 PM DEVICE PROGR EVAL DUAL: CPT | Performed by: INTERNAL MEDICINE

## 2022-09-02 NOTE — PROGRESS NOTES
MDT DUAL CHAMBER PM (HIS)/ ACTIVE SYSTEM IS MRI CONDITIONAL   DEVICE INTERROGATED IN THE [place] OFFICE:  BATTERY VOLTAGE ADEQUATE (13 0 YR )   AP 49 9%  9 2%    ALL LEAD PARAMETERS WITHIN NORMAL LIMITS   ATRIAL CAPTURE TEST NOT STORED/PRINTED   2 SVT-ST EPISODES WITH HR  BPM   NO PROGRAMMING CHANGES MADE TO DEVICE PARAMETERS   NORMAL DEVICE FUNCTION   RG (1) obesity (BMI greater than 25)

## 2022-09-12 ENCOUNTER — TELEMEDICINE (OUTPATIENT)
Dept: FAMILY MEDICINE CLINIC | Facility: CLINIC | Age: 67
End: 2022-09-12
Payer: MEDICARE

## 2022-09-12 ENCOUNTER — TELEPHONE (OUTPATIENT)
Dept: FAMILY MEDICINE CLINIC | Facility: CLINIC | Age: 67
End: 2022-09-12

## 2022-09-12 VITALS — WEIGHT: 190 LBS | HEIGHT: 68 IN | BODY MASS INDEX: 28.79 KG/M2

## 2022-09-12 DIAGNOSIS — U07.1 COVID-19: Primary | ICD-10-CM

## 2022-09-12 PROCEDURE — 99214 OFFICE O/P EST MOD 30 MIN: CPT | Performed by: PHYSICIAN ASSISTANT

## 2022-09-12 RX ORDER — NIRMATRELVIR AND RITONAVIR 300-100 MG
3 KIT ORAL 2 TIMES DAILY
Qty: 30 TABLET | Refills: 0 | Status: SHIPPED | OUTPATIENT
Start: 2022-09-12 | End: 2022-09-17

## 2022-09-12 NOTE — PROGRESS NOTES
COVID-19 Outpatient Progress Note    Assessment/Plan:    Problem List Items Addressed This Visit    None     Visit Diagnoses     COVID-19    -  Primary    Relevant Medications    nirmatrelvir & ritonavir (Paxlovid, 300/100,) tablet therapy pack         Disposition:     Patient has asymptomatic or mild COVID-19 infection  Based off CDC guidelines, they were recommended to isolate for 5 days  If they are asymptomatic or symptoms are improving with no fevers in the past 24 hours, isolation may be ended followed by 5 days of wearing a mask when around othes to minimize risk of infecting others  If still have a fever or other symptoms have not improved, continue to isolate until they improve  Regardless of when they end isolation, avoid being around people who are more likely to get very sick from COVID-19 until at least day 11  Discussed symptom directed medication options with patient  Patient meets criteria for PAXLOVID and they have been counseled appropriately according to EUA documentation released by the FDA  After discussion, patient agrees to treatment  Jose Adenegeli is an investigational medicine used to treat mild-to-moderate COVID-19 in adults and children (15years of age and older weighing at least 80 pounds (40 kg)) with positive results of direct SARS-CoV-2 viral testing, and who are at high risk for progression to severe COVID-19, including hospitalization or death  PAXLOVID is investigational because it is still being studied  There is limited information about the safety and effectiveness of using PAXLOVID to treat people with mild-to-moderate COVID-19      The FDA has authorized the emergency use of PAXLOVID for the treatment of mild-tomoderate COVID-19 in adults and children (15years of age and older weighing at least 80 pounds (40 kg)) with a positive test for the virus that causes COVID-19, and who are at high risk for progression to severe COVID-19, including hospitalization or death, under an EUA  What should I tell my healthcare provider before I take PAXLOVID? Tell your healthcare provider if you:  - Have any allergies  - Have liver or kidney disease  - Are pregnant or plan to become pregnant  - Are breastfeeding a child  - Have any serious illnesses    Tell your healthcare provider about all the medicines you take, including prescription and over-the-counter medicines, vitamins, and herbal supplements  Some medicines may interact with PAXLOVID and may cause serious side effects  Keep a list of your medicines to show your healthcare provider and pharmacist when you get a new medicine  You can ask your healthcare provider or pharmacist for a list of medicines that interact with PAXLOVID  Do not start taking a new medicine without telling your healthcare provider  Your healthcare provider can tell you if it is safe to take PAXLOVID with other medicines  Tell your healthcare provider if you are taking combined hormonal contraceptive  PAXLOVID may affect how your birth control pills work  Females who are able to become pregnant should use another effective alternative form of contraception or an additional barrier method of contraception  Talk to your healthcare provider if you have any questions about contraceptive methods that might be right for you  How do I take PAXLOVID? PAXLOVID consists of 2 medicines: nirmatrelvir and ritonavir  - Take 2 pink tablets of nirmatrelvir with 1 white tablet of ritonavir by mouth 2 times each day (in the morning and in the evening) for 5 days  For each dose, take all 3 tablets at the same time  - If you have kidney disease, talk to your healthcare provider  You may need a different dose  - Swallow the tablets whole  Do not chew, break, or crush the tablets  - Take PAXLOVID with or without food  - Do not stop taking PAXLOVID without talking to your healthcare provider, even if you feel better    - If you miss a dose of PAXLOVID within 8 hours of the time it is usually taken, take it as soon as you remember  If you miss a dose by more than 8 hours, skip the missed dose and take the next dose at your regular time  Do not take 2 doses of PAXLOVID at the same time  - If you take too much PAXLOVID, call your healthcare provider or go to the nearest hospital emergency room right away  - If you are taking a ritonavir- or cobicistat-containing medicine to treat hepatitis C or Human Immunodeficiency Virus (HIV), you should continue to take your medicine as prescribed by your healthcare provider   - Talk to your healthcare provider if you do not feel better or if you feel worse after 5 days  Who should generally not take PAXLOVID? Do not take PAXLOVID if:  You are allergic to nirmatrelvir, ritonavir, or any of the ingredients in PAXLOVID  You are taking any of the following medicines:  - Alfuzosin  - Pethidine, piroxicam, propoxyphene  - Ranolazine  - Amiodarone, dronedarone, flecainide, propafenone, quinidine  - Colchicine  - Lurasidone, pimozide, clozapine  - Dihydroergotamine, ergotamine, methylergonovine  - Lovastatin, simvastatin  - Sildenafil (Revatio®) for pulmonary arterial hypertension (PAH)  - Triazolam, oral midazolam  - Apalutamide  - Carbamazepine, phenobarbital, phenytoin  - Rifampin  - St  Frederics Wort (hypericum perforatum)    What are the important possible side effects of PAXLOVID? Possible side effects of PAXLOVID are:  - Liver Problems  Tell your healthcare provider right away if you have any of these signs and symptoms of liver problems: loss of appetite, yellowing of your skin and the whites of eyes (jaundice), dark-colored urine, pale colored stools and itchy skin, stomach area (abdominal) pain  - Resistance to HIV Medicines  If you have untreated HIV infection, PAXLOVID may lead to some HIV medicines not working as well in the future    - Other possible side effects include: altered sense of taste, diarrhea, high blood pressure, or muscle aches    These are not all the possible side effects of PAXLOVID  Not many people have taken PAXLOVID  Serious and unexpected side effects may happen  Ren Stacy is still being studied, so it is possible that all of the risks are not known at this time  What other treatment choices are there? Like Mary Johnson may allow for the emergency use of other medicines to treat people with COVID-19  Go to [facility] for information on the emergency use of other medicines that are authorized by FDA to treat people with COVID-19  Your healthcare provider may talk with you about clinical trials for which you may be eligible  It is your choice to be treated or not to be treated with PAXLOVID  Should you decide not to receive it or for your child not to receive it, it will not change your standard medical care  What if I am pregnant or breastfeeding? There is no experience treating pregnant women or breastfeeding mothers with PAXLOVID  For a mother and unborn baby, the benefit of taking PAXLOVID may be greater than the risk from the treatment  If you are pregnant, discuss your options and specific situation with your healthcare provider  It is recommended that you use effective barrier contraception or do not have sexual activity while taking PAXLOVID  If you are breastfeeding, discuss your options and specific situation with your healthcare provider  How do I report side effects with PAXLOVID? Contact your healthcare provider if you have any side effects that bother you or do not go away  Report side effects to FDA MedWatch at www fda gov/medwatch or call 3-702-QJD5797 or you can report side effects to [facility]  at the contact information provided below  Website Fax number Telephone number   mSpot [phone]     How should I store PAXLOVID? Store PAXLOVID tablets at room temperature between 68°F to 77°F (20°C to 25°C)  Full fact sheet for patients, parents, and caregivers can be found at: [employer]    I have spent 7 minutes directly with the patient  Greater than 50% of this time was spent in counseling/coordination of care regarding: diagnostic results, prognosis, risks and benefits of treatment options, instructions for management, patient and family education, importance of treatment compliance, risk factor reductions and impressions  Encounter provider: Paxton Perez PA-C     Provider located at: [address]  [address] [facility],[address]  [facility] [address] [phone]     Recent Visits  No visits were found meeting these conditions  Showing recent visits within past 7 days and meeting all other requirements  Today's Visits  Date Type Provider Dept   09/12/22 Telemedicine Paxton Perez PA-C [facility] [address] [place]   Showing today's visits and meeting all other requirements  Future Appointments  No visits were found meeting these conditions  Showing future appointments within next 150 days and meeting all other requirements     This virtual check-in was done via [facility] and patient was informed that this is a secure, HIPAA-compliant platform  He agrees to proceed  Patient agrees to participate in a virtual check in via telephone or video visit instead of presenting to the office to address urgent/immediate medical needs  Patient is aware this is a billable service  He acknowledged consent and understanding of privacy and security of the video platform  The patient has agreed to participate and understands they can discontinue the visit at any time  After connecting through [facility], the patient was identified by name and date of birth   Debbievickie Treviño was informed that this was a telemedicine visit and that the exam was being conducted confidentially over secure lines  My office door was closed  No one else was in the room  Dodie Dinero acknowledged consent and understanding of privacy and security of the telemedicine visit  I informed the patient that I have reviewed his record in Epic and presented the opportunity for him to ask any questions regarding the visit today  The patient agreed to participate  Verification of patient location:  Patient is located in the following state in which I hold an active license: [place]    Subjective: Dodie Dinero is a 74 y o  male who has been screened for COVID-19  Symptom change since last report: unchanged  Patient's symptoms include fatigue, nasal congestion, rhinorrhea, sore throat, cough, diarrhea, myalgias and headache  Patient denies fever, chills, anosmia, loss of taste, shortness of breath, chest tightness, abdominal pain, nausea and vomiting      - Date of symptom onset: 9/8/2022  - Date of positive COVID-19 test: 9/11/2022  Type of test: Home antigen  Patient with typical symptoms of COVID-19 and they attest that they were positive on home rapid antigen testing  Image of positive result is not able to be uploaded into their chart  COVID-19 vaccination status: Fully vaccinated with booster    Lorri Landau has been staying home and has isolated themselves in his home  He is taking care to not share personal items and is cleaning all surfaces that are touched often, like counters, tabletops, and doorknobs using household cleaning sprays or wipes  He is wearing a mask when he leaves his room       Lab Results   Component Value Date    SARSCOV2 Negative 12/27/2021     Past Medical History:   Diagnosis Date    Asthma     allergy induced    Basal cell carcinoma     Colon polyp     Fatty liver     H/O nonmelanoma skin cancer     last assessed-8/22/2017    Hyperlipidemia     Inflamed seborrheic keratosis     Malignant neoplasm of skin last assessed-1/21/2014    Neoplasm of uncertain behavior of skin     Nocturia     Pneumothorax, left     Seasonal allergies     Sebaceous cyst     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/01/2022    SCCIS- mid scalp    Squamous cell carcinoma of skin of neck     Testicular hypofunction     Viral warts      Past Surgical History:   Procedure Laterality Date    CARDIAC CATHETERIZATION      CARDIAC PACEMAKER PLACEMENT  05/05/2021    CARDIAC PACEMAKER PLACEMENT      CARDIOVASCULAR STRESS TEST  08/27/2010    COLONOSCOPY  10/08/2008    IR OTHER  04/15/2021    LUNG SURGERY      for pneumothorax    MOHS SURGERY  02/15/2022    SCCIS mid scalp- Dr Sydney Gaytan       Current Outpatient Medications   Medication Sig Dispense Refill    nirmatrelvir & ritonavir (Paxlovid, 300/100,) tablet therapy pack Take 3 tablets by mouth 2 (two) times a day for 5 days Take 2 nirmatrelvir tablets + 1 ritonavir tablet together per dose 30 tablet 0    omeprazole (PriLOSEC) 40 MG capsule Take 1 capsule (40 mg total) by mouth daily 90 capsule 0    amoxicillin (AMOXIL) 500 mg capsule Before dental work  (Patient not taking: Reported on 8/8/2022)      aspirin 81 MG tablet Take by mouth      atorvastatin (LIPITOR) 20 mg tablet TAKE 1 TABLET BY MOUTH EVERY DAY 90 tablet 1    budesonide (Pulmicort Flexhaler) 180 MCG/ACT inhaler Inhale 2 puffs 2 (two) times a day Rinse mouth after use  3 each 3    Calcium Carb-Cholecalciferol 1000-800 MG-UNIT TABS Take by mouth      citalopram (CeleXA) 20 mg tablet TAKE 1 TABLET BY MOUTH EVERY DAY 90 tablet 5    fluticasone (FLONASE) 50 mcg/act nasal spray 1 spray into each nostril daily      ipratropium (ATROVENT) 0 06 % nasal spray 1 SPRAY INTO EACH NOSTRIL IN THE MORNING AND 1 SPRAY BEFORE BEDTIME   15 mL 3    LORazepam (ATIVAN) 0 5 mg tablet Take 1 tablet (0 5 mg total) by mouth every 6 (six) hours as needed for anxiety 90 tablet 3    montelukast (SINGULAIR) 10 mg tablet TAKE 1 TABLET BY MOUTH EVERY DAY 90 tablet 1    sotalol (BETAPACE) 80 mg tablet Take 1 tablet (80 mg total) by mouth every 12 (twelve) hours 180 tablet 3    tamsulosin (FLOMAX) 0 4 mg Take 0 4 mg by mouth 2 (two) times a day       No current facility-administered medications for this visit  Allergies   Allergen Reactions    Dust Mite Extract     Molds & Smuts     Pollen Extract     Short Ragweed Pollen Ext     Tree Extract        Review of Systems   Constitutional: Positive for fatigue  Negative for chills, diaphoresis and fever  HENT: Positive for congestion, postnasal drip, rhinorrhea and sore throat  Respiratory: Positive for cough  Negative for chest tightness and shortness of breath  Gastrointestinal: Positive for diarrhea  Negative for abdominal pain, nausea and vomiting  Musculoskeletal: Positive for myalgias  Neurological: Positive for headaches  Objective:    Vitals:    09/12/22 0908   Weight: 86 2 kg (190 lb)   Height: 5' 8" (1 727 m)       Physical Exam  Vitals and nursing note reviewed  Constitutional:       Appearance: He is ill-appearing  HENT:      Head: Normocephalic and atraumatic  Pulmonary:      Effort: Pulmonary effort is normal    Musculoskeletal:      Cervical back: Normal range of motion  Neurological:      Mental Status: He is alert and oriented to person, place, and time     Psychiatric:         Mood and Affect: Mood normal          Behavior: Behavior normal

## 2022-09-12 NOTE — TELEPHONE ENCOUNTER
Hold the Tamsulosin and Pulmicort for the 5 days of taking Paxlovid  Also hold cholesterol medication  If need Pulmicort he may use

## 2022-09-12 NOTE — TELEPHONE ENCOUNTER
T/c from pt -- states he was told by pharmacy that there may be some interactions with Paxlovid and hie Pulmicort and Tamsulosin and that they should not be taken together  Pt is wondering which is more important, if he should take the Paxlovid and not the other medications or if he should take the other 2 and not the Paxlovid       Please advise

## 2022-09-26 ENCOUNTER — OFFICE VISIT (OUTPATIENT)
Dept: CARDIOLOGY CLINIC | Facility: CLINIC | Age: 67
End: 2022-09-26
Payer: MEDICARE

## 2022-09-26 VITALS
HEIGHT: 68 IN | OXYGEN SATURATION: 97 % | WEIGHT: 193 LBS | RESPIRATION RATE: 16 BRPM | DIASTOLIC BLOOD PRESSURE: 74 MMHG | SYSTOLIC BLOOD PRESSURE: 120 MMHG | HEART RATE: 68 BPM | BODY MASS INDEX: 29.25 KG/M2

## 2022-09-26 DIAGNOSIS — I49.5 TACHY-BRADY SYNDROME (HCC): ICD-10-CM

## 2022-09-26 DIAGNOSIS — I47.1 ATRIAL TACHYCARDIA, PAROXYSMAL (HCC): Primary | ICD-10-CM

## 2022-09-26 PROCEDURE — 99214 OFFICE O/P EST MOD 30 MIN: CPT | Performed by: INTERNAL MEDICINE

## 2022-09-26 PROCEDURE — 93000 ELECTROCARDIOGRAM COMPLETE: CPT | Performed by: INTERNAL MEDICINE
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
## 2022-12-08 DIAGNOSIS — K21.9 GASTROESOPHAGEAL REFLUX DISEASE: ICD-10-CM

## 2022-12-08 DIAGNOSIS — F41.9 ANXIETY: ICD-10-CM

## 2022-12-08 RX ORDER — OMEPRAZOLE 40 MG/1
40 CAPSULE, DELAYED RELEASE ORAL DAILY
Qty: 90 CAPSULE | Refills: 0 | Status: SHIPPED | OUTPATIENT
Start: 2022-12-08

## 2022-12-08 RX ORDER — LORAZEPAM 0.5 MG/1
0.5 TABLET ORAL EVERY 6 HOURS PRN
Qty: 90 TABLET | Refills: 3 | Status: SHIPPED | OUTPATIENT
Start: 2022-12-08

## 2022-12-08 NOTE — TELEPHONE ENCOUNTER
PATIENT ID PRESCRIPTION # FILLED WRITTEN DRUG LABEL QTY DAYS STRENGTH MME** PRESCRIBER PHARMACY PAYMENT REFILL #/AUTH STATE DETAIL   1 [id number] 06/02/2022 06/01/2022 LORazepam 05 MG TABLET (non-liquid) 90 0 22 05 MG NA ANGELICA BLACKMON [facility], [facility]CCA VIZCARRA'  0 / 3 PA

## 2022-12-15 ENCOUNTER — REMOTE DEVICE CLINIC VISIT (OUTPATIENT)
Dept: CARDIOLOGY CLINIC | Facility: CLINIC | Age: 67
End: 2022-12-15

## 2022-12-15 DIAGNOSIS — Z95.0 PRESENCE OF CARDIAC PACEMAKER: Primary | ICD-10-CM

## 2022-12-15 NOTE — PROGRESS NOTES
Results for orders placed or performed in visit on 12/15/22   Cardiac EP device report    Narrative    MDT DUAL CHAMBER PM (HIS)/ ACTIVE SYSTEM IS MRI CONDITIONAL  CARELINK TRANSMISSION: BATTERY VOLTAGE ADEQUATE (12 8 YRS)  AP: 52 2%  : 9 2% (MVP-OF)  ALL AVAILABLE LEAD PARAMETERS WITHIN NORMAL LIMITS  1 FAST A&V EPISODE W/ EGM SHOWING SVT0ST @ 158 BPM, DURATION 19 SECS  PT TAKES SOTALOL, ASA 81MG  EF: 60% (ECHO 2/18/21)  PACEMAKER FUNCTIONING APPROPRIATELY    [address] [place] [address] Street

## 2022-12-28 DIAGNOSIS — F41.9 ANXIETY: ICD-10-CM

## 2022-12-28 DIAGNOSIS — K21.9 GASTROESOPHAGEAL REFLUX DISEASE: ICD-10-CM

## 2022-12-28 NOTE — TELEPHONE ENCOUNTER
Pt calling -     Out of meds completely since 12/08/2022  Last fill of lorazepam in 06/02/2022 - pt picked up  Reviewed PDMP, doesn't appear that patient has picked up Rx from 12/08/2022 form Pharmacy  Spoke with Helen Saini @ [facility] - pharmacist filled both meds today and they will contact pt for   Pt notified  (omeprazole/lorazepam)  PDMP REVIEWED VIA WEBSITE : YES   PAIN MANAGEMENT ON FILE : NONE     1 [id number] 06/02/2022 06/01/2022 LORazepam 05 MG TABLET (non-liquid) 90 0 22 05 MG NA ANGELIAC BLACKMON [facility]     Please refuse refill

## 2023-01-14 DIAGNOSIS — I47.1 ATRIAL TACHYCARDIA (HCC): ICD-10-CM

## 2023-01-14 DIAGNOSIS — I49.5 TACHY-BRADY SYNDROME (HCC): ICD-10-CM

## 2023-01-16 RX ORDER — SOTALOL HYDROCHLORIDE 80 MG/1
TABLET ORAL
Qty: 180 TABLET | Refills: 3 | Status: SHIPPED | OUTPATIENT
Start: 2023-01-16

## 2023-01-31 ENCOUNTER — TELEPHONE (OUTPATIENT)
Dept: FAMILY MEDICINE CLINIC | Facility: CLINIC | Age: 68
End: 2023-01-31

## 2023-01-31 NOTE — TELEPHONE ENCOUNTER
Received t/c from pt - pt has upcoming appt with Dr Justice Covarrubias on 2/20 - pt wants to switch provider to Dr Yudi Hendrickson - requested a reason why he want's to switch and he was evasive/withholding on reason for wanting the switch and responded with "personal reasons" -  He then added, "was that enough for you?" - pt was notified Dr Yudi Hendrickson is not taking on new patients, his schedule is not available for quite some time and we have other providers to accommodate - insisted he wants to speak with Dr Yudi Hendrickson - I was unable to fulfill that request and vehemently insisted on speaking with Rui Gaytan about switching      Pt # [id number]

## 2023-02-01 NOTE — TELEPHONE ENCOUNTER
Please give patient an appt per Dr Michelle Carranza  I am in meetings this morning but if patient still wishes to speak to me, I can call this afternoon

## 2023-02-01 NOTE — TELEPHONE ENCOUNTER
Left message for pt -- asked for return call to schedule with Dr Gilbert Escalante, per his instruction and notified pt practice admin is in meetings this AM, but can call him when completed this afternoon if he would like

## 2023-02-06 DIAGNOSIS — E78.5 HYPERLIPIDEMIA, UNSPECIFIED HYPERLIPIDEMIA TYPE: ICD-10-CM

## 2023-02-06 RX ORDER — ATORVASTATIN CALCIUM 20 MG/1
TABLET, FILM COATED ORAL
Qty: 90 TABLET | Refills: 1 | Status: SHIPPED | OUTPATIENT
Start: 2023-02-06

## 2023-02-07 ENCOUNTER — OFFICE VISIT (OUTPATIENT)
Dept: DERMATOLOGY | Facility: CLINIC | Age: 68
End: 2023-02-07

## 2023-02-07 VITALS
HEIGHT: 68 IN | SYSTOLIC BLOOD PRESSURE: 100 MMHG | DIASTOLIC BLOOD PRESSURE: 80 MMHG | HEART RATE: 96 BPM | WEIGHT: 193 LBS | BODY MASS INDEX: 29.25 KG/M2

## 2023-02-07 DIAGNOSIS — L82.1 SEBORRHEIC KERATOSIS: ICD-10-CM

## 2023-02-07 DIAGNOSIS — L57.0 ACTINIC KERATOSIS: Primary | ICD-10-CM

## 2023-02-07 DIAGNOSIS — Z13.89 SCREENING FOR SKIN CONDITION: ICD-10-CM

## 2023-02-07 DIAGNOSIS — Z85.828 HISTORY OF SKIN CANCER: ICD-10-CM

## 2023-02-07 NOTE — PATIENT INSTRUCTIONS
Actinic Keratosis:  Patient advised lesions are precancers  These should resolve with cryosurgery if not to let us know sun block recommended  Seborrheic keratosis patient reassured these are normal growths we acquire with age no treatment needed  History of skin cancer in no recurrence nothing else atypical sunblock recommended follow-up in 6 months  Screening for dermatologic disorders nothing else of concern noted on complete exam follow-up in 6 months  Treatment with Cryotherapy    The doctor has treated your skin with nitrogen, which is 320 degrees Fahrenheit below zero  He has given the treated area "frostbite "    Stinging should subside within a few hours  You can take Tylenol for pain, if needed  Over the next few days, it is normal if the area becomes reddened, a blood blister, or swollen with fluid  If the lesion treated was near the eye - you could get a swollen eye over the next few days  Do not panic! This is all temporary, and will resolve with time  There is no special treatment - just keep the area clean  Makeup and BandAids can be used, if preferred  When the area starts to dry up and peel off, using Vaseline can help healing  It usually takes up to a month for it to heal   Some lesions are recurrent and may require repeat treatments  If a lesion has not healed in one month, please don't hesitate to contact us  If you have any further questions that are not answered here, please call us  [phone]    Thank you for allowing us to care for you

## 2023-02-07 NOTE — PROGRESS NOTES
[facility] Note     Patient Name: Eloisa Lucia  Encounter Date: February 7, 2023     Have you been cared for by a [place] [address] Dermatologist in the last 3 years and, if so, which description applies to you? Yes  I have been here within the last 3 years, and my medical history has NOT changed since that time  I am MALE/not capable of bearing children  REVIEW OF SYSTEMS:  Have you recently had or currently have any of the following? · No changes in my recent health  PAST MEDICAL HISTORY:  Have you personally ever had or currently have any of the following? If "YES," then please provide more detail  · No changes in my medical history  FAMILY HISTORY:  Any "first degree relatives" (parent, brother, sister, or child) with the following? • No changes in my family's known health  PATIENT EXPERIENCE:    • Do you want the Dermatologist to perform a COMPLETE skin exam today including a clinical examination under the "bra and underwear" areas? Yes  • If necessary, do we have your permission to call and leave a detailed message on your Preferred Phone number that includes your specific medical information?   Yes      Allergies   Allergen Reactions   • Dust Mite Extract    • Molds & Smuts    • Pollen Extract    • Short Ragweed Pollen Ext    • Tree Extract       Current Outpatient Medications:   •  aspirin 81 MG tablet, Take by mouth, Disp: , Rfl:   •  atorvastatin (LIPITOR) 20 mg tablet, TAKE 1 TABLET BY MOUTH EVERY DAY, Disp: 90 tablet, Rfl: 1  •  budesonide (Pulmicort Flexhaler) 180 MCG/ACT inhaler, Inhale 2 puffs 2 (two) times a day Rinse mouth after use , Disp: 3 each, Rfl: 3  •  Calcium Carb-Cholecalciferol 1000-800 MG-UNIT TABS, Take by mouth, Disp: , Rfl:   •  citalopram (CeleXA) 20 mg tablet, TAKE 1 TABLET BY MOUTH EVERY DAY, Disp: 90 tablet, Rfl: 5  •  fluticasone (FLONASE) 50 mcg/act nasal spray, 1 spray into each nostril daily, Disp: , Rfl:   •  ipratropium (ATROVENT) 0 06 % nasal spray, 1 SPRAY INTO EACH NOSTRIL IN THE MORNING AND 1 SPRAY BEFORE BEDTIME , Disp: 15 mL, Rfl: 3  •  LORazepam (ATIVAN) 0 5 mg tablet, Take 1 tablet (0 5 mg total) by mouth every 6 (six) hours as needed for anxiety, Disp: 90 tablet, Rfl: 3  •  montelukast (SINGULAIR) 10 mg tablet, TAKE 1 TABLET BY MOUTH EVERY DAY, Disp: 90 tablet, Rfl: 1  •  omeprazole (PriLOSEC) 40 MG capsule, Take 1 capsule (40 mg total) by mouth daily, Disp: 90 capsule, Rfl: 0  •  sotalol (BETAPACE) 80 mg tablet, TAKE 1 TABLET BY MOUTH EVERY 12 HOURS , Disp: 180 tablet, Rfl: 3  •  tamsulosin (FLOMAX) 0 4 mg, Take 0 4 mg by mouth 2 (two) times a day, Disp: , Rfl:   •  amoxicillin (AMOXIL) 500 mg capsule, Before dental work  (Patient not taking: Reported on 8/8/2022), Disp: , Rfl:           • Whom besides the patient is providing clinical information about today's encounter?   o NO ADDITIONAL HISTORIAN (patient alone provided history)    Physical Exam and Assessment/Plan by Diagnosis:

## 2023-02-07 NOTE — PROGRESS NOTES
[facility] DERMATOLOGY  [address] [id number]  [phone]  [phone]     MRN: [id number] : 1955  Encounter: [id number]  Patient Information: Nahum Leblanc  Chief complaint: Yearly checkup    History of present illness: 62-year-old male with previous history of nonmelanoma skin cancer and actinic keratosis presents for yearly checkup    Notes some scaly areas on the scalp  Past Medical History:   Diagnosis Date   • Asthma     allergy induced   • Basal cell carcinoma    • Colon polyp    • Fatty liver    • H/O nonmelanoma skin cancer     last assessed-2017   • Hyperlipidemia    • Inflamed seborrheic keratosis    • Malignant neoplasm of skin     last assessed-2014   • Neoplasm of uncertain behavior of skin    • Nocturia    • Pneumothorax, left    • Seasonal allergies    • Sebaceous cyst    • Squamous cell carcinoma of scalp 2022    SCCIS- mid scalp   • Squamous cell carcinoma of skin of neck    • Testicular hypofunction    • Viral warts      Past Surgical History:   Procedure Laterality Date   • CARDIAC CATHETERIZATION     • CARDIAC PACEMAKER PLACEMENT  2021   • CARDIAC PACEMAKER PLACEMENT     • CARDIOVASCULAR STRESS TEST  2010   • COLONOSCOPY  10/08/2008   • IR OTHER  04/15/2021   • LUNG SURGERY      for pneumothorax   • MOHS SURGERY  02/15/2022    SCCIS mid scalp- Dr Ewelina Walter   • PARTIAL HIP ARTHROPLASTY     • RHINOPLASTY       Social History   Social History     Substance and Sexual Activity   Alcohol Use No    Comment: quit 2020     Social History     Substance and Sexual Activity   Drug Use No     Social History     Tobacco Use   Smoking Status Never   Smokeless Tobacco Never     Family History   Problem Relation Age of Onset   • Colon cancer Father      Meds/Allergies   Allergies   Allergen Reactions   • Dust Mite Extract    • Molds & Smuts    • Pollen Extract    • Short Ragweed Pollen Ext    • Tree Extract Meds:  Prior to Admission medications    Medication Sig Start Date End Date Taking? Authorizing Provider   aspirin 81 MG tablet Take by mouth   Yes Historical Provider, MD   atorvastatin (LIPITOR) 20 mg tablet TAKE 1 TABLET BY MOUTH EVERY DAY 2/6/23  Yes Vale Huynh MD   budesonide (Pulmicort Flexhaler) 180 MCG/ACT inhaler Inhale 2 puffs 2 (two) times a day Rinse mouth after use   9/6/22  Yes BABS Finney   Calcium Carb-Cholecalciferol 1000-800 MG-UNIT TABS Take by mouth   Yes Historical Provider, MD   citalopram (CeleXA) 20 mg tablet TAKE 1 TABLET BY MOUTH EVERY DAY 8/22/22  Yes Vale Huynh MD   fluticasone (FLONASE) 50 mcg/act nasal spray 1 spray into each nostril daily   Yes Historical Provider, MD   ipratropium (ATROVENT) 0 06 % nasal spray 1 SPRAY INTO EACH NOSTRIL IN THE MORNING AND 1 SPRAY BEFORE BEDTIME  8/29/22  Yes Hamlet Davidson MD   LORazepam (ATIVAN) 0 5 mg tablet Take 1 tablet (0 5 mg total) by mouth every 6 (six) hours as needed for anxiety 12/8/22  Yes Vale Huynh MD   montelukast (SINGULAIR) 10 mg tablet TAKE 1 TABLET BY MOUTH EVERY DAY 12/12/22  Yes BABS Finney   omeprazole (PriLOSEC) 40 MG capsule Take 1 capsule (40 mg total) by mouth daily 12/8/22  Yes Vale Huynh MD   sotalol (BETAPACE) 80 mg tablet TAKE 1 TABLET BY MOUTH EVERY 12 HOURS  1/16/23  Yes Clarissa Dunne MD   tamsulosin (FLOMAX) 0 4 mg Take 0 4 mg by mouth 2 (two) times a day   Yes Historical Provider, MD   amoxicillin (AMOXIL) 500 mg capsule Before dental work   Patient not taking: Reported on 8/8/2022 1/24/22   Historical Provider, MD       Subjective:     Review of Systems:    General: negative for - chills, fatigue, fever,  weight gain or weight loss  Psychological: negative for - anxiety, behavioral disorder, concentration difficulties, decreased libido, depression, irritability, memory difficulties, mood swings, sleep disturbances or suicidal ideation  ENT: negative for - hearing difficulties , nasal congestion, nasal discharge, oral lesions, sinus pain, sneezing, sore throat  Allergy and Immunology: negative for - hives, insect bite sensitivity,  Hematological and Lymphatic: negative for - bleeding problems, blood clots,bruising, swollen lymph nodes  Endocrine: negative for - hair pattern changes, hot flashes, malaise/lethargy, mood swings, palpitations, polydipsia/polyuria, skin changes, temperature intolerance or unexpected weight change  Respiratory: negative for - cough, hemoptysis, orthopnea, shortness of breath, or wheezing  Cardiovascular: negative for - chest pain, dyspnea on exertion, edema,  Gastrointestinal: negative for - abdominal pain, nausea/vomiting  Genito-Urinary: negative for - dysuria, incontinence, irregular/heavy menses or urinary frequency/urgency  Musculoskeletal: negative for - gait disturbance, joint pain, joint stiffness, joint swelling, muscle pain, muscular weakness  Dermatological:  As in HPI  Neurological: negative for confusion, dizziness, headaches, impaired coordination/balance, memory loss, numbness/tingling, seizures, speech problems, tremors or weakness       Objective:   /80 (BP Location: Right arm, Patient Position: Sitting)   Pulse 96   Ht 5' 8" (1 727 m)   Wt 87 5 kg (193 lb)   BMI 29 35 kg/m²     Physical Exam:    General Appearance:    Alert, cooperative, no distress   Head:    Normocephalic, without obvious abnormality, atraumatic           Skin:   A full skin exam was performed including scalp, head scalp, eyes, ears, nose, lips, neck, chest, axilla, abdomen, back, buttocks, bilateral upper extremities, bilateral lower extremities, hands, feet, fingers, toes, fingernails, and toenails daily erythematous papules noted below normal keratotic papules with greasy stuck on appearance nothing else remarkable noted on exam the sites of skin cancer well-healed without recurrence  Physical Exam  HENT:      Head:         Cryotherapy Procedure Note    Pre-operative Diagnosis: actinic keratosis    Plan:  1  Instructed to keep the area dry and clean thereafter  Apply petrolatum if area gets crusty  2  Recommended that the patient use acetaminophen  as needed for pain  Locations: scalp    Indications: Destruction of actinic keratosis times 5    Patient informed of risks (permanent scarring, infection, light or dark discoloration, bleeding, infection, weakness, numbness and recurrence of the lesion) and benefits of the procedure and verbal informed consent obtained  The areas are treated with liquid nitrogen therapy, frozen until ice ball extended 2 mm beyond lesion, allowed to thaw, and treated again  The patient tolerated procedure well  The patient was instructed on post-op care, warned that there may be blister formation, redness and pain  Recommend OTC analgesia as needed for pain  Condition:  Stable    Complications:  none  Assessment:     1  Actinic keratosis        2  History of skin cancer        3  Seborrheic keratosis        4  Screening for skin condition              Plan:   Actinic Keratosis:  Patient advised lesions are precancers  These should resolve with cryosurgery if not to let us know sun block recommended  Seborrheic keratosis patient reassured these are normal growths we acquire with age no treatment needed  History of skin cancer in no recurrence nothing else atypical sunblock recommended follow-up in 6 months  Screening for dermatologic disorders nothing else of concern noted on complete exam follow-up in 6 months    Cristhian Chavis MD  2/7/2023,12:03 PM    Portions of the record may have been created with voice recognition software   Occasional wrong word or "sound a like" substitutions may have occurred due to the inherent limitations of voice recognition software   Read the chart carefully and recognize, using context, where substitutions have occurred

## 2023-03-14 ENCOUNTER — OFFICE VISIT (OUTPATIENT)
Dept: CARDIOLOGY CLINIC | Facility: CLINIC | Age: 68
End: 2023-03-14

## 2023-03-14 VITALS
WEIGHT: 193 LBS | HEART RATE: 70 BPM | HEIGHT: 68 IN | OXYGEN SATURATION: 97 % | SYSTOLIC BLOOD PRESSURE: 116 MMHG | RESPIRATION RATE: 16 BRPM | DIASTOLIC BLOOD PRESSURE: 78 MMHG | BODY MASS INDEX: 29.25 KG/M2

## 2023-03-14 DIAGNOSIS — I48.92 ATRIAL FLUTTER, UNSPECIFIED TYPE (HCC): Primary | ICD-10-CM

## 2023-03-14 DIAGNOSIS — I49.5 TACHY-BRADY SYNDROME (HCC): ICD-10-CM

## 2023-03-16 ENCOUNTER — REMOTE DEVICE CLINIC VISIT (OUTPATIENT)
Dept: CARDIOLOGY CLINIC | Facility: CLINIC | Age: 68
End: 2023-03-16

## 2023-03-16 DIAGNOSIS — Z95.0 PRESENCE OF PERMANENT CARDIAC PACEMAKER: Primary | ICD-10-CM

## 2023-03-16 NOTE — PROGRESS NOTES
MDT DUAL CHAMBER PM (HIS)/ ACTIVE SYSTEM IS MRI CONDITIONAL   CARELINK TRANSMISSION:  BATTERY VOLTAGE ADEQUATE (12 5 YR  )    AP 48 5%  6 8%    ALL LEAD PARAMETERS WITHIN NORMAL LIMITS   NO HIGH RATE EPISODES   NORMAL DEVICE FUNCTION  Sada Dubon

## 2023-03-22 DIAGNOSIS — K21.9 GASTROESOPHAGEAL REFLUX DISEASE: ICD-10-CM

## 2023-03-22 RX ORDER — OMEPRAZOLE 40 MG/1
CAPSULE, DELAYED RELEASE ORAL
Qty: 90 CAPSULE | Refills: 0 | Status: SHIPPED | OUTPATIENT
Start: 2023-03-22

## 2023-03-27 ENCOUNTER — OFFICE VISIT (OUTPATIENT)
Dept: FAMILY MEDICINE CLINIC | Facility: CLINIC | Age: 68
End: 2023-03-27

## 2023-03-27 VITALS
BODY MASS INDEX: 29.55 KG/M2 | WEIGHT: 195 LBS | OXYGEN SATURATION: 97 % | HEART RATE: 69 BPM | TEMPERATURE: 97 F | SYSTOLIC BLOOD PRESSURE: 120 MMHG | HEIGHT: 68 IN | DIASTOLIC BLOOD PRESSURE: 80 MMHG

## 2023-03-27 DIAGNOSIS — F41.1 ANXIETY, GENERALIZED: ICD-10-CM

## 2023-03-27 DIAGNOSIS — R97.20 ELEVATED PSA: ICD-10-CM

## 2023-03-27 DIAGNOSIS — I71.21 ANEURYSM OF ASCENDING AORTA WITHOUT RUPTURE (HCC): ICD-10-CM

## 2023-03-27 DIAGNOSIS — I49.5 TACHY-BRADY SYNDROME (HCC): ICD-10-CM

## 2023-03-27 DIAGNOSIS — E78.2 MIXED HYPERLIPIDEMIA: ICD-10-CM

## 2023-03-27 DIAGNOSIS — K86.2 PANCREATIC CYST: Primary | ICD-10-CM

## 2023-03-27 PROBLEM — Z95.0 PACEMAKER: Status: RESOLVED | Noted: 2022-02-14 | Resolved: 2023-03-27

## 2023-03-27 NOTE — PROGRESS NOTES
Name: Princess Kemp      : 1955      MRN: [id number]  Encounter Provider: Alina Huntley DO  Encounter Date: 3/27/2023   Encounter department: [facility] [address] [place] [address]     1  Pancreatic cyst  Assessment & Plan:  Due for follow-up MRI    Orders:  -     MRI abdomen w wo contrast; Future; Expected date: 2023    2  Aneurysm of ascending aorta without rupture  Assessment & Plan:  Repeat 2D Echo    Orders:  -     Echo complete w/ contrast if indicated; Future; Expected date: 2023    3  Tachy-juwan syndrome [facility])  Assessment & Plan:  Managed by cardiology      4  Mixed hyperlipidemia  Assessment & Plan:  Continue atorvastatin, check a CMP, lipid profile    Orders:  -     CBC; Future  -     Comprehensive metabolic panel; Future  -     Lipid panel; Future    5  Anxiety, generalized  Assessment & Plan:  Continue citalopram      6  Elevated PSA  Assessment & Plan:  Managed by urology             Subjective      Patient comes in today for checkup, states he is doing well  He is due for a follow-up MRI of his abdomen for his pancreatic cyst and a follow-up 2D echo for his ascending thoracic aneurysm  Review of Systems   Constitutional: Negative for chills, fatigue and fever  HENT: Negative for congestion, ear pain, hearing loss, postnasal drip, rhinorrhea and sore throat  Eyes: Negative for pain and visual disturbance  Respiratory: Negative for chest tightness, shortness of breath and wheezing  Cardiovascular: Negative for chest pain and leg swelling  Gastrointestinal: Negative for abdominal distention, abdominal pain, constipation, diarrhea and vomiting  Endocrine: Negative for cold intolerance and heat intolerance  Genitourinary: Negative for difficulty urinating, frequency and urgency  Musculoskeletal: Negative for arthralgias and gait problem  Skin: Negative for color change     Neurological: Negative for dizziness, tremors, "syncope, numbness and headaches  Hematological: Negative for adenopathy  Psychiatric/Behavioral: Negative for agitation, confusion and sleep disturbance  The patient is not nervous/anxious  Current Outpatient Medications on File Prior to Visit   Medication Sig   • aspirin 81 MG tablet Take by mouth   • atorvastatin (LIPITOR) 20 mg tablet TAKE 1 TABLET BY MOUTH EVERY DAY   • budesonide (Pulmicort Flexhaler) 180 MCG/ACT inhaler Inhale 2 puffs 2 (two) times a day Rinse mouth after use  • Calcium Carb-Cholecalciferol 1000-800 MG-UNIT TABS Take by mouth   • citalopram (CeleXA) 20 mg tablet TAKE 1 TABLET BY MOUTH EVERY DAY   • fluticasone (FLONASE) 50 mcg/act nasal spray 1 spray into each nostril daily   • ipratropium (ATROVENT) 0 06 % nasal spray 1 SPRAY INTO EACH NOSTRIL IN THE MORNING AND 1 SPRAY BEFORE BEDTIME  • LORazepam (ATIVAN) 0 5 mg tablet Take 1 tablet (0 5 mg total) by mouth every 6 (six) hours as needed for anxiety   • montelukast (SINGULAIR) 10 mg tablet TAKE 1 TABLET BY MOUTH EVERY DAY   • omeprazole (PriLOSEC) 40 MG capsule TAKE 1 CAPSULE BY MOUTH DAILY  • sotalol (BETAPACE) 80 mg tablet TAKE 1 TABLET BY MOUTH EVERY 12 HOURS  • tamsulosin (FLOMAX) 0 4 mg Take 0 4 mg by mouth 2 (two) times a day   • [DISCONTINUED] amoxicillin (AMOXIL) 500 mg capsule Before dental work  (Patient not taking: Reported on 8/8/2022)       Objective     /80 (BP Location: Left arm, Patient Position: Sitting, Cuff Size: Standard)   Pulse 69   Temp (!) 97 °F (36 1 °C) (Tympanic)   Ht 5' 8\" (1 727 m)   Wt 88 5 kg (195 lb)   SpO2 97%   BMI 29 65 kg/m²     Physical Exam  Vitals and nursing note reviewed  Constitutional:       Appearance: He is well-developed  HENT:      Head: Normocephalic  Eyes:      General: No scleral icterus  Conjunctiva/sclera: Conjunctivae normal    Neck:      Thyroid: No thyromegaly  Cardiovascular:      Rate and Rhythm: Normal rate and regular rhythm        Heart " sounds: Normal heart sounds  No murmur heard  Pulmonary:      Effort: Pulmonary effort is normal  No respiratory distress  Breath sounds: Normal breath sounds  No wheezing  Abdominal:      General: Bowel sounds are normal  There is no distension  Palpations: Abdomen is soft  Tenderness: There is no abdominal tenderness  Musculoskeletal:         General: No tenderness  Normal range of motion  Cervical back: Normal range of motion  Lymphadenopathy:      Cervical: No cervical adenopathy  Skin:     General: Skin is warm and dry  Coloration: Skin is not pale  Findings: No rash  Neurological:      Mental Status: He is alert and oriented to person, place, and time  Cranial Nerves: No cranial nerve deficit     Psychiatric:         Behavior: Behavior normal        Cleotis Share, DO

## 2023-04-26 ENCOUNTER — HOSPITAL ENCOUNTER (OUTPATIENT)
Dept: MRI IMAGING | Facility: HOSPITAL | Age: 68
Discharge: HOME | End: 2023-04-26

## 2023-04-26 DIAGNOSIS — K86.2 PANCREATIC CYST: ICD-10-CM

## 2023-04-26 RX ADMIN — GADOBUTROL 8 ML: 604.72 INJECTION INTRAVENOUS at 13:21

## 2023-06-13 DIAGNOSIS — K21.9 GASTROESOPHAGEAL REFLUX DISEASE: ICD-10-CM

## 2023-06-13 RX ORDER — OMEPRAZOLE 40 MG/1
CAPSULE, DELAYED RELEASE ORAL
Qty: 90 CAPSULE | Refills: 0 | Status: SHIPPED | OUTPATIENT
Start: 2023-06-13

## 2023-06-15 ENCOUNTER — REMOTE DEVICE CLINIC VISIT (OUTPATIENT)
Dept: CARDIOLOGY CLINIC | Facility: CLINIC | Age: 68
End: 2023-06-15
Payer: MEDICARE

## 2023-06-15 DIAGNOSIS — Z95.0 CARDIAC PACEMAKER IN SITU: Primary | ICD-10-CM

## 2023-06-15 PROCEDURE — 93294 REM INTERROG EVL PM/LDLS PM: CPT | Performed by: INTERNAL MEDICINE

## 2023-06-15 PROCEDURE — 93296 REM INTERROG EVL PM/IDS: CPT | Performed by: INTERNAL MEDICINE

## 2023-06-15 NOTE — PROGRESS NOTES
Results for orders placed or performed in visit on 06/15/23   Cardiac EP device report    Narrative    MDT DUAL CHAMBER PM (HIS)/ ACTIVE SYSTEM IS MRI CONDITIONAL  CARELINK TRANSMISSION: BATTERY VOLTAGE ADEQUATE (12 3 YRS)  AP-55%, -3%  ALL AVAILABLE LEAD PARAMETERS WITHIN NORMAL LIMITS  3 DEVICE CLASSIFIED NSVT EPISODES- PAT ON EGM'S  4 FAST A&V EPISODES, 3 ON SAME DAY, @ 118-162 BPM MAX DURATION 23 SEC- SVT ON EGM'S  PT ON ASA 81MG & SOTALOL  NORMAL DEVICE FUNCTION   GV

## 2023-07-10 ENCOUNTER — APPOINTMENT (OUTPATIENT)
Dept: LAB | Facility: CLINIC | Age: 68
End: 2023-07-10
Payer: MEDICARE

## 2023-07-10 DIAGNOSIS — R97.20 ELEVATED PROSTATE SPECIFIC ANTIGEN (PSA): ICD-10-CM

## 2023-07-10 DIAGNOSIS — E78.2 MIXED HYPERLIPIDEMIA: ICD-10-CM

## 2023-07-10 LAB
ALBUMIN SERPL BCP-MCNC: 3.9 G/DL (ref 3.5–5)
ALP SERPL-CCNC: 66 U/L (ref 46–116)
ALT SERPL W P-5'-P-CCNC: 35 U/L (ref 12–78)
ANION GAP SERPL CALCULATED.3IONS-SCNC: 4 MMOL/L
AST SERPL W P-5'-P-CCNC: 20 U/L (ref 5–45)
BILIRUB SERPL-MCNC: 0.47 MG/DL (ref 0.2–1)
BUN SERPL-MCNC: 13 MG/DL (ref 5–25)
CALCIUM SERPL-MCNC: 9.2 MG/DL (ref 8.3–10.1)
CHLORIDE SERPL-SCNC: 110 MMOL/L (ref 96–108)
CHOLEST SERPL-MCNC: 200 MG/DL
CO2 SERPL-SCNC: 25 MMOL/L (ref 21–32)
CREAT SERPL-MCNC: 0.89 MG/DL (ref 0.6–1.3)
ERYTHROCYTE [DISTWIDTH] IN BLOOD BY AUTOMATED COUNT: 13.1 % (ref 11.6–15.1)
GFR SERPL CREATININE-BSD FRML MDRD: 89 ML/MIN/1.73SQ M
GLUCOSE P FAST SERPL-MCNC: 104 MG/DL (ref 65–99)
HCT VFR BLD AUTO: 45.4 % (ref 36.5–49.3)
HDLC SERPL-MCNC: 48 MG/DL
HGB BLD-MCNC: 15.1 G/DL (ref 12–17)
LDLC SERPL CALC-MCNC: 119 MG/DL (ref 0–100)
MCH RBC QN AUTO: 30.1 PG (ref 26.8–34.3)
MCHC RBC AUTO-ENTMCNC: 33.3 G/DL (ref 31.4–37.4)
MCV RBC AUTO: 91 FL (ref 82–98)
NONHDLC SERPL-MCNC: 152 MG/DL
PLATELET # BLD AUTO: 217 THOUSANDS/UL (ref 149–390)
PMV BLD AUTO: 10.9 FL (ref 8.9–12.7)
POTASSIUM SERPL-SCNC: 4.2 MMOL/L (ref 3.5–5.3)
PROT SERPL-MCNC: 6.7 G/DL (ref 6.4–8.4)
PSA SERPL-MCNC: 5.46 NG/ML (ref 0–4)
RBC # BLD AUTO: 5.01 MILLION/UL (ref 3.88–5.62)
SODIUM SERPL-SCNC: 139 MMOL/L (ref 135–147)
TRIGL SERPL-MCNC: 167 MG/DL
WBC # BLD AUTO: 7.14 THOUSAND/UL (ref 4.31–10.16)

## 2023-07-10 PROCEDURE — 85027 COMPLETE CBC AUTOMATED: CPT

## 2023-07-10 PROCEDURE — 36415 COLL VENOUS BLD VENIPUNCTURE: CPT

## 2023-07-10 PROCEDURE — 84153 ASSAY OF PSA TOTAL: CPT

## 2023-07-10 PROCEDURE — 80053 COMPREHEN METABOLIC PANEL: CPT

## 2023-07-10 PROCEDURE — 80061 LIPID PANEL: CPT

## 2023-07-30 DIAGNOSIS — E78.5 HYPERLIPIDEMIA, UNSPECIFIED HYPERLIPIDEMIA TYPE: ICD-10-CM

## 2023-07-30 RX ORDER — ATORVASTATIN CALCIUM 20 MG/1
TABLET, FILM COATED ORAL
Qty: 90 TABLET | Refills: 1 | Status: ON HOLD | OUTPATIENT
Start: 2023-07-30

## 2023-08-12 ENCOUNTER — APPOINTMENT (EMERGENCY)
Dept: RADIOLOGY | Facility: HOSPITAL | Age: 68
DRG: 872 | End: 2023-08-12
Payer: MEDICARE

## 2023-08-12 ENCOUNTER — APPOINTMENT (EMERGENCY)
Dept: CT IMAGING | Facility: HOSPITAL | Age: 68
DRG: 872 | End: 2023-08-12
Payer: MEDICARE

## 2023-08-12 ENCOUNTER — HOSPITAL ENCOUNTER (INPATIENT)
Facility: HOSPITAL | Age: 68
LOS: 4 days | Discharge: HOME HEALTH | DRG: 872 | End: 2023-08-16
Attending: EMERGENCY MEDICINE | Admitting: STUDENT IN AN ORGANIZED HEALTH CARE EDUCATION/TRAINING PROGRAM
Payer: MEDICARE

## 2023-08-12 DIAGNOSIS — N39.0 UTI (URINARY TRACT INFECTION): Primary | ICD-10-CM

## 2023-08-12 DIAGNOSIS — R33.9 URINARY RETENTION: ICD-10-CM

## 2023-08-12 DIAGNOSIS — N49.0 SEMINAL VESICULITIS: ICD-10-CM

## 2023-08-12 PROBLEM — R00.1 SYMPTOMATIC SINUS BRADYCARDIA: Status: RESOLVED | Noted: 2021-04-06 | Resolved: 2023-08-12

## 2023-08-12 PROBLEM — A41.9 SEPSIS (HCC): Status: ACTIVE | Noted: 2023-08-12

## 2023-08-12 LAB
2HR DELTA HS TROPONIN: 1 NG/L
4HR DELTA HS TROPONIN: 4 NG/L
ALBUMIN SERPL BCP-MCNC: 4.4 G/DL (ref 3.5–5)
ALP SERPL-CCNC: 59 U/L (ref 34–104)
ALT SERPL W P-5'-P-CCNC: 19 U/L (ref 7–52)
ANION GAP SERPL CALCULATED.3IONS-SCNC: 10 MMOL/L
APTT PPP: 29 SECONDS (ref 23–37)
AST SERPL W P-5'-P-CCNC: 13 U/L (ref 13–39)
ATRIAL RATE: 133 BPM
BACTERIA UR QL AUTO: ABNORMAL /HPF
BASOPHILS # BLD AUTO: 0.03 THOUSANDS/ÂΜL (ref 0–0.1)
BASOPHILS NFR BLD AUTO: 0 % (ref 0–1)
BILIRUB SERPL-MCNC: 1.23 MG/DL (ref 0.2–1)
BILIRUB UR QL STRIP: NEGATIVE
BNP SERPL-MCNC: 220 PG/ML (ref 0–100)
BUN SERPL-MCNC: 15 MG/DL (ref 5–25)
CALCIUM SERPL-MCNC: 9.6 MG/DL (ref 8.4–10.2)
CARDIAC TROPONIN I PNL SERPL HS: 10 NG/L
CARDIAC TROPONIN I PNL SERPL HS: 6 NG/L
CARDIAC TROPONIN I PNL SERPL HS: 7 NG/L
CHLORIDE SERPL-SCNC: 100 MMOL/L (ref 96–108)
CLARITY UR: ABNORMAL
CO2 SERPL-SCNC: 24 MMOL/L (ref 21–32)
COLOR UR: YELLOW
CREAT SERPL-MCNC: 0.9 MG/DL (ref 0.6–1.3)
EOSINOPHIL # BLD AUTO: 0.04 THOUSAND/ÂΜL (ref 0–0.61)
EOSINOPHIL NFR BLD AUTO: 0 % (ref 0–6)
ERYTHROCYTE [DISTWIDTH] IN BLOOD BY AUTOMATED COUNT: 13.2 % (ref 11.6–15.1)
GFR SERPL CREATININE-BSD FRML MDRD: 88 ML/MIN/1.73SQ M
GLUCOSE SERPL-MCNC: 118 MG/DL (ref 65–140)
GLUCOSE UR STRIP-MCNC: NEGATIVE MG/DL
HCT VFR BLD AUTO: 43.8 % (ref 36.5–49.3)
HGB BLD-MCNC: 15 G/DL (ref 12–17)
HGB UR QL STRIP.AUTO: ABNORMAL
IMM GRANULOCYTES # BLD AUTO: 0.09 THOUSAND/UL (ref 0–0.2)
IMM GRANULOCYTES NFR BLD AUTO: 1 % (ref 0–2)
INR PPP: 1.16 (ref 0.84–1.19)
KETONES UR STRIP-MCNC: NEGATIVE MG/DL
LACTATE SERPL-SCNC: 1.7 MMOL/L (ref 0.5–2)
LEUKOCYTE ESTERASE UR QL STRIP: ABNORMAL
LYMPHOCYTES # BLD AUTO: 0.72 THOUSANDS/ÂΜL (ref 0.6–4.47)
LYMPHOCYTES NFR BLD AUTO: 4 % (ref 14–44)
MCH RBC QN AUTO: 31 PG (ref 26.8–34.3)
MCHC RBC AUTO-ENTMCNC: 34.2 G/DL (ref 31.4–37.4)
MCV RBC AUTO: 91 FL (ref 82–98)
MONOCYTES # BLD AUTO: 2.31 THOUSAND/ÂΜL (ref 0.17–1.22)
MONOCYTES NFR BLD AUTO: 12 % (ref 4–12)
MUCOUS THREADS UR QL AUTO: ABNORMAL
NEUTROPHILS # BLD AUTO: 15.96 THOUSANDS/ÂΜL (ref 1.85–7.62)
NEUTS SEG NFR BLD AUTO: 83 % (ref 43–75)
NITRITE UR QL STRIP: NEGATIVE
NON-SQ EPI CELLS URNS QL MICRO: ABNORMAL /HPF
NRBC BLD AUTO-RTO: 0 /100 WBCS
PH UR STRIP.AUTO: 6 [PH]
PLATELET # BLD AUTO: 164 THOUSANDS/UL (ref 149–390)
PMV BLD AUTO: 9.9 FL (ref 8.9–12.7)
POTASSIUM SERPL-SCNC: 4 MMOL/L (ref 3.5–5.3)
PROCALCITONIN SERPL-MCNC: 0.45 NG/ML
PROT SERPL-MCNC: 6.8 G/DL (ref 6.4–8.4)
PROT UR STRIP-MCNC: ABNORMAL MG/DL
PROTHROMBIN TIME: 14.6 SECONDS (ref 11.6–14.5)
QRS AXIS: -75 DEGREES
QRSD INTERVAL: 152 MS
QT INTERVAL: 358 MS
QTC INTERVAL: 528 MS
RBC # BLD AUTO: 4.84 MILLION/UL (ref 3.88–5.62)
RBC #/AREA URNS AUTO: ABNORMAL /HPF
SODIUM SERPL-SCNC: 134 MMOL/L (ref 135–147)
SP GR UR STRIP.AUTO: 1.02 (ref 1–1.03)
T WAVE AXIS: 52 DEGREES
UROBILINOGEN UR STRIP-ACNC: <2 MG/DL
VENTRICULAR RATE: 131 BPM
WBC # BLD AUTO: 19.15 THOUSAND/UL (ref 4.31–10.16)
WBC #/AREA URNS AUTO: ABNORMAL /HPF

## 2023-08-12 PROCEDURE — 84484 ASSAY OF TROPONIN QUANT: CPT | Performed by: PHYSICIAN ASSISTANT

## 2023-08-12 PROCEDURE — 87468 ANAPLSMA PHGCYTOPHLM AMP PRB: CPT | Performed by: PHYSICIAN ASSISTANT

## 2023-08-12 PROCEDURE — 87591 N.GONORRHOEAE DNA AMP PROB: CPT | Performed by: STUDENT IN AN ORGANIZED HEALTH CARE EDUCATION/TRAINING PROGRAM

## 2023-08-12 PROCEDURE — 87086 URINE CULTURE/COLONY COUNT: CPT | Performed by: PHYSICIAN ASSISTANT

## 2023-08-12 PROCEDURE — 81001 URINALYSIS AUTO W/SCOPE: CPT | Performed by: PHYSICIAN ASSISTANT

## 2023-08-12 PROCEDURE — 99284 EMERGENCY DEPT VISIT MOD MDM: CPT

## 2023-08-12 PROCEDURE — 36415 COLL VENOUS BLD VENIPUNCTURE: CPT | Performed by: PHYSICIAN ASSISTANT

## 2023-08-12 PROCEDURE — 80053 COMPREHEN METABOLIC PANEL: CPT | Performed by: PHYSICIAN ASSISTANT

## 2023-08-12 PROCEDURE — 99223 1ST HOSP IP/OBS HIGH 75: CPT | Performed by: STUDENT IN AN ORGANIZED HEALTH CARE EDUCATION/TRAINING PROGRAM

## 2023-08-12 PROCEDURE — 84145 PROCALCITONIN (PCT): CPT | Performed by: PHYSICIAN ASSISTANT

## 2023-08-12 PROCEDURE — 96361 HYDRATE IV INFUSION ADD-ON: CPT

## 2023-08-12 PROCEDURE — 85025 COMPLETE CBC W/AUTO DIFF WBC: CPT | Performed by: PHYSICIAN ASSISTANT

## 2023-08-12 PROCEDURE — G1004 CDSM NDSC: HCPCS

## 2023-08-12 PROCEDURE — 87491 CHLMYD TRACH DNA AMP PROBE: CPT | Performed by: STUDENT IN AN ORGANIZED HEALTH CARE EDUCATION/TRAINING PROGRAM

## 2023-08-12 PROCEDURE — 96375 TX/PRO/DX INJ NEW DRUG ADDON: CPT

## 2023-08-12 PROCEDURE — 85730 THROMBOPLASTIN TIME PARTIAL: CPT | Performed by: PHYSICIAN ASSISTANT

## 2023-08-12 PROCEDURE — 71046 X-RAY EXAM CHEST 2 VIEWS: CPT

## 2023-08-12 PROCEDURE — 85610 PROTHROMBIN TIME: CPT | Performed by: PHYSICIAN ASSISTANT

## 2023-08-12 PROCEDURE — 93005 ELECTROCARDIOGRAM TRACING: CPT

## 2023-08-12 PROCEDURE — 74177 CT ABD & PELVIS W/CONTRAST: CPT

## 2023-08-12 PROCEDURE — 83880 ASSAY OF NATRIURETIC PEPTIDE: CPT | Performed by: PHYSICIAN ASSISTANT

## 2023-08-12 PROCEDURE — 87040 BLOOD CULTURE FOR BACTERIA: CPT | Performed by: PHYSICIAN ASSISTANT

## 2023-08-12 PROCEDURE — 87077 CULTURE AEROBIC IDENTIFY: CPT | Performed by: PHYSICIAN ASSISTANT

## 2023-08-12 PROCEDURE — 96365 THER/PROPH/DIAG IV INF INIT: CPT

## 2023-08-12 PROCEDURE — 51798 US URINE CAPACITY MEASURE: CPT

## 2023-08-12 PROCEDURE — 87186 SC STD MICRODIL/AGAR DIL: CPT | Performed by: PHYSICIAN ASSISTANT

## 2023-08-12 PROCEDURE — 71260 CT THORAX DX C+: CPT

## 2023-08-12 PROCEDURE — 86618 LYME DISEASE ANTIBODY: CPT | Performed by: PHYSICIAN ASSISTANT

## 2023-08-12 PROCEDURE — 83605 ASSAY OF LACTIC ACID: CPT | Performed by: PHYSICIAN ASSISTANT

## 2023-08-12 RX ORDER — LIDOCAINE HYDROCHLORIDE 20 MG/ML
1 JELLY TOPICAL ONCE
Status: DISCONTINUED | OUTPATIENT
Start: 2023-08-12 | End: 2023-08-16 | Stop reason: HOSPADM

## 2023-08-12 RX ORDER — CITALOPRAM 20 MG/1
20 TABLET ORAL DAILY
Status: DISCONTINUED | OUTPATIENT
Start: 2023-08-13 | End: 2023-08-16 | Stop reason: HOSPADM

## 2023-08-12 RX ORDER — MORPHINE SULFATE 4 MG/ML
4 INJECTION, SOLUTION INTRAMUSCULAR; INTRAVENOUS ONCE
Status: COMPLETED | OUTPATIENT
Start: 2023-08-12 | End: 2023-08-12

## 2023-08-12 RX ORDER — SODIUM CHLORIDE, SODIUM GLUCONATE, SODIUM ACETATE, POTASSIUM CHLORIDE, MAGNESIUM CHLORIDE, SODIUM PHOSPHATE, DIBASIC, AND POTASSIUM PHOSPHATE .53; .5; .37; .037; .03; .012; .00082 G/100ML; G/100ML; G/100ML; G/100ML; G/100ML; G/100ML; G/100ML
500 INJECTION, SOLUTION INTRAVENOUS ONCE
Status: COMPLETED | OUTPATIENT
Start: 2023-08-12 | End: 2023-08-12

## 2023-08-12 RX ORDER — CEFTRIAXONE 1 G/50ML
1000 INJECTION, SOLUTION INTRAVENOUS EVERY 24 HOURS
Status: DISCONTINUED | OUTPATIENT
Start: 2023-08-13 | End: 2023-08-15

## 2023-08-12 RX ORDER — DOXYCYCLINE HYCLATE 100 MG/1
100 CAPSULE ORAL EVERY 12 HOURS SCHEDULED
Status: DISCONTINUED | OUTPATIENT
Start: 2023-08-12 | End: 2023-08-15

## 2023-08-12 RX ORDER — SOTALOL HYDROCHLORIDE 80 MG/1
80 TABLET ORAL EVERY 12 HOURS
Status: DISCONTINUED | OUTPATIENT
Start: 2023-08-12 | End: 2023-08-16 | Stop reason: HOSPADM

## 2023-08-12 RX ORDER — TAMSULOSIN HYDROCHLORIDE 0.4 MG/1
0.4 CAPSULE ORAL 2 TIMES DAILY
Status: DISCONTINUED | OUTPATIENT
Start: 2023-08-12 | End: 2023-08-16 | Stop reason: HOSPADM

## 2023-08-12 RX ORDER — MONTELUKAST SODIUM 10 MG/1
10 TABLET ORAL DAILY
Status: DISCONTINUED | OUTPATIENT
Start: 2023-08-13 | End: 2023-08-16 | Stop reason: HOSPADM

## 2023-08-12 RX ORDER — ACETAMINOPHEN 325 MG/1
650 TABLET ORAL EVERY 6 HOURS PRN
Status: DISCONTINUED | OUTPATIENT
Start: 2023-08-12 | End: 2023-08-16 | Stop reason: HOSPADM

## 2023-08-12 RX ORDER — PANTOPRAZOLE SODIUM 40 MG/1
40 TABLET, DELAYED RELEASE ORAL
Status: DISCONTINUED | OUTPATIENT
Start: 2023-08-13 | End: 2023-08-16 | Stop reason: HOSPADM

## 2023-08-12 RX ORDER — ATORVASTATIN CALCIUM 20 MG/1
20 TABLET, FILM COATED ORAL DAILY
Status: DISCONTINUED | OUTPATIENT
Start: 2023-08-13 | End: 2023-08-16 | Stop reason: HOSPADM

## 2023-08-12 RX ORDER — ENOXAPARIN SODIUM 100 MG/ML
40 INJECTION SUBCUTANEOUS DAILY
Status: DISCONTINUED | OUTPATIENT
Start: 2023-08-13 | End: 2023-08-16 | Stop reason: HOSPADM

## 2023-08-12 RX ORDER — LORAZEPAM 0.5 MG/1
0.5 TABLET ORAL EVERY 6 HOURS PRN
Status: DISCONTINUED | OUTPATIENT
Start: 2023-08-12 | End: 2023-08-16 | Stop reason: HOSPADM

## 2023-08-12 RX ORDER — CEFTRIAXONE 1 G/50ML
1000 INJECTION, SOLUTION INTRAVENOUS ONCE
Status: COMPLETED | OUTPATIENT
Start: 2023-08-12 | End: 2023-08-12

## 2023-08-12 RX ORDER — FLUTICASONE PROPIONATE 50 MCG
1 SPRAY, SUSPENSION (ML) NASAL DAILY
Status: DISCONTINUED | OUTPATIENT
Start: 2023-08-13 | End: 2023-08-16 | Stop reason: HOSPADM

## 2023-08-12 RX ADMIN — TAMSULOSIN HYDROCHLORIDE 0.4 MG: 0.4 CAPSULE ORAL at 20:26

## 2023-08-12 RX ADMIN — SODIUM CHLORIDE, SODIUM GLUCONATE, SODIUM ACETATE, POTASSIUM CHLORIDE, MAGNESIUM CHLORIDE, SODIUM PHOSPHATE, DIBASIC, AND POTASSIUM PHOSPHATE 500 ML: .53; .5; .37; .037; .03; .012; .00082 INJECTION, SOLUTION INTRAVENOUS at 22:06

## 2023-08-12 RX ADMIN — IOHEXOL 100 ML: 350 INJECTION, SOLUTION INTRAVENOUS at 16:05

## 2023-08-12 RX ADMIN — DOXYCYCLINE 100 MG: 100 CAPSULE ORAL at 20:28

## 2023-08-12 RX ADMIN — SOTALOL HYDROCHLORIDE 80 MG: 80 TABLET ORAL at 20:26

## 2023-08-12 RX ADMIN — ACETAMINOPHEN 650 MG: 325 TABLET, FILM COATED ORAL at 20:52

## 2023-08-12 RX ADMIN — SODIUM CHLORIDE 1000 ML: 0.9 INJECTION, SOLUTION INTRAVENOUS at 17:52

## 2023-08-12 RX ADMIN — MORPHINE SULFATE 4 MG: 4 INJECTION INTRAVENOUS at 17:52

## 2023-08-12 RX ADMIN — SODIUM CHLORIDE 1000 ML: 0.9 INJECTION, SOLUTION INTRAVENOUS at 15:00

## 2023-08-12 RX ADMIN — CEFTRIAXONE 1000 MG: 1 INJECTION, SOLUTION INTRAVENOUS at 16:32

## 2023-08-12 NOTE — ASSESSMENT & PLAN NOTE
As evidenced by tachycardia leukocytosis, setting of UTI and seminal vesiculitis noted on imaging. 4 day hx of dysuria, along with urethral discharge. Pt reports he has not been sexually active for almost 15 years. Does have hx of STD in past for which he has been tx. · CT scan personally reviewed, showed inflammatory fat stranding and some fluid around seminal vesicles.   No evidence of abscess  · Given findings + sx will cover for both GC/Cl for now pending results, HIV pending  · Prolonged Qtc, started on doxycycline 100 mg bid for chlamydia coverage  · Continue with IV ceftriaxone  · Follow-up on urine culture and blood culture  · Monitor fever curve and WBC

## 2023-08-12 NOTE — ED PROVIDER NOTES
History  Chief Complaint   Patient presents with   • Fever     Pt c/o intermittent fever since last night, pt also c/o weakness . Pt was placed on prednisone on Wednesday from his provider and feels like he is dehydrated      Hilaria Brown is a 82-year-old male presenting to the emergency department for evaluation with complaint of fever/chills since last night, accompanied by generalized weakness. Patient reports that he was placed on prednisone on Wednesday by his allergist and has been taking this as directed, expressing concern for dehydration because of this. He states that he had minimal urine output today, denying dysuria, urinary urgency or frequency, malodorous or cloudy urine, back pain or flank pain. He denies congestion, cough, shortness of breath, chest pain, abdominal pain, nausea, vomiting, diarrhea. Denies any recent travel, recent antibiotic use, recent illness or sick contact. Denies any tick bites or rashes, arthralgias, myalgias. He offers no other complaints or concerns at this time. Prior to Admission Medications   Prescriptions Last Dose Informant Patient Reported? Taking? Calcium Carb-Cholecalciferol 1000-800 MG-UNIT TABS  Self Yes No   Sig: Take by mouth   LORazepam (ATIVAN) 0.5 mg tablet  Self No No   Sig: Take 1 tablet (0.5 mg total) by mouth every 6 (six) hours as needed for anxiety   aspirin 81 MG tablet  Self Yes No   Sig: Take by mouth   atorvastatin (LIPITOR) 20 mg tablet   No No   Sig: TAKE 1 TABLET BY MOUTH EVERY DAY   budesonide (Pulmicort Flexhaler) 180 MCG/ACT inhaler  Self No No   Sig: Inhale 2 puffs 2 (two) times a day Rinse mouth after use. citalopram (CeleXA) 20 mg tablet  Self No No   Sig: TAKE 1 TABLET BY MOUTH EVERY DAY   fluticasone (FLONASE) 50 mcg/act nasal spray  Self Yes No   Si spray into each nostril daily   ipratropium (ATROVENT) 0.06 % nasal spray  Self No No   Si SPRAY INTO EACH NOSTRIL IN THE MORNING AND 1 SPRAY BEFORE BEDTIME. montelukast (SINGULAIR) 10 mg tablet  Self No No   Sig: TAKE 1 TABLET BY MOUTH EVERY DAY   omeprazole (PriLOSEC) 40 MG capsule   No No   Sig: TAKE 1 CAPSULE BY MOUTH EVERY DAY   sotalol (BETAPACE) 80 mg tablet  Self No No   Sig: TAKE 1 TABLET BY MOUTH EVERY 12 HOURS.   tamsulosin (FLOMAX) 0.4 mg  Self Yes No   Sig: Take 0.4 mg by mouth 2 (two) times a day      Facility-Administered Medications: None       Past Medical History:   Diagnosis Date   • Asthma     allergy induced   • Basal cell carcinoma    • Colon polyp    • Fatty liver    • H/O nonmelanoma skin cancer     last assessed-8/22/2017   • Hyperlipidemia    • Inflamed seborrheic keratosis    • Malignant neoplasm of skin     last assessed-1/21/2014   • Neoplasm of uncertain behavior of skin    • Nocturia    • Pneumothorax, left    • Seasonal allergies    • Sebaceous cyst    • 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/01/2022    SCCIS- mid scalp   • Squamous cell carcinoma of skin of neck    • Testicular hypofunction    • Viral warts        Past Surgical History:   Procedure Laterality Date   • CARDIAC CATHETERIZATION     • CARDIAC PACEMAKER PLACEMENT  05/05/2021   • CARDIAC PACEMAKER PLACEMENT     • CARDIOVASCULAR STRESS TEST  08/27/2010   • COLONOSCOPY  10/08/2008   • IR OTHER  04/15/2021   • LUNG SURGERY      for pneumothorax   • MOHS SURGERY  02/15/2022    SCCIS mid scalp- Dr Amanda Serrano   • PARTIAL HIP ARTHROPLASTY     • RHINOPLASTY         Family History   Problem Relation Age of Onset   • Colon cancer Father      I have reviewed and agree with the history as documented. E-Cigarette/Vaping   • E-Cigarette Use Never User      E-Cigarette/Vaping Substances   • Nicotine No    • THC No    • CBD No    • Flavoring No    • Other No    • Unknown No      Social History     Tobacco Use   • Smoking status: Never   • Smokeless tobacco: Never   Vaping Use   • Vaping Use: Never used   Substance Use Topics   • Alcohol use: No     Comment: quit march 2020   • Drug use:  No Review of Systems   Constitutional: Positive for chills and fever. HENT: Negative for congestion, ear pain and sore throat. Respiratory: Negative for cough and shortness of breath. Cardiovascular: Negative for chest pain. Gastrointestinal: Negative for abdominal pain, diarrhea, nausea and vomiting. Genitourinary: Positive for decreased urine volume. Negative for flank pain and urgency. Musculoskeletal: Negative for back pain. Neurological: Positive for weakness. All other systems reviewed and are negative. Physical Exam  Physical Exam  Vitals and nursing note reviewed. Constitutional:       General: He is not in acute distress. Appearance: Normal appearance. He is well-developed. He is not ill-appearing, toxic-appearing or diaphoretic. HENT:      Head: Normocephalic and atraumatic. Right Ear: External ear normal.      Left Ear: External ear normal.      Mouth/Throat:      Mouth: Mucous membranes are dry. Eyes:      Conjunctiva/sclera: Conjunctivae normal.   Cardiovascular:      Rate and Rhythm: Regular rhythm. Tachycardia present. Pulses: Normal pulses. Pulmonary:      Effort: Pulmonary effort is normal. No respiratory distress. Breath sounds: Normal breath sounds. No wheezing, rhonchi or rales. Abdominal:      General: There is no distension. Palpations: Abdomen is soft. Tenderness: There is no abdominal tenderness. There is no guarding. Musculoskeletal:         General: Normal range of motion. Cervical back: Normal range of motion and neck supple. Skin:     General: Skin is warm and dry. Capillary Refill: Capillary refill takes less than 2 seconds. Neurological:      Mental Status: He is alert. Motor: Motor function is intact.    Psychiatric:         Mood and Affect: Mood normal.         Vital Signs  ED Triage Vitals   Temperature Pulse Respirations Blood Pressure SpO2   08/12/23 1417 08/12/23 1417 08/12/23 1417 08/12/23 1417 08/12/23 1417   98.3 °F (36.8 °C) (!) 135 20 146/67 98 %      Temp Source Heart Rate Source Patient Position - Orthostatic VS BP Location FiO2 (%)   08/12/23 1417 08/12/23 1417 -- 08/12/23 1700 --   Tympanic Monitor  Left arm       Pain Score       --                  Vitals:    08/12/23 1417 08/12/23 1700 08/12/23 1715 08/12/23 1730   BP: 146/67 117/70  129/63   Pulse: (!) 135 (!) 127 (!) 119 (!) 116         Visual Acuity      ED Medications  Medications   lidocaine (URO-JET) 2 % urethral/mucosal gel 1 Application (1 Application Urethral Not Given 8/12/23 1658)   sodium chloride 0.9 % bolus 1,000 mL (1,000 mL Intravenous New Bag 8/12/23 1752)   sodium chloride 0.9 % bolus 1,000 mL (0 mL Intravenous Stopped 8/12/23 1620)   cefTRIAXone (ROCEPHIN) IVPB (premix in dextrose) 1,000 mg 50 mL (0 mg Intravenous Stopped 8/12/23 1714)   iohexol (OMNIPAQUE) 350 MG/ML injection (SINGLE-DOSE) 100 mL (100 mL Intravenous Given 8/12/23 1605)   morphine injection 4 mg (4 mg Intravenous Given 8/12/23 1752)       Diagnostic Studies  Results Reviewed     Procedure Component Value Units Date/Time    HS Troponin I 2hr [[id number]] Collected: 08/12/23 1703    Lab Status: In process Specimen: Blood from Arm, Right Updated: 08/12/23 1733    HS Troponin I 4hr [[id number]]     Lab Status: No result Specimen: Blood     Urine Microscopic [[id number]]  (Abnormal) Collected: 08/12/23 1536    Lab Status: Final result Specimen: Urine, Clean Catch Updated: 08/12/23 1549     RBC, UA 20-30 /hpf      WBC, UA Innumerable /hpf      Epithelial Cells Occasional /hpf      Bacteria, UA Innumerable /hpf      MUCUS THREADS Innumerable    Urine culture [[id number]] Collected: 08/12/23 1536    Lab Status:  In process Specimen: Urine, Clean Catch Updated: 08/12/23 1549    UA w Reflex to Microscopic w Reflex to Culture [[id number]]  (Abnormal) Collected: 08/12/23 1534    Lab Status: Final result Specimen: Urine, Clean Catch Updated: 08/12/23 1152     Color, UA Yellow Clarity, UA Turbid     Specific Gravity, UA 1.022     pH, UA 6.0     Leukocytes, UA Large     Nitrite, UA Negative     Protein, UA 30 (1+) mg/dl      Glucose, UA Negative mg/dl      Ketones, UA Negative mg/dl      Urobilinogen, UA <2.0 mg/dl      Bilirubin, UA Negative     Occult Blood, UA Moderate    Procalcitonin [[id number]]  (Abnormal) Collected: 08/12/23 1438    Lab Status: Final result Specimen: Blood from Arm, Right Updated: 08/12/23 1518     Procalcitonin 0.45 ng/ml     HS Troponin 0hr (reflex protocol) [[id number]]  (Normal) Collected: 08/12/23 1438    Lab Status: Final result Specimen: Blood from Arm, Right Updated: 08/12/23 1514     hs TnI 0hr 6 ng/L     B-Type Natriuretic Peptide(BNP) [[id number]]  (Abnormal) Collected: 08/12/23 1438    Lab Status: Final result Specimen: Blood from Arm, Right Updated: 08/12/23 1514      pg/mL     Comprehensive metabolic panel [[id number]]  (Abnormal) Collected: 08/12/23 1438    Lab Status: Final result Specimen: Blood from Arm, Right Updated: 08/12/23 1505     Sodium 134 mmol/L      Potassium 4.0 mmol/L      Chloride 100 mmol/L      CO2 24 mmol/L      ANION GAP 10 mmol/L      BUN 15 mg/dL      Creatinine 0.90 mg/dL      Glucose 118 mg/dL      Calcium 9.6 mg/dL      AST 13 U/L      ALT 19 U/L      Alkaline Phosphatase 59 U/L      Total Protein 6.8 g/dL      Albumin 4.4 g/dL      Total Bilirubin 1.23 mg/dL      eGFR 88 ml/min/1.73sq m     Narrative:      [place] guidelines for Chronic Kidney Disease (CKD):   •  Stage 1 with normal or high GFR (GFR > 90 mL/min/1.73 square meters)  •  Stage 2 Mild CKD (GFR = 60-89 mL/min/1.73 square meters)  •  Stage 3A Moderate CKD (GFR = 45-59 mL/min/1.73 square meters)  •  Stage 3B Moderate CKD (GFR = 30-44 mL/min/1.73 square meters)  •  Stage 4 Severe CKD (GFR = 15-29 mL/min/1.73 square meters)  •  Stage 5 End Stage CKD (GFR <15 mL/min/1.73 square meters)  Note: GFR calculation is accurate only with a steady state creatinine    Lactic acid [[id number]]  (Normal) Collected: 08/12/23 1438    Lab Status: Final result Specimen: Blood from Arm, Right Updated: 08/12/23 1504     LACTIC ACID 1.7 mmol/L     Narrative:      Result may be elevated if tourniquet was used during collection. APTT [[id number]]  (Normal) Collected: 08/12/23 1438    Lab Status: Final result Specimen: Blood from Arm, Right Updated: 08/12/23 1503     PTT 29 seconds     Protime-INR [[id number]]  (Abnormal) Collected: 08/12/23 1438    Lab Status: Final result Specimen: Blood from Arm, Right Updated: 08/12/23 1503     Protime 14.6 seconds      INR 1.16    Blood culture #1 [[id number]] Collected: 08/12/23 1448    Lab Status: In process Specimen: Blood from Hand, Right Updated: 08/12/23 1451    CBC and differential [[id number]]  (Abnormal) Collected: 08/12/23 1438    Lab Status: Final result Specimen: Blood from Arm, Right Updated: 08/12/23 1450     WBC 19.15 Thousand/uL      RBC 4.84 Million/uL      Hemoglobin 15.0 g/dL      Hematocrit 43.8 %      MCV 91 fL      MCH 31.0 pg      MCHC 34.2 g/dL      RDW 13.2 %      MPV 9.9 fL      Platelets 478 Thousands/uL      nRBC 0 /100 WBCs      Neutrophils Relative 83 %      Immat GRANS % 1 %      Lymphocytes Relative 4 %      Monocytes Relative 12 %      Eosinophils Relative 0 %      Basophils Relative 0 %      Neutrophils Absolute 15.96 Thousands/µL      Immature Grans Absolute 0.09 Thousand/uL      Lymphocytes Absolute 0.72 Thousands/µL      Monocytes Absolute 2.31 Thousand/µL      Eosinophils Absolute 0.04 Thousand/µL      Basophils Absolute 0.03 Thousands/µL     Anaplasma Phagocytophilum, PCR [[id number]] Collected: 08/12/23 1438    Lab Status: In process Specimen: Blood from Arm, Right Updated: 08/12/23 1448    Lyme Total AB W Reflex to IGM/IGG [[id number]] Collected: 08/12/23 1438    Lab Status:  In process Specimen: Blood from Arm, Right Updated: 08/12/23 1448    Blood culture #2 [[id number]] Collected: 08/12/23 1438    Lab Status: In process Specimen: Blood from Arm, Right Updated: 08/12/23 1448                 XR chest pa & lateral    (Results Pending)   CT chest abdomen pelvis w contrast    (Results Pending)              Procedures  ECG 12 Lead Documentation Only    Date/Time: 8/12/2023 2:50 PM    Performed by: Rachael Rivas PA-C  Authorized by: Rachael Rivas PA-C    Indications / Diagnosis:  Weakness  ECG reviewed by me, the ED Provider: yes    Patient location:  ED  Rate:     ECG rate:  131  Rhythm:     Rhythm: sinus tachycardia    QRS:     QRS axis:  Left  Conduction:     Conduction: normal    ST segments:     ST segments:  Non-specific  T waves:     T waves: non-specific               ED Course  ED Course as of 08/12/23 1758   Sat Aug 12, 2023   1553 UA showing evidence of UTI. Will proceed with CT for further evaluation as patient is otherwise asymptomatic from this. Bladder scan 550 mL post void and will place [place] for treatment of urinary retention                               SBIRT 20yo+    Flowsheet Row Most Recent Value   Initial Alcohol Screen: US AUDIT-C     1. How often do you have a drink containing alcohol? 0 Filed at: 08/12/2023 1735   2. How many drinks containing alcohol do you have on a typical day you are drinking? 0 Filed at: 08/12/2023 1735   3b. FEMALE Any Age, or MALE 65+: How often do you have 4 or more drinks on one occassion? 0 Filed at: 08/12/2023 1735   Audit-C Score 0 Filed at: 08/12/2023 1735   OSBALDO: How many times in the past year have you. .. Used an illegal drug or used a prescription medication for non-medical reasons? Never Filed at: 08/12/2023 1735                    Medical Decision Making  This is a 73yo male with fever/chills x1 day accompanied by generalized weakness.     Fever/chills x 1 day with broad differential, including but not limited to: URI, pneumonia, electrolyte derangements, anemia, diverticulitis, cystitis, UTI, prostatitis, tick-borne illness    Initial ED plan: ecg, labs, imaging, anticipate admission    Final ED Assessment: Vital signs reviewed on ED presentation, examination as above. Patient tachycardic which has responded to IV fluids. All labs and imaging independently reviewed with imaging interpreted by the Radiologist.  Lab work demonstrates leukocytosis of 19.15 as well as elevated procalcitonin of 0.45. Urinalysis is consistent with urinary tract infection. IV fluids and Rocephin were initiated. Bladder scan is also consistent with acute urinary retention and [place] catheter placed with improvement of symptoms per the patient. [facility] while awaiting results of CT chest/abdomen/pelvis, with plan to proceed with hospital admission for continued IV hydration and antibiotics. Test results reviewed with the patient thus far and admission plan which he is understanding of and agreeable with. Patient hemodynamically stable in no acute distress at time of signout. Amount and/or Complexity of Data Reviewed  Labs: ordered. Radiology: ordered. ECG/medicine tests: ordered. Risk  Prescription drug management. Decision regarding hospitalization. Disposition  Final diagnoses:   None     ED Disposition     ED Disposition   Admit    Condition   Stable    Date/Time   Sat Aug 12, 2023  4:40 PM    Comment   Case was discussed with Dr. Elsy Patterson and the patient's admission status was agreed to be Admission Status: observation status to the service of Dr. Elsy Patterson . Follow-up Information    None         Patient's Medications   Discharge Prescriptions    No medications on file       No discharge procedures on file.     PDMP Review     None          ED Provider  Electronically Signed by           Ruy Patel PA-C  08/12/23 7332

## 2023-08-12 NOTE — H&P
[address]  H&P  Name: Roxana Tapia  MRN: [id number]  Unit/Bed#: -25 I Date of Admission: 8/12/2023   Date of Service: 8/12/2023 I Hospital Day: 0    Assessment/Plan   * Sepsis [facility])  Assessment & Plan  As evidenced by tachycardia leukocytosis, setting of UTI and seminal vesiculitis noted on imaging. 4 day hx of dysuria, along with urethral discharge. Pt reports he has not been sexually active for almost 15 years. Does have hx of STD in past for which he has been tx. · CT scan personally reviewed, showed inflammatory fat stranding and some fluid around seminal vesicles. No evidence of abscess  · Given findings + sx will cover for both GC/Cl for now pending results, HIV pending  · Prolonged Qtc, started on doxycycline 100 mg bid for chlamydia coverage  · Continue with IV ceftriaxone  · Follow-up on urine culture and blood culture  · Monitor fever curve and WBC    Urinary retention  Assessment & Plan  Noted to have 800cc on bladder scan in ED, aldana catheter inserted. 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 Code Status: Level 1 - Full Code   Discussion with family: PT.      Anticipated Length of Stay: Patient will be admitted on an inpatient basis with an anticipated length of stay of greater than 2 midnights secondary to sepsis. Total Time Spent on Date of Encounter in care of patient: 55 minutes This time was spent on one or more of the following: performing physical exam; counseling and coordination of care; obtaining or reviewing history; documenting in the medical record; reviewing/ordering tests, medications or procedures; communicating with other healthcare professionals and discussing with patient's family/caregivers. Chief Complaint: fevers and chills    History of Present Illness:  Haley Jara is a 79 y.o. male with a PMH of asthma, HLD, SSS s/p PPM on sotalol who presents with 2 day hx of fevers and chills. Ongoing dysuria for the past few days as well as lower abdominal pain. Also reports some white urethral discharge. Does have hx of STD. Has not been sexually active in about 15 years. Denies etoh use hx. Admitted for further management of sepsis. Review of Systems:  Review of Systems   Constitutional: Positive for chills, fatigue and fever. Genitourinary: Positive for difficulty urinating, frequency and penile discharge.        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/01/2022    SCCIS- mid scalp   • Squamous cell carcinoma of skin of neck    • Testicular hypofunction    • Viral warts        Past Surgical History:   Procedure Laterality Date   • CARDIAC CATHETERIZATION     • CARDIAC PACEMAKER PLACEMENT  05/05/2021   • CARDIAC PACEMAKER PLACEMENT     • CARDIOVASCULAR STRESS TEST  08/27/2010   • COLONOSCOPY  10/08/2008   • IR OTHER  04/15/2021   • LUNG SURGERY      for pneumothorax   • MOHS SURGERY  02/15/2022    SCCIS mid scalp- Dr Rebekah Quintanilla   • PARTIAL HIP ARTHROPLASTY     • RHINOPLASTY         Meds/Allergies:  Prior to Admission medications    Medication Sig Start Date End Date Taking? Authorizing Provider   aspirin 81 MG tablet Take by mouth    Historical Provider, MD   atorvastatin (LIPITOR) 20 mg tablet TAKE 1 TABLET BY MOUTH EVERY DAY 7/30/23   Inocencio Raza MD   budesonide (Pulmicort Flexhaler) 180 MCG/ACT inhaler Inhale 2 puffs 2 (two) times a day Rinse mouth after use. 9/6/22   BABS Staples   Calcium Carb-Cholecalciferol 1000-800 MG-UNIT TABS Take by mouth    Historical Provider, MD   citalopram (CeleXA) 20 mg tablet TAKE 1 TABLET BY MOUTH EVERY DAY 8/22/22   Inocencio Raza MD   fluticasone (FLONASE) 50 mcg/act nasal spray 1 spray into each nostril daily    Historical Provider, MD   ipratropium (ATROVENT) 0.06 % nasal spray 1 SPRAY INTO EACH NOSTRIL IN THE MORNING AND 1 SPRAY BEFORE BEDTIME. 3/10/23   BABS Staples   LORazepam (ATIVAN) 0.5 mg tablet Take 1 tablet (0.5 mg total) by mouth every 6 (six) hours as needed for anxiety 12/8/22   Inocencio Raza MD   montelukast (SINGULAIR) 10 mg tablet TAKE 1 TABLET BY MOUTH EVERY DAY 12/12/22   BABS Staples   omeprazole (PriLOSEC) 40 MG capsule TAKE 1 CAPSULE BY MOUTH EVERY DAY 6/13/23   Inocencio Raza MD   sotalol (BETAPACE) 80 mg tablet TAKE 1 TABLET BY MOUTH EVERY 12 HOURS. 1/16/23   Deandre Acevedo MD   tamsulosin (FLOMAX) 0.4 mg Take 0.4 mg by mouth 2 (two) times a day    Historical Provider, MD GAONA have reviewed home medications with patient personally. Allergies:    Allergies   Allergen Reactions   • Dust Mite Extract    • Molds & Smuts    • Pollen Extract    • Short Ragweed Pollen Ext    • Tree Extract        Social History:  Marital Status: Single     Substance Use History:   Social History     Substance and Sexual Activity   Alcohol Use No    Comment: quit march 2020 Social History     Tobacco Use   Smoking Status Never   Smokeless Tobacco Never     Social History     Substance and Sexual Activity   Drug Use No       Family History:  Family History   Problem Relation Age of Onset   • Colon cancer Father        Physical Exam:     Vitals:   Blood Pressure: 122/54 (08/12/23 1930)  Pulse: 95 (08/12/23 1930)  Temperature: 99.6 °F (37.6 °C) (08/12/23 1835)  Temp Source: Oral (08/12/23 1835)  Respirations: 16 (08/12/23 1930)  SpO2: 95 % (08/12/23 1930)    Physical Exam  Vitals reviewed. Constitutional:       General: He is not in acute distress. Appearance: He is well-developed. He is not diaphoretic. HENT:      Head: Normocephalic and atraumatic. Mouth/Throat:      Pharynx: No oropharyngeal exudate. Eyes:      General: No scleral icterus. Extraocular Movements: Extraocular movements intact. Conjunctiva/sclera: Conjunctivae normal.   Neck:      Vascular: No JVD. Trachea: No tracheal deviation. Cardiovascular:      Rate and Rhythm: Regular rhythm. Tachycardia present. Heart sounds: No murmur heard. No friction rub. No gallop. Pulmonary:      Effort: Pulmonary effort is normal. No respiratory distress. Breath sounds: No stridor. No wheezing. Abdominal:      General: There is no distension. Palpations: Abdomen is soft. There is no mass. Tenderness: There is no abdominal tenderness. There is no right CVA tenderness or left CVA tenderness. Genitourinary:     Comments: [place] in place draining yellow urine  Musculoskeletal:         General: No tenderness. Normal range of motion. Right lower leg: No edema. Left lower leg: No edema. Skin:     General: Skin is warm and dry. Coloration: Skin is not pale. Findings: No erythema. Neurological:      Mental Status: He is alert and oriented to person, place, and time. Psychiatric:         Behavior: Behavior normal.         Thought Content:  Thought content normal. Additional Data:     Lab Results:  Results from last 7 days   Lab Units 08/12/23  1438   WBC Thousand/uL 19.15*   HEMOGLOBIN g/dL 15.0   HEMATOCRIT % 43.8   PLATELETS Thousands/uL 164   NEUTROS PCT % 83*   LYMPHS PCT % 4*   MONOS PCT % 12   EOS PCT % 0     Results from last 7 days   Lab Units 08/12/23  1438   SODIUM mmol/L 134*   POTASSIUM mmol/L 4.0   CHLORIDE mmol/L 100   CO2 mmol/L 24   BUN mg/dL 15   CREATININE mg/dL 0.90   ANION GAP mmol/L 10   CALCIUM mg/dL 9.6   ALBUMIN g/dL 4.4   TOTAL BILIRUBIN mg/dL 1.23*   ALK PHOS U/L 59   ALT U/L 19   AST U/L 13   GLUCOSE RANDOM mg/dL 118     Results from last 7 days   Lab Units 08/12/23  1438   INR  1.16             Results from last 7 days   Lab Units 08/12/23  1438   LACTIC ACID mmol/L 1.7   PROCALCITONIN ng/ml 0.45*       Lines/Drains:  Invasive Devices     Peripheral Intravenous Line  Duration           Peripheral IV 08/12/23 Left Forearm <1 day    Peripheral IV 08/12/23 Right Antecubital <1 day          Drain  Duration           Urethral Catheter Coude 18 Fr. <1 day              Urinary Catheter:  Goal for removal: Voiding trial when ambulation improves             Imaging: Reviewed radiology reports from this admission including: abdominal/pelvic CT  CT chest abdomen pelvis w contrast   Final Result by Pedro Robles MD (08/12 1803)      Enlarged low-attenuation seminal vesicles with surrounding inflammatory fat stranding and trace amount of surrounding fluid compatible with seminal vesiculitis. No evidence of abscess. Hypervascular lesion in the liver straddling segments 4A and 8. Recommend further characterization with a dedicated liver MRI not emergently. 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 The study was marked in [facility] for immediate notification.             Workstation performed: [id number]         XR chest pa & lateral    (Results Pending)       EKG and Other Studies Reviewed on Admission:   · EKG: Sinus Tachycardia. .    ** Please Note: This note has been constructed using a voice recognition system.  **

## 2023-08-13 LAB
ANION GAP SERPL CALCULATED.3IONS-SCNC: 9 MMOL/L
B BURGDOR IGG+IGM SER QL IA: NEGATIVE
BASOPHILS # BLD AUTO: 0.05 THOUSANDS/ÂΜL (ref 0–0.1)
BASOPHILS NFR BLD AUTO: 0 % (ref 0–1)
BUN SERPL-MCNC: 13 MG/DL (ref 5–25)
CALCIUM SERPL-MCNC: 8.5 MG/DL (ref 8.4–10.2)
CHLORIDE SERPL-SCNC: 106 MMOL/L (ref 96–108)
CO2 SERPL-SCNC: 23 MMOL/L (ref 21–32)
CREAT SERPL-MCNC: 0.72 MG/DL (ref 0.6–1.3)
EOSINOPHIL # BLD AUTO: 0.01 THOUSAND/ÂΜL (ref 0–0.61)
EOSINOPHIL NFR BLD AUTO: 0 % (ref 0–6)
ERYTHROCYTE [DISTWIDTH] IN BLOOD BY AUTOMATED COUNT: 13.4 % (ref 11.6–15.1)
GFR SERPL CREATININE-BSD FRML MDRD: 97 ML/MIN/1.73SQ M
GLUCOSE SERPL-MCNC: 105 MG/DL (ref 65–140)
HCT VFR BLD AUTO: 39.2 % (ref 36.5–49.3)
HGB BLD-MCNC: 13.1 G/DL (ref 12–17)
HIV 1+2 AB+HIV1 P24 AG SERPL QL IA: NORMAL
HIV1 P24 AG SER QL: NORMAL
IMM GRANULOCYTES # BLD AUTO: 0.1 THOUSAND/UL (ref 0–0.2)
IMM GRANULOCYTES NFR BLD AUTO: 1 % (ref 0–2)
LYMPHOCYTES # BLD AUTO: 0.9 THOUSANDS/ÂΜL (ref 0.6–4.47)
LYMPHOCYTES NFR BLD AUTO: 7 % (ref 14–44)
MCH RBC QN AUTO: 30.5 PG (ref 26.8–34.3)
MCHC RBC AUTO-ENTMCNC: 33.4 G/DL (ref 31.4–37.4)
MCV RBC AUTO: 91 FL (ref 82–98)
MONOCYTES # BLD AUTO: 1.75 THOUSAND/ÂΜL (ref 0.17–1.22)
MONOCYTES NFR BLD AUTO: 13 % (ref 4–12)
NEUTROPHILS # BLD AUTO: 11.07 THOUSANDS/ÂΜL (ref 1.85–7.62)
NEUTS SEG NFR BLD AUTO: 79 % (ref 43–75)
NRBC BLD AUTO-RTO: 0 /100 WBCS
PLATELET # BLD AUTO: 127 THOUSANDS/UL (ref 149–390)
PMV BLD AUTO: 10.6 FL (ref 8.9–12.7)
POTASSIUM SERPL-SCNC: 3.5 MMOL/L (ref 3.5–5.3)
PROCALCITONIN SERPL-MCNC: 0.65 NG/ML
RBC # BLD AUTO: 4.3 MILLION/UL (ref 3.88–5.62)
SODIUM SERPL-SCNC: 138 MMOL/L (ref 135–147)
WBC # BLD AUTO: 13.88 THOUSAND/UL (ref 4.31–10.16)

## 2023-08-13 PROCEDURE — 80048 BASIC METABOLIC PNL TOTAL CA: CPT | Performed by: STUDENT IN AN ORGANIZED HEALTH CARE EDUCATION/TRAINING PROGRAM

## 2023-08-13 PROCEDURE — 85025 COMPLETE CBC W/AUTO DIFF WBC: CPT | Performed by: STUDENT IN AN ORGANIZED HEALTH CARE EDUCATION/TRAINING PROGRAM

## 2023-08-13 PROCEDURE — 84145 PROCALCITONIN (PCT): CPT | Performed by: STUDENT IN AN ORGANIZED HEALTH CARE EDUCATION/TRAINING PROGRAM

## 2023-08-13 PROCEDURE — 87806 HIV AG W/HIV1&2 ANTB W/OPTIC: CPT | Performed by: STUDENT IN AN ORGANIZED HEALTH CARE EDUCATION/TRAINING PROGRAM

## 2023-08-13 PROCEDURE — 99233 SBSQ HOSP IP/OBS HIGH 50: CPT | Performed by: STUDENT IN AN ORGANIZED HEALTH CARE EDUCATION/TRAINING PROGRAM

## 2023-08-13 RX ORDER — HYDROMORPHONE HCL/PF 1 MG/ML
0.5 SYRINGE (ML) INJECTION EVERY 4 HOURS PRN
Status: DISCONTINUED | OUTPATIENT
Start: 2023-08-13 | End: 2023-08-16 | Stop reason: HOSPADM

## 2023-08-13 RX ORDER — FINASTERIDE 5 MG/1
5 TABLET, FILM COATED ORAL DAILY
Status: DISCONTINUED | OUTPATIENT
Start: 2023-08-13 | End: 2023-08-16 | Stop reason: HOSPADM

## 2023-08-13 RX ORDER — HYDROMORPHONE HCL/PF 1 MG/ML
0.5 SYRINGE (ML) INJECTION
Status: DISCONTINUED | OUTPATIENT
Start: 2023-08-13 | End: 2023-08-16 | Stop reason: HOSPADM

## 2023-08-13 RX ORDER — HYDROMORPHONE HCL/PF 1 MG/ML
0.5 SYRINGE (ML) INJECTION
Status: DISCONTINUED | OUTPATIENT
Start: 2023-08-13 | End: 2023-08-13

## 2023-08-13 RX ORDER — OXYCODONE HYDROCHLORIDE 5 MG/1
5 TABLET ORAL EVERY 6 HOURS PRN
Status: DISCONTINUED | OUTPATIENT
Start: 2023-08-13 | End: 2023-08-16 | Stop reason: HOSPADM

## 2023-08-13 RX ADMIN — PANTOPRAZOLE SODIUM 40 MG: 40 TABLET, DELAYED RELEASE ORAL at 06:45

## 2023-08-13 RX ADMIN — CEFTRIAXONE 1000 MG: 1 INJECTION, SOLUTION INTRAVENOUS at 08:18

## 2023-08-13 RX ADMIN — TAMSULOSIN HYDROCHLORIDE 0.4 MG: 0.4 CAPSULE ORAL at 08:18

## 2023-08-13 RX ADMIN — FINASTERIDE 5 MG: 5 TABLET, FILM COATED ORAL at 11:24

## 2023-08-13 RX ADMIN — SOTALOL HYDROCHLORIDE 80 MG: 80 TABLET ORAL at 20:37

## 2023-08-13 RX ADMIN — LORAZEPAM 0.5 MG: 0.5 TABLET ORAL at 23:04

## 2023-08-13 RX ADMIN — ACETAMINOPHEN 650 MG: 325 TABLET, FILM COATED ORAL at 23:04

## 2023-08-13 RX ADMIN — SOTALOL HYDROCHLORIDE 80 MG: 80 TABLET ORAL at 08:19

## 2023-08-13 RX ADMIN — ENOXAPARIN SODIUM 40 MG: 40 INJECTION SUBCUTANEOUS at 08:18

## 2023-08-13 RX ADMIN — ACETAMINOPHEN 650 MG: 325 TABLET, FILM COATED ORAL at 11:27

## 2023-08-13 RX ADMIN — HYDROMORPHONE HYDROCHLORIDE 0.5 MG: 1 INJECTION, SOLUTION INTRAMUSCULAR; INTRAVENOUS; SUBCUTANEOUS at 20:37

## 2023-08-13 RX ADMIN — TAMSULOSIN HYDROCHLORIDE 0.4 MG: 0.4 CAPSULE ORAL at 17:14

## 2023-08-13 RX ADMIN — FLUTICASONE PROPIONATE 1 SPRAY: 50 SPRAY, METERED NASAL at 08:19

## 2023-08-13 RX ADMIN — HYDROMORPHONE HYDROCHLORIDE 0.5 MG: 1 INJECTION, SOLUTION INTRAMUSCULAR; INTRAVENOUS; SUBCUTANEOUS at 11:24

## 2023-08-13 RX ADMIN — DOXYCYCLINE 100 MG: 100 CAPSULE ORAL at 08:18

## 2023-08-13 RX ADMIN — ACETAMINOPHEN 650 MG: 325 TABLET, FILM COATED ORAL at 03:26

## 2023-08-13 RX ADMIN — CITALOPRAM HYDROBROMIDE 20 MG: 20 TABLET ORAL at 08:18

## 2023-08-13 RX ADMIN — FLUTICASONE FUROATE 1 PUFF: 100 POWDER RESPIRATORY (INHALATION) at 08:20

## 2023-08-13 RX ADMIN — ACETAMINOPHEN 650 MG: 325 TABLET, FILM COATED ORAL at 17:12

## 2023-08-13 RX ADMIN — ASPIRIN 81 MG: 81 TABLET, COATED ORAL at 08:18

## 2023-08-13 RX ADMIN — ATORVASTATIN CALCIUM 20 MG: 20 TABLET, FILM COATED ORAL at 08:18

## 2023-08-13 RX ADMIN — MONTELUKAST 10 MG: 10 TABLET, FILM COATED ORAL at 08:18

## 2023-08-13 RX ADMIN — DOXYCYCLINE 100 MG: 100 CAPSULE ORAL at 20:37

## 2023-08-13 NOTE — PROGRESS NOTES
[address]  Progress Note  Name: Micael Gaucher  MRN: [id number]  Unit/Bed#: -47 I Date of Admission: 8/12/2023   Date of Service: 8/13/2023 I Hospital Day: 1    Assessment/Plan   * Sepsis [facility])  Assessment & Plan  · Present on admission- tachycardia, fevers, hypotension in setting of vesiculitis   · Likely source UTI, patient denies sexual activity   · CT imaging negative for fluid or gas collection  · White count down trending, afebrile today  · Improving with antibiotics, continue  · Consult urology for further evaluation  · Continue aldana care     Urinary retention  Assessment & Plan  Noted to have 800cc on bladder scan in ED, aldana catheter inserted. 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 Sequential Compression Devices Ordered. Patient Centered Rounds: I performed bedside rounds with nursing staff today. Discussions with Specialists or Other Care Team Provider: joe    Education and Discussions with Family / Patient: Patient declined call to . Total Time Spent on Date of Encounter in care of patient: 35 minutes This time was spent on one or more of the following: performing physical exam; counseling and coordination of care; obtaining or reviewing history; documenting in the medical record; reviewing/ordering tests, medications or procedures; communicating with other healthcare professionals and discussing with patient's family/caregivers.     Current Length of Stay: 1 day(s)  Current Patient Status: Inpatient   Certification Statement: The patient will continue to require additional inpatient hospital stay due to IV antibiotics Discharge Plan: Anticipate discharge in 48-72 hrs to home. Code Status: Level 1 - Full Code    Subjective:   Patient feels better today     Objective:     Vitals:   Temp (24hrs), Av.8 °F (37.7 °C), Min:98.3 °F (36.8 °C), Max:101.5 °F (38.6 °C)    Temp:  [98.3 °F (36.8 °C)-101.5 °F (38.6 °C)] 99.2 °F (37.3 °C)  HR:  [] 60  Resp:  [16-20] 18  BP: (101-146)/(47-79) 101/47  SpO2:  [92 %-98 %] 94 %  There is no height or weight on file to calculate BMI. Input and Output Summary (last 24 hours): Intake/Output Summary (Last 24 hours) at 2023 1040  Last data filed at 2023 3944  Gross per 24 hour   Intake 2340 ml   Output 2575 ml   Net -235 ml       Physical Exam:   Physical Exam  Constitutional:       General: He is not in acute distress. Appearance: Normal appearance. He is not toxic-appearing. Cardiovascular:      Rate and Rhythm: Normal rate and regular rhythm. Heart sounds: Normal heart sounds. No murmur heard. Pulmonary:      Effort: Pulmonary effort is normal. No respiratory distress. Breath sounds: Normal breath sounds. No wheezing. Abdominal:      General: Abdomen is flat. There is no distension. Palpations: Abdomen is soft. Tenderness: There is no abdominal tenderness. Neurological:      General: No focal deficit present. Mental Status: He is alert and oriented to person, place, and time. Mental status is at baseline. Motor: No weakness.           Additional Data:     Labs:  Results from last 7 days   Lab Units 23  0503   WBC Thousand/uL 13.88*   HEMOGLOBIN g/dL 13.1   HEMATOCRIT % 39.2   PLATELETS Thousands/uL 127*   NEUTROS PCT % 79*   LYMPHS PCT % 7*   MONOS PCT % 13*   EOS PCT % 0     Results from last 7 days   Lab Units 23  0503 23  1438   SODIUM mmol/L 138 134*   POTASSIUM mmol/L 3.5 4.0   CHLORIDE mmol/L 106 100   CO2 mmol/L 23 24   BUN mg/dL 13 15   CREATININE mg/dL 0.72 0.90   ANION GAP mmol/L 9 10   CALCIUM mg/dL 8.5 9.6   ALBUMIN g/dL  --  4.4   TOTAL BILIRUBIN mg/dL  --  1.23*   ALK PHOS U/L  --  59   ALT U/L  --  19   AST U/L  --  13   GLUCOSE RANDOM mg/dL 105 118     Results from last 7 days   Lab Units 08/12/23  1438   INR  1.16             Results from last 7 days   Lab Units 08/13/23  0503 08/12/23  1438   LACTIC ACID mmol/L  --  1.7   PROCALCITONIN ng/ml 0.65* 0.45*       Lines/Drains:  Invasive Devices     Peripheral Intravenous Line  Duration           Peripheral IV 08/12/23 Left Forearm <1 day    Peripheral IV 08/12/23 Right Antecubital <1 day          Drain  Duration           Urethral Catheter Coude 18 Fr. <1 day              Urinary Catheter:  Goal for removal: N/A- Discharging with [place]               Imaging: Reviewed radiology reports from this admission including: abdominal/pelvic CT    Recent Cultures (last 7 days):   Results from last 7 days   Lab Units 08/12/23  1448 08/12/23  1438   BLOOD CULTURE  Received in Microbiology Lab. Culture in Progress. Received in Microbiology Lab. Culture in Progress.        Last 24 Hours Medication List:   Current Facility-Administered Medications   Medication Dose Route Frequency Provider Last Rate   • acetaminophen  650 mg Oral Q6H PRN [facility]     • aspirin  81 mg Oral Daily [facility]     • atorvastatin  20 mg Oral Daily [facility]     • cefTRIAXone  1,000 mg Intravenous Q24H [facility], [facility] 1,000 mg (08/13/23 0818)   • citalopram  20 mg Oral Daily [facility]     • doxycycline hyclate  100 mg Oral Q12H [facility]     • enoxaparin  40 mg Subcutaneous Daily [facility]     • finasteride  5 mg Oral Daily Marce Watson MD     • fluticasone  1 puff Inhalation Daily [facility]     • fluticasone  1 spray Nasal Daily [facility]     • lidocaine  1 Application Urethral Once [facility]     • LORazepam  0.5 mg Oral Q6H PRN Su Irene Lewis, DO • montelukast  10 mg Oral Daily Su Neeta Saraiya, DO     • pantoprazole  40 mg Oral Early Morning Su Neeta Saraiya, DO     • sotalol  80 mg Oral Q12H Su Neeta Saraiya, DO     • tamsulosin  0.4 mg Oral BID Su Neeta Saraiya, DO     • trimethobenzamide  200 mg Intramuscular Q6H PRN Ida Patel PA-C          Today, Patient Was Seen By: Veronica Woody MD    **Please Note: This note may have been constructed using a voice recognition system. **

## 2023-08-13 NOTE — INCIDENTAL FINDINGS
The following findings require follow up:  Radiographic finding   Finding: Hypervascular lesion in the liver straddling segments 4A and 8.  Recommend further characterization with a dedicated liver MRI not emergently.      Follow up required:  yes   Follow up should be done within 1 month(s)    Please notify the following clinician to assist with the follow up:   PCP

## 2023-08-13 NOTE — ASSESSMENT & PLAN NOTE
· S/p PPM, follows with EP  · On asa and sotalol   · Has broad fluctuations in heart rate  · Monitor

## 2023-08-13 NOTE — PLAN OF CARE
Problem: Potential for Falls  Goal: Patient will remain free of falls  Description: INTERVENTIONS:  - Educate patient/family on patient safety including physical limitations  - Instruct patient to call for assistance with activity   - Consult OT/PT to assist with strengthening/mobility   - Keep Call bell within reach  - Keep bed low and locked with side rails adjusted as appropriate  - Keep care items and personal belongings within reach  - Initiate and maintain comfort rounds  - Make Fall Risk Sign visible to staff  - Offer Toileting every 3 Hours, in advance of need  - Initiate/Maintain bed alarm  - Obtain necessary fall risk management equipment:   - Apply yellow socks and bracelet for high fall risk patients  - Consider moving patient to room near nurses station  Outcome: Progressing     Problem: MOBILITY - ADULT  Goal: Maintain or return to baseline ADL function  Description: INTERVENTIONS:  -  Assess patient's ability to carry out ADLs; assess patient's baseline for ADL function and identify physical deficits which impact ability to perform ADLs (bathing, care of mouth/teeth, toileting, grooming, dressing, etc.)  - Assess/evaluate cause of self-care deficits   - Assess range of motion  - Assess patient's mobility; develop plan if impaired  - Assess patient's need for assistive devices and provide as appropriate  - Encourage maximum independence but intervene and supervise when necessary  - Involve family in performance of ADLs  - Assess for home care needs following discharge   - Consider OT consult to assist with ADL evaluation and planning for discharge  - Provide patient education as appropriate  Outcome: Progressing  Goal: Maintains/Returns to pre admission functional level  Description: INTERVENTIONS:  - Perform BMAT or MOVE assessment daily.   - Set and communicate daily mobility goal to care team and patient/family/caregiver.    - Collaborate with rehabilitation services on mobility goals if consulted  - Perform Range of Motion 3 times a day. - Reposition patient every 3 hours.   - Dangle patient 3 times a day  - Stand patient 3 times a day  - Ambulate patient 3 times a day  - Out of bed to chair 3 times a day   - Out of bed for meals 3 times a day  - Out of bed for toileting  - Record patient progress and toleration of activity level   Outcome: Progressing     Problem: PAIN - ADULT  Goal: Verbalizes/displays adequate comfort level or baseline comfort level  Description: Interventions:  - Encourage patient to monitor pain and request assistance  - Assess pain using appropriate pain scale  - Administer analgesics based on type and severity of pain and evaluate response  - Implement non-pharmacological measures as appropriate and evaluate response  - Consider cultural and social influences on pain and pain management  - Notify physician/advanced practitioner if interventions unsuccessful or patient reports new pain  Outcome: Progressing     Problem: INFECTION - ADULT  Goal: Absence or prevention of progression during hospitalization  Description: INTERVENTIONS:  - Assess and monitor for signs and symptoms of infection  - Monitor lab/diagnostic results  - Monitor all insertion sites, i.e. indwelling lines, tubes, and drains  - Monitor endotracheal if appropriate and nasal secretions for changes in amount and color  - [place] appropriate cooling/warming therapies per order  - Administer medications as ordered  - Instruct and encourage patient and family to use good hand hygiene technique  - Identify and instruct in appropriate isolation precautions for identified infection/condition  Outcome: Progressing     Problem: DISCHARGE PLANNING  Goal: Discharge to home or other facility with appropriate resources  Description: INTERVENTIONS:  - Identify barriers to discharge w/patient and caregiver  - Arrange for needed discharge resources and transportation as appropriate  - Identify discharge learning needs (meds, wound care, etc.)  - Arrange for interpretive services to assist at discharge as needed  - Refer to Case Management Department for coordinating discharge planning if the patient needs post-hospital services based on physician/advanced practitioner order or complex needs related to functional status, cognitive ability, or social support system  Outcome: Progressing     Problem: Knowledge Deficit  Goal: Patient/family/caregiver demonstrates understanding of disease process, treatment plan, medications, and discharge instructions  Description: Complete learning assessment and assess knowledge base.   Interventions:  - Provide teaching at level of understanding  - Provide teaching via preferred learning methods  Outcome: Progressing     Problem: RESPIRATORY - ADULT  Goal: Achieves optimal ventilation and oxygenation  Description: INTERVENTIONS:  - Assess for changes in respiratory status  - Assess for changes in mentation and behavior  - Position to facilitate oxygenation and minimize respiratory effort  - Oxygen administered by appropriate delivery if ordered  - Initiate smoking cessation education as indicated  - Encourage broncho-pulmonary hygiene including cough, deep breathe, Incentive Spirometry  - Assess the need for suctioning and aspirate as needed  - Assess and instruct to report SOB or any respiratory difficulty  - Respiratory Therapy support as indicated  Outcome: Progressing     Problem: GASTROINTESTINAL - ADULT  Goal: Maintains adequate nutritional intake  Description: INTERVENTIONS:  - Monitor percentage of each meal consumed  - Identify factors contributing to decreased intake, treat as appropriate  - Assist with meals as needed  - Monitor I&O, weight, and lab values if indicated  - Obtain nutrition services referral as needed  Outcome: Progressing     Problem: GENITOURINARY - ADULT  Goal: Absence of urinary retention  Description: INTERVENTIONS:  - Assess patient’s ability to void and empty bladder  - Monitor I/O  - Bladder scan as needed  - Discuss with physician/AP medications to alleviate retention as needed  - Discuss catheterization for long term situations as appropriate  Outcome: Progressing     Problem: METABOLIC, FLUID AND ELECTROLYTES - ADULT  Goal: Electrolytes maintained within normal limits  Description: INTERVENTIONS:  - Monitor labs and assess patient for signs and symptoms of electrolyte imbalances  - Administer electrolyte replacement as ordered  - Monitor response to electrolyte replacements, including repeat lab results as appropriate  - Instruct patient on fluid and nutrition as appropriate  Outcome: Progressing  Goal: Fluid balance maintained  Description: INTERVENTIONS:  - Monitor labs   - Monitor I/O and WT  - Instruct patient on fluid and nutrition as appropriate  - Assess for signs & symptoms of volume excess or deficit  Outcome: Progressing  Goal: Glucose maintained within target range  Description: INTERVENTIONS:  - Monitor Blood Glucose as ordered  - Assess for signs and symptoms of hyperglycemia and hypoglycemia  - Administer ordered medications to maintain glucose within target range  - Assess nutritional intake and initiate nutrition service referral as needed  Outcome: Progressing

## 2023-08-13 NOTE — ASSESSMENT & PLAN NOTE
Noted to have 800cc on bladder scan in ED, aldana catheter inserted.  In setting of UA vs sepsis vs BPH  · Recommend voiding trial tomorrow   · Continue with flomax  · Intake and output  · Daily aldana care

## 2023-08-13 NOTE — ASSESSMENT & PLAN NOTE
Noted to have 800cc on bladder scan in ED, aldana catheter inserted.   · Continue with flomax  · Intake and output  · Daily aldana care

## 2023-08-13 NOTE — ASSESSMENT & PLAN NOTE
· Present on admission- tachycardia, fevers, hypotension in setting of vesiculitis   · Likely source UTI, patient denies sexual activity   · CT imaging negative for fluid or gas collection  · White count down trending, afebrile today  · Improving with antibiotics, continue  · Consult urology for further evaluation  · Continue aldana care

## 2023-08-14 ENCOUNTER — APPOINTMENT (INPATIENT)
Dept: CT IMAGING | Facility: HOSPITAL | Age: 68
DRG: 872 | End: 2023-08-14
Payer: MEDICARE

## 2023-08-14 LAB
ANION GAP SERPL CALCULATED.3IONS-SCNC: 7 MMOL/L
BASOPHILS # BLD AUTO: 0.02 THOUSANDS/ÂΜL (ref 0–0.1)
BASOPHILS NFR BLD AUTO: 0 % (ref 0–1)
BUN SERPL-MCNC: 13 MG/DL (ref 5–25)
C TRACH DNA SPEC QL NAA+PROBE: NEGATIVE
CALCIUM SERPL-MCNC: 8.7 MG/DL (ref 8.4–10.2)
CHLORIDE SERPL-SCNC: 103 MMOL/L (ref 96–108)
CO2 SERPL-SCNC: 25 MMOL/L (ref 21–32)
CREAT SERPL-MCNC: 0.85 MG/DL (ref 0.6–1.3)
EOSINOPHIL # BLD AUTO: 0.03 THOUSAND/ÂΜL (ref 0–0.61)
EOSINOPHIL NFR BLD AUTO: 0 % (ref 0–6)
ERYTHROCYTE [DISTWIDTH] IN BLOOD BY AUTOMATED COUNT: 13 % (ref 11.6–15.1)
GFR SERPL CREATININE-BSD FRML MDRD: 90 ML/MIN/1.73SQ M
GLUCOSE SERPL-MCNC: 103 MG/DL (ref 65–140)
HCT VFR BLD AUTO: 41 % (ref 36.5–49.3)
HGB BLD-MCNC: 13.8 G/DL (ref 12–17)
IMM GRANULOCYTES # BLD AUTO: 0.04 THOUSAND/UL (ref 0–0.2)
IMM GRANULOCYTES NFR BLD AUTO: 0 % (ref 0–2)
LYMPHOCYTES # BLD AUTO: 0.66 THOUSANDS/ÂΜL (ref 0.6–4.47)
LYMPHOCYTES NFR BLD AUTO: 7 % (ref 14–44)
MAGNESIUM SERPL-MCNC: 1.9 MG/DL (ref 1.9–2.7)
MCH RBC QN AUTO: 30.3 PG (ref 26.8–34.3)
MCHC RBC AUTO-ENTMCNC: 33.7 G/DL (ref 31.4–37.4)
MCV RBC AUTO: 90 FL (ref 82–98)
MONOCYTES # BLD AUTO: 0.97 THOUSAND/ÂΜL (ref 0.17–1.22)
MONOCYTES NFR BLD AUTO: 10 % (ref 4–12)
N GONORRHOEA DNA SPEC QL NAA+PROBE: NEGATIVE
NEUTROPHILS # BLD AUTO: 8.2 THOUSANDS/ÂΜL (ref 1.85–7.62)
NEUTS SEG NFR BLD AUTO: 83 % (ref 43–75)
NRBC BLD AUTO-RTO: 0 /100 WBCS
PLATELET # BLD AUTO: 136 THOUSANDS/UL (ref 149–390)
PMV BLD AUTO: 10.1 FL (ref 8.9–12.7)
POTASSIUM SERPL-SCNC: 3.5 MMOL/L (ref 3.5–5.3)
RBC # BLD AUTO: 4.56 MILLION/UL (ref 3.88–5.62)
SODIUM SERPL-SCNC: 135 MMOL/L (ref 135–147)
WBC # BLD AUTO: 9.92 THOUSAND/UL (ref 4.31–10.16)

## 2023-08-14 PROCEDURE — 83735 ASSAY OF MAGNESIUM: CPT | Performed by: STUDENT IN AN ORGANIZED HEALTH CARE EDUCATION/TRAINING PROGRAM

## 2023-08-14 PROCEDURE — 99233 SBSQ HOSP IP/OBS HIGH 50: CPT | Performed by: STUDENT IN AN ORGANIZED HEALTH CARE EDUCATION/TRAINING PROGRAM

## 2023-08-14 PROCEDURE — 99223 1ST HOSP IP/OBS HIGH 75: CPT | Performed by: INTERNAL MEDICINE

## 2023-08-14 PROCEDURE — G1004 CDSM NDSC: HCPCS

## 2023-08-14 PROCEDURE — 99222 1ST HOSP IP/OBS MODERATE 55: CPT

## 2023-08-14 PROCEDURE — 74177 CT ABD & PELVIS W/CONTRAST: CPT

## 2023-08-14 PROCEDURE — 80048 BASIC METABOLIC PNL TOTAL CA: CPT | Performed by: STUDENT IN AN ORGANIZED HEALTH CARE EDUCATION/TRAINING PROGRAM

## 2023-08-14 PROCEDURE — 85025 COMPLETE CBC W/AUTO DIFF WBC: CPT | Performed by: STUDENT IN AN ORGANIZED HEALTH CARE EDUCATION/TRAINING PROGRAM

## 2023-08-14 RX ADMIN — MONTELUKAST 10 MG: 10 TABLET, FILM COATED ORAL at 08:28

## 2023-08-14 RX ADMIN — DOXYCYCLINE 100 MG: 100 CAPSULE ORAL at 08:28

## 2023-08-14 RX ADMIN — CITALOPRAM HYDROBROMIDE 20 MG: 20 TABLET ORAL at 08:28

## 2023-08-14 RX ADMIN — ACETAMINOPHEN 650 MG: 325 TABLET, FILM COATED ORAL at 13:13

## 2023-08-14 RX ADMIN — ACETAMINOPHEN 650 MG: 325 TABLET, FILM COATED ORAL at 05:49

## 2023-08-14 RX ADMIN — TAMSULOSIN HYDROCHLORIDE 0.4 MG: 0.4 CAPSULE ORAL at 17:32

## 2023-08-14 RX ADMIN — HYDROMORPHONE HYDROCHLORIDE 0.5 MG: 1 INJECTION, SOLUTION INTRAMUSCULAR; INTRAVENOUS; SUBCUTANEOUS at 08:57

## 2023-08-14 RX ADMIN — IOHEXOL 100 ML: 350 INJECTION, SOLUTION INTRAVENOUS at 11:43

## 2023-08-14 RX ADMIN — TAMSULOSIN HYDROCHLORIDE 0.4 MG: 0.4 CAPSULE ORAL at 08:28

## 2023-08-14 RX ADMIN — PANTOPRAZOLE SODIUM 40 MG: 40 TABLET, DELAYED RELEASE ORAL at 05:49

## 2023-08-14 RX ADMIN — ATORVASTATIN CALCIUM 20 MG: 20 TABLET, FILM COATED ORAL at 08:28

## 2023-08-14 RX ADMIN — FINASTERIDE 5 MG: 5 TABLET, FILM COATED ORAL at 08:28

## 2023-08-14 RX ADMIN — ENOXAPARIN SODIUM 40 MG: 40 INJECTION SUBCUTANEOUS at 08:27

## 2023-08-14 RX ADMIN — SOTALOL HYDROCHLORIDE 80 MG: 80 TABLET ORAL at 20:17

## 2023-08-14 RX ADMIN — LORAZEPAM 0.5 MG: 0.5 TABLET ORAL at 05:58

## 2023-08-14 RX ADMIN — ACETAMINOPHEN 650 MG: 325 TABLET, FILM COATED ORAL at 20:17

## 2023-08-14 RX ADMIN — CEFTRIAXONE 1000 MG: 1 INJECTION, SOLUTION INTRAVENOUS at 08:21

## 2023-08-14 RX ADMIN — SOTALOL HYDROCHLORIDE 80 MG: 80 TABLET ORAL at 08:28

## 2023-08-14 RX ADMIN — DOXYCYCLINE 100 MG: 100 CAPSULE ORAL at 21:08

## 2023-08-14 RX ADMIN — ASPIRIN 81 MG: 81 TABLET, COATED ORAL at 08:27

## 2023-08-14 RX ADMIN — FLUTICASONE PROPIONATE 1 SPRAY: 50 SPRAY, METERED NASAL at 08:38

## 2023-08-14 NOTE — PLAN OF CARE
Problem: MOBILITY - ADULT  Goal: Maintain or return to baseline ADL function  Description: INTERVENTIONS:  -  Assess patient's ability to carry out ADLs; assess patient's baseline for ADL function and identify physical deficits which impact ability to perform ADLs (bathing, care of mouth/teeth, toileting, grooming, dressing, etc.)  - Assess/evaluate cause of self-care deficits   - Assess range of motion  - Assess patient's mobility; develop plan if impaired  - Assess patient's need for assistive devices and provide as appropriate  - Encourage maximum independence but intervene and supervise when necessary  - Involve family in performance of ADLs  - Assess for home care needs following discharge   - Consider OT consult to assist with ADL evaluation and planning for discharge  - Provide patient education as appropriate  Outcome: Progressing     Problem: PAIN - ADULT  Goal: Verbalizes/displays adequate comfort level or baseline comfort level  Description: Interventions:  - Encourage patient to monitor pain and request assistance  - Assess pain using appropriate pain scale  - Administer analgesics based on type and severity of pain and evaluate response  - Implement non-pharmacological measures as appropriate and evaluate response  - Consider cultural and social influences on pain and pain management  - Notify physician/advanced practitioner if interventions unsuccessful or patient reports new pain  Outcome: Progressing     Problem: INFECTION - ADULT  Goal: Absence or prevention of progression during hospitalization  Description: INTERVENTIONS:  - Assess and monitor for signs and symptoms of infection  - Monitor lab/diagnostic results  - Monitor all insertion sites, i.e. indwelling lines, tubes, and drains  - Monitor endotracheal if appropriate and nasal secretions for changes in amount and color  - [place]vin appropriate cooling/warming therapies per order  - Administer medications as ordered  - Instruct and encourage patient and family to use good hand hygiene technique  - Identify and instruct in appropriate isolation precautions for identified infection/condition  Outcome: Progressing

## 2023-08-14 NOTE — CASE MANAGEMENT
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   • Sick sinus syndrome ([address]) 2021   • Bifascicular block 2021   • Health maintenance examination 2019   • Elevated PSA 2019   • Vitamin D deficiency 2019   • History of skin cancer 2018   • Hyperlipidemia 2016   • Acid reflux disease 10/13/2014   • Anxiety, generalized 10/13/2014   • Asthma 10/13/2014   • Benign prostatic hyperplasia 10/13/2014   • Allergic rhinitis 10/02/2014      LOS (days): 2  Geometric Mean LOS (GMLOS) (days): 3.50  Days to GMLOS:1.8     OBJECTIVE:    Risk of Unplanned Readmission Score: 10.22         Current admission status: Inpatient       Preferred Pharmacy:   [id number]- [place], [place] - [address] [place] [address]  Phone: [phone] Fax: [phone] for [facility],  [address], [address] [place] [address],[address] [place] [address]  Phone: [phone] Fax: [phone]    Primary Care Provider: Shraddha Hernandez DO    Primary Insurance: MEDICARE  Secondary Insurance: TOMERP    ASSESSMENT:  [place] Proxies    There are no active Health Care Proxies on file.        Advance Directives  Does patient have a Health Care POA?: No  Was patient offered paperwork?: Yes (Reviewed and declined)  Does patient currently have a Health Care decision maker?: No  Does patient have Advance Directives?: No  Was patient offered paperwork?: Yes (Reviewed and declined)  Primary Contact: Sheryl Connell (Sister)   [phone]         Readmission Root Cause  30 Day Readmission: No    Patient Information  Admitted from[de-identified] Facility  Mental Status: Alert  During Assessment patient was accompanied by: Not accompanied during assessment  Assessment information provided by[de-identified] Patient  Primary Caregiver: Self  Support Systems: Self, Friend, Family members  [place] of Residence: [address] [place] [address] do you live in?: [address] entry access options.  Select all that apply.: No steps to enter home  Type of Current Residence: [place]  In the last 12 months, was there a time when you were not able to pay the mortgage or rent on time?: No  In the last 12 months, how many places have you lived?: 1  In the last 12 months, was there a time when you did not have a steady place to sleep or slept in a shelter (including now)?: No  Homeless/housing insecurity resource given?: No  Living Arrangements: Lives Alone  Is patient a ?: No    Activities of Daily Living Prior to Admission  Functional Status: Independent  Completes ADLs independently?: Yes  Ambulates independently?: Yes  Does patient use assisted devices?: No  Does patient currently own DME?: No  Does patient have a history of Outpatient Therapy (PT/OT)?: No  Does the patient have a history of Short-Term Rehab?: No  Does patient have a history of HHC?: No  Does patient currently have [address]?: No         Patient Information Continued  Income Source: Employed  Does patient have prescription coverage?: Yes  Within the past 12 months, you worried that your food would run out before you got the money to buy more.: Never true  Within the past 12 months, the food you bought just didn't last and you didn't have money to get more.: Never true  Food insecurity resource given?: No  Does patient receive dialysis treatments?: No  Does patient have a history of substance abuse?: No  Does patient have a history of Mental Health Diagnosis?: No         Means of Transportation  Means of Transport to [facility][de-identified] Drives Self  In the past 12 months, has lack of transportation kept you from medical appointments or from getting medications?: No  In the past 12 months, has lack of transportation kept you from meetings, work, or from getting things needed for daily living?: No  Was application for public transport provided?: No        DISCHARGE DETAILS:    Discharge planning discussed with[de-identified] Pt at bedside  [place] of Choice: Yes  Comments - Freedom of Choice: CM met with pt at bedside and introduced self/role. Pt is alert and oriented x3 able to make his needs known and encouraged to do so. FOC discussed at length. Pt states that prior to his admission, pt is independent with all of his ADls and IADLs. Pt is open to homecare as pt is currently on aldana cath and is unsure if he will be dc home with it. Pt states he has been feeling weak and would appericiate homecare. [place] Referral made for VNA. Pt is aware and encouraged to seek CM for any questions or concerns. CM continues to follow.   CM contacted family/caregiver?: No- see comments (Pt declined states he has been updating his sister daily)  Were Treatment Team discharge recommendations reviewed with patient/caregiver?: Yes  Did patient/caregiver verbalize understanding of patient care needs?: Yes  Were patient/caregiver advised of the risks associated with not following Treatment Team discharge recommendations?: Yes    Contacts  Reason/Outcome: Discharge Planning, [address]         Is the patient interested in [facility] at discharge?: Yes         Other Referral/Resources/Interventions Provided:  Interventions: [facility] AT [facility]  Referral Comments: [place] Referral made for VNA    Would you like to participate in our [address] service program?  : No - Declined    Treatment Team Recommendation: Home  Discharge Destination Plan[de-identified] Home with [address] [place] [address] at Discharge : Family, Self

## 2023-08-14 NOTE — QUICK NOTE
[address]  Progress Note  Name: Chacorta Powers  MRN: [id number]  Unit/Bed#: -60 I Date of Admission: 8/12/2023   Date of Service: 8/14/2023 I Hospital Day: 2      Assessment/Plan     Sepsis:  Assessment:  · Present on admission with tachycardia, fever, hypotension with seminal vesiculitis  · Culture positive for gram negative rods  · Unclear response to ceftriaxone; WBC decreased; neutrophils remain elevated. Still having fevers. · Patient reports bloody penile discharge and 10/10 sharp testicular pain  · Add doxycycline to cover GC/Cl; PCR pending   Plan  · Continue abx; consider changing to TMP/SMX  · Consult urology  · Consult ID   · CT with focus on pelvis/scrotum     Lab Results   Component Value Date    WBC 9.92 08/14/2023    WBC 13.88 (H) 08/13/2023    WBC 19.15 (H) 08/12/2023    NEUTROABS 8.20 (H) 08/14/2023    NEUTROABS 11.07 (H) 08/13/2023    NEUTROABS 15.96 (H) 08/12/2023    NEUTOPHILPCT 83 (H) 08/14/2023    NEUTOPHILPCT 79 (H) 08/13/2023    NEUTOPHILPCT 83 (H) 08/12/2023    LYMPHSABS 0.66 08/14/2023    LYMPHSABS 0.90 08/13/2023    LYMPHSABS 0.72 08/12/2023    LYMPHOPCT 7 (L) 08/14/2023    LYMPHOPCT 7 (L) 08/13/2023    LYMPHOPCT 4 (L) 08/12/2023       Urinary Retention  Assessment  · Present on admission; 800cc bladder noted   · Imaging showed moderately distended and trabeculated bladder compatible with chronic outlet obstruction   · Aldana catheter placed   Plan  · Continue finasteride and tamsulosin  · Monitor Intake/output  · Continue aldana, plan for void trial     Tachy-juwan Syndrome   Assessment   · PPM in place   · Broad flucuations in HR  Plan  · Continue aspirin and sotalol  · Monitor HR       VTE Pharmacologic Prophylaxis:   Pharmacologic: Enoxaparin (Lovenox)  Mechanical VTE Prophylaxis in Place: {Yes or No:94056}    Patient Centered Rounds: I have performed bedside rounds with nursing staff today.     Discussions with Specialists or Other Care Team Provider: Consult to urology     Education and Discussions with Family / Patient: Patient    Time Spent for Care: 30 minutes. More than 50% of total time spent on counseling and coordination of care as described above. Current Length of Stay: 2 day(s)    Current Patient Status: Inpatient   Certification Statement: The patient will continue to require additional inpatient hospital stay due to IV antibiotics    Discharge Plan: ***    Code Status: Level 1 - Full Code      Subjective:   No acute events overnight. Patient reports bloody penile discharge and 10/10 testicular pain, sharp in nature. Objective:     General: Endorses fever, chills   Head: Denies dizziness, or headache   Mouth and Throat: Denies sore throat; difficulty swallowing   Respiratory: Endorses shortness of breath, denies cough  Cardiac: Denies chest pain, palpitations  Gastrointestinal: Denies abdominal pain, diarrhea, vomiting   Genitourinary: Endorses blood discharge, testicular pain; Denies increased urgency; painful urination  Musculoskeletal: Denies back pain, joint pain     Vitals:   Temp (24hrs), Av.9 °F (37.7 °C), Min:99.1 °F (37.3 °C), Max:101.5 °F (38.6 °C)    Temp:  [99.1 °F (37.3 °C)-101.5 °F (38.6 °C)] 99.6 °F (37.6 °C)  HR:  [] 127  Resp:  [15-19] 18  BP: ()/(59-91) 130/91  SpO2:  [94 %-96 %] 95 %  There is no height or weight on file to calculate BMI. Input and Output Summary (last 24 hours):        Intake/Output Summary (Last 24 hours) at 2023 0930  Last data filed at 2023 0451  Gross per 24 hour   Intake 770 ml   Output 800 ml   Net -30 ml       Physical Exam:     Physical Exam  ( *** Be Sure to Include Physical Exam: Delete this entire line when you have entered your exam)    Additional Data:     Labs:    Results from last 7 days   Lab Units 23  0451   WBC Thousand/uL 9.92   HEMOGLOBIN g/dL 13.8   HEMATOCRIT % 41.0   PLATELETS Thousands/uL 136*   NEUTROS PCT % 83*   LYMPHS PCT % 7*   MONOS PCT % 10 EOS PCT % 0     Results from last 7 days   Lab Units 08/14/23  0451 08/13/23  0503 08/12/23  1438   SODIUM mmol/L 135   < > 134*   POTASSIUM mmol/L 3.5   < > 4.0   CHLORIDE mmol/L 103   < > 100   CO2 mmol/L 25   < > 24   BUN mg/dL 13   < > 15   CREATININE mg/dL 0.85   < > 0.90   ANION GAP mmol/L 7   < > 10   CALCIUM mg/dL 8.7   < > 9.6   ALBUMIN g/dL  --   --  4.4   TOTAL BILIRUBIN mg/dL  --   --  1.23*   ALK PHOS U/L  --   --  59   ALT U/L  --   --  19   AST U/L  --   --  13   GLUCOSE RANDOM mg/dL 103   < > 118    < > = values in this interval not displayed. Results from last 7 days   Lab Units 08/12/23  1438   INR  1.16             Results from last 7 days   Lab Units 08/13/23  0503 08/12/23  1438   LACTIC ACID mmol/L  --  1.7   PROCALCITONIN ng/ml 0.65* 0.45*           * I Have Reviewed All Lab Data Listed Above. * Additional Pertinent Lab Tests Reviewed: All Labs Within Last 24 Hours Reviewed    Imaging:    Imaging Reports Reviewed Today Include: ***  Imaging Personally Reviewed by Myself Includes:  ***    Recent Cultures (last 7 days):     Results from last 7 days   Lab Units 08/12/23  1536 08/12/23  1448 08/12/23  1438   BLOOD CULTURE   --  No Growth at 24 hrs. No Growth at 24 hrs.    URINE CULTURE  >100,000 cfu/ml Gram Negative Jong Enteric Like*  --   --        Last 24 Hours Medication List:   Current Facility-Administered Medications   Medication Dose Route Frequency Provider Last Rate   • acetaminophen  650 mg Oral Q6H PRN Su Neeta Saraiya, DO     • aspirin  81 mg Oral Daily Su Neeta Saraiya, DO     • atorvastatin  20 mg Oral Daily Su Neeta Saraiya, DO     • cefTRIAXone  1,000 mg Intravenous Q24H Su Neeta Saraiya, DO 1,000 mg (08/14/23 1212)   • citalopram  20 mg Oral Daily Su Neeta Saraiya, DO     • doxycycline hyclate  100 mg Oral Q12H [address] Su Neeta Saraiya,      • enoxaparin  40 mg Subcutaneous Daily Su Lin DO     • finasteride  5 mg Oral Daily Shoshana Duncan MD • fluticasone  1 puff Inhalation Daily [facility]     • fluticasone  1 spray Nasal Daily [facility], DO     • HYDROmorphone  0.5 mg Intravenous Q4H PRN Kellee Rdz MD     • HYDROmorphone  0.5 mg Intravenous Q3H PRN Kellee Rdz MD     • lidocaine  1 Application Urethral Once [facility], DO     • LORazepam  0.5 mg Oral Q6H PRN [facility], DO     • montelukast  10 mg Oral Daily [facility], DO     • oxyCODONE  5 mg Oral Q6H PRN Kellee Rdz MD     • pantoprazole  40 mg Oral Early Morning [facility]     • sotalol  80 mg Oral Q12H [facility]     • tamsulosin  0.4 mg Oral BID [facility], [facility]     • trimethobenzamide  200 mg Intramuscular Q6H PRN Issa Mckee PA-C          Today, Patient Was Seen By: Bambi Paz, MS3

## 2023-08-14 NOTE — CONSULTS
Consult - Urology   Ledora Sever 1955, 79 y.o. male MRN: [id number]    Unit/Bed#: -01 Encounter: [id number]      Assessment & Plan:  1. UTI  2. Urinary retention  - CT scan showing enlarged low-attenuation seminal vesicles with surrounding inflammatory fat stranding and trace amount of surrounding fluid compatible with seminal vasculitis, no evidence of abscess. Prostate gland, partially obscured by artifact but is enlarged with median lobe hypertrophy.  - Urine culture gram negative rods. Blood cultures no growth  - Leukocytosis resolved. On admission 19.15  - VSS, afebrile. Tachycardic. Temp yesterday 101.5F  - Procal elevated 0.65  - Patient on flomax and proscar  - Tenderness on scrotal exam. No erythema, fluctuance, discrete abscess. - No surgical intervention  - Repeat CT pelvis pending  - Appreciate ID consult and recommendations  - Maintain Redding catheter on discharge. Trial of void in 10 to 14 days. Patient follows with Dr. Alfreda Mac as an outpatient. Patient unsure if he will follow-up with Dr. Alfreda Mac or [facility] urology. Office will call to offer appointment. - Urology will sign off at this time. We will follow-up results of CT peripherally. Subjective: Cristine Che is a 82-year-old with past medical history asthma, HLD, SSS status post PPM on sotalol who presented to the ED with 2 days history of fever and chills. Patient with reporting dysuria for the past few days and lower abdominal, suprapubic pain. Associated symptoms include white urethral discharge. Does have history of STD, not sexually active in about 15 years. Patient recently placed on prednisone by his allergist.  Reporting decreased urine output over the past few days. In the ED patient with noted bladder scan 800 mL and Redding catheter was placed. UA on admission large leukocytes, nitrate negative. Patient with mildly elevated procalcitonin 0.45. Kidney function at baseline.   CT scan showing enlarged low-attenuation seminal vesicles with surrounding inflammatory fat stranding and trace amount of surrounding fluid compatible with seminal vasculitis, no evidence of abscess. Prostate gland, partially obscured by artifact but is enlarged with median lobe hypertrophy. Urinary bladder moderately distended. Urology was consulted due to UTI and urinary retention. Review of Systems   Constitutional: Positive for fever. Negative for chills. Respiratory: Negative for cough and shortness of breath. Cardiovascular: Negative for chest pain and palpitations. Gastrointestinal: Negative for abdominal pain and vomiting. Genitourinary: Positive for testicular pain. Negative for dysuria and hematuria. Musculoskeletal: Negative for arthralgias and back pain. Skin: Negative for color change and rash. Neurological: Negative for seizures and syncope. All other systems reviewed and are negative. Objective:  Vitals: Blood pressure 130/91, pulse (!) 127, temperature 99.6 °F (37.6 °C), temperature source Oral, resp. rate 18, SpO2 95 %. ,There is no height or weight on file to calculate BMI. Physical Exam    Imaging:  CT CHEST, ABDOMEN AND PELVIS WITH IV CONTRAST     INDICATION:   Sepsis  fever, uti, sepsis.     COMPARISON: Comparison is made to prior studies, including a chest CT dated March 25, 2021 and an MRI of the abdomen, most recent is dated April 26, 2023.     TECHNIQUE: CT examination of the chest, abdomen and pelvis was performed. Axial, sagittal, and coronal 2D reformatted images were created from the source data and submitted for interpretation.     Radiation dose length product (DLP) for this visit:  948 mGy-cm .   This examination, like all CT scans performed in the [facility], was performed utilizing techniques to minimize radiation dose exposure, including the use of iterative   reconstruction and automated exposure control.     IV Contrast:  100 mL of iohexol (OMNIPAQUE)  Enteric Contrast: Enteric contrast was administered.     FINDINGS:     CHEST     LUNGS: Staple line in the left lung apex from prior surgery. No suspicious pulmonary nodules. Central airways are patent.     PLEURA:  Unremarkable.     HEART/GREAT VESSELS: Heart is unremarkable for patient's age. No thoracic aortic aneurysm.     MEDIASTINUM AND GAGANDEEP:  Unremarkable.     CHEST WALL AND LOWER NECK: Left subclavian dual-lead pacemaker in     ABDOMEN     LIVER/BILIARY TREE: Diffuse hepatic steatosis.     2.5 x 1.9 cm vague hyperenhancing lesion straddling segments 4A and 8 (2, 102). No additional liver lesions.     GALLBLADDER:  No calcified gallstones. No pericholecystic inflammatory change.     SPLEEN:  Unremarkable.     PANCREAS: Known cystic lesion in the head of the pancreas measures approximately 1.5 x 1.1 cm unchanged when measured on the most recent MRI in a similar fashion. ADRENAL GLANDS:  Unremarkable.     KIDNEYS/URETERS: Left renal cysts with the largest measuring 4.7 cm. No collecting system dilatation or obstructing calculi.     STOMACH AND BOWEL: Diverticulosis. No evidence of acute diverticulitis.     APPENDIX:  No findings to suggest appendicitis.     ABDOMINOPELVIC CAVITY: Trace amount of free fluid in the rectovesical space mostly on the left.     VESSELS: Atherosclerotic changes are present. No evidence of aneurysm.     PELVIS     REPRODUCTIVE ORGANS: Prostate gland is partially obscured by beam hardening artifact due to right hip arthroplasty but is enlarged with median lobe hypertrophy.     Seminal vesicles both appear enlarged and abnormally decreased in attenuation with adjacent inflammatory fat stranding.     URINARY BLADDER: Moderately distended and trabeculated compatible with chronic outlet obstruction.     ABDOMINAL WALL/INGUINAL REGIONS:  Unremarkable.     OSSEOUS STRUCTURES: Right hip arthroplasty.  Degenerative changes throughout the spine.     IMPRESSION:     Enlarged low-attenuation seminal vesicles with surrounding inflammatory fat stranding and trace amount of surrounding fluid compatible with seminal vesiculitis. No evidence of abscess.     Hypervascular lesion in the liver straddling segments 4A and 8. Recommend further characterization with a dedicated liver MRI not emergently.     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     The study was marked in EPIC for immediate notification.     Labs:  Recent Labs     08/12/23  1438 08/13/23  0503 08/14/23  0451   WBC 19.15* 13.88* 9.92     Recent Labs     08/12/23  1438 08/13/23  0503 08/14/23  0451   HGB 15.0 13.1 13.8     Recent Labs     08/12/23  1438 08/13/23  0503 08/14/23  0451   CREATININE 0.90 0.72 0.85         History:  Social History     Socioeconomic History   • Marital status: Single     Spouse name: None   • Number of children: None   • Years of education: None   • Highest education level: None   Occupational History   • None   Tobacco Use   • Smoking status: Never   • Smokeless tobacco: Never   Vaping Use   • Vaping Use: Never used   Substance and Sexual Activity   • Alcohol use: Not Currently     Comment: quit march 2020   • Drug use: No   • Sexual activity: None   Other Topics Concern   • None   Social History Narrative   • None     Social Determinants of Health     Financial Resource Strain: Not on file   Food Insecurity: Not on file   Transportation Needs: Not on file   Physical Activity: Not on file   Stress: Not on file   Social Connections: Not on file   Intimate Partner Violence: Not on file   Housing Stability: Not on file     Past Medical History:   Diagnosis Date   • Asthma     allergy induced   • Basal cell carcinoma    • Colon polyp    • Fatty liver    • H/O nonmelanoma skin cancer     last assessed-8/22/2017   • Hyperlipidemia    • Inflamed seborrheic keratosis    • Malignant neoplasm of skin     last assessed-1/21/2014   • Neoplasm of uncertain behavior of skin    • Nocturia    • Pneumothorax, left    • Seasonal allergies    • Sebaceous cyst    • 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/01/2022    SCCIS- mid scalp   • Squamous cell carcinoma of skin of neck    • Testicular hypofunction    • Viral warts      Past Surgical History:   Procedure Laterality Date   • CARDIAC CATHETERIZATION     • CARDIAC PACEMAKER PLACEMENT  05/05/2021   • CARDIAC PACEMAKER PLACEMENT     • CARDIOVASCULAR STRESS TEST  08/27/2010   • COLONOSCOPY  10/08/2008   • IR OTHER  04/15/2021   • LUNG SURGERY      for pneumothorax   • MOHS SURGERY  02/15/2022    SCCIS mid scalp- Dr Marisol Garcia   • PARTIAL HIP ARTHROPLASTY     • RHINOPLASTY       Family History   Problem Relation Age of Onset   • Colon cancer Father        Neelam Bynum PA-C  Date: 8/14/2023 Time: 9:40 AM

## 2023-08-14 NOTE — ASSESSMENT & PLAN NOTE
· Present on admission- tachycardia, fevers, hypotension in setting of vesiculitis   · Likely source UTI, patient denies sexual activity   · CT imaging negative for fluid or gas collection  · White count down trending, but spiking fevers  · Will order CT pelvis with contrast for re imaging   · Consult ID and urology, continue antibiotics for now  · Stable

## 2023-08-14 NOTE — PROGRESS NOTES
[address]  Progress Note  Name: Enedelia Schmidt  MRN: [id number]  Unit/Bed#: -00 I Date of Admission: 8/12/2023   Date of Service: 8/14/2023 I Hospital Day: 2    Assessment/Plan   * Sepsis [facility])  Assessment & Plan  · Present on admission- tachycardia, fevers, hypotension in setting of vesiculitis   · Likely source UTI, patient denies sexual activity   · CT imaging negative for fluid or gas collection  · White count down trending, but spiking fevers  · Will order CT pelvis with contrast for re imaging   · Consult ID and urology, continue antibiotics for now  · Stable     Urinary retention  Assessment & Plan  Noted to have 800cc on bladder scan in ED, aldana catheter inserted. 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 Sequential Compression Devices Ordered. Patient Centered Rounds: I performed bedside rounds with nursing staff today. Discussions with Specialists or Other Care Team Provider: joe    Education and Discussions with Family / Patient: Patient declined call to . Total Time Spent on Date of Encounter in care of patient: 35 minutes This time was spent on one or more of the following: performing physical exam; counseling and coordination of care; obtaining or reviewing history; documenting in the medical record; reviewing/ordering tests, medications or procedures; communicating with other healthcare professionals and discussing with patient's family/caregivers.     Current Length of Stay: 2 day(s)  Current Patient Status: Inpatient   Certification Statement: The patient will continue to require additional inpatient hospital stay due to ID, urology, antibiotics  Discharge Plan: Anticipate discharge in 48 hrs to home. Code Status: Level 1 - Full Code    Subjective:   Patient feels about the same as yesterday     Objective:     Vitals:   Temp (24hrs), Av.9 °F (37.7 °C), Min:99.1 °F (37.3 °C), Max:101.5 °F (38.6 °C)    Temp:  [99.1 °F (37.3 °C)-101.5 °F (38.6 °C)] 99.6 °F (37.6 °C)  HR:  [] 53  Resp:  [15-19] 18  BP: ()/(57-91) 92/57  SpO2:  [94 %-96 %] 95 %  There is no height or weight on file to calculate BMI. Input and Output Summary (last 24 hours): Intake/Output Summary (Last 24 hours) at 2023 1137  Last data filed at 2023 0451  Gross per 24 hour   Intake 720 ml   Output 800 ml   Net -80 ml       Physical Exam:   Physical Exam  Constitutional:       General: He is not in acute distress. Appearance: Normal appearance. He is not toxic-appearing. Cardiovascular:      Rate and Rhythm: Normal rate and regular rhythm. Heart sounds: Normal heart sounds. No murmur heard. Pulmonary:      Effort: Pulmonary effort is normal. No respiratory distress. Breath sounds: Normal breath sounds. No wheezing. Abdominal:      General: Abdomen is flat. There is no distension. Palpations: Abdomen is soft. Tenderness: There is no abdominal tenderness. Neurological:      General: No focal deficit present. Mental Status: He is alert and oriented to person, place, and time. Mental status is at baseline. Motor: No weakness.           Additional Data:     Labs:  Results from last 7 days   Lab Units 23  0451   WBC Thousand/uL 9.92   HEMOGLOBIN g/dL 13.8   HEMATOCRIT % 41.0   PLATELETS Thousands/uL 136*   NEUTROS PCT % 83*   LYMPHS PCT % 7*   MONOS PCT % 10   EOS PCT % 0     Results from last 7 days   Lab Units 23  0451 23  0503 08/12/23  1438   SODIUM mmol/L 135   < > 134*   POTASSIUM mmol/L 3.5   < > 4.0   CHLORIDE mmol/L 103   < > 100   CO2 mmol/L 25   < > 24   BUN mg/dL 13   < > 15   CREATININE mg/dL 0.85   < > 0.90   ANION GAP mmol/L 7   < > 10   CALCIUM mg/dL 8.7   < > 9.6   ALBUMIN g/dL  --   --  4.4   TOTAL BILIRUBIN mg/dL  --   --  1.23*   ALK PHOS U/L  --   --  59   ALT U/L  --   --  19   AST U/L  --   --  13   GLUCOSE RANDOM mg/dL 103   < > 118    < > = values in this interval not displayed. Results from last 7 days   Lab Units 08/12/23  1438   INR  1.16             Results from last 7 days   Lab Units 08/13/23  0503 08/12/23  1438   LACTIC ACID mmol/L  --  1.7   PROCALCITONIN ng/ml 0.65* 0.45*       Lines/Drains:  Invasive Devices     Peripheral Intravenous Line  Duration           Peripheral IV 08/12/23 Left Forearm 1 day    Peripheral IV 08/12/23 Right Antecubital 1 day          Drain  Duration           Urethral Catheter Coude 18 Fr. 1 day              Urinary Catheter:  Goal for removal: N/A- Discharging with Redding               Imaging: No pertinent imaging reviewed. Recent Cultures (last 7 days):   Results from last 7 days   Lab Units 08/12/23  1536 08/12/23  1448 08/12/23  1438   BLOOD CULTURE   --  No Growth at 24 hrs. No Growth at 24 hrs.    URINE CULTURE  >100,000 cfu/ml Escherichia coli*  --   --        Last 24 Hours Medication List:   Current Facility-Administered Medications   Medication Dose Route Frequency Provider Last Rate   • acetaminophen  650 mg Oral Q6H PRN Su Neeta Saraiya, DO     • aspirin  81 mg Oral Daily Su Neeta Saraiya, DO     • atorvastatin  20 mg Oral Daily Su Neeta Saraiya, DO     • cefTRIAXone  1,000 mg Intravenous Q24H Su Neeta Saraiya, DO 1,000 mg (08/14/23 6533)   • citalopram  20 mg Oral Daily Su Neeta Saraiya, DO     • doxycycline hyclate  100 mg Oral Q12H [address] Su Neeta Saraiya, DO     • enoxaparin  40 mg Subcutaneous Daily Sularisa Lin DO     • finasteride  5 mg Oral Daily Diomedes Abrams MD     • fluticasone  1 puff [facility]halation [facility]     • fluticasone  1 spray Nasal Daily [facility], DO     • HYDROmorphone  0.5 mg Intravenous Q4H PRN Diomedes Abrams MD     • HYDROmorphone  0.5 mg Intravenous Q3H PRN Diomedes Abrams MD     • lidocaine  1 Application Urethral Once [facility], DO     • LORazepam  0.5 mg Oral Q6H PRN [facility], DO     • montelukast  10 mg Oral Daily [facility], DO     • oxyCODONE  5 mg Oral Q6H PRN Diomedes Abrams MD     • pantoprazole  40 mg Oral Early Morning [facility], DO     • sotalol  80 mg Oral Q12H [facility]     • tamsulosin  0.4 mg Oral BID [facility], DO     • trimethobenzamide  200 mg Intramuscular Q6H PRN Antonio Luciano PA-C          Today, Patient Was Seen By: Link Campuzano MD    **Please Note: This note may have been constructed using a voice recognition system. **

## 2023-08-14 NOTE — ASSESSMENT & PLAN NOTE
· S/p PPM, follows with EP  · On asa and sotalol   · Has broad fluctuations in heart rate likely exacerbated by infection  · Monitor

## 2023-08-14 NOTE — CONSULTS
Consultation - Infectious Disease   Alan Zambrano 79 y.o. male MRN: [id number]  Unit/Bed#: -01 Encounter: [id number]      IMPRESSION & RECOMMENDATIONS:   Impression/Recommendations:  1. Sepsis, POA. Tachycardia, leukocytosis. With early high fever. Secondary to #2. No other clear explanation. Negative blood cultures. Ongoing intermittent fevers may take time to fully resolve. WBC count has normalized. Patient is hemodynamically stable, nontoxic.    -Antibiotic plan as below  -Follow temperatures and hemodynamics  -Follow blood cultures  -Recheck CBC in AM    2. Acute bilateral seminal vesiculitis. As evidenced by active symptoms, positive urine culture for E. coli, CT findings. Consideration for prostatic involvement of infection. No evidence of abscess on contrast CT. Doubt STI. Seems to be slowly improving on antibiotic with normalization of WBC count.    -Continue IV ceftriaxone  -Hold doxycycline  -Follow-up final urine culture and tailor antibiotics accordingly.  -Follow-up chlamydia/GC for completeness  -Minimum 2-week course of antibiotic. 3.  Acute urinary retention. Likely due to underlying BPH as well as acute infection, as above. Status post Redding catheter placement in ER. 4.  SSS post pacemaker. Blood cultures are negative. Antibiotics:   Ceftriaxone/doxycycline 3      I discussed above plan with Dr. Beulah Lopez from primary service, and with patient. Thank you for this consultation. We will follow along with you. HISTORY OF PRESENT ILLNESS:  Reason for Consult: Seminal vesiculitis    HPI: Alan Zambrano is a 79 y.o. male with SSS post pacemaker, asthma presented to ER on 8/12 with complaint of fevers and chills, in addition to dysuria and lower abdominal discomfort for about 2 days. Also reported some whitish discharge from urethra. WBC count was 19 on presentation. Patient was tachycardic with early fever of 101.5.   Was noted to have 800 cc of urine on bladder scan for which a Redding catheter was inserted. CT abdomen showed enlarged seminal vesicles with surrounding inflammatory fat stranding and trace amount of surrounding fluid concerning for seminal vesiculitis. No evidence of abscess. Started on ceftriaxone and doxycycline. Blood cultures are negative. Preliminary urine culture with growth of E. coli. REVIEW OF SYSTEMS:  A complete system-based review of systems is otherwise negative.     PAST MEDICAL HISTORY:  Past Medical History:   Diagnosis Date   • Asthma     allergy induced   • Basal cell carcinoma    • Colon polyp    • Fatty liver    • H/O nonmelanoma skin cancer     last assessed-8/22/2017   • Hyperlipidemia    • Inflamed seborrheic keratosis    • Malignant neoplasm of skin     last assessed-1/21/2014   • Neoplasm of uncertain behavior of skin    • Nocturia    • Pneumothorax, left    • Seasonal allergies    • Sebaceous cyst    • 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/01/2022    SCCIS- mid scalp   • Squamous cell carcinoma of skin of neck    • Testicular hypofunction    • Viral warts      Past Surgical History:   Procedure Laterality Date   • CARDIAC CATHETERIZATION     • CARDIAC PACEMAKER PLACEMENT  05/05/2021   • CARDIAC PACEMAKER PLACEMENT     • CARDIOVASCULAR STRESS TEST  08/27/2010   • COLONOSCOPY  10/08/2008   • IR OTHER  04/15/2021   • LUNG SURGERY      for pneumothorax   • MOHS SURGERY  02/15/2022    SCCIS mid scalp- Dr Dora Reynoso   • PARTIAL HIP ARTHROPLASTY     • RHINOPLASTY         FAMILY HISTORY:  Non-contributory    SOCIAL HISTORY:  Social History     Substance and Sexual Activity   Alcohol Use Not Currently    Comment: quit march 2020     Social History     Substance and Sexual Activity   Drug Use No     Social History     Tobacco Use   Smoking Status Never   Smokeless Tobacco Never       ALLERGIES:  Allergies   Allergen Reactions   • Dust Mite Extract    • Molds & Smuts    • Pollen Extract    • Short Ragweed Pollen Ext    • Tree Extract low-attenuation seminal vesicles with surrounding inflammatory fat stranding and trace amount of surrounding fluid compatible with seminal vesiculitis. No evidence of abscess. Hypervascular lesion in liver straddling segments 4A and 8. Stable pancreatic head cystic lesion. EKG, Pathology, and Other Studies:   I have personally reviewed pertinent reports.

## 2023-08-15 ENCOUNTER — TELEPHONE (OUTPATIENT)
Dept: OTHER | Facility: HOSPITAL | Age: 68
End: 2023-08-15

## 2023-08-15 LAB
ANION GAP SERPL CALCULATED.3IONS-SCNC: 6 MMOL/L
BACTERIA UR CULT: ABNORMAL
BASOPHILS # BLD AUTO: 0.03 THOUSANDS/ÂΜL (ref 0–0.1)
BASOPHILS NFR BLD AUTO: 0 % (ref 0–1)
BUN SERPL-MCNC: 10 MG/DL (ref 5–25)
CALCIUM SERPL-MCNC: 8.7 MG/DL (ref 8.4–10.2)
CHLORIDE SERPL-SCNC: 106 MMOL/L (ref 96–108)
CO2 SERPL-SCNC: 25 MMOL/L (ref 21–32)
CREAT SERPL-MCNC: 0.79 MG/DL (ref 0.6–1.3)
EOSINOPHIL # BLD AUTO: 0.09 THOUSAND/ÂΜL (ref 0–0.61)
EOSINOPHIL NFR BLD AUTO: 1 % (ref 0–6)
ERYTHROCYTE [DISTWIDTH] IN BLOOD BY AUTOMATED COUNT: 12.8 % (ref 11.6–15.1)
GFR SERPL CREATININE-BSD FRML MDRD: 93 ML/MIN/1.73SQ M
GLUCOSE SERPL-MCNC: 108 MG/DL (ref 65–140)
HCT VFR BLD AUTO: 39 % (ref 36.5–49.3)
HGB BLD-MCNC: 13.2 G/DL (ref 12–17)
IMM GRANULOCYTES # BLD AUTO: 0.03 THOUSAND/UL (ref 0–0.2)
IMM GRANULOCYTES NFR BLD AUTO: 0 % (ref 0–2)
LYMPHOCYTES # BLD AUTO: 0.75 THOUSANDS/ÂΜL (ref 0.6–4.47)
LYMPHOCYTES NFR BLD AUTO: 11 % (ref 14–44)
MAGNESIUM SERPL-MCNC: 2 MG/DL (ref 1.9–2.7)
MCH RBC QN AUTO: 30.1 PG (ref 26.8–34.3)
MCHC RBC AUTO-ENTMCNC: 33.8 G/DL (ref 31.4–37.4)
MCV RBC AUTO: 89 FL (ref 82–98)
MONOCYTES # BLD AUTO: 1.1 THOUSAND/ÂΜL (ref 0.17–1.22)
MONOCYTES NFR BLD AUTO: 16 % (ref 4–12)
NEUTROPHILS # BLD AUTO: 4.83 THOUSANDS/ÂΜL (ref 1.85–7.62)
NEUTS SEG NFR BLD AUTO: 72 % (ref 43–75)
NRBC BLD AUTO-RTO: 0 /100 WBCS
PLATELET # BLD AUTO: 157 THOUSANDS/UL (ref 149–390)
PMV BLD AUTO: 10.7 FL (ref 8.9–12.7)
POTASSIUM SERPL-SCNC: 3.4 MMOL/L (ref 3.5–5.3)
RBC # BLD AUTO: 4.38 MILLION/UL (ref 3.88–5.62)
SODIUM SERPL-SCNC: 137 MMOL/L (ref 135–147)
WBC # BLD AUTO: 6.83 THOUSAND/UL (ref 4.31–10.16)

## 2023-08-15 PROCEDURE — 80048 BASIC METABOLIC PNL TOTAL CA: CPT | Performed by: STUDENT IN AN ORGANIZED HEALTH CARE EDUCATION/TRAINING PROGRAM

## 2023-08-15 PROCEDURE — 99233 SBSQ HOSP IP/OBS HIGH 50: CPT | Performed by: INTERNAL MEDICINE

## 2023-08-15 PROCEDURE — 83735 ASSAY OF MAGNESIUM: CPT | Performed by: STUDENT IN AN ORGANIZED HEALTH CARE EDUCATION/TRAINING PROGRAM

## 2023-08-15 PROCEDURE — 85025 COMPLETE CBC W/AUTO DIFF WBC: CPT | Performed by: STUDENT IN AN ORGANIZED HEALTH CARE EDUCATION/TRAINING PROGRAM

## 2023-08-15 RX ORDER — SULFAMETHOXAZOLE AND TRIMETHOPRIM 800; 160 MG/1; MG/1
1 TABLET ORAL EVERY 12 HOURS SCHEDULED
Status: DISCONTINUED | OUTPATIENT
Start: 2023-08-16 | End: 2023-08-16 | Stop reason: HOSPADM

## 2023-08-15 RX ORDER — POTASSIUM CHLORIDE 20 MEQ/1
20 TABLET, EXTENDED RELEASE ORAL ONCE
Status: COMPLETED | OUTPATIENT
Start: 2023-08-15 | End: 2023-08-15

## 2023-08-15 RX ADMIN — PANTOPRAZOLE SODIUM 40 MG: 40 TABLET, DELAYED RELEASE ORAL at 06:07

## 2023-08-15 RX ADMIN — ASPIRIN 81 MG: 81 TABLET, COATED ORAL at 08:13

## 2023-08-15 RX ADMIN — SOTALOL HYDROCHLORIDE 80 MG: 80 TABLET ORAL at 08:13

## 2023-08-15 RX ADMIN — FLUTICASONE PROPIONATE 1 SPRAY: 50 SPRAY, METERED NASAL at 08:17

## 2023-08-15 RX ADMIN — CITALOPRAM HYDROBROMIDE 20 MG: 20 TABLET ORAL at 08:14

## 2023-08-15 RX ADMIN — CEFTRIAXONE 1000 MG: 1 INJECTION, SOLUTION INTRAVENOUS at 08:13

## 2023-08-15 RX ADMIN — POTASSIUM CHLORIDE 20 MEQ: 1500 TABLET, EXTENDED RELEASE ORAL at 08:13

## 2023-08-15 RX ADMIN — MONTELUKAST 10 MG: 10 TABLET, FILM COATED ORAL at 08:14

## 2023-08-15 RX ADMIN — SOTALOL HYDROCHLORIDE 80 MG: 80 TABLET ORAL at 21:00

## 2023-08-15 RX ADMIN — TAMSULOSIN HYDROCHLORIDE 0.4 MG: 0.4 CAPSULE ORAL at 17:56

## 2023-08-15 RX ADMIN — FINASTERIDE 5 MG: 5 TABLET, FILM COATED ORAL at 08:14

## 2023-08-15 RX ADMIN — DOXYCYCLINE 100 MG: 100 CAPSULE ORAL at 08:13

## 2023-08-15 RX ADMIN — ATORVASTATIN CALCIUM 20 MG: 20 TABLET, FILM COATED ORAL at 08:14

## 2023-08-15 RX ADMIN — TAMSULOSIN HYDROCHLORIDE 0.4 MG: 0.4 CAPSULE ORAL at 08:13

## 2023-08-15 RX ADMIN — ENOXAPARIN SODIUM 40 MG: 40 INJECTION SUBCUTANEOUS at 08:14

## 2023-08-15 NOTE — QUICK NOTE
Incomplete                      [address]  Progress Note  Name: Jerry Monahan  KB  Unit/Bed#: -01 I Date of Admission: 2023   Date of [facility] Day: 2        Assessment/Plan      Sepsis:  Assessment:  • Present on admission with tachycardia, fever, hypotension with seminal vesiculitis  • Culture positive for gram negative rods  • Unclear response to ceftriaxone; WBC decreased; neutrophils remain elevated. Still having fevers. • Patient reports bloody penile discharge and 10/10 sharp testicular pain  ?  Add doxycycline to cover GC/Cl; PCR pending   Plan  • Continue abx; consider changing to TMP/SMX  • Consult urology  • Consult ID   • CT with focus on pelvis/scrotum            Lab Results   Component Value Date     WBC 9.92 2023     WBC 13.88 (H) 2023     WBC 19.15 (H) 2023     NEUTROABS 8.20 (H) 2023     NEUTROABS 11.07 (H) 2023     NEUTROABS 15.96 (H) 2023     NEUTOPHILPCT 83 (H) 2023     NEUTOPHILPCT 79 (H) 2023     NEUTOPHILPCT 83 (H) 2023     LYMPHSABS 0.66 2023     LYMPHSABS 0.90 2023     LYMPHSABS 0.72 2023     LYMPHOPCT 7 (L) 2023     LYMPHOPCT 7 (L) 2023     LYMPHOPCT 4 (L) 2023         Urinary Retention  Assessment  • Present on admission; 800cc bladder noted   • Imaging showed moderately distended and trabeculated bladder compatible with chronic outlet obstruction   • Aldana catheter placed   Plan  • Continue finasteride and tamsulosin  • Monitor Intake/output  • Continue aldana, plan for void trial      Tachy-juwan Syndrome   Assessment   • PPM in place   • Broad flucuations in HR  Plan  • Continue aspirin and sotalol  • Monitor HR         VTE Pharmacologic Prophylaxis:   Pharmacologic: Enoxaparin (Lovenox)  Mechanical VTE Prophylaxis in Place: {Yes or No:32549}     Patient Centered Rounds: I have performed bedside rounds with nursing staff today.     Discussions with Specialists or Other Care Team Provider: Consult to urology      Education and Discussions with Family / Patient: Patient     Time Spent for Care: 30 minutes. More than 50% of total time spent on counseling and coordination of care as described above.     Current Length of Stay: 2 day(s)     Current Patient Status: Inpatient   Certification Statement: The patient will continue to require additional inpatient hospital stay due to IV antibiotics     Discharge Plan: ***     Code Status: Level 1 - Full Code        Subjective:   No acute events overnight.  Patient reports bloody penile discharge and 10/10 testicular pain, sharp in nature.      Objective:      General: Endorses fever, chills   Head: Denies dizziness, or headache   Mouth and Throat: Denies sore throat; difficulty swallowing   Respiratory: Endorses shortness of breath, denies cough  Cardiac: Denies chest pain, palpitations  Gastrointestinal: Denies abdominal pain, diarrhea, vomiting   Genitourinary: Endorses blood discharge, testicular pain; Denies increased urgency; painful urination  Musculoskeletal: Denies back pain, joint pain      Vitals:   Temp (24hrs), Av.9 °F (37.7 °C), Min:99.1 °F (37.3 °C), Max:101.5 °F (38.6 °C)     Temp:  [99.1 °F (37.3 °C)-101.5 °F (38.6 °C)] 99.6 °F (37.6 °C)  HR:  [] 127  Resp:  [15-19] 18  BP: ()/(59-91) 130/91  SpO2:  [94 %-96 %] 95 %  There is no height or weight on file to calculate BMI.      Input and Output Summary (last 24 hours):         Intake/Output Summary (Last 24 hours) at 2023 0930  Last data filed at 2023 0451      Gross per 24 hour   Intake 770 ml   Output 800 ml   Net -30 ml         Physical Exam:      Physical Exam  ( *** Be Sure to Include Physical Exam: Delete this entire line when you have entered your exam)     Additional Data:      Labs:          Results from last 7 days   Lab Units 23  0451   WBC Thousand/uL 9.92   HEMOGLOBIN g/dL 13.8 HEMATOCRIT % 41.0   PLATELETS Thousands/uL 136*   NEUTROS PCT % 83*   LYMPHS PCT % 7*   MONOS PCT % 10   EOS PCT % 0             Results from last 7 days   Lab Units 08/14/23  0451 08/13/23  0503 08/12/23  1438   SODIUM mmol/L 135   < > 134*   POTASSIUM mmol/L 3.5   < > 4.0   CHLORIDE mmol/L 103   < > 100   CO2 mmol/L 25   < > 24   BUN mg/dL 13   < > 15   CREATININE mg/dL 0.85   < > 0.90   ANION GAP mmol/L 7   < > 10   CALCIUM mg/dL 8.7   < > 9.6   ALBUMIN g/dL  --   --  4.4   TOTAL BILIRUBIN mg/dL  --   --  1.23*   ALK PHOS U/L  --   --  59   ALT U/L  --   --  19   AST U/L  --   --  13   GLUCOSE RANDOM mg/dL 103   < > 118    < > = values in this interval not displayed.           Results from last 7 days   Lab Units 08/12/23  1438   INR   1.16                    Results from last 7 days   Lab Units 08/13/23  0503 08/12/23  1438   LACTIC ACID mmol/L  --  1.7   PROCALCITONIN ng/ml 0.65* 0.45*               * I Have Reviewed All Lab Data Listed Above. * Additional Pertinent Lab Tests Reviewed: All Labs Within Last 24 Hours Reviewed     Imaging:     Imaging Reports Reviewed Today Include: ***  Imaging Personally Reviewed by Myself Includes:  ***     Recent Cultures (last 7 days):             Results from last 7 days   Lab Units 08/12/23  1536 08/12/23  1448 08/12/23  1438   BLOOD CULTURE    --  No Growth at 24 hrs. No Growth at 24 hrs.    URINE CULTURE   >100,000 cfu/ml Gram Negative Jong Enteric Like*  --   --          Last 24 Hours Medication List:            Current Facility-Administered Medications   Medication Dose Route Frequency Provider Last Rate   • acetaminophen  650 mg Oral Q6H PRN Su Neeta Saraiya, DO     • aspirin  81 mg Oral Daily Su Neeta Saraiya, DO     • atorvastatin  20 mg Oral Daily Su Neeta Saraiya, DO     • cefTRIAXone  1,000 mg Intravenous Q24H Su Neeta Saraiya, DO 1,000 mg (08/14/23 0428)   • citalopram  20 mg Oral Daily Su Neeta Saraiya, DO     • doxycycline hyclate  100 mg Oral Q12H [address] [facility], DO     • enoxaparin  40 mg Subcutaneous Daily Su [facility] Saraiya, DO     • finasteride  5 mg Oral Daily Gab Guardado MD     • fluticasone  1 puff Inhalation Daily Su [facility] Saraiya, DO     • fluticasone  1 spray Nasal Daily Su [facility] Saraiya, DO     • HYDROmorphone  0.5 mg Intravenous Q4H PRN Gab Guardado MD     • HYDROmorphone  0.5 mg Intravenous Q3H PRN Gab Guardado MD     • lidocaine  1 Application Urethral Once Su [facility] Saraiya, DO     • LORazepam  0.5 mg Oral Q6H PRN Su [facility] Saraiya, DO     • montelukast  10 mg Oral Daily Su [facility] Saraiya, DO     • oxyCODONE  5 mg Oral Q6H PRN Gab Guardado MD     • pantoprazole  40 mg Oral Early Morning Su [facility] Saraiya, DO     • sotalol  80 mg Oral Q12H Su [facility] Saraiya, DO     • tamsulosin  0.4 mg Oral BID Su [facility] Saraiya, DO     • trimethobenzamide  200 mg Intramuscular Q6H PRN Hany Elise PA-C           Today, Patient Was Seen By: Chetan Covington MS3

## 2023-08-15 NOTE — PLAN OF CARE
Problem: PAIN - ADULT  Goal: Verbalizes/displays adequate comfort level or baseline comfort level  Description: Interventions:  - Encourage patient to monitor pain and request assistance  - Assess pain using appropriate pain scale  - Administer analgesics based on type and severity of pain and evaluate response  - Implement non-pharmacological measures as appropriate and evaluate response  - Consider cultural and social influences on pain and pain management  - Notify physician/advanced practitioner if interventions unsuccessful or patient reports new pain  Outcome: Progressing     Problem: INFECTION - ADULT  Goal: Absence or prevention of progression during hospitalization  Description: INTERVENTIONS:  - Assess and monitor for signs and symptoms of infection  - Monitor lab/diagnostic results  - Monitor all insertion sites, i.e. indwelling lines, tubes, and drains  - Monitor endotracheal if appropriate and nasal secretions for changes in amount and color  - [place] appropriate cooling/warming therapies per order  - Administer medications as ordered  - Instruct and encourage patient and family to use good hand hygiene technique  - Identify and instruct in appropriate isolation precautions for identified infection/condition  Outcome: Progressing     Problem: MOBILITY - ADULT  Goal: Maintain or return to baseline ADL function  Description: INTERVENTIONS:  -  Assess patient's ability to carry out ADLs; assess patient's baseline for ADL function and identify physical deficits which impact ability to perform ADLs (bathing, care of mouth/teeth, toileting, grooming, dressing, etc.)  - Assess/evaluate cause of self-care deficits   - Assess range of motion  - Assess patient's mobility; develop plan if impaired  - Assess patient's need for assistive devices and provide as appropriate  - Encourage maximum independence but intervene and supervise when necessary  - Involve family in performance of ADLs  - Assess for home care

## 2023-08-15 NOTE — ASSESSMENT & PLAN NOTE
· Present on admission- tachycardia, fevers, hypotension in setting of vesiculitis   · Likely source UTI, patient denies sexual activity  · CT imaging negative for fluid or gas collection  · White count down trending, but spiking fevers  · Appreciate urology recommendations, no further plans inpatient, will need follow-up with outpatient urology for void trial  · Appreciate ID recommendations, transition to oral Bactrim likely would be on a 2-week course of oral antibiotics  · Stable

## 2023-08-15 NOTE — PROGRESS NOTES
Progress Note - Infectious Disease   Cely Rogers 79 y.o. male MRN: [id number]  Unit/Bed#: -01 Encounter: [id number]         IMPRESSION & RECOMMENDATIONS:   Impression/Recommendations:  1. Sepsis, POA. Tachycardia, leukocytosis. With early high fever. Secondary to #2. No other clear explanation. Negative blood cultures. Ongoing intermittent fevers may take time to fully resolve. Overall fever curve is much improved. WBC count has normalized. Patient is hemodynamically stable, nontoxic.     -Antibiotic plan as below  -Follow temperatures and hemodynamics     2. Acute bilateral seminal vesiculitis. As evidenced by active symptoms, positive urine culture for E. coli, CT findings. Consideration for prostatic involvement of infection. No evidence of abscess on contrast CT. Doubt STI. Negative GC/chlamydia. Seems to be slowly improving on antibiotic with normalization of WBC count. Urology input noted.     -Discontinue ceftriaxone/doxycycline.  -Start oral Bactrim  -If continues to improve, anticipate 2-week course of antibiotic.     3. Acute urinary retention. Likely due to underlying BPH as well as acute infection, as above. Status post [place] catheter placement in ER. Urology input noted with plan to discharge with temporary [place] catheter and outpatient follow-up.     4. SSS post pacemaker. Blood cultures are negative.     Antibiotics:   Ceftriaxone/doxycycline 4     I discussed above plan with primary service, and with patient. Subjective:  Patient is overall feeling much better with improving fevers. No new pain. Tolerating oral intake.     Objective:  Vitals:  Temp:  [98.4 °F (36.9 °C)-100.5 °F (38.1 °C)] 98.4 °F (36.9 °C)  HR:  [] 40  Resp:  [16-18] 16  BP: (136-140)/(63-85) 138/71  SpO2:  [94 %-95 %] 94 %  Temp (24hrs), Av.5 °F (37.5 °C), Min:98.4 °F (36.9 °C), Max:100.5 °F (38.1 °C)  Current: Temperature: 98.4 °F (36.9 °C)    Physical Exam:   General:  No acute distress, nontoxic  HEENT: Atraumatic normocephalic  Neck: Trachea midline  Psychiatric:  Awake and alert  Pulmonary:  Normal respiratory excursion without accessory muscle use  Abdomen:  Soft, nontender  Redding catheter in place draining clear yellow urine  Extremities:  No edema  Skin:  No rashes  Neuro: Moves all extremities spontaneously. Lab Results:  I have personally reviewed pertinent labs. Results from last 7 days   Lab Units 08/15/23  0512 08/14/23  0451 08/13/23  0503 08/12/23  1438   POTASSIUM mmol/L 3.4* 3.5 3.5 4.0   CHLORIDE mmol/L 106 103 106 100   CO2 mmol/L 25 25 23 24   BUN mg/dL 10 13 13 15   CREATININE mg/dL 0.79 0.85 0.72 0.90   EGFR ml/min/1.73sq m 93 90 97 88   CALCIUM mg/dL 8.7 8.7 8.5 9.6   AST U/L  --   --   --  13   ALT U/L  --   --   --  19   ALK PHOS U/L  --   --   --  59     Results from last 7 days   Lab Units 08/15/23  0512 08/14/23  0451 08/13/23  0503   WBC Thousand/uL 6.83 9.92 13.88*   HEMOGLOBIN g/dL 13.2 13.8 13.1   PLATELETS Thousands/uL 157 136* 127*     Results from last 7 days   Lab Units 08/12/23  1536 08/12/23  1448 08/12/23  1438   BLOOD CULTURE   --  No Growth at 48 hrs. No Growth at 48 hrs. URINE CULTURE  >100,000 cfu/ml Escherichia coli*  --   --        Imaging Studies:   I have personally reviewed pertinent imaging study reports and images in PACS. EKG, Pathology, and Other Studies:   I have personally reviewed pertinent reports.

## 2023-08-15 NOTE — ASSESSMENT & PLAN NOTE
· Continue flomax daily   · Aldana inserted in ED, continue aldana care.  Will discharge with aldana with outpatient ff up with Urology for voiding trial

## 2023-08-15 NOTE — PROGRESS NOTES
[address]  Progress Note  Name: Kevin Huerta  MRN: [id number]  Unit/Bed#: -24 I Date of Admission: 8/12/2023   Date of Service: 8/15/2023 I Hospital Day: 3    Assessment/Plan   * Sepsis [facility])  Assessment & Plan  · Present on admission- tachycardia, fevers, hypotension in setting of vesiculitis   · Likely source UTI, patient denies sexual activity  · CT imaging negative for fluid or gas collection  · White count down trending, but spiking fevers  · Appreciate urology recommendations, no further plans inpatient, will need follow-up with outpatient urology for void trial  · Appreciate ID recommendations, transition to oral Bactrim likely would be on a 2-week course of oral antibiotics  · Stable     Urinary retention  Assessment & Plan  Noted to have 800cc on bladder scan in ED, aldana catheter inserted. · Continue with flomax  · Intake and output  · Daily aldana care, discharge with [facility] with outpatient Urology ff up for voiding trial    Tachy-juwan syndrome [facility])  Assessment & Plan  · S/p PPM, follows with EP  · On asa and sotalol   · Has broad fluctuations in heart rate likely exacerbated by infection  · Monitor     Hyperlipidemia  Assessment & Plan  · Continue home dose statin    Benign prostatic hyperplasia  Assessment & Plan  · Continue flomax daily   · Aldana inserted in ED, continue aldana care. Will discharge with aldana with outpatient ff up with Urology for voiding trial          VTE Pharmacologic Prophylaxis: VTE Score: 5 High Risk (Score >/= 5) - Pharmacological DVT Prophylaxis Ordered: enoxaparin (Lovenox). Sequential Compression Devices Ordered. Patient Centered Rounds: I performed bedside rounds with nursing staff today. Discussions with Specialists or Other Care Team Provider: case management, ID, urology on board    Education and Discussions with Family / Patient: Discussed with the patient, patient stated he will update family.      Total Time Spent on Date of Encounter in care of patient: 65 minutes This time was spent on one or more of the following: performing physical exam; counseling and coordination of care; obtaining or reviewing history; documenting in the medical record; reviewing/ordering tests, medications or procedures; communicating with other healthcare professionals and discussing with patient's family/caregivers. Current Length of Stay: 3 day(s)  Current Patient Status: Inpatient   Certification Statement: The patient will continue to require additional inpatient hospital stay due to Sepsis  Discharge Plan: Anticipate discharge tomorrow to home with home services. Code Status: Level 1 - Full Code    Subjective:   Denies abdominal pain at this time. Did endorse that he feels a little bit better today. Fever curve is improved. Also noticed a little bit of an improvement after FC insertion. Requesting for home health care to help with [facility] once discharged. Objective:     Vitals:   Temp (24hrs), Av.5 °F (37.5 °C), Min:98.4 °F (36.9 °C), Max:100.5 °F (38.1 °C)    Temp:  [98.4 °F (36.9 °C)-100.5 °F (38.1 °C)] 98.4 °F (36.9 °C)  HR:  [] 40  Resp:  [16-18] 16  BP: (136-140)/(63-85) 138/71  SpO2:  [94 %-95 %] 94 %  There is no height or weight on file to calculate BMI. Input and Output Summary (last 24 hours): Intake/Output Summary (Last 24 hours) at 8/15/2023 1340  Last data filed at 8/15/2023 1245  Gross per 24 hour   Intake 2224 ml   Output 3140 ml   Net -916 ml       Physical Exam:   Physical Exam  Vitals and nursing note reviewed. Constitutional:       General: He is not in acute distress. Appearance: He is well-developed. He is not toxic-appearing. HENT:      Head: Normocephalic and atraumatic. Nose: Nose normal.      Mouth/Throat:      Mouth: Mucous membranes are moist.      Pharynx: Oropharynx is clear. Eyes:      Pupils: Pupils are equal, round, and reactive to light.    Cardiovascular:      Rate and Rhythm: Normal rate. Pulses: Normal pulses. Pulmonary:      Effort: Pulmonary effort is normal. No respiratory distress. Breath sounds: Normal breath sounds. No wheezing. Abdominal:      Palpations: Abdomen is soft. Tenderness: There is no guarding. Genitourinary:     Comments: (+) FC  Musculoskeletal:         General: No swelling or tenderness. Normal range of motion. Cervical back: Normal range of motion. Skin:     General: Skin is warm and dry. Capillary Refill: Capillary refill takes less than 2 seconds. Neurological:      General: No focal deficit present. Mental Status: He is alert and oriented to person, place, and time. Mental status is at baseline. Psychiatric:         Mood and Affect: Mood normal.         Thought Content: Thought content normal.         Additional Data:     Labs:  Results from last 7 days   Lab Units 08/15/23  0512   WBC Thousand/uL 6.83   HEMOGLOBIN g/dL 13.2   HEMATOCRIT % 39.0   PLATELETS Thousands/uL 157   NEUTROS PCT % 72   LYMPHS PCT % 11*   MONOS PCT % 16*   EOS PCT % 1     Results from last 7 days   Lab Units 08/15/23  0512 08/13/23  0503 08/12/23  1438   SODIUM mmol/L 137   < > 134*   POTASSIUM mmol/L 3.4*   < > 4.0   CHLORIDE mmol/L 106   < > 100   CO2 mmol/L 25   < > 24   BUN mg/dL 10   < > 15   CREATININE mg/dL 0.79   < > 0.90   ANION GAP mmol/L 6   < > 10   CALCIUM mg/dL 8.7   < > 9.6   ALBUMIN g/dL  --   --  4.4   TOTAL BILIRUBIN mg/dL  --   --  1.23*   ALK PHOS U/L  --   --  59   ALT U/L  --   --  19   AST U/L  --   --  13   GLUCOSE RANDOM mg/dL 108   < > 118    < > = values in this interval not displayed.      Results from last 7 days   Lab Units 08/12/23  1438   INR  1.16             Results from last 7 days   Lab Units 08/13/23  0503 08/12/23  1438   LACTIC ACID mmol/L  --  1.7   PROCALCITONIN ng/ml 0.65* 0.45*       Lines/Drains:  Invasive Devices     Peripheral Intravenous Line  Duration           Peripheral IV 08/12/23 Left Forearm 2 days    Peripheral IV 08/12/23 Right Antecubital 2 days          Drain  Duration           Urethral Catheter Coude 18 Fr. 2 days              Urinary Catheter:  Goal for removal: N/A- Discharging with [place]               Imaging: Reviewed radiology reports from this admission including: chest xray, chest CT scan and abdominal/pelvic CT    Recent Cultures (last 7 days):   Results from last 7 days   Lab Units 08/12/23  1536 08/12/23  1448 08/12/23  1438   BLOOD CULTURE   --  No Growth at 48 hrs. No Growth at 48 hrs.    URINE CULTURE  >100,000 cfu/ml Escherichia coli*  --   --        Last 24 Hours Medication List:   Current Facility-Administered Medications   Medication Dose Route Frequency Provider Last Rate   • acetaminophen  650 mg [facility]H [facility]     • aspirin  81 mg [facility]     • atorvastatin  20 mg Oral Daily [facility] [place], DO     • citalopram  20 mg Oral [facility]     • enoxaparin  40 mg Subcutaneous Daily [facility]     • finasteride  5 mg Oral Daily Tawana Vásquez MD     • fluticasone  1 puff Inhalation Daily [facility]     • fluticasone  1 spray Nasal Daily [facility]     • HYDROmorphone  0.5 mg Intravenous Q4H PRN Tawana Vásquez MD     • HYDROmorphone  0.5 mg Intravenous Q3H PRN Tawana Vásquez MD     • lidocaine  1 Application Urethral Once [facility], [facility]     • LORazepam  0.5 mg Oral Q6H PRN [facility], DO     • montelukast  10 mg Oral Daily [facility] [place], DO     • oxyCODONE  5 mg Oral Q6H PRN Tawana Vásquez MD     • pantoprazole  40 mg Oral Early Morning [facility], DO     • sotalol  80 mg Oral Q12H [facility]     • [START ON 8/16/2023] sulfamethoxazole-trimethoprim  1 tablet Oral Q12H [facility] Adelaida Sr DO     • tamsulosin  0.4 mg Oral BID [facility], DO     • trimethobenzamide  200 mg Intramuscular Q6H PRN Placidomu Murphy PA-C Today, Patient Was Seen By: Alvaro Reich MD    **Please Note: This note may have been constructed using a voice recognition system. **

## 2023-08-15 NOTE — ASSESSMENT & PLAN NOTE
Noted to have 800cc on bladder scan in ED, aldana catheter inserted.   · Continue with flomax  · Intake and output  · Daily aldana care, discharge with [facility] with outpatient Urology ff up for voiding trial

## 2023-08-15 NOTE — CASE MANAGEMENT
Case Management Discharge Planning Note    Patient name James Walker  Location /-72 MRN [id number]  : 1955 Date 8/15/2023       Current Admission Date: 2023  Current Admission Diagnosis:Sepsis [facility])   Patient Active Problem List    Diagnosis Date Noted   • Sepsis ([address]) 2023   • Urinary retention 2023   • Pancreatic cyst 2023   • Aneurysm of ascending aorta without rupture ([address]) 2023   • 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[address]) 2021   • Sick sinus syndrome ([address]) 2021   • Bifascicular block 2021   • Health maintenance examination 2019   • Elevated PSA 2019   • Vitamin D deficiency 2019   • History of skin cancer 2018   • Hyperlipidemia 2016   • Acid reflux disease 10/13/2014   • Anxiety, generalized 10/13/2014   • Asthma 10/13/2014   • Benign prostatic hyperplasia 10/13/2014   • Allergic rhinitis 10/02/2014      LOS (days): 3  Geometric Mean LOS (GMLOS) (days): 3.50  Days to GMLOS:0.7     OBJECTIVE:  Risk of Unplanned Readmission Score: 10.53         Current admission status: Inpatient   Preferred Pharmacy:   [id number] [place] - [address] [place] [address]  Phone: [phone] Fax: [phone] for [address] [facility],  [address], [address] [place] [address],[address]  [address] [place] [address],[address]  [place] [address]  Phone: [phone] Fax: [phone]    Primary Care Provider: Amandeep Pollard DO    Primary Insurance: MEDICARE  Secondary Insurance: AARP    DISCHARGE DETAILS:    Discharge planning discussed with[de-identified] Pt at bedside  [place] of Choice: Yes  Comments - Freedom of Choice: CM met withpt at bedside and provided pt with pt choice list for [facility]. Pt will let CM know on choice. CM continues to follow.        Requested [address]         Is the patient interested in [facility] at discharge?: Yes  [facility] requested[de-identified] Nursing, Physical Therapy, Occupational Therapy         Would you like to participate in our [facility] Adify service program?  : No - Declined    Treatment Team Recommendation: Home  Discharge Destination Plan[de-identified] Home with Olivia1 Antonio [email or web address] at Discharge : Self, Family              IMM Given (Date):: 08/15/23  IMM Given to[de-identified] Patient  Family notified[de-identified] Pt at bedside  Additional Comments: CM reviewed IMM with pt at bedside. Pt expressed full and complete understanding.  Copy provided and original placed in MR for scanning

## 2023-08-15 NOTE — PLAN OF CARE
Problem: INFECTION - ADULT  Goal: Absence or prevention of progression during hospitalization  Description: INTERVENTIONS:  - Assess and monitor for signs and symptoms of infection  - Monitor lab/diagnostic results  - Monitor all insertion sites, i.e. indwelling lines, tubes, and drains  - Monitor endotracheal if appropriate and nasal secretions for changes in amount and color  - [place] appropriate cooling/warming therapies per order  - Administer medications as ordered  - Instruct and encourage patient and family to use good hand hygiene technique  - Identify and instruct in appropriate isolation precautions for identified infection/condition  Outcome: Progressing     Problem: GENITOURINARY - ADULT  Goal: Absence of urinary retention  Description: INTERVENTIONS:  - Assess patient’s ability to void and empty bladder  - Monitor I/O  - Bladder scan as needed  - Discuss with physician/AP medications to alleviate retention as needed  - Discuss catheterization for long term situations as appropriate  Outcome: Progressing

## 2023-08-15 NOTE — TELEPHONE ENCOUNTER
Jeffrey Garcia is a 63-year-old seen in urologic consultation at [facility] due to urinary retention, UTI. CT scan showing seminal vesiculitis. Recommend Redding catheter stay for 10 to 14 days, and nonurgent follow up on seminal vesiculitis. Patient follows with Dr. Alonzo Ochoa as an outpatient. Patient unsure if he will follow-up with Dr. Alonzo Ochoa or [facility]. Please call patient to offer appointment for trial of void.

## 2023-08-16 ENCOUNTER — HOME HEALTH ADMISSION (OUTPATIENT)
Dept: HOME HEALTH SERVICES | Facility: HOME HEALTHCARE | Age: 68
End: 2023-08-16
Payer: MEDICARE

## 2023-08-16 VITALS
SYSTOLIC BLOOD PRESSURE: 127 MMHG | OXYGEN SATURATION: 95 % | BODY MASS INDEX: 29.83 KG/M2 | TEMPERATURE: 98.5 F | DIASTOLIC BLOOD PRESSURE: 88 MMHG | RESPIRATION RATE: 18 BRPM | HEIGHT: 68 IN | HEART RATE: 40 BPM

## 2023-08-16 LAB
ANION GAP SERPL CALCULATED.3IONS-SCNC: 7 MMOL/L
BASOPHILS # BLD AUTO: 0.04 THOUSANDS/ÂΜL (ref 0–0.1)
BASOPHILS NFR BLD AUTO: 1 % (ref 0–1)
BUN SERPL-MCNC: 10 MG/DL (ref 5–25)
CALCIUM SERPL-MCNC: 8.8 MG/DL (ref 8.4–10.2)
CHLORIDE SERPL-SCNC: 104 MMOL/L (ref 96–108)
CO2 SERPL-SCNC: 26 MMOL/L (ref 21–32)
CREAT SERPL-MCNC: 0.71 MG/DL (ref 0.6–1.3)
EOSINOPHIL # BLD AUTO: 0.16 THOUSAND/ÂΜL (ref 0–0.61)
EOSINOPHIL NFR BLD AUTO: 2 % (ref 0–6)
ERYTHROCYTE [DISTWIDTH] IN BLOOD BY AUTOMATED COUNT: 12.7 % (ref 11.6–15.1)
GFR SERPL CREATININE-BSD FRML MDRD: 97 ML/MIN/1.73SQ M
GLUCOSE SERPL-MCNC: 99 MG/DL (ref 65–140)
HCT VFR BLD AUTO: 40.6 % (ref 36.5–49.3)
HGB BLD-MCNC: 13.6 G/DL (ref 12–17)
IMM GRANULOCYTES # BLD AUTO: 0.06 THOUSAND/UL (ref 0–0.2)
IMM GRANULOCYTES NFR BLD AUTO: 1 % (ref 0–2)
LYMPHOCYTES # BLD AUTO: 1.09 THOUSANDS/ÂΜL (ref 0.6–4.47)
LYMPHOCYTES NFR BLD AUTO: 15 % (ref 14–44)
MAGNESIUM SERPL-MCNC: 2 MG/DL (ref 1.9–2.7)
MCH RBC QN AUTO: 29.8 PG (ref 26.8–34.3)
MCHC RBC AUTO-ENTMCNC: 33.5 G/DL (ref 31.4–37.4)
MCV RBC AUTO: 89 FL (ref 82–98)
MONOCYTES # BLD AUTO: 1.15 THOUSAND/ÂΜL (ref 0.17–1.22)
MONOCYTES NFR BLD AUTO: 16 % (ref 4–12)
NEUTROPHILS # BLD AUTO: 4.88 THOUSANDS/ÂΜL (ref 1.85–7.62)
NEUTS SEG NFR BLD AUTO: 65 % (ref 43–75)
NRBC BLD AUTO-RTO: 0 /100 WBCS
PLATELET # BLD AUTO: 171 THOUSANDS/UL (ref 149–390)
PMV BLD AUTO: 10.4 FL (ref 8.9–12.7)
POTASSIUM SERPL-SCNC: 3.7 MMOL/L (ref 3.5–5.3)
RBC # BLD AUTO: 4.57 MILLION/UL (ref 3.88–5.62)
SODIUM SERPL-SCNC: 137 MMOL/L (ref 135–147)
WBC # BLD AUTO: 7.38 THOUSAND/UL (ref 4.31–10.16)

## 2023-08-16 PROCEDURE — 99233 SBSQ HOSP IP/OBS HIGH 50: CPT | Performed by: INTERNAL MEDICINE

## 2023-08-16 PROCEDURE — 99239 HOSP IP/OBS DSCHRG MGMT >30: CPT | Performed by: INTERNAL MEDICINE

## 2023-08-16 PROCEDURE — 83735 ASSAY OF MAGNESIUM: CPT | Performed by: STUDENT IN AN ORGANIZED HEALTH CARE EDUCATION/TRAINING PROGRAM

## 2023-08-16 PROCEDURE — 85025 COMPLETE CBC W/AUTO DIFF WBC: CPT | Performed by: STUDENT IN AN ORGANIZED HEALTH CARE EDUCATION/TRAINING PROGRAM

## 2023-08-16 PROCEDURE — 80048 BASIC METABOLIC PNL TOTAL CA: CPT | Performed by: STUDENT IN AN ORGANIZED HEALTH CARE EDUCATION/TRAINING PROGRAM

## 2023-08-16 RX ORDER — FINASTERIDE 5 MG/1
5 TABLET, FILM COATED ORAL DAILY
Qty: 30 TABLET | Refills: 0 | Status: SHIPPED | OUTPATIENT
Start: 2023-08-17 | End: 2023-09-16

## 2023-08-16 RX ORDER — OXYBUTYNIN CHLORIDE 5 MG/1
5 TABLET ORAL 3 TIMES DAILY
Qty: 42 TABLET | Refills: 0 | Status: SHIPPED | OUTPATIENT
Start: 2023-08-16 | End: 2023-08-30

## 2023-08-16 RX ORDER — SULFAMETHOXAZOLE AND TRIMETHOPRIM 800; 160 MG/1; MG/1
1 TABLET ORAL EVERY 12 HOURS SCHEDULED
Qty: 27 TABLET | Refills: 0 | Status: SHIPPED | OUTPATIENT
Start: 2023-08-16 | End: 2023-08-30

## 2023-08-16 RX ORDER — OXYCODONE HYDROCHLORIDE 5 MG/1
5 TABLET ORAL EVERY 8 HOURS PRN
Qty: 10 TABLET | Refills: 0 | Status: SHIPPED | OUTPATIENT
Start: 2023-08-16

## 2023-08-16 RX ADMIN — ENOXAPARIN SODIUM 40 MG: 40 INJECTION SUBCUTANEOUS at 09:41

## 2023-08-16 RX ADMIN — ATORVASTATIN CALCIUM 20 MG: 20 TABLET, FILM COATED ORAL at 09:40

## 2023-08-16 RX ADMIN — ASPIRIN 81 MG: 81 TABLET, COATED ORAL at 09:39

## 2023-08-16 RX ADMIN — FINASTERIDE 5 MG: 5 TABLET, FILM COATED ORAL at 09:39

## 2023-08-16 RX ADMIN — ACETAMINOPHEN 650 MG: 325 TABLET, FILM COATED ORAL at 15:09

## 2023-08-16 RX ADMIN — SULFAMETHOXAZOLE AND TRIMETHOPRIM 1 TABLET: 800; 160 TABLET ORAL at 09:40

## 2023-08-16 RX ADMIN — MONTELUKAST 10 MG: 10 TABLET, FILM COATED ORAL at 09:40

## 2023-08-16 RX ADMIN — CITALOPRAM HYDROBROMIDE 20 MG: 20 TABLET ORAL at 09:39

## 2023-08-16 RX ADMIN — FLUTICASONE PROPIONATE 1 SPRAY: 50 SPRAY, METERED NASAL at 09:41

## 2023-08-16 RX ADMIN — PANTOPRAZOLE SODIUM 40 MG: 40 TABLET, DELAYED RELEASE ORAL at 05:28

## 2023-08-16 RX ADMIN — TAMSULOSIN HYDROCHLORIDE 0.4 MG: 0.4 CAPSULE ORAL at 09:41

## 2023-08-16 RX ADMIN — LORAZEPAM 0.5 MG: 0.5 TABLET ORAL at 02:50

## 2023-08-16 NOTE — PROGRESS NOTES
Progress Note - Infectious Disease   Sujey Adam 79 y.o. male MRN: [id number]  Unit/Bed#: -01 Encounter: [id number]       IMPRESSION & RECOMMENDATIONS:   Impression/Recommendations:  1.  Sepsis, POA.  Tachycardia, leukocytosis.  With early high fever.  Secondary to #2.  No other clear explanation.  Negative blood cultures.  Ongoing intermittent fevers may take time to fully resolve. Overall fever curve is much improved. WBC count has normalized.  Patient is hemodynamically stable, nontoxic.     -Antibiotic plan as below  -Follow temperatures and hemodynamics     2.  Acute bilateral seminal vesiculitis.  As evidenced by active symptoms, positive urine culture for E. coli, CT findings.  Consideration for prostatic involvement of infection.  No evidence of abscess on contrast CT.  Doubt STI. Negative GC/chlamydia. Urology input noted. Symptoms continue to improve.     -Continue Bactrim  -Complete 2-week course of antibiotic, through .     3.  Acute urinary retention. Tiny Foster due to underlying BPH as well as acute infection, as above.  Status post Redding catheter placement in ER. Urology input noted with plan to discharge with temporary Redding catheter and outpatient follow-up.     4.  SSS post pacemaker.  Blood cultures are negative.     Antibiotics:   Antibiotic 5  Bactrim 1     I discussed above plan with primary service, and with patient.              Subjective:  Patient continues to feel better every day. No further fevers or chills. Strength is improving. Tolerating oral intake.     Objective:  Vitals:  Temp:  [98.5 °F (36.9 °C)-99.2 °F (37.3 °C)] 98.5 °F (36.9 °C)  HR:  [40-47] 40  Resp:  [16-18] 18  BP: (127-156)/(73-88) 127/88  SpO2:  [92 %-96 %] 95 %  Temp (24hrs), Av.8 °F (37.1 °C), Min:98.5 °F (36.9 °C), Max:99.2 °F (37.3 °C)  Current: Temperature: 98.5 °F (36.9 °C)    Physical Exam:   General:  No acute distress, nontoxic  HEENT: Atraumatic normocephalic  Neck: Trachea midline  Psychiatric:  Awake and alert  Pulmonary:  Normal respiratory excursion without accessory muscle use  Abdomen:  Soft, nontender  Redding in place draining clear yellow urine  Extremities:  No edema  Skin:  No rashes  Neuro: Moves all extremities spontaneously    Lab Results:  I have personally reviewed pertinent labs. Results from last 7 days   Lab Units 08/16/23  0531 08/15/23  0512 08/14/23  0451 08/13/23  0503 08/12/23  1438   POTASSIUM mmol/L 3.7 3.4* 3.5   < > 4.0   CHLORIDE mmol/L 104 106 103   < > 100   CO2 mmol/L 26 25 25   < > 24   BUN mg/dL 10 10 13   < > 15   CREATININE mg/dL 0.71 0.79 0.85   < > 0.90   EGFR ml/min/1.73sq m 97 93 90   < > 88   CALCIUM mg/dL 8.8 8.7 8.7   < > 9.6   AST U/L  --   --   --   --  13   ALT U/L  --   --   --   --  19   ALK PHOS U/L  --   --   --   --  59    < > = values in this interval not displayed. Results from last 7 days   Lab Units 08/16/23  0531 08/15/23  0512 08/14/23  0451   WBC Thousand/uL 7.38 6.83 9.92   HEMOGLOBIN g/dL 13.6 13.2 13.8   PLATELETS Thousands/uL 171 157 136*     Results from last 7 days   Lab Units 08/12/23  1536 08/12/23  1448 08/12/23  1438   BLOOD CULTURE   --  No Growth at 72 hrs. No Growth at 72 hrs. URINE CULTURE  >100,000 cfu/ml Escherichia coli*  --   --        Imaging Studies:   I have personally reviewed pertinent imaging study reports and images in PACS. EKG, Pathology, and Other Studies:   I have personally reviewed pertinent reports.

## 2023-08-16 NOTE — NURSING NOTE
Patient given discharge instructions, changed to leg bag. Given supplies for home. All questions answered. patient taken to lobby for pickup by friend.

## 2023-08-16 NOTE — CASE MANAGEMENT
Case Management Discharge Planning Note    Patient name Suni Mckenna  Location /-53 MRN [id number]  : 1955 Date 2023       Current Admission Date: 2023  Current Admission Diagnosis:Sepsis [facility])   Patient Active Problem List    Diagnosis Date Noted   • Sepsis ([address]) 2023   • Urinary retention 2023   • Pancreatic cyst 2023   • Aneurysm of ascending aorta without rupture ([address]) 2023   • 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[address]) 2021   • Sick sinus syndrome ([address]) 2021   • Bifascicular block 2021   • Health maintenance examination 2019   • Elevated PSA 2019   • Vitamin D deficiency 2019   • History of skin cancer 2018   • Hyperlipidemia 2016   • Acid reflux disease 10/13/2014   • Anxiety, generalized 10/13/2014   • Asthma 10/13/2014   • Benign prostatic hyperplasia 10/13/2014   • Allergic rhinitis 10/02/2014      LOS (days): 4  Geometric Mean LOS (GMLOS) (days): 3.50  Days to GMLOS:-0.2     OBJECTIVE:  Risk of Unplanned Readmission Score: 10.55         Current admission status: Inpatient   Preferred Pharmacy:   [id number] [address]  Phone: [phone] Fax: [phone] for [facility],  [address], [address] [place] [address],[address] [place] [address]  Phone: [phone] Fax: [phone]    Primary Care Provider: Hari Niño DO    Primary Insurance: MEDICARE  Secondary Insurance: [facility]    DISCHARGE DETAILS:  Pt is being dc home today with [facility]. CM reserved homecare via [address] and AVS reflected. Pt is aware that [facility] will be in touch with pt once dc.              [facility] Agency Name[de-identified] [facility] External Referral Reason (only applicable if external HHA name selected): Patient has established relationship with provider  [facility] Provider[de-identified] PCP  Home Health Services Needed[de-identified] Evaluate Functional Status and Safety, Gait/ADL Training, Urinary Incontinence Catheter Management  Homebound Criteria Met[de-identified] Requires the Assistance of Another Person for Safe Ambulation or to Leave the Home, Uses an Assist Device (i.e. cane, walker, etc)  Supporting Clincal Findings[de-identified] Limited Endurance, Fatigues Easliy in [employer]

## 2023-08-16 NOTE — PLAN OF CARE
Problem: GENITOURINARY - ADULT  Goal: Absence of urinary retention  Description: INTERVENTIONS:  - Assess patient’s ability to void and empty bladder  - Monitor I/O  - Bladder scan as needed  - Discuss with physician/AP medications to alleviate retention as needed  - Discuss catheterization for long term situations as appropriate  Outcome: Progressing     Problem: METABOLIC, FLUID AND ELECTROLYTES - ADULT  Goal: Electrolytes maintained within normal limits  Description: INTERVENTIONS:  - Monitor labs and assess patient for signs and symptoms of electrolyte imbalances  - Administer electrolyte replacement as ordered  - Monitor response to electrolyte replacements, including repeat lab results as appropriate  - Instruct patient on fluid and nutrition as appropriate  Outcome: Progressing  Goal: Fluid balance maintained  Description: INTERVENTIONS:  - Monitor labs   - Monitor I/O and WT  - Instruct patient on fluid and nutrition as appropriate  - Assess for signs & symptoms of volume excess or deficit  Outcome: Progressing  Goal: Glucose maintained within target range  Description: INTERVENTIONS:  - Monitor Blood Glucose as ordered  - Assess for signs and symptoms of hyperglycemia and hypoglycemia  - Administer ordered medications to maintain glucose within target range  - Assess nutritional intake and initiate nutrition service referral as needed  Outcome: Progressing

## 2023-08-16 NOTE — PROGRESS NOTES
-- Patient:  -- MRN: [id number]  -- Aidin Request ID: [id number]  -- Level of care reserved: Gilma Washington  -- Partner Reserved: [facility] [place],  [facility] [address] [phone]  -- Clinical needs requested:  -- Geography searched: [id number]  -- Start of Service:  -- Request sent: 10:51am EDT on 8/14/2023 by Inocencia Edwards  -- Partner reserved: 11:22am EDT on 8/16/2023 by Inocencia Edwards  -- Choice list shared: 9:19am EDT on 8/15/2023 by Inocencia Edwards

## 2023-08-16 NOTE — QUICK NOTE
[address]  Progress Note   Name: Jes Pierce,  MRN: [id number],   Unit/bed: [place] [address]  Date of Admission: 8/12/2023  Date of Service: 8/16/2023  Hospital Day: 4  Attending Physician: Huey Patel MD     Urinary Tract Infection  Assessment:  • Sepsis as evidenced by tachycardia, fever, hypotension with seminal vesiculitis  • Culture positive for e. Coli; bilateral seminal vesiculitis noted on CT without abscess or gas  • Blood culture negative at 48 hrs  • 4 day course IV ceftriaxone 1000 mg and Doxycyline 100 mg oral ; WBC and neutrophils now WNL; afebrile for 24 hrs   Plan  • Discontinue ceftriaxone/doxycycline   • Pt. To start Bactrim q12h for 10 days   • Follow with outpatient urology     Lab Results   Component Value Date    WBC 7.38 08/16/2023    WBC 6.83 08/15/2023    WBC 9.92 08/14/2023    WBC 13.88 (H) 08/13/2023    HGB 13.6 08/16/2023    HCT 40.6 08/16/2023    MCV 89 08/16/2023     08/16/2023    NEUTOPHILPCT 65 08/16/2023    NEUTOPHILPCT 72 08/15/2023    NEUTOPHILPCT 83 (H) 08/14/2023    NEUTOPHILPCT 79 (H) 08/13/2023       Benign Prostatic Hyperplasia/Chronic urinary Retention  Assessment:  · POA; bladder scan demonstrated ~700 cc bladder   · Enlarged prostated demonstrated on CT  · Noted to have elevated PSA; stable   · Redding inserted  Plan:  · Continue finasteride and tamsulosin   · Schedule void trial with outpatient urology in 10-14 days  · Continue ALEX and monitor PSA as directed by urology, consider prostate health index  · If ALEX suspicious or PHI and PSA continue to be elevated; consider Prostate MRI for PI-RADS assessment    SSS:  Assessment/plan  · POA; pacemaker in place   · Continue ASA and sotalol     VTE Pharmacologic Prophylaxis:   Pharmacologic: Enoxaparin (Lovenox)  Mechanical VTE Prophylaxis in Place: No    Patient Centered Rounds: I have performed bedside rounds with nursing staff today. Time Spent for Care: 30 minutes.   More than 50% of total time spent on counseling and coordination of care as described above. Current Length of Stay: 4 day(s)    Current Patient Status: Inpatient   Certification Statement: Completion of IV antibiotis     Discharge Plan: Discharge today on oral antibiotics to home     Code Status: Level 1 - Full Code      Subjective:   No acute events overnight. Patient feels like he is improving, stomach feels slightly uneasy, likely 2/2 to antibiotics. Recommended patient takes oral probiotics or consumes yogurt with probiotics as an outpatient . Patient reports some leaking from catheter near the urethra and is concerned of catheter leaking at home. Patient believes the catheter balloon is now deflated to 7cc from [address] His concerns were reported to nursing. Patient denies any penile burning, scrotal tenderness, or abdominal pressure. He denies episodes of nausea, vomiting or diarrhea. Objective:     General:   Skin: Denies rashes; new lumps and bumps  Head: Denies dizziness; headaches   Eyes: Denies changes in vision; photosensitivity   Nose: Denies stuffiness; runny nose  Mouth and Throat: Denies sore throat; hoarseness   Neck: Denies pain; swelling in neck  Respiratory: Denies SOB; cough  Cardiac: Denies chest pain; palpitations  Gastrointestinal: Denies abdominal pain; changes in appetite; vomiting or diarrhea  Genitourinary: Endorses leaking from aldana; denies discharge   Musculoskeletal: Denies muscle aches; joint swelling   Neurological: Denies changes in memory  Endocrine: Denies heat/cold intolerance; night sweats      Vitals:   Temp (24hrs), Av.8 °F (37.1 °C), Min:98.5 °F (36.9 °C), Max:99.2 °F (37.3 °C)    Temp:  [98.5 °F (36.9 °C)-99.2 °F (37.3 °C)] 98.5 °F (36.9 °C)  HR:  [40-47] 40  Resp:  [16-18] 18  BP: (127-156)/(73-88) 127/88  SpO2:  [92 %-96 %] 92 %  Body mass index is 29.83 kg/m². Input and Output Summary (last 24 hours):        Intake/Output Summary (Last 24 hours) at 2023 1015  Last data filed at 8/16/2023 0226  Gross per 24 hour   Intake 1164 ml   Output 1400 ml   Net -236 ml       Physical Exam:     Constitutional: Well appearing male, not in acute distress  Vitals: Vital signs reviewed   HEENT: Head is normocephalic, atraumatic; Extra-ocular movements (EOMs) intact, pupils are equal, round, and reactive to light and accommodation (PERRLA), sclerae not icteric. Pinna, tragus, and ear canal are non-tender and without swelling. Ear canal clear without discharge. Tympanic membrane translucent with light reflex. Neck: Neck supple. Carotids +2 bilaterally without bruit. Lymph Nodes: No cervical, submandibular, inguinal, or supraclavicular lymphadenopathy   Pulmonary: Lungs are clear to auscultation bilaterally. The chest wall is symmetric, without deformity and tenderness. Cardiovascular: Regular rate and rhythm, no murmurs, or rubs. The external chest is unremarkable without lifts, heaves, or thrills. PMI is not visible and is palpated in the 5th intercostal space at the midclavicular line. Negative for costochondral tenderness. Abdomen: No hepatosplenomegaly. Soft, non-distended, non-tender. Bowel sounds normoactive. No masses, guarding or rebound. No abdominal or renal bruit. 4 quadrants resonant to percussion. : Redding draining yellow urine; no clots present   Extremities: No clubbing or cyanosis. No calf tenderness, warmth, or erythema. Musculoskeletal: reflex?   Neurologic: Not done  Skin: No rashes, petechiae or ecchymosis    Additional Data:     Labs:    Results from last 7 days   Lab Units 08/16/23  0531   WBC Thousand/uL 7.38   HEMOGLOBIN g/dL 13.6   HEMATOCRIT % 40.6   PLATELETS Thousands/uL 171   NEUTROS PCT % 65   LYMPHS PCT % 15   MONOS PCT % 16*   EOS PCT % 2     Results from last 7 days   Lab Units 08/16/23  0531 08/13/23  0503 08/12/23  1438   SODIUM mmol/L 137   < > 134*   POTASSIUM mmol/L 3.7   < > 4.0   CHLORIDE mmol/L 104   < > 100   CO2 mmol/L 26   < > 24   BUN mg/dL 10 < > 15   CREATININE mg/dL 0.71   < > 0.90   ANION GAP mmol/L 7   < > 10   CALCIUM mg/dL 8.8   < > 9.6   ALBUMIN g/dL  --   --  4.4   TOTAL BILIRUBIN mg/dL  --   --  1.23*   ALK PHOS U/L  --   --  59   ALT U/L  --   --  19   AST U/L  --   --  13   GLUCOSE RANDOM mg/dL 99   < > 118    < > = values in this interval not displayed. Results from last 7 days   Lab Units 08/12/23  1438   INR  1.16             Results from last 7 days   Lab Units 08/13/23  0503 08/12/23  1438   LACTIC ACID mmol/L  --  1.7   PROCALCITONIN ng/ml 0.65* 0.45*       Recent Cultures (last 7 days):     Results from last 7 days   Lab Units 08/12/23  1536 08/12/23  1448 08/12/23  1438   BLOOD CULTURE   --  No Growth at 72 hrs. No Growth at 72 hrs.    URINE CULTURE  >100,000 cfu/ml Escherichia coli*  --   --      Last 24 Hours Medication List:   Current Facility-Administered Medications   Medication Dose Route Frequency Provider Last Rate   • acetaminophen  650 mg Oral Q6H PRN [facility]     • aspirin  81 mg Oral Daily [facility], DO     • atorvastatin  20 mg Oral Daily [facility], DO     • citalopram  20 mg Oral Daily [facility], DO     • enoxaparin  40 mg Subcutaneous Daily [facility]     • finasteride  5 mg Oral Daily Renetta Hurst MD     • fluticasone  1 puff Inhalation Daily [facility], DO     • fluticasone  1 spray Nasal Daily [facility], DO     • HYDROmorphone  0.5 mg Intravenous Q4H PRN Renetta Hurst MD     • HYDROmorphone  0.5 mg Intravenous Q3H PRN Renetta Hurst MD     • lidocaine  1 Application Urethral Once [facility], DO     • LORazepam  0.5 mg Oral Q6H PRN [facility], [facility]     • montelukast  10 mg Oral Daily [facility], [facility]     • oxyCODONE  5 mg Oral Q6H PRN Renetta Hurst MD     • pantoprazole  40 mg Oral Early Morning Su Neeta Lin,      • sotalol  80 mg Oral Q12H Sularisa Lin,      • sulfamethoxazole-trimethoprim  1 tablet Oral Q12H [address] Adelaida Remberto, DO     • tamsulosin  0.4 mg Oral BID Su Lin DO     • trimethobenzamide  200 mg Intramuscular Q6H PRN Doug Flanagan PA-C          Today, Patient Was Seen By: Amanda Ott, MS3

## 2023-08-17 ENCOUNTER — HOME CARE VISIT (OUTPATIENT)
Dept: HOME HEALTH SERVICES | Facility: HOME HEALTHCARE | Age: 68
End: 2023-08-17
Payer: MEDICARE

## 2023-08-17 ENCOUNTER — TELEPHONE (OUTPATIENT)
Dept: FAMILY MEDICINE CLINIC | Facility: CLINIC | Age: 68
End: 2023-08-17

## 2023-08-17 ENCOUNTER — TRANSITIONAL CARE MANAGEMENT (OUTPATIENT)
Dept: FAMILY MEDICINE CLINIC | Facility: CLINIC | Age: 68
End: 2023-08-17

## 2023-08-17 VITALS
HEART RATE: 61 BPM | OXYGEN SATURATION: 98 % | SYSTOLIC BLOOD PRESSURE: 112 MMHG | DIASTOLIC BLOOD PRESSURE: 70 MMHG | TEMPERATURE: 97.2 F | RESPIRATION RATE: 20 BRPM

## 2023-08-17 LAB
A PHAGOCYTOPH DNA BLD QL NAA+PROBE: NEGATIVE
ATRIAL RATE: 133 BPM
QRS AXIS: -75 DEGREES
QRSD INTERVAL: 152 MS
QT INTERVAL: 358 MS
QTC INTERVAL: 528 MS
T WAVE AXIS: 52 DEGREES
VENTRICULAR RATE: 131 BPM

## 2023-08-17 PROCEDURE — 400013 VN SOC

## 2023-08-17 PROCEDURE — 10330081 VN NO-PAY CLAIM PROCEDURE

## 2023-08-17 PROCEDURE — 93010 ELECTROCARDIOGRAM REPORT: CPT | Performed by: INTERNAL MEDICINE

## 2023-08-17 PROCEDURE — G0299 HHS/HOSPICE OF RN EA 15 MIN: HCPCS

## 2023-08-18 LAB
BACTERIA BLD CULT: NORMAL
BACTERIA BLD CULT: NORMAL

## 2023-08-18 NOTE — ASSESSMENT & PLAN NOTE
· Present on admission- tachycardia, fevers, hypotension in setting of vesiculitis   · Likely source UTI, patient denies sexual activity  · CT imaging negative for fluid or gas collection  · Appreciate urology recommendations, no further plans inpatient, will need follow-up with outpatient urology for void trial  · Appreciate ID recommendations, transition to oral Bactrim likely would be on a 2-week course of oral antibiotics

## 2023-08-18 NOTE — ASSESSMENT & PLAN NOTE
Noted to have 800cc on bladder scan in ED, aldana catheter inserted.   · Continue with flomax  · Intake and output  · Daily aldana care, discharge with [facility] with outpatient Urology ff up for voiding trial  · [facility] on discharge

## 2023-08-18 NOTE — TELEPHONE ENCOUNTER
Spoke to pt -- asking if the TCM can be virtual? He states it is hard to leave the house with the catheter and would rather do a virtual visit or wait until it is removed, which would be around 8/28.      Please advise

## 2023-08-18 NOTE — TELEPHONE ENCOUNTER
2nd attempt - asked he c/b to schedule void trial on 8/24/23 .  Also asked if he will be following with us or Dr. Jose Betancourt

## 2023-08-18 NOTE — DISCHARGE SUMMARY
[address]  Discharge- Aubrey Levi 1955, 79 y.o. male MRN: [id number]  Unit/Bed#: -01 Encounter: [id number]  Primary Care Provider: Jeffrie Bernheim, DO   Date and time admitted to hospital: 8/12/2023  2:08 PM    * Sepsis [facility])  Assessment & Plan  · Present on admission- tachycardia, fevers, hypotension in setting of vesiculitis   · Likely source UTI, patient denies sexual activity  · CT imaging negative for fluid or gas collection  · Appreciate urology recommendations, no further plans inpatient, will need follow-up with outpatient urology for void trial  · Appreciate ID recommendations, transition to oral Bactrim likely would be on a 2-week course of oral antibiotics    Urinary retention  Assessment & Plan  Noted to have 800cc on bladder scan in ED, aldana catheter inserted. · Continue with flomax  · Intake and output  · Daily aldana care, discharge with [facility] with outpatient Urology ff up for voiding trial  · HHC on discharge    Tachy-juwan syndrome [facility])  Assessment & Plan  · S/p PPM, follows with EP  · On asa and sotalol   · Has broad fluctuations in heart rate likely exacerbated by infection  · Monitor     Hyperlipidemia  Assessment & Plan  · Continue home dose statin    Benign prostatic hyperplasia  Assessment & Plan  · Continue flomax daily   · Aldana inserted in ED, continue aldana care.  Will discharge with aldana with outpatient ff up with Urology for voiding trial      Medical Problems     Resolved Problems  Date Reviewed: 8/17/2023   None       Discharging Physician / Practitioner: Sivakumar Zimmerman MD  PCP: Jeffrie Bernheim, DO  Admission Date:   Admission Orders (From admission, onward)     Ordered        08/12/23 1836  INPATIENT ADMISSION  Once                      Discharge Date: 8/16/2023    Consultations During Hospital Stay:  · Urology  · ID    Procedures Performed:   · FC placement    Significant Findings / Test Results:   CT abdomen pelvis w contrast    Result Date: 8/14/2023  Impression: 1. No evidence of intra-abdominal or pelvic abscess. 2.  Nonspecific prominence of the seminal vesicles without evidence of abscess. 3.  Prostate partially obscured by artifact from a right hip prosthesis. Prostatomegaly. No gross evidence of prostatic abscess. If this remains a concern, consider further evaluation with either MRI of the prostate, without and with IV contrast or endorectal ultrasound of the prostate. 4.  2 nonspecific enhancing foci in the liver as described above, probably present on an MRI from 4/26/2023 and, possibly an earlier MRI from 2/29/2022, with suggestion of slight interval increase in size. Consider further evaluation with MRI of the abdomen, without and with IV contrast for more accurate correlation with the prior MRIs. 5.  Stable cyst in the head of the pancreas. 6.  Nonspecific focal mural thickening of the gallbladder near the fundus. Appearance nonspecific. If clinically indicated, this can probably be best evaluated with right upper quadrant abdominal sonography. The study was marked in [facility] for immediate notification. Workstation performed: [id number]     XR chest pa & lateral    Result Date: 8/13/2023  Impression: No acute cardiopulmonary disease. Workstation performed: [id number]     CT chest abdomen pelvis w contrast    Result Date: 8/12/2023  Impression: Enlarged low-attenuation seminal vesicles with surrounding inflammatory fat stranding and trace amount of surrounding fluid compatible with seminal vesiculitis. No evidence of abscess. Hypervascular lesion in the liver straddling segments 4A and 8. Recommend further characterization with a dedicated liver MRI not emergently. 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 The study was marked in [facility] for immediate notification.     Incidental Findings:   · NA      Test Results Pending at Discharge (will require follow up):   · NA     Outpatient Tests Requested:  · Follow up with PCP, Urology for further outpatient testing    Complications:  NA    Reason for Admission: fevers and chills    Hospital Course:   Nilsa De La Vega is a 79 y.o. male patient who originally presented to the hospital on 8/12/2023 due to fevers and chills. Was found to have sepsis thought to be in the setting of UTI. Concern for possible seminal vesiculitis. Was seen and evaluated by ID and Urology. Was started on IV antibiotics. Course complicated by urinary retention requiring placement of FC. Noted improvement of symptoms. Transitioned to oral Bactrim given culture results. Will be discharged with [facility] and follow up with Urology as outpatient for voiding trial. [address] [facility] set up for discharge. Please see above list of diagnoses and related plan for additional information. Condition at Discharge: stable    Discharge Day Visit / Exam:   Subjective:  Reports improvement with symptoms. Amenable to go home with FC. Knows to follow up with Urology for voiding trial. No other complaints at time of evaluation. Vitals: Blood Pressure: 127/88 (08/16/23 0856)  Pulse: (!) 40 (08/16/23 0856)  Temperature: 98.5 °F (36.9 °C) (08/16/23 0856)  Temp Source: Oral (08/15/23 0651)  Respirations: 18 (08/16/23 0856)  Height: 5' 8" (172.7 cm) (08/15/23 0651)  SpO2: 95 % (08/16/23 1216)  Exam:   Physical Exam  Vitals and nursing note reviewed. Constitutional:       General: He is not in acute distress. Appearance: He is well-developed. He is not toxic-appearing. HENT:      Head: Normocephalic and atraumatic. Nose: Nose normal.      Mouth/Throat:      Mouth: Mucous membranes are moist.      Pharynx: Oropharynx is clear. Eyes:      Pupils: Pupils are equal, round, and reactive to light. Cardiovascular:      Rate and Rhythm: Normal rate. Pulses: Normal pulses. Pulmonary:      Effort: Pulmonary effort is normal. No respiratory distress. Breath sounds: Normal breath sounds. No wheezing.    Abdominal:      Palpations: Abdomen is soft. Tenderness: There is no guarding. Genitourinary:     Comments: (+) FC  Musculoskeletal:         General: No swelling or tenderness. Normal range of motion. Cervical back: Normal range of motion. Skin:     General: Skin is warm and dry. Capillary Refill: Capillary refill takes less than 2 seconds. Neurological:      General: No focal deficit present. Mental Status: He is alert and oriented to person, place, and time. Mental status is at baseline. Psychiatric:         Mood and Affect: Mood normal.         Thought Content: Thought content normal.         Discussion with Family: Patient declined call to . Discharge instructions/Information to patient and family:   See after visit summary for information provided to patient and family. Provisions for Follow-Up Care:  See after visit summary for information related to follow-up care and any pertinent home health orders. Disposition:   Home with [facility] Services (Reminder: Complete face to face encounter)    Planned Readmission: NA     Discharge Statement:  I spent 40 minutes discharging the patient. This time was spent on the day of discharge. I had direct contact with the patient on the day of discharge. Greater than 50% of the total time was spent examining patient, answering all patient questions, arranging and discussing plan of care with patient as well as directly providing post-discharge instructions. Additional time then spent on discharge activities. Discharge Medications:  See after visit summary for reconciled discharge medications provided to patient and/or family.       **Please Note: This note may have been constructed using a voice recognition system**

## 2023-08-21 ENCOUNTER — TELEMEDICINE (OUTPATIENT)
Dept: FAMILY MEDICINE CLINIC | Facility: CLINIC | Age: 68
End: 2023-08-21
Payer: MEDICARE

## 2023-08-21 ENCOUNTER — HOME CARE VISIT (OUTPATIENT)
Dept: HOME HEALTH SERVICES | Facility: HOME HEALTHCARE | Age: 68
End: 2023-08-21
Payer: MEDICARE

## 2023-08-21 VITALS
RESPIRATION RATE: 17 BRPM | OXYGEN SATURATION: 97 % | HEART RATE: 63 BPM | DIASTOLIC BLOOD PRESSURE: 60 MMHG | TEMPERATURE: 96.8 F | SYSTOLIC BLOOD PRESSURE: 90 MMHG

## 2023-08-21 DIAGNOSIS — N40.1 BENIGN PROSTATIC HYPERPLASIA WITH URINARY FREQUENCY: ICD-10-CM

## 2023-08-21 DIAGNOSIS — A41.51 SEPSIS DUE TO ESCHERICHIA COLI WITHOUT ACUTE ORGAN DYSFUNCTION (HCC): ICD-10-CM

## 2023-08-21 DIAGNOSIS — R33.9 URINARY RETENTION: Primary | ICD-10-CM

## 2023-08-21 DIAGNOSIS — R35.0 BENIGN PROSTATIC HYPERPLASIA WITH URINARY FREQUENCY: ICD-10-CM

## 2023-08-21 DIAGNOSIS — I49.5 TACHY-BRADY SYNDROME (HCC): ICD-10-CM

## 2023-08-21 PROCEDURE — 99214 OFFICE O/P EST MOD 30 MIN: CPT | Performed by: FAMILY MEDICINE

## 2023-08-21 PROCEDURE — G0299 HHS/HOSPICE OF RN EA 15 MIN: HCPCS

## 2023-08-21 NOTE — PROGRESS NOTES
Virtual Regular Visit    Verification of patient location:    Patient is located at Home in the following state in which I hold an active license [place]      Assessment/Plan:    Problem List Items Addressed This Visit        Cardiovascular and Mediastinum    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[address])     Heart rate was variable during the hospitalization, this has resolved, continue his sotalol            Genitourinary    Benign prostatic hyperplasia     Continue the tamsulosin, follow-up with his urologist.         Urinary retention - Primary     Continue [place] maintenance, follow-up with his urologist as scheduled next week. Other    Sepsis ([address])     Finish the Bactrim, call if he develops any fevers, chills                 Reason for visit is   Chief Complaint   Patient presents with   • Virtual Regular Visit        Encounter provider Kiara Maloney DO    Provider located at [address] [place] [address] [address] [facility]  [address] [place] [id number] [phone]      Recent Visits  No visits were found meeting these conditions. Showing recent visits within past 7 days and meeting all other requirements  Today's Visits  Date Type Provider Dept   08/21/23 Telemedicine Kiara Maloney DO [address] [place] today's visits and meeting all other requirements  Future Appointments  No visits were found meeting these conditions. Showing future appointments within next 150 days and meeting all other requirements       The patient was identified by name and date of birth. Breana Alexandra was informed that this is a telemedicine visit and that the visit is being conducted through the [address] [place] [address] [id number]-DOCTORS platform. He agrees to proceed. .  My office door was closed. No one else was in the room. He acknowledged consent and understanding of privacy and security of the video platform.  The patient has agreed to participate and understands they can discontinue the visit at any time. Patient is aware this is a billable service. Jennifer Arceo is a 79 y.o. male presenting for a video hospital follow-up. Patient presents for a [facility] hospital follow-up visit, he was hospitalized with a diagnosis of urosepsis. He is currently on Bactrim for that. He also had acute urinary retention, he was discharged with a [place] catheter and is following up with his urologist next week. States he is feeling much better, no specific complaints. Past Medical History:   Diagnosis Date   • Asthma     allergy induced   • Basal cell carcinoma    • Colon polyp    • Fatty liver    • H/O nonmelanoma skin cancer     last assessed-8/22/2017   • Hyperlipidemia    • Inflamed seborrheic keratosis    • Malignant neoplasm of skin     last assessed-1/21/2014   • Neoplasm of uncertain behavior of skin    • Nocturia    • Pneumothorax, left    • Seasonal allergies    • Sebaceous cyst    • 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/01/2022    SCCIS- mid scalp   • Squamous cell carcinoma of skin of neck    • Testicular hypofunction    • Viral warts        Past Surgical History:   Procedure Laterality Date   • CARDIAC CATHETERIZATION     • CARDIAC PACEMAKER PLACEMENT  05/05/2021   • CARDIAC PACEMAKER PLACEMENT     • CARDIOVASCULAR STRESS TEST  08/27/2010   • COLONOSCOPY  10/08/2008   • IR OTHER  04/15/2021   • LUNG SURGERY      for pneumothorax   • MOHS SURGERY  02/15/2022    SCCIS mid scalp- Dr Sirena Gomez   • PARTIAL HIP ARTHROPLASTY     • RHINOPLASTY         Current Outpatient Medications   Medication Sig Dispense Refill   • aspirin 81 MG tablet Take by mouth     • atorvastatin (LIPITOR) 20 mg tablet TAKE 1 TABLET BY MOUTH EVERY DAY 90 tablet 1   • budesonide (Pulmicort Flexhaler) 180 MCG/ACT inhaler Inhale 2 puffs 2 (two) times a day Rinse mouth after use.  3 each 3   • Calcium Carb-Cholecalciferol 1000-800 MG-UNIT TABS Take by mouth     • citalopram (CeleXA) 20 mg tablet TAKE 1 TABLET BY MOUTH EVERY DAY 90 tablet 5   • finasteride (PROSCAR) 5 mg tablet Take 1 tablet (5 mg total) by mouth daily Do not start before August 17, 2023. 30 tablet 0   • fluticasone (FLONASE) 50 mcg/act nasal spray 1 spray into each nostril daily     • ipratropium (ATROVENT) 0.06 % nasal spray 1 SPRAY INTO EACH NOSTRIL IN THE MORNING AND 1 SPRAY BEFORE BEDTIME. 15 mL 5   • LORazepam (ATIVAN) 0.5 mg tablet Take 1 tablet (0.5 mg total) by mouth every 6 (six) hours as needed for anxiety 90 tablet 3   • montelukast (SINGULAIR) 10 mg tablet TAKE 1 TABLET BY MOUTH EVERY DAY 90 tablet 1   • omeprazole (PriLOSEC) 40 MG capsule TAKE 1 CAPSULE BY MOUTH EVERY DAY 90 capsule 0   • oxybutynin (DITROPAN) 5 mg tablet Take 1 tablet (5 mg total) by mouth 3 (three) times a day for 14 days 42 tablet 0   • oxyCODONE (ROXICODONE) 5 immediate release tablet Take 1 tablet (5 mg total) by mouth every 8 (eight) hours as needed for moderate pain or severe pain Max Daily Amount: 15 mg 10 tablet 0   • sotalol (BETAPACE) 80 mg tablet TAKE 1 TABLET BY MOUTH EVERY 12 HOURS. 180 tablet 3   • sulfamethoxazole-trimethoprim (BACTRIM DS) 800-160 mg per tablet Take 1 tablet by mouth every 12 (twelve) hours for 27 doses 27 tablet 0   • tamsulosin (FLOMAX) 0.4 mg Take 0.4 mg by mouth 2 (two) times a day       No current facility-administered medications for this visit. Allergies   Allergen Reactions   • Dust Mite Extract    • Molds & Smuts    • Pollen Extract    • Short Ragweed Pollen Ext    • Tree Extract        Review of Systems   Constitutional: Negative for chills, fatigue and fever. HENT: Negative for congestion, ear pain, hearing loss, postnasal drip, rhinorrhea and sore throat. Eyes: Negative for pain and visual disturbance. Respiratory: Negative for chest tightness, shortness of breath and wheezing. Cardiovascular: Negative for chest pain and leg swelling.    Gastrointestinal: Negative for abdominal distention, abdominal pain, constipation, diarrhea and vomiting. Endocrine: Negative for cold intolerance and heat intolerance. Genitourinary: Negative for difficulty urinating, frequency and urgency. Musculoskeletal: Negative for arthralgias and gait problem. Skin: Negative for color change. Neurological: Negative for dizziness, tremors, syncope, numbness and headaches. Hematological: Negative for adenopathy. Psychiatric/Behavioral: Negative for agitation, confusion and sleep disturbance. The patient is not nervous/anxious. Video Exam    There were no vitals filed for this visit. Physical Exam  Constitutional:       Appearance: He is well-developed. Pulmonary:      Effort: Pulmonary effort is normal.   Neurological:      Mental Status: He is alert and oriented to person, place, and time. Psychiatric:         Behavior: Behavior normal.         Thought Content:  Thought content normal.         Judgment: Judgment normal.          Visit Time  Total Visit Duration: 20

## 2023-08-22 ENCOUNTER — HOME CARE VISIT (OUTPATIENT)
Dept: HOME HEALTH SERVICES | Facility: HOME HEALTHCARE | Age: 68
End: 2023-08-22
Payer: MEDICARE

## 2023-08-22 VITALS
DIASTOLIC BLOOD PRESSURE: 60 MMHG | RESPIRATION RATE: 16 BRPM | HEART RATE: 62 BPM | OXYGEN SATURATION: 98 % | SYSTOLIC BLOOD PRESSURE: 95 MMHG

## 2023-08-22 VITALS
OXYGEN SATURATION: 98 % | DIASTOLIC BLOOD PRESSURE: 62 MMHG | TEMPERATURE: 98.2 F | HEART RATE: 64 BPM | SYSTOLIC BLOOD PRESSURE: 118 MMHG

## 2023-08-22 PROCEDURE — G0151 HHCP-SERV OF PT,EA 15 MIN: HCPCS

## 2023-08-22 PROCEDURE — G0152 HHCP-SERV OF OT,EA 15 MIN: HCPCS

## 2023-08-23 ENCOUNTER — HOME CARE VISIT (OUTPATIENT)
Dept: HOME HEALTH SERVICES | Facility: HOME HEALTHCARE | Age: 68
End: 2023-08-23
Payer: MEDICARE

## 2023-08-23 PROCEDURE — G0180 MD CERTIFICATION HHA PATIENT: HCPCS | Performed by: INTERNAL MEDICINE

## 2023-08-24 ENCOUNTER — HOME CARE VISIT (OUTPATIENT)
Dept: HOME HEALTH SERVICES | Facility: HOME HEALTHCARE | Age: 68
End: 2023-08-24
Payer: MEDICARE

## 2023-08-24 VITALS
TEMPERATURE: 97 F | HEART RATE: 68 BPM | SYSTOLIC BLOOD PRESSURE: 104 MMHG | DIASTOLIC BLOOD PRESSURE: 72 MMHG | RESPIRATION RATE: 16 BRPM | OXYGEN SATURATION: 98 %

## 2023-08-24 PROCEDURE — G0299 HHS/HOSPICE OF RN EA 15 MIN: HCPCS

## 2023-08-24 PROCEDURE — 10330064 BAG, URINE LEG STR 1000ML (48/CS)

## 2023-08-28 ENCOUNTER — HOME CARE VISIT (OUTPATIENT)
Dept: HOME HEALTH SERVICES | Facility: HOME HEALTHCARE | Age: 68
End: 2023-08-28
Payer: MEDICARE

## 2023-08-28 VITALS
TEMPERATURE: 96.8 F | RESPIRATION RATE: 17 BRPM | DIASTOLIC BLOOD PRESSURE: 64 MMHG | SYSTOLIC BLOOD PRESSURE: 110 MMHG | OXYGEN SATURATION: 94 % | HEART RATE: 63 BPM

## 2023-08-28 PROCEDURE — G0299 HHS/HOSPICE OF RN EA 15 MIN: HCPCS

## 2023-08-28 NOTE — ASSESSMENT & PLAN NOTE
· S/p PPM, follows with EP  · On asa and sotalol   · Has broad fluctuations in heart rate likely exacerbated by infection  · Monitor Vermilion Border Text: The closure involved the vermilion border.

## 2023-09-01 ENCOUNTER — HOME CARE VISIT (OUTPATIENT)
Dept: HOME HEALTH SERVICES | Facility: HOME HEALTHCARE | Age: 68
End: 2023-09-01
Payer: MEDICARE

## 2023-09-01 VITALS
HEART RATE: 64 BPM | OXYGEN SATURATION: 96 % | SYSTOLIC BLOOD PRESSURE: 112 MMHG | DIASTOLIC BLOOD PRESSURE: 66 MMHG | TEMPERATURE: 97.8 F | RESPIRATION RATE: 18 BRPM

## 2023-09-01 PROCEDURE — G0300 HHS/HOSPICE OF LPN EA 15 MIN: HCPCS

## 2023-09-01 NOTE — CASE COMMUNICATION
Ship to  [address] [facility] [address] bag [facility] [id number]   1

## 2023-09-01 NOTE — CASE COMMUNICATION
Just lizzy New Gaurav wanted me to inform you that he failed his trial void. He will have another attempt next Thursday and if that fails he will probably need surgery.

## 2023-09-02 PROCEDURE — 10330064 BAG, URINARY DRAIN ANTIRFLX 2000ML (1/EA

## 2023-09-06 ENCOUNTER — HOME CARE VISIT (OUTPATIENT)
Dept: HOME HEALTH SERVICES | Facility: HOME HEALTHCARE | Age: 68
End: 2023-09-06
Payer: MEDICARE

## 2023-09-06 NOTE — CASE COMMUNICATION
Please move snv from [facility] 9/7 to [facility] 9/8.  Pt has an appt with urology on 9/7 for voiding trial

## 2023-09-08 ENCOUNTER — HOME CARE VISIT (OUTPATIENT)
Dept: HOME HEALTH SERVICES | Facility: HOME HEALTHCARE | Age: 68
End: 2023-09-08
Payer: MEDICARE

## 2023-09-08 VITALS
TEMPERATURE: 96.9 F | OXYGEN SATURATION: 98 % | HEART RATE: 70 BPM | DIASTOLIC BLOOD PRESSURE: 68 MMHG | SYSTOLIC BLOOD PRESSURE: 100 MMHG | RESPIRATION RATE: 16 BRPM

## 2023-09-08 PROCEDURE — G0299 HHS/HOSPICE OF RN EA 15 MIN: HCPCS

## 2023-09-12 DIAGNOSIS — K21.9 GASTROESOPHAGEAL REFLUX DISEASE: ICD-10-CM

## 2023-09-12 RX ORDER — OMEPRAZOLE 40 MG/1
CAPSULE, DELAYED RELEASE ORAL
Qty: 90 CAPSULE | Refills: 0 | Status: SHIPPED | OUTPATIENT
Start: 2023-09-12

## 2023-09-15 ENCOUNTER — IN-CLINIC DEVICE VISIT (OUTPATIENT)
Dept: CARDIOLOGY CLINIC | Facility: CLINIC | Age: 68
End: 2023-09-15
Payer: MEDICARE

## 2023-09-15 DIAGNOSIS — Z95.0 PRESENCE OF PERMANENT CARDIAC PACEMAKER: Primary | ICD-10-CM

## 2023-09-15 PROCEDURE — 93280 PM DEVICE PROGR EVAL DUAL: CPT | Performed by: INTERNAL MEDICINE

## 2023-09-15 NOTE — PROGRESS NOTES
MDT DUAL CHAMBER PM / ACTIVE SYSTEM IS MRI CONDITIONAL   DEVICE INTERROGATED IN THE [place] OFFICE:  BATTERY VOLTAGE ADEQUATE (12.0 YR.).  AP 54.1%  3.4%.   ALL LEAD PARAMETERS WITHIN NORMAL LIMITS.  1 VT-NS EPISODES WITH PAT @ 154 BPM ON EGM.  NO PROGRAMMING CHANGES MADE TO DEVICE PARAMETERS.  NORMAL DEVICE FUNCTION.  RG

## 2023-10-14 PROBLEM — A41.9 SEPSIS (HCC): Status: RESOLVED | Noted: 2023-08-12 | Resolved: 2023-10-14

## 2023-10-19 ENCOUNTER — TELEPHONE (OUTPATIENT)
Dept: FAMILY MEDICINE CLINIC | Facility: CLINIC | Age: 68
End: 2023-10-19

## 2023-10-19 DIAGNOSIS — M54.50 ACUTE BILATERAL LOW BACK PAIN WITHOUT SCIATICA: Primary | ICD-10-CM

## 2023-10-19 NOTE — TELEPHONE ENCOUNTER
T/c from pt -- saw his physical therapist today and was advised to get an xray of his lower back. Pt was told the PT office, [facility], would forward his notes to Dr Chad Ellis (are not in chart yet). Pt is requesting xray be placed/an appt be scheduled asap, if required to get xray.

## 2023-10-19 NOTE — TELEPHONE ENCOUNTER
Also pt following up on his earlier request - PT office, [facility], would forward his notes to Dr Granados Memos (are not in chart yet). - awaiting fax - pt aware it may take up to 24 hrs to arrive to put into his chart - we do have Centralized Fax - pt aware we are receiving those usually in the morning.

## 2023-10-19 NOTE — TELEPHONE ENCOUNTER
Notified - pt will go to [address] [place] [address] get an xray of his lower back (opens after 8:00 AM).

## 2023-10-21 ENCOUNTER — HOSPITAL ENCOUNTER (OUTPATIENT)
Dept: RADIOLOGY | Facility: HOSPITAL | Age: 68
Discharge: HOME | End: 2023-10-21
Payer: MEDICARE

## 2023-10-21 DIAGNOSIS — M54.50 ACUTE BILATERAL LOW BACK PAIN WITHOUT SCIATICA: ICD-10-CM

## 2023-10-21 PROCEDURE — 72110 X-RAY EXAM L-2 SPINE 4/>VWS: CPT

## 2023-12-05 ENCOUNTER — OFFICE VISIT (OUTPATIENT)
Age: 68
End: 2023-12-05
Payer: MEDICARE

## 2023-12-05 VITALS — WEIGHT: 180 LBS | BODY MASS INDEX: 27.28 KG/M2 | HEIGHT: 68 IN

## 2023-12-05 DIAGNOSIS — L57.0 ACTINIC KERATOSIS: ICD-10-CM

## 2023-12-05 DIAGNOSIS — Z85.828 HISTORY OF SKIN CANCER: ICD-10-CM

## 2023-12-05 DIAGNOSIS — Z13.89 SCREENING FOR SKIN CONDITION: Primary | ICD-10-CM

## 2023-12-05 DIAGNOSIS — L98.9 UNKNOWN SKIN LESION: ICD-10-CM

## 2023-12-05 DIAGNOSIS — L82.1 SEBORRHEIC KERATOSIS: ICD-10-CM

## 2023-12-05 DIAGNOSIS — D18.01 CHERRY ANGIOMA: ICD-10-CM

## 2023-12-05 DIAGNOSIS — D22.9 NEVUS: ICD-10-CM

## 2023-12-05 PROCEDURE — 88305 TISSUE EXAM BY PATHOLOGIST: CPT | Performed by: STUDENT IN AN ORGANIZED HEALTH CARE EDUCATION/TRAINING PROGRAM

## 2023-12-05 PROCEDURE — 99214 OFFICE O/P EST MOD 30 MIN: CPT | Performed by: DERMATOLOGY

## 2023-12-05 PROCEDURE — 17003 DESTRUCT PREMALG LES 2-14: CPT | Performed by: DERMATOLOGY

## 2023-12-05 PROCEDURE — 17000 DESTRUCT PREMALG LESION: CPT | Performed by: DERMATOLOGY

## 2023-12-05 PROCEDURE — 11102 TANGNTL BX SKIN SINGLE LES: CPT | Performed by: DERMATOLOGY

## 2023-12-05 NOTE — PATIENT INSTRUCTIONS
III. POST-PROCEDURAL CARE (WHAT YOU WILL NEED TO DO "AFTER THE BIOPSY" TO OPTIMIZE HEALING)    Keep the area clean and dry. Try NOT to remove the bandage or get it wet for the first 24 hours. Gently clean the area and apply surgical ointment (such as Vaseline petrolatum ointment, which is available "over the counter" and not a prescription) to the biopsy site for up to 2 weeks straight. This acts to protect the wound from the outside world. Sutures are not usually placed in this procedure. If any sutures were placed, return for suture removal as instructed (generally 1 week for the face, 2 weeks for the body). Take Acetaminophen (Tylenol) for discomfort, if no contraindications. Ibuprofen or aspirin could make bleeding worse. Call our office immediately for signs of infection: fever, chills, increased redness, warmth, tenderness, discomfort/pain, or pus or foul smell coming from the wound. WHAT TO DO IF THERE IS ANY BLEEDING? If a small amount of bleeding is noticed, place a clean cloth over the area and apply firm pressure for ten minutes. Check the wound after 10 minutes of direct pressure. If bleeding persists, try one more time for an additional 10 minutes of direct pressure on the area. If the bleeding becomes heavier or does not stop after the second attempt, or if you have any other questions about this procedure, then please call your [facility] by calling [phone] (SKIN). I hereby acknowledge that I have reviewed and verified the site with my Dermatologist and have requested and authorized my Dermatologist to proceed with the procedure. ACTINIC KERATOSIS    Actinic keratoses are very common on sites repeatedly exposed to the sun, especially the backs of the hands and the face, most often affecting the ears, nose, cheeks, upper lip, vermilion of the lower lip, temples, forehead and balding scalp.  In severely chronically sun-damaged individuals, they may also be found on the upper trunk, upper and lower limbs, and dorsum of feet. We discussed the theoretical premalignant (“pre-cancerous”) nature and etiology of these growths. We discussed the prevailing notion that actinic keratoses are a reflection of abnormal skin cell development due to DNA damage by short wavelength UVB. They are more likely to appear if the immune function is poor, due to aging, recent sun exposure, predisposing disease or certain drugs. We discussed that the main concern is that actinic keratoses may predispose to squamous cell carcinoma. It is rare for a solitary actinic keratosis to evolve to squamous cell carcinoma (SCC), but the risk of SCC occurring at some stage in a patient with more than 10 actinic keratoses is thought to be about 10 to 15%. A tender, thickened, ulcerated or enlarging actinic keratosis is suspicious of SCC. Actinic keratoses may be prevented by strict sun protection. If already present, keratoses may improve with a very high sun protection factor (50+) broad-spectrum sunscreen applied at least daily to affected areas, year-round. We recommend that UPF-rated clothing and hats and sunglasses be worn whenever possible and that a sunscreen-moisturizer combination product such as Neutrogena Daily Defense be applied at least three times a day. We performed a thorough discussion of treatment options and specific risk/benefits/alternatives including but not limited to medical “field” treatment with medications such as the following:    Topical “field area” medications such as 5-fluorouracil or Aldara (specifically, the trouble with long-term compliance, blistering and local skin reaction versus the convenience of at-home therapy and that field therapy “gets what is not yet seen”).     Cryotherapy (specifically, local pain, scarring, dyspigmentation, blistering, possible superinfection, and treats “only what we see” versus directed treatment today). Photodynamic therapy (specifically, local pain, scarring, dyspigmentation, blistering, possible superinfection, need to schedule for a later date, and time spent in the office versus field therapy that “gets what is not yet seen”). Treatment with Cryotherapy    The doctor has treated your skin with nitrogen, which is 320 degrees Fahrenheit below zero. He has given the treated area "frostbite."    Stinging should subside within a few hours. You can take Tylenol for pain, if needed. Over the next few days, it is normal if the area becomes reddened, a blood blister, or swollen with fluid. If the lesion treated was near the eye - you could get a swollen eye over the next few days. Do not panic! This is all temporary, and will resolve with time. There is no special treatment - just keep the area clean. Makeup and BandAids can be used, if preferred. When the area starts to dry up and peel off, using Vaseline can help healing. It usually takes up to a month for it to heal.  Some lesions are recurrent and may require repeat treatments. If a lesion has not healed in one month, please don't hesitate to contact us. If you have any further questions that are not answered here, please call us. [phone]. Thank you for allowing us to care for you. SQUAMOUS CELL CARCINOMA    What is cutaneous squamous cell carcinoma? Cutaneous squamous cell carcinoma (SCC) is a common type of keratinocyte or non-melanoma skin cancer. It is derived from cells within the epidermis that make keratin -- the horny protein that makes up skin, hair and nails. Cutaneous SCC is an invasive disease, referring to cancer cells that have grown beyond the epidermis. SCC can sometimes metastasise and may prove fatal.  Intraepidermal carcinoma (cutaneous SCC in situ) and mucosal SCC are considered elsewhere. Who gets cutaneous squamous cell carcinoma?   Risk factors for cutaneous SCC include:  Age and sex: SCCs are particularly prevalent in elderly males. However, they also affect females and younger adults. Previous SCC or another form of skin cancer (basal cell carcinoma, melanoma) are a strong predictor for further skin cancers. Actinic keratoses   Outdoor occupation or recreation   Smoking   Fair skin, blue eyes and blond or red hair   Previous cutaneous injury, thermal burn, disease (eg cutaneous lupus, epidermolysis bullosa, leg ulcer)   Inherited syndromes: SCC is a particular problem for families with xeroderma pigmentosum and albinism   Other risk factors include ionising radiation, exposure to arsenic, and immune suppression due to disease (eg chronic lymphocytic leukaemia) or medicines. Organ transplant recipients have a massively increased risk of developing SCC. What causes cutaneous squamous cell carcinoma? More than 90% of cases of SCC are associated with numerous DNA mutations in multiple somatic genes. Mutations in the p53 tumour suppressor gene are caused by exposure to ultraviolet radiation (UV), especially UVB (known as signature 7). Other signature mutations relate to cigarette smoking, ageing and immune suppression (eg, to drugs such as azathioprine). Mutations in signalling pathways affect the epidermal growth factor receptor, JENNA, Fyn, and [id number] signalling. Beta-genus human papillomaviruses (wart virus) are thought to play a role in SCC arising in immune-suppressed populations. ?-HPV and HPV subtypes 5, 8, 17, 20, 24, and 38 have also been associated with an increased risk of cutaneous SCC in immunocompetent individuals. What are the clinical features of cutaneous squamous cell carcinoma? Cutaneous SCCs present as enlarging scaly or crusted lumps. They usually arise within pre-existing actinic keratosis or intraepidermal carcinoma.   They grow over weeks to months   They may ulcerate   They are often tender or painful   Located on sun-exposed sites, particularly the face, lips, ears, hands, forearms and lower legs   Size varies from a few millimetres to several centimetres in diameter. Types of cutaneous squamous cell carcinoma  Distinct clinical types of invasive cutaneous SCC include:  Cutaneous horn -- the horn is due to excessive production of keratin   Keratoacanthoma (KA) -- a rapidly growing keratinising nodule that may resolve without treatment   Carcinoma cuniculatum (‘verrucous carcinoma’), a slow-growing, warty tumour on the sole of the foot. Multiple eruptive SCC/KA-like lesions arising in syndromes, such as multiple self-healing squamous epitheliomas of Holder-Smith and Grzybowski syndrome  The pathologist may classify a tumour as well differentiated, moderately well differentiated, poorly differentiated or anaplastic cutaneous SCC. There are other variants. Classification of squamous cell carcinoma by risk  Cutaneous SCC is classified as low-risk or high-risk, depending on the chance of tumour recurrence and metastasis. Characteristics of high-risk SCC include:  High-risk cutaneous squamous cell carcinoma has the following characteristics:  Diameter greater than or equal to 2 cm   Location on the ear, vermilion of the lip, central face, hands, feet, genitalia   Arising in elderly or immune suppressed patient   Histological thickness greater than 2 mm, poorly differentiated histology, or with the invasion of the subcutaneous tissue, nerves and blood vessels  Metastatic SCC is found in regional lymph nodes (80%), lungs, liver, brain, bones and skin. Staging cutaneous squamous cell carcinoma  In 2011, the [address] on Cancer (AJCC) published a new staging systemic for cutaneous SCC for the 7th Edition of the AJCC manual. This evaluates the dimensions of the original primary tumour (T) and its metastases to lymph nodes (N). Tumour staging for cutaneous SCC  TX:  Th Primary tumour cannot be assessed  T0: No evidence of a primary tumour  Tis: Carcinoma in situ  T1: Tumour ? 2cm without high-risk features  T2: Tumour ? 2cm; or; Tumour ? 2 cm with high-risk features  T3: Tumour with the invasion of maxilla, mandible, orbit or temporal bone  T4: Tumour with the invasion of axial or appendicular skeleton or perineural invasion of skull base    Sweetie staging for cutaneous SCC  NX: Regional lymph nodes cannot be assessed  N0: No regional lymph node metastasis  N1: Metastasis in one local lymph node ? 3cm  N2: Metastasis in one local lymph node ? 3cm; or; Metastasis in >1 local lymph node ? 6cm  N3: Metastasis in lymph node ? 6cm    How is squamous cell carcinoma diagnosed? Diagnosis of cutaneous SCC is based on clinical features. The diagnosis and histological subtype are confirmed pathologically by diagnostic biopsy or following excision. See squamous cell carcinoma - pathology. Patients with high-risk SCC may also undergo staging investigations to determine whether it has spread to lymph nodes or elsewhere. These may include:  Imaging using ultrasound scan, X-rays, CT scans, MRI scans   Lymph node or other tissue biopsies    What is the treatment for cutaneous squamous cell carcinoma? Cutaneous SCC is nearly always treated surgically. Most cases are excised with a 3-10 mm margin of normal tissue around a visible tumour. A flap or skin graft may be needed to repair the defect. Other methods of removal include:  Shave, curettage, and electrocautery for low-risk tumours on trunk and limbs   Aggressive cryotherapy for very small, thin, low-risk tumours   Mohs micrographic surgery for large facial lesions with indistinct margins or recurrent tumours   Radiotherapy for an inoperable tumour, patients unsuitable for surgery, or as adjuvant    What is the treatment for advanced or metastatic squamous cell carcinoma? Locally advanced primary, recurrent or metastatic SCC requires multidisciplinary consultation. Often a combination of treatments is used.   Surgery Radiotherapy   Cemiplimab   Experimental targeted therapy using epidermal growth factor receptor inhibitors    How can cutaneous squamous cell carcinoma be prevented? There is a great deal of evidence to show that very careful sun protection at any time of life reduces the number of SCCs. This is particularly important in ageing, sun-damaged, fair skin; in patients that are immune suppressed; and in those who already have actinic keratoses or previous SCC. Stay indoors or under the shade in the middle of the day   Wear covering clothing   Apply high protection factor SPF50+ broad-spectrum sunscreens generously to exposed skin if outdoors   Avoid indoor tanning (sun beds, solaria)    Oral nicotinamide (vitamin B3) in a dose of 500 mg twice daily may reduce the number and severity of SCCs in people at high risk. Patients with multiple squamous cell carcinomas may be prescribed an oral retinoid (acitretin or isotretinoin). These reduce the number of tumours but have some nuisance side effects. What is the outlook for cutaneous squamous cell carcinoma? Most SCCs are cured by treatment. A cure is most likely if treatment is undertaken when the lesion is small. The risk of recurrence or disease-associated death is greater for tumours that are > 20 mm in diameter and/or > 2 mm in thickness at the time of surgical excision. About 50% of people at high risk of SCC develop a second one within 5 years of the first. They are also at increased risk of other skin cancers, especially melanoma. Regular self-skin examinations and long-term annual skin checks by an experienced health professional are recommended. MELANOCYTIC NEVI ("Moles")    Melanocytic nevi ("moles") are tan or brown, raised or flat areas of the skin which have an increased number of melanocytes. Melanocytes are the cells in our body which make pigment and account for skin color.     Some moles are present at birth (I.e., "congenital nevi"), while others come up later in life (i.e., "acquired nevi"). The sun can stimulate the body to make more moles. Sunburns are not the only thing that triggers more moles. Chronic sun exposure can do it too. Clinically distinguishing a healthy mole from melanoma may be difficult, even for experienced dermatologists. The "ABCDE's" of moles have been suggested as a means of helping to alert a person to a suspicious mole and the possible increased risk of melanoma. The suggestions for raising alert are as follows:    Asymmetry: Healthy moles tend to be symmetric, while melanomas are often asymmetric. Asymmetry means if you draw a line through the mole, the two halves do not match in color, size, shape, or surface texture. Asymmetry can be a result of rapid enlargement of a mole, the development of a raised area on a previously flat lesion, scaling, ulceration, bleeding or scabbing within the mole. Any mole that starts to demonstrate "asymmetry" should be examined promptly by a board certified dermatologist.     Border: Healthy moles tend to have discrete, even borders. The border of a melanoma often blends into the normal skin and does not sharply delineate the mole from normal skin. Any mole that starts to demonstrate "uneven borders" should be examined promptly by a board certified dermatologist.     Color: Healthy moles tend to be one color throughout. Melanomas tend to be made up of different colors ranging from dark black, blue, white, or red. Any mole that demonstrates a color change should be examined promptly by a board certified dermatologist.     Diameter: Healthy moles tend to be smaller than 0.6 cm in size; an exception are "congenital nevi" that can be larger. Melanomas tend to grow and can often be greater than 0.6 cm (1/4 of an inch, or the size of a pencil eraser). This is only a guideline, and many normal moles may be larger than 0.6 cm without being unhealthy.   Any mole that starts to change in size (small to bigger or bigger to smaller) should be examined promptly by a board certified dermatologist.     Evolving: Healthy moles tend to "stay the same."  Melanomas may often show signs of change or evolution such as a change in size, shape, color, or elevation. Any mole that starts to itch, bleed, crust, burn, hurt, or ulcerate or demonstrate a change or evolution should be examined promptly by a board certified dermatologist.      Dysplastic Nevi  Dysplastic moles are moles that fit the ABCDE rules of melanoma but are not identified as melanomas when examined under the microscope. They may indicate an increased risk of melanoma in that person. If there is a family history of melanoma, most experts agree that the person may be at an increased risk for developing a melanoma. Experts still do not agree on what dysplastic moles mean in patients without a personal or family history of melanoma. Dysplastic moles are usually larger than common moles and have different colors within it with irregular borders. The appearance can be very similar to a melanoma. Biopsies of dysplastic moles may show abnormalities which are different from a regular mole. Melanoma  Malignant melanoma is a type of skin cancer that can be deadly if it spreads throughout the body. The incidence of melanoma in the [facility] is growing faster than any other cancer. Melanoma usually grows near the surface of the skin for a period of time, and then begins to grow deeper into the skin. Once it grows deeper into the skin, the risk of spread to other organs greatly increases. Therefore, early detection and removal of a malignant melanoma may result in a better chance at a complete cure; removal after the tumor has spread may not be as effective, leading to worse clinical outcomes such as death. The true rate of nevus transformation into a melanoma is unknown.  It has been estimated that the lifetime risk for any acquired melanocytic nevus on any 21year-old individual transforming into melanoma by age 80 is 0.03% (1 in 3,164) for men and 0.009% (1 in 10,800) for women. The appearance of a "new mole" remains one of the most reliable methods for identifying a malignant melanoma. Occasionally, melanomas appear as rapidly growing, blue-black, dome-shaped bumps within a previous mole or previous area of normal skin. Other times, melanomas are suspected when a mole suddenly appears or changes. Itching, burning, or pain in a pigmented lesion should increase suspicion, but most patients with early melanoma have no skin discomfort whatsoever. Melanoma can occur anywhere on the skin, including areas that are difficult for self-examination. Many melanomas are first noticed by other family members. Suspicious-looking moles may be removed for microscopic examination. You may be able to prevent death from melanoma by doing two simple things:    Try to avoid unnecessary sun exposure and protect your skin when it is exposed to the sun. People who live near the equator, people who have intermittent exposures to large amounts of sun, and people who have had sunburns in childhood or adolescence have an increased risk for melanoma. Sun sense and vigilant sun protection may be keys to helping to prevent melanoma. We recommend wearing UPF-rated sun protective clothing and sunglasses whenever possible and applying a moisturizer-sunscreen combination product (SPF 50+) such as Neutrogena Daily Defense to sun exposed areas of skin at least three times a day. Have your moles regularly examined by a board certified dermatologist AND by yourself or a family member/friend at home.   We recommend that you have your moles examined at least once a year by a board certified dermatologist.  Use your birthday as an annual reminder to have your "Birthday Suit" (I.e., your skin) examined; it is a nice birthday gift to yourself to know that your skin is healthy appearing! Additionally, at-home self examinations may be helpful for detecting a possible melanoma. Use the ABCDEs we discussed and check your moles once a month at home. SEBORRHEIC KERATOSIS  A seborrheic keratosis is a harmless warty spot that appears during adult life as a common sign of skin aging. Seborrheic keratoses can arise on any area of skin, covered or uncovered, with the usual exception of the palms and soles. They do not arise from mucous membranes. Seborrheic keratoses can have highly variable appearance. Seborrheic keratoses are extremely common. It has been estimated that over 90% of adults over the age of 61 years have one or more of them. They occur in males and females of all races, typically beginning to erupt in the 35s or 45s. They are uncommon under the age of 21 years. The precise cause of seborrhoeic keratoses is not known. Seborrhoeic keratoses are considered degenerative in nature. As time goes by, seborrheic keratoses tend to become more numerous. Some people inherit a tendency to develop a very large number of them; some people may have hundreds of them. The name "seborrheic keratosis" is misleading, because these lesions are not limited to a seborrhoeic distribution (scalp, mid-face, chest, upper back), nor are they formed from sebaceous glands, nor are they associated with sebum -- which is greasy. Seborrheic keratosis may also be called "SK," "Seb K," "basal cell papilloma," "senile wart," or "barnacle."      There is no easy way to remove multiple lesions on a single occasion. Unless a specific lesion is "inflamed" and is causing pain or stinging/burning or is bleeding, most insurance companies do not authorize treatment. ANGIOMA ("CHERRY ANGIOMA")  Luna angiomas markedly increase in number from about the age of 36, so it has been estimated that 75% of people over 76years of age have them.  Although they also called "senile angiomas," they can occur in young people too - 5% of adolescents have been found to have them. Cherry angiomas are very common in males and females of any age or race, with no difference in sexes or races affected. They are however more noticeable in white skin than in skin of colour. There may be a family history of similar lesions. Eruptive (very large number appearing in a short period of time) cherry angiomas have been rarely reported to be associated with internal malignancy and pregnancy.

## 2023-12-05 NOTE — PROGRESS NOTES
[facility] Note     Patient Name: Stephanie Reid  Encounter Date: 12/05/2023     Have you been cared for by a [facility]ologist in the last 3 years and, if so, which description applies to you? Yes. I have been here within the last 3 years, and my medical history has NOT changed since that time. I am MALE/not capable of bearing children. REVIEW OF SYSTEMS:  Have you recently had or currently have any of the following? No changes in my recent health. PAST MEDICAL HISTORY:  Have you personally ever had or currently have any of the following? If "YES," then please provide more detail. No changes in my medical history. HISTORY OF IMMUNOSUPPRESSION: Do you have a history of any of the following:  Systemic Immunosuppression such as Diabetes, Biologic or Immunotherapy, Chemotherapy, Organ Transplantation, Bone Marrow Transplantation? No     Answering "YES" requires the addition of the dotphrase "IMMUNOSUPPRESSED" as the first diagnosis of the patient's visit. FAMILY HISTORY:  Any "first degree relatives" (parent, brother, sister, or child) with the following? No changes in my family's known health. PATIENT EXPERIENCE:    Do you want the Dermatologist to perform a COMPLETE skin exam today including a clinical examination under the "bra and underwear" areas? NO  If necessary, do we have your permission to call and leave a detailed message on your Preferred Phone number that includes your specific medical information?   Yes      Allergies   Allergen Reactions    Dust Mite Extract     Molds & Smuts     Pollen Extract     Short Ragweed Pollen Ext     Tree Extract       Current Outpatient Medications:     aspirin 81 MG tablet, Take by mouth, Disp: , Rfl:     atorvastatin (LIPITOR) 20 mg tablet, TAKE 1 TABLET BY MOUTH EVERY DAY, Disp: 90 tablet, Rfl: 1    budesonide (Pulmicort Flexhaler) 180 MCG/ACT inhaler, INHALE 2 PUFFS BY MOUTH 2 TIMES A DAY RINSE MOUTH AFTER USE., Disp: 3 each, Rfl: 3    Calcium Carb-Cholecalciferol 1000-800 MG-UNIT TABS, Take by mouth, Disp: , Rfl:     citalopram (CeleXA) 20 mg tablet, TAKE 1 TABLET BY MOUTH EVERY DAY, Disp: 90 tablet, Rfl: 5    finasteride (PROSCAR) 5 mg tablet, Take 1 tablet (5 mg total) by mouth daily Do not start before August 17, 2023., Disp: 30 tablet, Rfl: 0    fluticasone (FLONASE) 50 mcg/act nasal spray, 1 spray into each nostril daily, Disp: , Rfl:     ipratropium (ATROVENT) 0.06 % nasal spray, 1 SPRAY INTO EACH NOSTRIL IN THE MORNING AND 1 SPRAY BEFORE BEDTIME., Disp: 15 mL, Rfl: 5    LORazepam (ATIVAN) 0.5 mg tablet, Take 1 tablet (0.5 mg total) by mouth every 6 (six) hours as needed for anxiety, Disp: 90 tablet, Rfl: 3    montelukast (SINGULAIR) 10 mg tablet, Take 1 tablet (10 mg total) by mouth daily, Disp: 90 tablet, Rfl: 0    omeprazole (PriLOSEC) 40 MG capsule, TAKE 1 CAPSULE BY MOUTH EVERY DAY, Disp: 90 capsule, Rfl: 0    oxybutynin (DITROPAN) 5 mg tablet, Take 1 tablet (5 mg total) by mouth 3 (three) times a day for 14 days, Disp: 42 tablet, Rfl: 0    oxyCODONE (ROXICODONE) 5 immediate release tablet, Take 1 tablet (5 mg total) by mouth every 8 (eight) hours as needed for moderate pain or severe pain Max Daily Amount: 15 mg, Disp: 10 tablet, Rfl: 0    sotalol (BETAPACE) 80 mg tablet, TAKE 1 TABLET BY MOUTH EVERY 12 HOURS., Disp: 180 tablet, Rfl: 3    tamsulosin (FLOMAX) 0.4 mg, Take 0.4 mg by mouth 2 (two) times a day, Disp: , Rfl:           Whom besides the patient is providing clinical information about today's encounter? NO ADDITIONAL HISTORIAN (patient alone provided history)      79year old male with history of skin cancer presents with two spots of concern. One in right cheek and another one in his scalp. Physical Exam and Assessment/Plan by Diagnosis:    HISTORY OF SQUAMOUS CELL CARCINOMA     Physical Exam:  Anatomic Location Affected:  mid scalp in 2022.   Morphological Description of Scar:  scar well healed  Suspected Recurrence: no  Regional adenopathy: no    Additional History of Present Condition:  history of squamous cell carcinoma with no sign of recurrence. Assessment and Plan:  Based on a thorough discussion of this condition and the management approach to it (including a comprehensive discussion of the known risks, side effects and potential benefits of treatment), the patient (family) agrees to implement the following specific plan:  Monitor for change  When outside we recommend using a wide brim hat, sunglasses, long sleeve and pants, sunscreen with SPF 59+ with reapplication every 2 hours, or SPF specific clothing. NEOPLASM OF UNCERTAIN BEHAVIOR OF SKIN    Physical Exam:  (Anatomic Location); (Size and Morphological Description); (Differential Diagnosis):  R frontal scalp 7 mm scaling erythematous papule r/o squamous cell cancer        Additional History of Present Condition:  present for a while. Assessment and Plan:  I have discussed with the patient that a sample of skin via a "skin biopsy” would be potentially helpful to further make a specific diagnosis under the microscope. Based on a thorough discussion of this condition and the management approach to it (including a comprehensive discussion of the known risks, side effects and potential benefits of treatment), the patient (family) agrees to implement the following specific plan:    Procedure:  Skin Biopsy. After a thorough discussion of treatment options and risk/benefits/alternatives (including but not limited to local pain, scarring, dyspigmentation, blistering, possible superinfection, and inability to confirm a diagnosis via histopathology), verbal and written consent were obtained and portion of the rash was biopsied for tissue sample. See below for consent that was obtained from patient and subsequent Procedure Note.           PROCEDURE TANGENTIAL (SHAVE) BIOPSY NOTE:    Performing Physician:   Anatomic Location; Clinical Description with size (cm); Pre-Op Diagnosis:   R frontal scalp 7 mm scaling erythematous papule r/o squamous cell cancer  Post-op diagnosis: Same     Local anesthesia: 1% xylocaine with epi      Topical anesthesia: None    Hemostasis: Aluminum chloride       After obtaining informed consent  at which time there was a discussion about the purpose of biopsy  and low risks of infection and bleeding. The area was prepped and draped in the usual fashion. Anesthesia was obtained with 1% lidocaine with epinephrine. A shave biopsy to an appropriate sampling depth was obtained by Shave (Dermablade or 15 blade) The resulting wound was covered with surgical ointment and bandaged appropriately. The patient tolerated the procedure well without complications and was without signs of functional compromise. Specimen has been sent for review by Dermatopathology. Standard post-procedure care has been explained and has been included in written form within the patient's copy of Informed Consent. INFORMED CONSENT DISCUSSION AND POST-OPERATIVE INSTRUCTIONS FOR PATIENT    I.  RATIONALE FOR PROCEDURE  I understand that a skin biopsy allows the Dermatologist to test a lesion or rash under the microscope to obtain a diagnosis. It usually involves numbing the area with numbing medication and removing a small piece of skin; sometimes the area will be closed with sutures. In this specific procedure, sutures are not usually needed. If any sutures are placed, then they are usually need to be removed in 2 weeks or less. I understand that my Dermatologist recommends that a skin "shave" biopsy be performed today. A local anesthetic, similar to the kind that a dentist uses when filling a cavity, will be injected with a very small needle into the skin area to be sampled. The injected skin and tissue underneath "will go to sleep” and become numb so no pain should be felt afterwards.   An instrument shaped like a tiny "razor blade" (shave biopsy instrument) will be used to cut a small piece of tissue and skin from the area so that a sample of tissue can be taken and examined more closely under the microscope. A slight amount of bleeding will occur, but it will be stopped with direct pressure and a pressure bandage and any other appropriate methods. I understands that a scar will form where the wound was created. Surgical ointment will be applied to help protect the wound. Sutures are not usually needed. II.  RISKS AND POTENTIAL COMPLICATIONS   I understand the risks and potential complications of a skin biopsy include but are not limited to the following:  Bleeding  Infection  Pain  Scar/keloid  Skin discoloration  Incomplete Removal  Recurrence  Nerve Damage/Numbness/Loss of Function  Allergic Reaction to Anesthesia  Biopsies are diagnostic procedures and based on findings additional treatment or evaluation may be required  Loss or destruction of specimen resulting in no additional findings    My Dermatologist has explained to me the nature of the condition, the nature of the procedure, and the benefits to be reasonably expected compared with alternative approaches. My Dermatologist has discussed the likelihood of major risks or complications of this procedure including the specific risks listed above, such as bleeding, infection, and scarring/keloid. I understand that a scar is expected after this procedure. I understand that my physician cannot predict if the scar will form a "keloid," which extends beyond the borders of the wound that is created. A keloid is a thick, painful, and bumpy scar. A keloid can be difficult to treat, as it does not always respond well to therapy, which includes injecting cortisone directly into the keloid every few weeks. While this usually reduces the pain and size of the scar, it does not eliminate it. I understand that photographs may be taken before and after the procedure.   These will be maintained as part of the medical providers confidential records and may not be made available to me. I further authorize the medical provider to use the photographs for teaching purposes or to illustrate scientific papers, books, or lectures if in his/her judgment, medical research, education, or science may benefit from its use. I have had an opportunity to fully inquire about the risks and benefits of this procedure and its alternatives. I have been given ample time and opportunity to ask questions and to seek a second opinion if I wished to do so. I acknowledge that there have specifically been no guarantees as to the cosmetic results from the procedure. I am aware that with any procedure there is always the possibility of an unexpected complication. III. POST-PROCEDURAL CARE (WHAT YOU WILL NEED TO DO "AFTER THE BIOPSY" TO OPTIMIZE HEALING)    Keep the area clean and dry. Try NOT to remove the bandage or get it wet for the first 24 hours. Gently clean the area and apply surgical ointment (such as Vaseline petrolatum ointment, which is available "over the counter" and not a prescription) to the biopsy site for up to 2 weeks straight. This acts to protect the wound from the outside world. Sutures are not usually placed in this procedure. If any sutures were placed, return for suture removal as instructed (generally 1 week for the face, 2 weeks for the body). Take Acetaminophen (Tylenol) for discomfort, if no contraindications. Ibuprofen or aspirin could make bleeding worse. Call our office immediately for signs of infection: fever, chills, increased redness, warmth, tenderness, discomfort/pain, or pus or foul smell coming from the wound. WHAT TO DO IF THERE IS ANY BLEEDING? If a small amount of bleeding is noticed, place a clean cloth over the area and apply firm pressure for ten minutes. Check the wound after 10 minutes of direct pressure.   If bleeding persists, try one more time for an additional 10 minutes of direct pressure on the area. If the bleeding becomes heavier or does not stop after the second attempt, or if you have any other questions about this procedure, then please call your [facility] by calling [phone] (SKIN). I hereby acknowledge that I have reviewed and verified the site with my Dermatologist and have requested and authorized my Dermatologist to proceed with the procedure. ACTINIC KERATOSIS    Physical Exam:  Anatomic Location: scalp and face  Morphologic Description: Scaly pink papules    Physical Exam  HENT:      Head:            Plan:  Cryotherapy performed in the office (See Procedure Note). We discussed that a hypopigmentation or scar may form in the region following cryotherapy. We discussed the pre-cancerous nature of the condition. Actinic keratosis is found on sun-damaged skin and there is small risk that the condition could turn into a skin cancer called squamous cell carcinoma. There is no risk of actinic keratosis turning into melanoma. We discussed sun protection measures, including using sunscreen with an SPF 50+ year round, avoiding the sun, and wearing protective clothing such as long sleeves and pants when out in the sun. Continue to monitor clinically for signs of recurrence. Discussed with patient the importance of keeping up to date with full body skin exams. PROCEDURES PERFORMED TODAY ASSOCIATED WITH THIS CONDITION:          Cryotherapy: PROCEDURE:  DESTRUCTION OF PRE-MALIGNANT LESIONS  After a thorough discussion of treatment options and risk/benefits/alternatives (including but not limited to local pain, scarring, dyspigmentation, blistering, and possible superinfection), verbal and written consent were obtained and the aforementioned lesions were treated on with cryotherapy using liquid nitrogen x 1 cycle for 5-10 seconds.     TOTAL NUMBER of 5 pre-malignant lesions were treated today on the ANATOMIC LOCATION: face and scalp. The patient tolerated the procedure well, and after-care instructions were provided. MELANOCYTIC NEVI ("Moles")    Physical Exam:  Anatomic Location Affected: Mostly on sun-exposed areas of the body  Morphological Description:  Scattered, 1-4mm round to ovoid, symmetrical-appearing, even bordered, skin colored to dark brown macules/papules, mostly in sun-exposed areas    Additional History of Present Condition:  presents on exam.     Assessment and Plan:  Based on a thorough discussion of this condition and the management approach to it (including a comprehensive discussion of the known risks, side effects and potential benefits of treatment), the patient (family) agrees to implement the following specific plan:  Provided handout with information regarding the ABCDE's of moles   Recommend routine skin exams every year   Sun avoidance, protective clothing (known as UPF clothing), and the use of at least SPF 30 sunscreens is advised. Sunscreen should be reapplied every two hours when outside. SEBORRHEIC KERATOSIS; NON-INFLAMED    Physical Exam:  Anatomic Location Affected:  scattered across trunk, extremities,  face  Morphological Description:  Flat and raised, waxy, smooth to warty textured, yellow to brownish-grey to dark brown to blackish, discrete, "stuck-on" appearing papules. Additional History of Present Condition:  Patient reports new bumps on the skin. Denies itch, burn, pain, bleeding or ulceration. Present constantly; nothing seems to make it worse or better. No prior treatment.       Assessment and Plan:  Based on a thorough discussion of this condition and the management approach to it (including a comprehensive discussion of the known risks, side effects and potential benefits of treatment), the patient (family) agrees to implement the following specific plan:  Reassured benign      ANGIOMA ("CHERRY ANGIOMA")    Physical Exam:  Anatomic Location: scattered across sun exposed areas of the trunk and extremities   Morphologic Description: Firm red to reddish-blue discrete papules    Additional History of Present Condition:  Present on exam.     Assessment and Plan:  Reassured benign        Scribe Attestation      I,:  Dodie Her am acting as a scribe while in the presence of the attending physician.:       I,:  Alba Devlin MD personally performed the services described in this documentation    as scribed in my presence.:

## 2023-12-09 DIAGNOSIS — I47.19 ATRIAL TACHYCARDIA: ICD-10-CM

## 2023-12-09 DIAGNOSIS — I49.5 TACHY-BRADY SYNDROME (HCC): ICD-10-CM

## 2023-12-11 PROCEDURE — 88305 TISSUE EXAM BY PATHOLOGIST: CPT | Performed by: STUDENT IN AN ORGANIZED HEALTH CARE EDUCATION/TRAINING PROGRAM

## 2023-12-15 RX ORDER — SOTALOL HYDROCHLORIDE 80 MG/1
TABLET ORAL
Qty: 180 TABLET | Refills: 3 | Status: SHIPPED | OUTPATIENT
Start: 2023-12-15

## 2023-12-16 ENCOUNTER — NURSE TRIAGE (OUTPATIENT)
Dept: OTHER | Facility: OTHER | Age: 68
End: 2023-12-16

## 2023-12-16 DIAGNOSIS — I49.5 SICK SINUS SYNDROME (HCC): ICD-10-CM

## 2023-12-16 DIAGNOSIS — I45.2 BIFASCICULAR BLOCK: ICD-10-CM

## 2023-12-16 DIAGNOSIS — I71.21 ANEURYSM OF ASCENDING AORTA WITHOUT RUPTURE (HCC): ICD-10-CM

## 2023-12-16 DIAGNOSIS — J45.909 UNCOMPLICATED ASTHMA, UNSPECIFIED ASTHMA SEVERITY, UNSPECIFIED WHETHER PERSISTENT: Primary | ICD-10-CM

## 2023-12-16 DIAGNOSIS — I49.5 TACHY-BRADY SYNDROME (HCC): ICD-10-CM

## 2023-12-16 RX ORDER — NIRMATRELVIR AND RITONAVIR 300-100 MG
3 KIT ORAL 2 TIMES DAILY
Qty: 30 TABLET | Refills: 0 | Status: SHIPPED | OUTPATIENT
Start: 2023-12-16 | End: 2023-12-21

## 2023-12-16 NOTE — TELEPHONE ENCOUNTER
Regarding: covid postive/dry cough/runny nose/bronchial asthma  ----- Message from Asuncion Reis sent at 12/16/2023  9:39 AM EST -----  Pt stated, "I tested positive for covid. I have a dry cough, runny nose and a tempeture of 100. I have bronchial asthma and breathing issues so I am interested in medication for covid to lessen the symptoms. "

## 2023-12-16 NOTE — TELEPHONE ENCOUNTER
Reason for Disposition  • HIGH RISK for severe COVID complications (e.g., weak immune system, age > 59 years, obesity with BMI > 22, pregnant, chronic lung disease or other chronic medical condition)  (Exception: Already seen by PCP and no new or worsening symptoms.)    Answer Assessment - Initial Assessment Questions  1. COVID-19 DIAGNOSIS: "Who made your COVID-19 diagnosis?" "Was it confirmed by a positive lab test or self-test?" If not diagnosed by a doctor (or NP/PA), ask "Are there lots of cases (community spread) where you live?" Note: See [facility] health department website, if unsure. Tested positive via home test this morning    2. COVID-19 EXPOSURE: "Was there any known exposure to COVID before the symptoms began?" CDC Definition of close contact: within 6 feet (2 meters) for a total of 15 minutes or more over a 24-hour period. Unknown    3. ONSET: "When did the COVID-19 symptoms start?"       Thursday night     4. WORST SYMPTOM: "What is your worst symptom?" (e.g., cough, fever, shortness of breath, muscle aches)      Cough    5. COUGH: "Do you have a cough?" If Yes, ask: "How bad is the cough?"        Yes, dry intermittent cough    6. FEVER: "Do you have a fever?" If Yes, ask: "What is your temperature, how was it measured, and when did it start?"      Fever currently 100.6 orally    7. RESPIRATORY STATUS: "Describe your breathing?" (e.g., shortness of breath, wheezing, unable to speak)       Denies SOB, wheezing, difficulty breathing    8. BETTER-SAME-WORSE: "Are you getting better, staying the same or getting worse compared to yesterday?"  If getting worse, ask, "In what way?"      Worse    9. HIGH RISK DISEASE: "Do you have any chronic medical problems?" (e.g., asthma, heart or lung disease, weak immune system, obesity, etc.)      Heart disease, asthma    10. VACCINE: "Have you had the COVID-19 vaccine?" If Yes, ask: "Which one, how many shots, when did you get it?"        Yes    11.  BOOSTER: "Have you received your COVID-19 booster?" If Yes, ask: "Which one and when did you get it?"        No booster this year    12. OTHER SYMPTOMS: "Do you have any other symptoms?"  (e.g., chills, fatigue, headache, loss of smell or taste, muscle pain, sore throat)        Cough, weakness, fever, sore throat, muscle pain    13.  O2 SATURATION MONITOR:  "Do you use an oxygen saturation monitor (pulse oximeter) at home?" If Yes, ask "What is your reading (oxygen level) today?" "What is your usual oxygen saturation reading?" (e.g., 95%)        97%      Protocols used: Coronavirus (COVID-19) Diagnosed or Suspected[facility]

## 2023-12-18 ENCOUNTER — REMOTE DEVICE CLINIC VISIT (OUTPATIENT)
Dept: CARDIOLOGY CLINIC | Facility: CLINIC | Age: 68
End: 2023-12-18
Payer: MEDICARE

## 2023-12-18 DIAGNOSIS — Z95.0 CARDIAC PACEMAKER IN SITU: Primary | ICD-10-CM

## 2023-12-18 PROCEDURE — 93296 REM INTERROG EVL PM/IDS: CPT | Performed by: INTERNAL MEDICINE

## 2023-12-18 PROCEDURE — 93294 REM INTERROG EVL PM/LDLS PM: CPT | Performed by: INTERNAL MEDICINE

## 2023-12-18 NOTE — PROGRESS NOTES
"Results for orders placed or performed in visit on 12/18/23   Cardiac EP device report    Narrative    MDT DUAL CHAMBER PM / ACTIVE SYSTEM IS MRI CONDITIONAL  CARELINK TRANSMISSION: BATTERY STATUS \"11 YRS.\" AP 55%  2%. ALL AVAILABLE LEAD PARAMETERS WITHIN NORMAL LIMITS. NO SIGNIFICANT HIGH RATE EPISODES. NORMAL DEVICE FUNCTION. NC         "

## 2023-12-19 ENCOUNTER — TELEPHONE (OUTPATIENT)
Dept: FAMILY MEDICINE CLINIC | Facility: CLINIC | Age: 68
End: 2023-12-19

## 2023-12-19 NOTE — TELEPHONE ENCOUNTER
T/c from pt - pt started paxlovid recently - he is experiencing dizziness when he gets out of bed (been in bed since he got sick) and also while he is sitting on the toilet to urinate - also  coughing up yellow phlegm - he said the pharmacist mentioned the flomax possible interaction - should he discontinue paxlovid?    Pt is aware Dr Bush is not in today.    Please advise.

## 2023-12-28 ENCOUNTER — PROCEDURE VISIT (OUTPATIENT)
Age: 68
End: 2023-12-28
Payer: MEDICARE

## 2023-12-28 VITALS — BODY MASS INDEX: 27.28 KG/M2 | WEIGHT: 180 LBS | HEIGHT: 68 IN

## 2023-12-28 DIAGNOSIS — D04.4 SQUAMOUS CELL CARCINOMA IN SITU (SCCIS) OF SCALP: Primary | ICD-10-CM

## 2023-12-28 PROCEDURE — 17271 DSTR MAL LES S/N/H/F/G 0.6-1: CPT | Performed by: DERMATOLOGY

## 2023-12-28 NOTE — PROGRESS NOTES
"[facility] Note     Patient Name: Jerry Funk  Encounter Date: 12/28/2023     Have you been cared for by a [facility] Dermatologist in the last 3 years and, if so, which description applies to you?    Yes.  I have been here within the last 3 years, and my medical history has NOT changed since that time.  I am MALE/not capable of bearing children.    REVIEW OF SYSTEMS:  Have you recently had or currently have any of the following? No changes in my recent health.   PAST MEDICAL HISTORY:  Have you personally ever had or currently have any of the following?  If \"YES,\" then please provide more detail. No changes in my medical history.   HISTORY OF IMMUNOSUPPRESSION: Do you have a history of any of the following:  Systemic Immunosuppression such as Diabetes, Biologic or Immunotherapy, Chemotherapy, Organ Transplantation, Bone Marrow Transplantation?  No     Answering \"YES\" requires the addition of the dotphrase \"IMMUNOSUPPRESSED\" as the first diagnosis of the patient's visit.   FAMILY HISTORY:  Any \"first degree relatives\" (parent, brother, sister, or child) with the following?    No changes in my family's known health.   PATIENT EXPERIENCE:    Do you want the Dermatologist to perform a COMPLETE skin exam today including a clinical examination under the \"bra and underwear\" areas?  NO  If necessary, do we have your permission to call and leave a detailed message on your Preferred Phone number that includes your specific medical information?  Yes      Allergies   Allergen Reactions    Dust Mite Extract     Molds & Smuts     Pollen Extract     Short Ragweed Pollen Ext     Tree Extract       Current Outpatient Medications:     aspirin 81 MG tablet, Take by mouth, Disp: , Rfl:     atorvastatin (LIPITOR) 20 mg tablet, TAKE 1 TABLET BY MOUTH EVERY DAY, Disp: 90 tablet, Rfl: 1    budesonide (Pulmicort Flexhaler) 180 MCG/ACT inhaler, INHALE 2 PUFFS BY MOUTH 2 TIMES A DAY RINSE MOUTH AFTER USE., Disp: 3 each, " Rfl: 3    Calcium Carb-Cholecalciferol 1000-800 MG-UNIT TABS, Take by mouth, Disp: , Rfl:     citalopram (CeleXA) 20 mg tablet, TAKE 1 TABLET BY MOUTH EVERY DAY, Disp: 90 tablet, Rfl: 5    finasteride (PROSCAR) 5 mg tablet, Take 1 tablet (5 mg total) by mouth daily Do not start before August 17, 2023., Disp: 30 tablet, Rfl: 0    fluticasone (FLONASE) 50 mcg/act nasal spray, 1 spray into each nostril daily, Disp: , Rfl:     ipratropium (ATROVENT) 0.06 % nasal spray, 1 SPRAY INTO EACH NOSTRIL IN THE MORNING AND 1 SPRAY BEFORE BEDTIME., Disp: 15 mL, Rfl: 5    LORazepam (ATIVAN) 0.5 mg tablet, Take 1 tablet (0.5 mg total) by mouth every 6 (six) hours as needed for anxiety, Disp: 90 tablet, Rfl: 3    montelukast (SINGULAIR) 10 mg tablet, Take 1 tablet (10 mg total) by mouth daily, Disp: 90 tablet, Rfl: 0    omeprazole (PriLOSEC) 40 MG capsule, TAKE 1 CAPSULE BY MOUTH EVERY DAY, Disp: 90 capsule, Rfl: 0    oxybutynin (DITROPAN) 5 mg tablet, Take 1 tablet (5 mg total) by mouth 3 (three) times a day for 14 days, Disp: 42 tablet, Rfl: 0    oxyCODONE (ROXICODONE) 5 immediate release tablet, Take 1 tablet (5 mg total) by mouth every 8 (eight) hours as needed for moderate pain or severe pain Max Daily Amount: 15 mg, Disp: 10 tablet, Rfl: 0    sotalol (BETAPACE) 80 mg tablet, TAKE 1 TABLET BY MOUTH EVERY 12 HOURS, Disp: 180 tablet, Rfl: 3    tamsulosin (FLOMAX) 0.4 mg, Take 0.4 mg by mouth 2 (two) times a day, Disp: , Rfl:           Whom besides the patient is providing clinical information about today's encounter?   NO ADDITIONAL HISTORIAN (patient alone provided history)    Physical Exam and Assessment/Plan by Diagnosis:    CURETTAGE AND DESICCATION Malignant and Premalignant Lesion    Diagnosis: Squamous cell carcinoma in situ  Prior biopsy: Yes  Access Number: [id number]  Location right frontal scalp  Size preop 7 mm  Size postop 9mm    Additional History of Present Condition:  present for the past months.    Assessment and  Plan:  Based on a thorough discussion of this condition and the management approach to it (including a comprehensive discussion of the known risks, side effects and potential benefits of treatment), the patient (family) agrees to treat the above described lesion with desiccation and curettage.    Procedure:  The area was cleanly  prepped in usual manner  Anesthesia:1% xylocaine with epi    The above described lesion was aggressively curetted to mid dermis followed by desiccation  Number of cycles of desiccation and curettage 3  A clean dry dressing was placed on site. Oral and written postop care instructions were discussed and reviewed      DISCUSSION OF TREATMENT AND POSTOP CARE FOR PATIENT    What is curettage and desiccation?  Curettage and desiccation is a type of electrosurgery in which a skin lesion is scraped off and heat is applied to the skin surface.    What is involved in curettage and cautery?  Your doctor will explain to you why your skin lesion needs treatment and the procedure involved. You may have to sign a consent form to indicate that you consent to the surgical procedure. Tell your doctor if you are taking any medication, or if you have any allergies or medical conditions.  The doctor will inject some local anaesthetic into the area surrounding the lesion to be treated. This will make the skin go numb so no pain should be felt during the procedure. You may feel a pushing sensation but this should not be painful. The skin lesion is scraped off with a curette, which is like a small spoon with very sharp edges.  The lesion should be sent to a pathology laboratory for analysis. The wound surface is then cauterised with a hot wire beaded tip or electrosurgical unit (diathermy). This stops bleeding and helps destroys any remaining skin tumour cells.  This procedure is usually repeated twice for malignant skin lesions (serial curettage and cautery).  A dressing may be applied and instructions should be  given on how to care for your wound.    What types of skin lesions can be treated by curettage?  Curettage is suitable to treat lesions where the material being scraped off is softer than the surrounding skin or when there is a natural cleavage plane between the lesion and the surrounding normal tissue. The following are sometimes treated by curettage:  Squamous cell carcinoma in situ   Actinic keratoses   Basal cell carcinomas that are large, deep or recurrent are usually not suitable for curettage. Lesions with poorly defined edges are also generally unsuitable.Curettage and desiccation is most often used in more superficial type basal cell carcinoma.    Will I have a scar?  Curettage often results in some sort of scar especially if accompanied by cautery.   The scars from curettage are usually flat and round. They are a similar size to that of the original skin lesion. Some people have an abnormal response to skin healing and these people may get larger scars than usual (keloids and hypertrophic scarring).    How do I look after the wound following skin curettage?  The wound may be tender when the local anaesthetic wears off.  Leave the dressing in place till the nest day. Avoid strenuous exertion and stretching of the area.   If there is any bleeding, press on the wound firmly with a folded towel without looking at it for 30 minutes. If it is still bleeding after this time, seek medical attention.  Follow instructions a written in our consent ,  The wound from curettage will take approximately 2-3 weeks to heal over. The scar will initially be red and raised but usually reduces in colour and size over several months.      Scribe Attestation      I,:  Kristyn Tan am acting as a scribe while in the presence of the attending physician.:       I,:  Alphonso Marcus MD personally performed the services described in this documentation    as scribed in my presence.:

## 2023-12-28 NOTE — PATIENT INSTRUCTIONS
DISCUSSION OF TREATMENT AND POSTOP CARE FOR PATIENT    What is curettage and desiccation?  Curettage and desiccation is a type of electrosurgery in which a skin lesion is scraped off and heat is applied to the skin surface.    What is involved in curettage and cautery?  Your doctor will explain to you why your skin lesion needs treatment and the procedure involved. You may have to sign a consent form to indicate that you consent to the surgical procedure. Tell your doctor if you are taking any medication, or if you have any allergies or medical conditions.  The doctor will inject some local anaesthetic into the area surrounding the lesion to be treated. This will make the skin go numb so no pain should be felt during the procedure. You may feel a pushing sensation but this should not be painful. The skin lesion is scraped off with a curette, which is like a small spoon with very sharp edges.  The lesion should be sent to a pathology laboratory for analysis. The wound surface is then cauterised with a hot wire beaded tip or electrosurgical unit (diathermy). This stops bleeding and helps destroys any remaining skin tumour cells.  This procedure is usually repeated twice for malignant skin lesions (serial curettage and cautery).  A dressing may be applied and instructions should be given on how to care for your wound.    What types of skin lesions can be treated by curettage?  Curettage is suitable to treat lesions where the material being scraped off is softer than the surrounding skin or when there is a natural cleavage plane between the lesion and the surrounding normal tissue. The following are sometimes treated by curettage:  Squamous cell carcinoma in situ   Actinic keratoses   Basal cell carcinomas that are large, deep or recurrent are usually not suitable for curettage. Lesions with poorly defined edges are also generally unsuitable.Curettage and desiccation is most often used in more superficial type basal  cell carcinoma.    Will I have a scar?  Curettage often results in some sort of scar especially if accompanied by cautery.   The scars from curettage are usually flat and round. They are a similar size to that of the original skin lesion. Some people have an abnormal response to skin healing and these people may get larger scars than usual (keloids and hypertrophic scarring).    How do I look after the wound following skin curettage?  The wound may be tender when the local anaesthetic wears off.  Leave the dressing in place till the nest day. Avoid strenuous exertion and stretching of the area.   If there is any bleeding, press on the wound firmly with a folded towel without looking at it for 30 minutes. If it is still bleeding after this time, seek medical attention.  Follow instructions a written in our consent ,  The wound from curettage will take approximately 2-3 weeks to heal over. The scar will initially be red and raised but usually reduces in colour and size over several months.

## 2024-01-04 DIAGNOSIS — K21.9 GASTROESOPHAGEAL REFLUX DISEASE: ICD-10-CM

## 2024-01-04 DIAGNOSIS — F41.1 ANXIETY, GENERALIZED: ICD-10-CM

## 2024-01-05 RX ORDER — CITALOPRAM 20 MG/1
TABLET ORAL
Qty: 90 TABLET | Refills: 5 | Status: SHIPPED | OUTPATIENT
Start: 2024-01-05

## 2024-01-05 RX ORDER — OMEPRAZOLE 40 MG/1
CAPSULE, DELAYED RELEASE ORAL
Qty: 90 CAPSULE | Refills: 0 | Status: SHIPPED | OUTPATIENT
Start: 2024-01-05

## 2024-01-24 DIAGNOSIS — E78.5 HYPERLIPIDEMIA, UNSPECIFIED HYPERLIPIDEMIA TYPE: ICD-10-CM

## 2024-01-24 RX ORDER — ATORVASTATIN CALCIUM 20 MG/1
TABLET, FILM COATED ORAL
Qty: 90 TABLET | Refills: 1 | Status: SHIPPED | OUTPATIENT
Start: 2024-01-24

## 2024-02-21 PROBLEM — Z00.00 HEALTH MAINTENANCE EXAMINATION: Status: RESOLVED | Noted: 2019-08-06 | Resolved: 2024-02-21

## 2024-03-07 ENCOUNTER — TELEPHONE (OUTPATIENT)
Age: 69
End: 2024-03-07

## 2024-03-07 NOTE — TELEPHONE ENCOUNTER
03/07/24  Screened by: Betzy Roa    Referring Provider     Pre- Screening:     There is no height or weight on file to calculate BMI. 27.37   Has patient been referred for a routine screening Colonoscopy?  YES   Is the patient between 45-75 years old? YES       Previous Colonoscopy yes   If yes:    Date:  3 YR RECALL    Facility:     Reason:       Does the patient want to see a Gastroenterologist prior to their procedure OR are they having any GI symptoms? no    Has the patient been hospitalized or had abdominal surgery in the past 6 months? no    Does the patient use supplemental oxygen? no    Does the patient take Coumadin, Lovenox, Plavix, Elliquis, Xarelto, or other blood thinning medication? no    Has the patient had a stroke, cardiac event, or stent placed in the past year? no      If patient is between 45yrs - 49yrs, please advise patient that we will have to confirm benefits & coverage with their insurance company for a routine screening colonoscopy.

## 2024-03-18 ENCOUNTER — REMOTE DEVICE CLINIC VISIT (OUTPATIENT)
Dept: CARDIOLOGY CLINIC | Facility: CLINIC | Age: 69
End: 2024-03-18
Payer: MEDICARE

## 2024-03-18 DIAGNOSIS — Z95.0 PRESENCE OF PERMANENT CARDIAC PACEMAKER: Primary | ICD-10-CM

## 2024-03-18 PROCEDURE — 93296 REM INTERROG EVL PM/IDS: CPT | Performed by: INTERNAL MEDICINE

## 2024-03-18 PROCEDURE — 93294 REM INTERROG EVL PM/LDLS PM: CPT | Performed by: INTERNAL MEDICINE

## 2024-03-18 NOTE — PROGRESS NOTES
MDT DC PM/ACTIVE SYSTEM IS MRI CONDITIONAL  CARELINK TRANSMISSION:  BATTERY VOLTAGE ADEQUATE (11.5 YR.).  AP 56.9% -LPF 2.0% (DDD 60 PPM, -350 MS).  ALL LEAD PARAMETERS WITHIN NORMAL LIMITS.  NO SIGNIFICANT HIGH RATE EPISODES.  NORMAL DEVICE FUNCTION.  RG

## 2024-03-22 ENCOUNTER — CONSULT (OUTPATIENT)
Dept: GASTROENTEROLOGY | Facility: CLINIC | Age: 69
End: 2024-03-22
Payer: MEDICARE

## 2024-03-22 VITALS
TEMPERATURE: 97.8 F | HEIGHT: 68 IN | OXYGEN SATURATION: 98 % | HEART RATE: 91 BPM | DIASTOLIC BLOOD PRESSURE: 74 MMHG | SYSTOLIC BLOOD PRESSURE: 110 MMHG | WEIGHT: 192 LBS | BODY MASS INDEX: 29.1 KG/M2

## 2024-03-22 DIAGNOSIS — K59.01 SLOW TRANSIT CONSTIPATION: ICD-10-CM

## 2024-03-22 DIAGNOSIS — Z86.010 HISTORY OF COLON POLYPS: Primary | ICD-10-CM

## 2024-03-22 PROCEDURE — 99203 OFFICE O/P NEW LOW 30 MIN: CPT | Performed by: INTERNAL MEDICINE

## 2024-03-22 NOTE — PATIENT INSTRUCTIONS
Scheduled date of colonoscopy (as of today):5/16/24  Physician performing colonoscopy:Brionna  Location of colonoscopy:[place]  Bowel prep reviewed with patient:miralax/dulcolax  Instructions reviewed with patient by:Kendra GOLD  Clearances:  none

## 2024-03-22 NOTE — PROGRESS NOTES
[facility] - Outpatient Consultation  Jerry Funk 68 y.o. male MRN: [id number]  Encounter: [id number]          ASSESSMENT AND PLAN:      1. History of colon polyps  - Colonoscopy; Future    2. Slow transit constipation  -Continue daily    ______________________________________________________________________    HPI: Jerry is a 68-year-old male today to schedule a routine surveillance colonoscopy.  He underwent colon screening on February 4, 2021 which showed 4 polyps.  1 of these polyps was a pedunculated tubulovillous adenoma that was larger than 1 cm in size.  2 of the polyps were adenomas.  He states that he did have a problem with the bowel prep but seizure or with the actual procedure.  Hard time drinking the bowel prep which consisted of MiraLAX and Gatorade but also mentioned that he felt like he drank it fairly quickly.  There is no family history of colon cancer or colon polyp.  He does admit to constipation problem for which she takes MiraLAX on a daily basis.  He denies any heartburn but states that his heartburn is controlled by taking omeprazole 40 mg every morning.  He denies any nausea or vomiting.  There has been no rectal bleeding or melena or abdominal pain, bloating, gaseousness.      REVIEW OF SYSTEMS:    CONSTITUTIONAL: Denies any fever, chills, rigors, and weight loss.  HEENT: No earache or tinnitus. Denies hearing loss or visual disturbances.  CARDIOVASCULAR: No chest pain or palpitations.   RESPIRATORY: Denies any cough, hemoptysis, shortness of breath or dyspnea on exertion.  GASTROINTESTINAL: As noted in the History of Present Illness.   GENITOURINARY: No problems with urination. Denies any hematuria or dysuria.  NEUROLOGIC: No dizziness or vertigo, denies headaches.   MUSCULOSKELETAL: Denies any muscle or joint pain.   SKIN: Denies skin rashes or itching.   ENDOCRINE: Denies excessive thirst. Denies intolerance to heat or cold.  PSYCHOSOCIAL: Denies  depression or anxiety. Denies any recent memory loss.       Historical Information   Past Medical History:   Diagnosis Date    Asthma     allergy induced    Basal cell carcinoma     Colon polyp     Fatty liver     H/O nonmelanoma skin cancer     last assessed-8/22/2017    Hyperlipidemia     Inflamed seborrheic keratosis     Malignant neoplasm of skin     last assessed-1/21/2014    Neoplasm of uncertain behavior of skin     Nocturia     Pneumothorax, left     Seasonal allergies     Sebaceous cyst     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/01/2022    SCCIS- mid scalp    Squamous cell carcinoma of skin of neck     Testicular hypofunction     Viral warts      Past Surgical History:   Procedure Laterality Date    CARDIAC CATHETERIZATION      CARDIAC PACEMAKER PLACEMENT  05/05/2021    CARDIAC PACEMAKER PLACEMENT      CARDIOVASCULAR STRESS TEST  08/27/2010    COLONOSCOPY  10/08/2008    IR OTHER  04/15/2021    LUNG SURGERY      for pneumothorax    MOHS SURGERY  02/15/2022    SCCIS mid scalp- Dr Leigh    PARTIAL HIP ARTHROPLASTY      RHINOPLASTY       Social History   Social History     Substance and Sexual Activity   Alcohol Use Not Currently    Comment: quit march 2020     Social History     Substance and Sexual Activity   Drug Use No     Social History     Tobacco Use   Smoking Status Never   Smokeless Tobacco Never     Family History   Problem Relation Age of Onset    Colon cancer Father        Meds/Allergies       Current Outpatient Medications:     aspirin 81 MG tablet    atorvastatin (LIPITOR) 20 mg tablet    citalopram (CeleXA) 20 mg tablet    finasteride (PROSCAR) 5 mg tablet    fluticasone (FLONASE) 50 mcg/act nasal spray    ipratropium (ATROVENT) 0.06 % nasal spray    LORazepam (ATIVAN) 0.5 mg tablet    montelukast (SINGULAIR) 10 mg tablet    omeprazole (PriLOSEC) 40 MG capsule    sotalol (BETAPACE) 80 mg tablet    tamsulosin (FLOMAX) 0.4 mg    fluticasone (Arnuity Ellipta) 100 MCG/ACT AEPB inhaler    Allergies  "  Allergen Reactions    Dust Mite Extract     Molds & Smuts     Pollen Extract     Short Ragweed Pollen Ext     Tree Extract            Objective     Blood pressure 110/74, pulse 91, temperature 97.8 °F (36.6 °C), height 5' 8\" (1.727 m), weight 87.1 kg (192 lb), SpO2 98%. Body mass index is 29.19 kg/m².        PHYSICAL EXAM:      General Appearance:   Alert, cooperative, no distress   HEENT:   Normocephalic, atraumatic, anicteric.     Neck:  Supple, symmetrical, trachea midline   Lungs:   Clear to auscultation bilaterally; no rales, rhonchi or wheezing; respirations unlabored    Heart::   Regular rate and rhythm; no murmur, rub, or gallop.   Abdomen:   Soft, non-tender, non-distended; normal bowel sounds; no masses, no organomegaly    Genitalia:   Deferred    Rectal:   Deferred    Extremities:  No cyanosis, clubbing or edema    Pulses:  2+ and symmetric    Skin:  No jaundice, rashes, or lesions    Lymph nodes:  No palpable cervical lymphadenopathy        Lab Results:   No visits with results within 1 Day(s) from this visit.   Latest known visit with results is:   Office Visit on 12/05/2023   Component Date Value    Case Report 12/05/2023                      Value:Surgical Pathology Report                         Case: [id number]                                   Authorizing Provider:  Alphonso Marcus MD          Collected:           12/05/2023 0855              Ordering Location:     [facility]      Received:            12/05/2023 0855                                     [place]                                                                       Pathologist:           Brent Acosta MD                                                           Specimen:    Skin, Other, specimen A; right frontal scalp                                               Final Diagnosis 12/05/2023                      Value:This result contains rich text formatting which cannot be displayed here.    Additional Information " 12/05/2023                      Value:This result contains rich text formatting which cannot be displayed here.    Gross Description 12/05/2023                      Value:This result contains rich text formatting which cannot be displayed here.    Clinical Information 12/05/2023                      Value:Specimen A; skin; right frontal scalp; shave biopsy; 68 year old male with a suspicious lesion; rule out squamous cell carcinoma.     Attn: derm path         Radiology Results:   Cardiac EP device report    Result Date: 3/18/2024  Narrative: MDT DC PM/ACTIVE SYSTEM IS MRI CONDITIONAL CARELINK TRANSMISSION:  BATTERY VOLTAGE ADEQUATE (11.5 YR.).  AP 56.9% -LPF 2.0% (DDD 60 PPM, -350 MS).  ALL LEAD PARAMETERS WITHIN NORMAL LIMITS.  NO SIGNIFICANT HIGH RATE EPISODES.  NORMAL DEVICE FUNCTION.  RG     Answers submitted by the patient for this visit:  Abdominal Pain Questionnaire (Submitted on 3/21/2024)  Chief Complaint: Abdominal pain  Chronicity: recurrent  Onset: more than 1 year ago  Onset quality: undetermined  Frequency: intermittently  anorexia: No  arthralgias: Yes  belching: No  constipation: Yes  diarrhea: No  dysuria: No  fever: No  flatus: Yes  frequency: Yes  headaches: No  hematochezia: No  hematuria: Yes  melena: No  myalgias: Yes  nausea: No  weight loss: No  vomiting: No

## 2024-04-04 DIAGNOSIS — K21.9 GASTROESOPHAGEAL REFLUX DISEASE: ICD-10-CM

## 2024-04-04 RX ORDER — OMEPRAZOLE 40 MG/1
CAPSULE, DELAYED RELEASE ORAL
Qty: 90 CAPSULE | Refills: 0 | Status: SHIPPED | OUTPATIENT
Start: 2024-04-04

## 2024-04-19 ENCOUNTER — APPOINTMENT (OUTPATIENT)
Age: 69
End: 2024-04-19
Payer: MEDICARE

## 2024-04-19 DIAGNOSIS — R97.20 ELEVATED PROSTATE SPECIFIC ANTIGEN (PSA): ICD-10-CM

## 2024-04-19 LAB — PSA SERPL-MCNC: 2.35 NG/ML (ref 0–4)

## 2024-04-19 PROCEDURE — 36415 COLL VENOUS BLD VENIPUNCTURE: CPT

## 2024-04-19 PROCEDURE — 84153 ASSAY OF PSA TOTAL: CPT

## 2024-04-24 ENCOUNTER — OFFICE VISIT (OUTPATIENT)
Dept: CARDIOLOGY CLINIC | Facility: CLINIC | Age: 69
End: 2024-04-24
Payer: MEDICARE

## 2024-04-24 VITALS
BODY MASS INDEX: 29.1 KG/M2 | OXYGEN SATURATION: 95 % | HEART RATE: 69 BPM | HEIGHT: 68 IN | SYSTOLIC BLOOD PRESSURE: 102 MMHG | WEIGHT: 192 LBS | DIASTOLIC BLOOD PRESSURE: 68 MMHG | RESPIRATION RATE: 16 BRPM

## 2024-04-24 DIAGNOSIS — Z01.810 ENCOUNTER FOR PREPROCEDURAL CARDIOVASCULAR EXAMINATION: Primary | ICD-10-CM

## 2024-04-24 PROCEDURE — 93000 ELECTROCARDIOGRAM COMPLETE: CPT | Performed by: INTERNAL MEDICINE

## 2024-04-24 PROCEDURE — 99214 OFFICE O/P EST MOD 30 MIN: CPT | Performed by: INTERNAL MEDICINE
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
## 2024-04-29 ENCOUNTER — TELEPHONE (OUTPATIENT)
Dept: FAMILY MEDICINE CLINIC | Facility: CLINIC | Age: 69
End: 2024-04-29

## 2024-04-29 DIAGNOSIS — E78.2 MIXED HYPERLIPIDEMIA: Primary | ICD-10-CM

## 2024-04-29 NOTE — TELEPHONE ENCOUNTER
Patient scheduled awv for 7/1 - would you like for him to have any blood work done before appt?   
depressed/hopeless/sad/withdrawn

## 2024-05-16 ENCOUNTER — ANESTHESIA (OUTPATIENT)
Dept: GASTROENTEROLOGY | Facility: HOSPITAL | Age: 69
End: 2024-05-16

## 2024-05-16 ENCOUNTER — ANESTHESIA EVENT (OUTPATIENT)
Dept: GASTROENTEROLOGY | Facility: HOSPITAL | Age: 69
End: 2024-05-16

## 2024-05-16 ENCOUNTER — HOSPITAL ENCOUNTER (OUTPATIENT)
Dept: GASTROENTEROLOGY | Facility: HOSPITAL | Age: 69
Setting detail: OUTPATIENT SURGERY
End: 2024-05-16
Attending: INTERNAL MEDICINE
Payer: MEDICARE

## 2024-05-16 VITALS
HEIGHT: 68 IN | HEART RATE: 70 BPM | TEMPERATURE: 98 F | DIASTOLIC BLOOD PRESSURE: 51 MMHG | RESPIRATION RATE: 19 BRPM | SYSTOLIC BLOOD PRESSURE: 112 MMHG | OXYGEN SATURATION: 96 % | WEIGHT: 180 LBS | BODY MASS INDEX: 27.28 KG/M2

## 2024-05-16 DIAGNOSIS — Z86.010 HISTORY OF COLON POLYPS: ICD-10-CM

## 2024-05-16 PROCEDURE — 45385 COLONOSCOPY W/LESION REMOVAL: CPT | Performed by: INTERNAL MEDICINE

## 2024-05-16 PROCEDURE — 88305 TISSUE EXAM BY PATHOLOGIST: CPT | Performed by: PATHOLOGY

## 2024-05-16 RX ORDER — LIDOCAINE HYDROCHLORIDE 20 MG/ML
INJECTION, SOLUTION EPIDURAL; INFILTRATION; INTRACAUDAL; PERINEURAL AS NEEDED
Status: DISCONTINUED | OUTPATIENT
Start: 2024-05-16 | End: 2024-05-16

## 2024-05-16 RX ORDER — PROPOFOL 10 MG/ML
INJECTION, EMULSION INTRAVENOUS AS NEEDED
Status: DISCONTINUED | OUTPATIENT
Start: 2024-05-16 | End: 2024-05-16

## 2024-05-16 RX ORDER — PHENYLEPHRINE HCL IN 0.9% NACL 1 MG/10 ML
SYRINGE (ML) INTRAVENOUS AS NEEDED
Status: DISCONTINUED | OUTPATIENT
Start: 2024-05-16 | End: 2024-05-16

## 2024-05-16 RX ORDER — SODIUM CHLORIDE, SODIUM LACTATE, POTASSIUM CHLORIDE, CALCIUM CHLORIDE 600; 310; 30; 20 MG/100ML; MG/100ML; MG/100ML; MG/100ML
75 INJECTION, SOLUTION INTRAVENOUS CONTINUOUS
Status: DISCONTINUED | OUTPATIENT
Start: 2024-05-16 | End: 2024-05-20 | Stop reason: HOSPADM

## 2024-05-16 RX ADMIN — PROPOFOL 35 MG: 10 INJECTION, EMULSION INTRAVENOUS at 12:38

## 2024-05-16 RX ADMIN — PROPOFOL 35 MG: 10 INJECTION, EMULSION INTRAVENOUS at 12:34

## 2024-05-16 RX ADMIN — PROPOFOL 35 MG: 10 INJECTION, EMULSION INTRAVENOUS at 12:25

## 2024-05-16 RX ADMIN — PROPOFOL 75 MG: 10 INJECTION, EMULSION INTRAVENOUS at 12:22

## 2024-05-16 RX ADMIN — PROPOFOL 35 MG: 10 INJECTION, EMULSION INTRAVENOUS at 12:31

## 2024-05-16 RX ADMIN — PROPOFOL 35 MG: 10 INJECTION, EMULSION INTRAVENOUS at 12:41

## 2024-05-16 RX ADMIN — PROPOFOL 50 MG: 10 INJECTION, EMULSION INTRAVENOUS at 12:21

## 2024-05-16 RX ADMIN — SODIUM CHLORIDE, SODIUM LACTATE, POTASSIUM CHLORIDE, AND CALCIUM CHLORIDE 75 ML/HR: .6; .31; .03; .02 INJECTION, SOLUTION INTRAVENOUS at 11:46

## 2024-05-16 RX ADMIN — Medication 100 MCG: at 12:42

## 2024-05-16 RX ADMIN — PROPOFOL 35 MG: 10 INJECTION, EMULSION INTRAVENOUS at 12:28

## 2024-05-16 RX ADMIN — LIDOCAINE HYDROCHLORIDE 50 MG: 20 INJECTION, SOLUTION EPIDURAL; INFILTRATION; INTRACAUDAL; PERINEURAL at 12:20

## 2024-05-16 NOTE — ANESTHESIA PREPROCEDURE EVALUATION
Procedure:  COLONOSCOPY    Relevant Problems   CARDIO   (+) Aneurysm of ascending aorta without rupture (HCC)   (+) Bifascicular block   (+) Hyperlipidemia   (+) Sick sinus syndrome (HCC)   (+) 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HCC)      GI/HEPATIC   (+) Acid reflux disease   (+) Pancreatic cyst      /RENAL   (+) Benign prostatic hyperplasia      NEURO/PSYCH   (+) Anxiety, generalized      PULMONARY   (+) Asthma        Physical Exam    Airway    Mallampati score: I  TM Distance: >3 FB  Neck ROM: full     Dental       Cardiovascular  Cardiovascular exam normal    Pulmonary  Pulmonary exam normal     Other Findings        Anesthesia Plan  ASA Score- 3     Anesthesia Type- IV sedation with anesthesia with ASA Monitors.         Additional Monitors:     Airway Plan:            Plan Factors-Exercise tolerance (METS): >4 METS.    Chart reviewed. EKG reviewed. Imaging results reviewed. Existing labs reviewed. Patient summary reviewed.                  Induction- intravenous.    Postoperative Plan- Plan for postoperative opioid use. Planned trial extubation        Informed Consent- Anesthetic plan and risks discussed with patient.  I personally reviewed this patient with the CRNA. Discussed and agreed on the Anesthesia Plan with the CRNA..

## 2024-05-16 NOTE — ANESTHESIA POSTPROCEDURE EVALUATION
Post-Op Assessment Note    CV Status:  Stable  Pain Score: 0    Pain management: adequate       Mental Status:  Alert and awake   Hydration Status:  Euvolemic   PONV Controlled:  Controlled   Airway Patency:  Patent     Post Op Vitals Reviewed: Yes    No anethesia notable event occurred.    Staff: CRNA               BP   122/62   Temp 97.6   Pulse 67   Resp 17   SpO2 96

## 2024-05-16 NOTE — H&P
History and Physical -  [facility]  Jerry Funk 68 y.o. male MRN: [id number]      HPI: Jerry Funk is a 68 y.o. year old male who presents for history of colon polyps and constipation      REVIEW OF SYSTEMS: Per the HPI, and otherwise unremarkable.    Historical Information   Past Medical History:   Diagnosis Date    Asthma     allergy induced    Basal cell carcinoma     Colon polyp     Fatty liver     H/O nonmelanoma skin cancer     last assessed-8/22/2017    Hyperlipidemia     Inflamed seborrheic keratosis     Malignant neoplasm of skin     last assessed-1/21/2014    Neoplasm of uncertain behavior of skin     Nocturia     Pneumothorax, left     Seasonal allergies     Sebaceous cyst     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/01/2022    SCCIS- mid scalp    Squamous cell carcinoma of skin of neck     Testicular hypofunction     Viral warts      Past Surgical History:   Procedure Laterality Date    CARDIAC CATHETERIZATION      CARDIAC PACEMAKER PLACEMENT  05/05/2021    CARDIAC PACEMAKER PLACEMENT      CARDIOVASCULAR STRESS TEST  08/27/2010    COLONOSCOPY  10/08/2008    IR OTHER  04/15/2021    LUNG SURGERY      for pneumothorax    MOHS SURGERY  02/15/2022    SCCIS mid scalp- Dr Leigh    PARTIAL HIP ARTHROPLASTY      RHINOPLASTY       Social History   Social History     Substance and Sexual Activity   Alcohol Use Not Currently    Comment: quit march 2020     Social History     Substance and Sexual Activity   Drug Use No     Social History     Tobacco Use   Smoking Status Never   Smokeless Tobacco Never     Family History   Problem Relation Age of Onset    Colon cancer Father        Meds/Allergies     Not in a hospital admission.    Allergies   Allergen Reactions    Dust Mite Extract     Molds & Smuts     Pollen Extract     Short Ragweed Pollen Ext     Tree Extract        Objective     Blood pressure 126/89, pulse 69, temperature (!) 97.2 °F (36.2 °C), temperature source Temporal, resp. rate  "16, height 5' 8\" (1.727 m), weight 81.6 kg (180 lb), SpO2 99%.      PHYSICAL EXAM    Gen: NAD  CV: RRR  CHEST: Clear  ABD: soft, NT/ND  EXT: no edema      ASSESSMENT/PLAN:  This is a 68 y.o. year old male here for colonoscopy, and he is stable and optimized for his procedure.          "

## 2024-05-20 PROCEDURE — 88305 TISSUE EXAM BY PATHOLOGIST: CPT | Performed by: PATHOLOGY

## 2024-05-29 ENCOUNTER — OFFICE VISIT (OUTPATIENT)
Age: 69
End: 2024-05-29
Payer: MEDICARE

## 2024-05-29 VITALS — BODY MASS INDEX: 27.28 KG/M2 | WEIGHT: 180 LBS | HEIGHT: 68 IN | TEMPERATURE: 98 F

## 2024-05-29 DIAGNOSIS — D18.01 CHERRY ANGIOMA: ICD-10-CM

## 2024-05-29 DIAGNOSIS — Z13.89 SCREENING FOR SKIN CONDITION: Primary | ICD-10-CM

## 2024-05-29 DIAGNOSIS — Z85.828 HISTORY OF SKIN CANCER: ICD-10-CM

## 2024-05-29 DIAGNOSIS — L57.0 KERATOSIS, ACTINIC: ICD-10-CM

## 2024-05-29 DIAGNOSIS — L82.1 SEBORRHEIC KERATOSIS: ICD-10-CM

## 2024-05-29 DIAGNOSIS — D22.9 MULTIPLE NEVI: ICD-10-CM

## 2024-05-29 PROCEDURE — 99214 OFFICE O/P EST MOD 30 MIN: CPT | Performed by: DERMATOLOGY

## 2024-05-29 PROCEDURE — 17000 DESTRUCT PREMALG LESION: CPT | Performed by: DERMATOLOGY

## 2024-05-29 PROCEDURE — 17003 DESTRUCT PREMALG LES 2-14: CPT | Performed by: DERMATOLOGY

## 2024-05-29 NOTE — PATIENT INSTRUCTIONS
"Treatment with Cryotherapy    The doctor has treated your skin with nitrogen, which is 320 degrees Fahrenheit below zero.  He has given the treated area \"frostbite.\"    Stinging should subside within a few hours.  You can take Tylenol for pain, if needed.    Over the next few days, it is normal if the area becomes reddened, a blood blister, or swollen with fluid.  If the lesion treated was near the eye - you could get a swollen eye over the next few days.  Do not panic!  This is all temporary, and will resolve with time.    There is no special treatment - just keep the area clean.  Makeup and BandAids can be used, if preferred.    When the area starts to dry up and peel off, using Vaseline can help healing.    It usually takes up to a month for it to heal.  Some lesions are recurrent and may require repeat treatments.  If a lesion has not healed in one month, please don't hesitate to contact us.      If you have any further questions that are not answered here, please call us.  [phone].    Thank you for allowing us to care for you.    ACTINIC KERATOSIS    Actinic keratoses are very common on sites repeatedly exposed to the sun, especially the backs of the hands and the face, most often affecting the ears, nose, cheeks, upper lip, vermilion of the lower lip, temples, forehead and balding scalp. In severely chronically sun-damaged individuals, they may also be found on the upper trunk, upper and lower limbs, and dorsum of feet.    We discussed the theoretical premalignant (“pre-cancerous”) nature and etiology of these growths.  We discussed the prevailing notion that actinic keratoses are a reflection of abnormal skin cell development due to DNA damage by short wavelength UVB.  They are more likely to appear if the immune function is poor, due to aging, recent sun exposure, predisposing disease or certain drugs.    We discussed that the main concern is that actinic keratoses may predispose to squamous cell " carcinoma. It is rare for a solitary actinic keratosis to evolve to squamous cell carcinoma (SCC), but the risk of SCC occurring at some stage in a patient with more than 10 actinic keratoses is thought to be about 10 to 15%. A tender, thickened, ulcerated or enlarging actinic keratosis is suspicious of SCC.    Actinic keratoses may be prevented by strict sun protection. If already present, keratoses may improve with a very high sun protection factor (50+) broad-spectrum sunscreen applied at least daily to affected areas, year-round.  We recommend that UPF-rated clothing and hats and sunglasses be worn whenever possible and that a sunscreen-moisturizer combination product such as Neutrogena Daily Defense be applied at least three times a day.    We performed a thorough discussion of treatment options and specific risk/benefits/alternatives including but not limited to medical “field” treatment with medications such as the following:    Topical “field area” medications such as 5-fluorouracil or Aldara (specifically, the trouble with long-term compliance, blistering and local skin reaction versus the convenience of at-home therapy and that field therapy “gets what is not yet seen”).    Cryotherapy (specifically, local pain, scarring, dyspigmentation, blistering, possible superinfection, and treats “only what we see” versus directed treatment today).    Photodynamic therapy (specifically, local pain, scarring, dyspigmentation, blistering, possible superinfection, need to schedule for a later date, and time spent in the office versus field therapy that “gets what is not yet seen”).      BASAL CELL CARCINOMA    What is basal cell carcinoma?  Basal cell carcinoma (BCC) is a common, locally invasive, keratinocytic, or non-melanoma, skin cancer. It is also known as rodent ulcer and basalioma. Patients with BCC often develop multiple primary tumours over time.    Who gets basal cell carcinoma?  Risk factors for BCC  include:  Age and sex: BCCs are particularly prevalent in elderly males. However, they also affect females and younger adults   Previous BCC or other form of skin cancer (squamous cell carcinoma, melanoma)   Sun damage (photoaging, actinic keratoses)   Repeated prior episodes of sunburn   Fair skin, blue eyes and blond or red hair--note; BCC can also affect darker skin types   Previous cutaneous injury, thermal burn, disease (eg cutaneous lupus, sebaceous naevus)   Inherited syndromes: BCC is a particular problem for families with basal cell naevus syndrome (Gorlin syndrome), Dvnlg-Iwmsé-Yqzhognp syndrome, Rombo syndrome, Oley syndrome and xeroderma pigmentosum   Other risk factors include ionising radiation, exposure to arsenic, and immune suppression due to disease or medicines    What causes basal cell carcinoma?  The cause of BCC is multifactorial.  Most often, there are DNA mutations in the patched (PTCH) tumour suppressor gene, part of hedgehog signaling pathway   These may be triggered by exposure to ultraviolet radiation   Various spontaneous and inherited gene defects predispose to BCC    What are the clinical features of basal cell carcinoma?  BCC is a locally invasive skin tumour. The main characteristics are:  Slowly growing plaque or nodule   Skin coloured, pink or pigmented   Varies in size from a few millimetres to several centimetres in diameter   Spontaneous bleeding or ulceration  BCC is very rarely a threat to life. A tiny proportion of BCCs grow rapidly, invade deeply, and/or metastasise to local lymph nodes.    Types of basal cell carcinoma  There are several distinct clinical types of BCC, and over 20 histological growth patterns of BCC.  Nodular BCC  Most common type of facial BCC   Shiny or pearly nodule with a smooth surface   May have central depression or ulceration, so its edges appear rolled   Blood vessels cross its surface   Cystic variant is soft, with jelly-like contents    Micronodular, microcystic and infiltrative types are potentially aggressive subtypes   Also known as nodulocystic carcinoma  Superficial BCC  Most common type in younger adults   Most common type on upper trunk and shoulders   Slightly scaly, irregular plaque   Thin, translucent rolled border   Multiple microerosions  Morphoeaform BCC  Usually found in mid-facial sites   Waxy, scar-like plaque with indistinct borders   Wide and deep subclinical extension   May infiltrate cutaneous nerves (perineural spread)   Also known as morpheic, morphoeiform or sclerosing BCC  Basosquamous carcinoma  Mixed basal cell carcinoma (BCC) and squamous cell carcinoma (SCC)   Infiltrative growth pattern   Potentially more aggressive than other forms of BCC   Also known as basisquamous carcinoma and mixed basal-squamous cell carcinoma       Complications of basal cell carcinoma    Recurrent BCC  Recurrence of BCC after initial treatment is not uncommon. Characteristics of recurrent BCC often include:  Incomplete excision or narrow margins at primary excision   Morphoeic, micronodular, and infiltrative subtypes   Location on head and neck    Advanced BCC  Advanced BCCs are large, often neglected tumours.  They may be several centimetres in diameter   They may be deeply infiltrating into tissues below the skin   They are difficult or impossible to treat surgically    Metastatic BCC  Very rare   Primary tumour is often large, neglected or recurrent, located on head and neck, with aggressive subtype   May have had multiple prior treatments   May arise in site exposed to ionising radiation   Can be fatal    How is basal cell carcinoma diagnosed?  BCC is diagnosed clinically by the presence of a slowly enlarging skin lesion with typical appearance. The diagnosis and  by a diagnostic biopsy or following excision.  Some typical superficial BCCs on trunk and limbs are clinically diagnosed and have non-surgical treatment without  histology.    What is the treatment for primary basal cell carcinoma?  The treatment for a BCC depends on its type, size and location, the number to be treated, patient factors, and the preference or expertise of the doctor. Most BCCs are treated surgically. Long-term follow-up is recommended to check for new lesions and recurrence; the latter may be unnecessary if histology has reported wide clear margins.    Excision biopsy  Excision means the lesion is cut out and the skin stitched up.  Most appropriate treatment for nodular, infiltrative and morphoeic BCCs   Should include 3 to 5 mm margin of normal skin around the tumour   Very large lesions may require flap or skin graft to repair the defect   Pathologist will report deep and lateral margins   Further surgery is recommended for lesions that are incompletely excised    Mohs micrographically controlled excision  Mohs micrographically controlled surgery involves examining carefully marked excised tissue under the microscope, layer by layer, to ensure complete excision.  Very high cure rates achieved by trained Mohs surgeons   Used in high-risk areas of the face around eyes, lips and nose   Suitable for ill-defined, morphoeic, infiltrative and recurrent subtypes   Large defects are repaired by flap or skin graft    Superficial skin surgery  Superficial skin surgery comprises shave, curettage, and electrocautery. It is a rapid technique using local anaesthesia and does not require sutures.  Suitable for small, well-defined nodular or superficial BCCs   Lesions are usually located on trunk or limbs   Wound is left open to heal by secondary intention   Moist wound dressings lead to healing within a few weeks   Eventual scar quality variable    Cryotherapy  Cryotherapy is the treatment of a superficial skin lesion by freezing it, usually with liquid nitrogen.  Suitable for small superficial BCCs on covered areas of trunk and limbs   Best avoided for BCCs on head and  neck, and distal to knees   Double freeze-thaw technique   Results in a blister that crusts over and heals within several weeks.   Leaves permanent white saira    Photodynamic therapy  Photodynamic therapy (PDT) refers to a technique in which BCC is treated with a photosensitising chemical, and exposed to light several hours later.  Topical photosensitisers include aminolevulinic acid lotion and methyl aminolevulinate cream   Suitable for low-risk small, superficial BCCs   Best avoided if tumour in site at high risk of recurrence   Results in inflammatory reaction, maximal 3-4 days after procedure   Treatment repeated 7 days after initial treatment   Excellent cosmetic results    Imiquimod cream  Imiquimod is an immune response modifier.  Best used for superficial BCCs less than 2 cm diameter   Applied three to five times each week, for 6-16 weeks   Results in a variable inflammatory reaction, maximal at three weeks   Minimal scarring is usual    Fluorouracil cream  5-Fluorouracil cream is a topical cytotoxic agent.  Used to treat small superficial basal cell carcinomas   Requires prolonged course, eg twice daily for 6-12 weeks   Causes inflammatory reaction   Has high recurrence rates    Radiotherapy  Radiotherapy or X-ray treatment can be used to treat primary BCCs or as adjunctive treatment if margins are incomplete.  Mainly used if surgery is not suitable   Best avoided in young patients and in genetic conditions predisposing to skin cancer   Best cosmetic results achieved using multiple fractions   Typically, patient attends once-weekly for several weeks   Causes inflammatory reaction followed by scar   Risk of radiodermatitis, late recurrence, and new tumours    What is the treatment for advanced or metastatic basal cell carcinoma?  Locally advanced primary, recurrent or metastatic BCC requires multidisciplinary consultation. Often a combination of treatments is used.  Surgery   Radiotherapy   Targeted  therapy  Targeted therapy refers to the hedgehog signalling pathway inhibitors, vismodegib and sonidegib. These drugs have some important risks and side effects.    How can basal cell carcinoma be prevented?  The most important way to prevent BCC is to avoid sunburn. This is especially important in childhood and early life. Fair skinned individuals and those with a personal or family history of BCC should protect their skin from sun exposure daily, year-round and lifelong.  Stay indoors or under the shade in the middle of the day   Wear covering clothing   Apply high protection factor SPF50+ broad-spectrum sunscreens generously to exposed skin if outdoors   Avoid indoor tanning (sun beds, solaria)  Oral nicotinamide (vitamin B3) in a dose of 500 mg twice daily may reduce the number and severity of BCCs.    What is the outlook for basal cell carcinoma?  Most BCCs are cured by treatment. Cure is most likely if treatment is undertaken when the lesion is small.  About 50% of people with BCC develop a second one within 3 years of the first. They are also at increased risk of other skin cancers, especially melanoma. Regular self-skin examinations and long-term annual skin checks by an experienced health professional are recommended.        SQUAMOUS CELL CARCINOMA    What is cutaneous squamous cell carcinoma?  Cutaneous squamous cell carcinoma (SCC) is a common type of keratinocyte or non-melanoma skin cancer. It is derived from cells within the epidermis that make keratin -- the horny protein that makes up skin, hair and nails.  Cutaneous SCC is an invasive disease, referring to cancer cells that have grown beyond the epidermis. SCC can sometimes metastasise and may prove fatal.  Intraepidermal carcinoma (cutaneous SCC in situ) and mucosal SCC are considered elsewhere.    Who gets cutaneous squamous cell carcinoma?  Risk factors for cutaneous SCC include:  Age and sex: SCCs are particularly prevalent in elderly males.  However, they also affect females and younger adults.   Previous SCC or another form of skin cancer (basal cell carcinoma, melanoma) are a strong predictor for further skin cancers.   Actinic keratoses   Outdoor occupation or recreation   Smoking   Fair skin, blue eyes and blond or red hair   Previous cutaneous injury, thermal burn, disease (eg cutaneous lupus, epidermolysis bullosa, leg ulcer)   Inherited syndromes: SCC is a particular problem for families with xeroderma pigmentosum and albinism   Other risk factors include ionising radiation, exposure to arsenic, and immune suppression due to disease (eg chronic lymphocytic leukaemia) or medicines. Organ transplant recipients have a massively increased risk of developing SCC.    What causes cutaneous squamous cell carcinoma?  More than 90% of cases of SCC are associated with numerous DNA mutations in multiple somatic genes. Mutations in the p53 tumour suppressor gene are caused by exposure to ultraviolet radiation (UV), especially UVB (known as signature 7). Other signature mutations relate to cigarette smoking, ageing and immune suppression (eg, to drugs such as azathioprine). Mutations in signalling pathways affect the epidermal growth factor receptor, JENNA, Fyn, and [id number] signalling.   Beta-genus human papillomaviruses (wart virus) are thought to play a role in SCC arising in immune-suppressed populations. ?-HPV and HPV subtypes 5, 8, 17, 20, 24, and 38 have also been associated with an increased risk of cutaneous SCC in immunocompetent individuals.     What are the clinical features of cutaneous squamous cell carcinoma?  Cutaneous SCCs present as enlarging scaly or crusted lumps. They usually arise within pre-existing actinic keratosis or intraepidermal carcinoma.  They grow over weeks to months   They may ulcerate   They are often tender or painful   Located on sun-exposed sites, particularly the face, lips, ears, hands, forearms and lower legs   Size  varies from a few millimetres to several centimetres in diameter.    Types of cutaneous squamous cell carcinoma  Distinct clinical types of invasive cutaneous SCC include:  Cutaneous horn -- the horn is due to excessive production of keratin   Keratoacanthoma (KA) -- a rapidly growing keratinising nodule that may resolve without treatment   Carcinoma cuniculatum (‘verrucous carcinoma’), a slow-growing, warty tumour on the sole of the foot.   Multiple eruptive SCC/KA-like lesions arising in syndromes, such as multiple self-healing squamous epitheliomas of Holder-Smith and Grzybowski syndrome  The pathologist may classify a tumour as well differentiated, moderately well differentiated, poorly differentiated or anaplastic cutaneous SCC. There are other variants.    Classification of squamous cell carcinoma by risk  Cutaneous SCC is classified as low-risk or high-risk, depending on the chance of tumour recurrence and metastasis. Characteristics of high-risk SCC include:  High-risk cutaneous squamous cell carcinoma has the following characteristics:  Diameter greater than or equal to 2 cm   Location on the ear, vermilion of the lip, central face, hands, feet, genitalia   Arising in elderly or immune suppressed patient   Histological thickness greater than 2 mm, poorly differentiated histology, or with the invasion of the subcutaneous tissue, nerves and blood vessels  Metastatic SCC is found in regional lymph nodes (80%), lungs, liver, brain, bones and skin.    Staging cutaneous squamous cell carcinoma  In 2011, the American Joint Committee on Cancer (AJCC) published a new staging systemic for cutaneous SCC for the 7th Edition of the AJCC manual. This evaluates the dimensions of the original primary tumour (T) and its metastases to lymph nodes (N).    Tumour staging for cutaneous SCC  TX: Th Primary tumour cannot be assessed  T0: No evidence of a primary tumour  Tis: Carcinoma in situ  T1: Tumour ? 2cm without high-risk  features  T2: Tumour ? 2cm; or; Tumour ? 2 cm with high-risk features  T3: Tumour with the invasion of maxilla, mandible, orbit or temporal bone  T4: Tumour with the invasion of axial or appendicular skeleton or perineural invasion of skull base    Sweetie staging for cutaneous SCC  NX: Regional lymph nodes cannot be assessed  N0: No regional lymph node metastasis  N1: Metastasis in one local lymph node ? 3cm  N2: Metastasis in one local lymph node ? 3cm; or; Metastasis in >1 local lymph node ? 6cm  N3: Metastasis in lymph node ? 6cm    How is squamous cell carcinoma diagnosed?  Diagnosis of cutaneous SCC is based on clinical features. The diagnosis and histological subtype are confirmed pathologically by diagnostic biopsy or following excision. See squamous cell carcinoma - pathology.  Patients with high-risk SCC may also undergo staging investigations to determine whether it has spread to lymph nodes or elsewhere. These may include:  Imaging using ultrasound scan, X-rays, CT scans, MRI scans   Lymph node or other tissue biopsies    What is the treatment for cutaneous squamous cell carcinoma?  Cutaneous SCC is nearly always treated surgically. Most cases are excised with a 3-10 mm margin of normal tissue around a visible tumour. A flap or skin graft may be needed to repair the defect.  Other methods of removal include:  Shave, curettage, and electrocautery for low-risk tumours on trunk and limbs   Aggressive cryotherapy for very small, thin, low-risk tumours   Mohs micrographic surgery for large facial lesions with indistinct margins or recurrent tumours   Radiotherapy for an inoperable tumour, patients unsuitable for surgery, or as adjuvant    What is the treatment for advanced or metastatic squamous cell carcinoma?  Locally advanced primary, recurrent or metastatic SCC requires multidisciplinary consultation. Often a combination of treatments is used.  Surgery   Radiotherapy   Cemiplimab   Experimental targeted  therapy using epidermal growth factor receptor inhibitors    How can cutaneous squamous cell carcinoma be prevented?  There is a great deal of evidence to show that very careful sun protection at any time of life reduces the number of SCCs. This is particularly important in ageing, sun-damaged, fair skin; in patients that are immune suppressed; and in those who already have actinic keratoses or previous SCC.  Stay indoors or under the shade in the middle of the day   Wear covering clothing   Apply high protection factor SPF50+ broad-spectrum sunscreens generously to exposed skin if outdoors   Avoid indoor tanning (sun beds, solaria)    Oral nicotinamide (vitamin B3) in a dose of 500 mg twice daily may reduce the number and severity of SCCs in people at high risk.  Patients with multiple squamous cell carcinomas may be prescribed an oral retinoid (acitretin or isotretinoin). These reduce the number of tumours but have some nuisance side effects.    What is the outlook for cutaneous squamous cell carcinoma?  Most SCCs are cured by treatment. A cure is most likely if treatment is undertaken when the lesion is small. The risk of recurrence or disease-associated death is greater for tumours that are > 20 mm in diameter and/or > 2 mm in thickness at the time of surgical excision.  About 50% of people at high risk of SCC develop a second one within 5 years of the first. They are also at increased risk of other skin cancers, especially melanoma. Regular self-skin examinations and long-term annual skin checks by an experienced health professional are recommended.

## 2024-05-29 NOTE — PROGRESS NOTES
"[facility] Note     Patient Name: Jerry Funk  Encounter Date: May 29, 2024     Have you been cared for by a [facility] Dermatologist in the last 3 years and, if so, which description applies to you?    Yes.  I have been here within the last 3 years, and my medical history has NOT changed since that time.  I am MALE/not capable of bearing children.    REVIEW OF SYSTEMS:  Have you recently had or currently have any of the following? No changes in my recent health.   PAST MEDICAL HISTORY:  Have you personally ever had or currently have any of the following?  If \"YES,\" then please provide more detail. No changes in my medical history.   HISTORY OF IMMUNOSUPPRESSION: Do you have a history of any of the following:  Systemic Immunosuppression such as Diabetes, Biologic or Immunotherapy, Chemotherapy, Organ Transplantation, Bone Marrow Transplantation?  No     Answering \"YES\" requires the addition of the dotphrase \"IMMUNOSUPPRESSED\" as the first diagnosis of the patient's visit.   FAMILY HISTORY:  Any \"first degree relatives\" (parent, brother, sister, or child) with the following?    No changes in my family's known health.   PATIENT EXPERIENCE:    Do you want the Dermatologist to perform a COMPLETE skin exam today including a clinical examination under the \"bra and underwear\" areas?  Yes  If necessary, do we have your permission to call and leave a detailed message on your Preferred Phone number that includes your specific medical information?  Yes      Allergies   Allergen Reactions    Dust Mite Extract     Molds & Smuts     Pollen Extract     Short Ragweed Pollen Ext     Tree Extract       Current Outpatient Medications:     aspirin 81 MG tablet, Take by mouth, Disp: , Rfl:     atorvastatin (LIPITOR) 20 mg tablet, TAKE 1 TABLET BY MOUTH EVERY DAY, Disp: 90 tablet, Rfl: 1    citalopram (CeleXA) 20 mg tablet, TAKE 1 TABLET BY MOUTH EVERY DAY, Disp: 90 tablet, Rfl: 5    finasteride (PROSCAR) 5 mg " tablet, Take 1 tablet (5 mg total) by mouth daily Do not start before August 17, 2023., Disp: 30 tablet, Rfl: 0    fluticasone (Arnuity Ellipta) 100 MCG/ACT AEPB inhaler, Inhale 1 puff daily Rinse mouth after use., Disp: 90 blister, Rfl: 0    fluticasone (FLONASE) 50 mcg/act nasal spray, 1 spray into each nostril daily, Disp: , Rfl:     ipratropium (ATROVENT) 0.06 % nasal spray, 1 SPRAY INTO EACH NOSTRIL IN THE MORNING AND 1 SPRAY BEFORE BEDTIME., Disp: 15 mL, Rfl: 5    LORazepam (ATIVAN) 0.5 mg tablet, Take 1 tablet (0.5 mg total) by mouth every 6 (six) hours as needed for anxiety (Patient taking differently: Take 0.5 mg by mouth every 6 (six) hours as needed for anxiety prn), Disp: 90 tablet, Rfl: 3    montelukast (SINGULAIR) 10 mg tablet, TAKE 1 TABLET BY MOUTH EVERY DAY, Disp: 90 tablet, Rfl: 0    omeprazole (PriLOSEC) 40 MG capsule, TAKE 1 CAPSULE BY MOUTH EVERY DAY, Disp: 90 capsule, Rfl: 0    sotalol (BETAPACE) 80 mg tablet, TAKE 1 TABLET BY MOUTH EVERY 12 HOURS, Disp: 180 tablet, Rfl: 3    tamsulosin (FLOMAX) 0.4 mg, Take 0.4 mg by mouth 2 (two) times a day, Disp: , Rfl:           Whom besides the patient is providing clinical information about today's encounter?   NO ADDITIONAL HISTORIAN (patient alone provided history)    Physical Exam and Assessment/Plan by Diagnosis:    Chief complaint: Patient is a 69 y/o male present for a routine skin exam with history of non-melanoma skin cancer and has a spot of concern on the Right Chest that he noticed for several weeks    HISTORY OF SQUAMOUS CELL CARCINOMA     Physical Exam:  Anatomic Location Affected:  Right Frontal Scalp - 2023  Mid Scalp - 2022  Morphological Description of Scar:  well healed  Suspected Recurrence: no  Regional adenopathy: no    Additional History of Present Condition:  History of Squamous Cell Carcinoma, treated    Assessment and Plan:  Based on a thorough discussion of this condition and the management approach to it (including a  comprehensive discussion of the known risks, side effects and potential benefits of treatment), the patient (family) agrees to implement the following specific plan:  Monitor for change    ACTINIC KERATOSIS    Physical Exam:  Anatomic Location: chest and scalp   Morphologic Description: Scaly pink papules  Pertinent Positives:  Pertinent Negatives:  Physical Exam  Constitutional:        HENT:      Head:           Additional History of Present Condition:  present on exam    Plan:  Cryotherapy performed in the office (See Procedure Note). We discussed that a hypopigmentation or scar may form in the region following cryotherapy.  We discussed the pre-cancerous nature of the condition. Actinic keratosis is found on sun-damaged skin and there is small risk that the condition could turn into a skin cancer called squamous cell carcinoma. There is no risk of actinic keratosis turning into melanoma.  We discussed sun protection measures, including using sunscreen with an SPF 50+ year round, avoiding the sun, and wearing protective clothing such as long sleeves and pants when out in the sun.  Continue to monitor clinically for signs of recurrence. Discussed with patient the importance of keeping up to date with full body skin exams.     PROCEDURES PERFORMED TODAY ASSOCIATED WITH THIS CONDITION:          Cryotherapy: PROCEDURE:  DESTRUCTION OF PRE-MALIGNANT LESIONS  After a thorough discussion of treatment options and risk/benefits/alternatives (including but not limited to local pain, scarring, dyspigmentation, blistering, and possible superinfection), verbal and written consent were obtained and the aforementioned lesions were treated on with cryotherapy using liquid nitrogen x 1 cycle for 5-10 seconds.    TOTAL NUMBER of 5 pre-malignant lesions were treated today on the ANATOMIC LOCATION: Scalp and Chest.     The patient tolerated the procedure well, and after-care instructions were provided.        MELANOCYTIC NEVI  "(\"Moles\")    Physical Exam:  Anatomic Location Affected: Mostly on sun-exposed areas of the trunk and extremities  Morphological Description:  Scattered, 1-4mm round to ovoid, symmetrical-appearing, even bordered, skin colored to dark brown macules/papules, mostly in sun-exposed areas  Pertinent Positives:  Pertinent Negatives:    Additional History of Present Condition:  present on exam    Assessment and Plan:  Based on a thorough discussion of this condition and the management approach to it (including a comprehensive discussion of the known risks, side effects and potential benefits of treatment), the patient (family) agrees to implement the following specific plan:  Provided handout with information regarding the ABCDE's of moles   Recommend routine skin exams every 6 months   Sun avoidance, protective clothing (known as UPF clothing), and the use of at least SPF 30 sunscreens is advised. Sunscreen should be reapplied every two hours when outside.     SEBORRHEIC KERATOSIS; NON-INFLAMED    Physical Exam:  Anatomic Location Affected:  scattered across trunk, extremities, face  Morphological Description:  Flat and raised, waxy, smooth to warty textured, yellow to brownish-grey to dark brown to blackish, discrete, \"stuck-on\" appearing papules.  Pertinent Positives:  Pertinent Negatives:    Additional History of Present Condition:  Patient reports new bumps on the skin.  Denies itch, burn, pain, bleeding or ulceration.  Present constantly; nothing seems to make it worse or better.  No prior treatment.      Assessment and Plan:  Based on a thorough discussion of this condition and the management approach to it (including a comprehensive discussion of the known risks, side effects and potential benefits of treatment), the patient (family) agrees to implement the following specific plan:  Reassured benign    ANGIOMA (\"CHERRY ANGIOMA\")    Physical Exam:  Anatomic Location: scattered across sun exposed areas of the trunk " and extremities   Morphologic Description: Firm red to reddish-blue discrete papules  Pertinent Positives:  Pertinent Negatives:    Additional History of Present Condition:  Present on exam.     Assessment and Plan:  Reassured benign    Scribe Attestation      I,:  Tayler Hawthorne am acting as a scribe while in the presence of the attending physician.:       I,:  Alphonso Marcus MD personally performed the services described in this documentation    as scribed in my presence.:

## 2024-06-17 ENCOUNTER — REMOTE DEVICE CLINIC VISIT (OUTPATIENT)
Dept: CARDIOLOGY CLINIC | Facility: CLINIC | Age: 69
End: 2024-06-17
Payer: MEDICARE

## 2024-06-17 DIAGNOSIS — Z95.0 PRESENCE OF PERMANENT CARDIAC PACEMAKER: Primary | ICD-10-CM

## 2024-06-17 PROCEDURE — 93296 REM INTERROG EVL PM/IDS: CPT | Performed by: STUDENT IN AN ORGANIZED HEALTH CARE EDUCATION/TRAINING PROGRAM

## 2024-06-17 PROCEDURE — 93294 REM INTERROG EVL PM/LDLS PM: CPT | Performed by: STUDENT IN AN ORGANIZED HEALTH CARE EDUCATION/TRAINING PROGRAM

## 2024-06-17 NOTE — PROGRESS NOTES
Results for orders placed or performed in visit on 06/17/24   Cardiac EP device report    Narrative    MDT DC PM/ACTIVE SYSTEM IS MRI CONDITIONAL  CARELINK TRANSMISSION:  BATTERY VOLTAGE ADEQUATE (11.3 YR.).  AP 58%  2.6% (DDD 60 PPM W/-350 MS).  ALL AVAILABLE LEAD PARAMETERS WITHIN NORMAL LIMITS.  8 DEVICE CLASSIFIED VT-NS EPISODE & 6 SVT-ST WITH ALL EGM'S FOR ST VS SVT (MULTIPLE CONSECUTIVE SAME DAY) - AVG RATES 154-158 BPM; EF 60% (4/13/23 ECHO); PATIENT TAKES ASA 81, SOTALOL.  NORMAL DEVICE FUNCTION.  ES

## 2024-06-24 ENCOUNTER — APPOINTMENT (OUTPATIENT)
Age: 69
End: 2024-06-24
Payer: MEDICARE

## 2024-06-24 ENCOUNTER — RA CDI HCC (OUTPATIENT)
Dept: OTHER | Facility: HOSPITAL | Age: 69
End: 2024-06-24

## 2024-06-24 DIAGNOSIS — E78.2 MIXED HYPERLIPIDEMIA: ICD-10-CM

## 2024-06-24 LAB
ALBUMIN SERPL BCG-MCNC: 4.2 G/DL (ref 3.5–5)
ALP SERPL-CCNC: 57 U/L (ref 34–104)
ALT SERPL W P-5'-P-CCNC: 20 U/L (ref 7–52)
ANION GAP SERPL CALCULATED.3IONS-SCNC: 9 MMOL/L (ref 4–13)
AST SERPL W P-5'-P-CCNC: 17 U/L (ref 13–39)
BILIRUB SERPL-MCNC: 0.51 MG/DL (ref 0.2–1)
BUN SERPL-MCNC: 14 MG/DL (ref 5–25)
CALCIUM SERPL-MCNC: 9.2 MG/DL (ref 8.4–10.2)
CHLORIDE SERPL-SCNC: 105 MMOL/L (ref 96–108)
CHOLEST SERPL-MCNC: 196 MG/DL
CO2 SERPL-SCNC: 25 MMOL/L (ref 21–32)
CREAT SERPL-MCNC: 0.79 MG/DL (ref 0.6–1.3)
ERYTHROCYTE [DISTWIDTH] IN BLOOD BY AUTOMATED COUNT: 12.6 % (ref 11.6–15.1)
GFR SERPL CREATININE-BSD FRML MDRD: 93 ML/MIN/1.73SQ M
GLUCOSE P FAST SERPL-MCNC: 96 MG/DL (ref 65–99)
HCT VFR BLD AUTO: 44.3 % (ref 36.5–49.3)
HDLC SERPL-MCNC: 46 MG/DL
HGB BLD-MCNC: 14.8 G/DL (ref 12–17)
LDLC SERPL CALC-MCNC: 119 MG/DL (ref 0–100)
MCH RBC QN AUTO: 31 PG (ref 26.8–34.3)
MCHC RBC AUTO-ENTMCNC: 33.4 G/DL (ref 31.4–37.4)
MCV RBC AUTO: 93 FL (ref 82–98)
NONHDLC SERPL-MCNC: 150 MG/DL
PLATELET # BLD AUTO: 216 THOUSANDS/UL (ref 149–390)
PMV BLD AUTO: 10.9 FL (ref 8.9–12.7)
POTASSIUM SERPL-SCNC: 4.1 MMOL/L (ref 3.5–5.3)
PROT SERPL-MCNC: 6.4 G/DL (ref 6.4–8.4)
RBC # BLD AUTO: 4.78 MILLION/UL (ref 3.88–5.62)
SODIUM SERPL-SCNC: 139 MMOL/L (ref 135–147)
TRIGL SERPL-MCNC: 157 MG/DL
WBC # BLD AUTO: 7.07 THOUSAND/UL (ref 4.31–10.16)

## 2024-06-24 PROCEDURE — 80053 COMPREHEN METABOLIC PANEL: CPT

## 2024-06-24 PROCEDURE — 36415 COLL VENOUS BLD VENIPUNCTURE: CPT

## 2024-06-24 PROCEDURE — 85027 COMPLETE CBC AUTOMATED: CPT

## 2024-06-24 PROCEDURE — 80061 LIPID PANEL: CPT

## 2024-06-27 DIAGNOSIS — K21.9 GASTROESOPHAGEAL REFLUX DISEASE: ICD-10-CM

## 2024-06-27 RX ORDER — OMEPRAZOLE 40 MG/1
CAPSULE, DELAYED RELEASE ORAL
Qty: 90 CAPSULE | Refills: 0 | Status: SHIPPED | OUTPATIENT
Start: 2024-06-27

## 2024-07-01 ENCOUNTER — OFFICE VISIT (OUTPATIENT)
Dept: FAMILY MEDICINE CLINIC | Facility: CLINIC | Age: 69
End: 2024-07-01
Payer: MEDICARE

## 2024-07-01 VITALS
WEIGHT: 183 LBS | OXYGEN SATURATION: 97 % | SYSTOLIC BLOOD PRESSURE: 122 MMHG | BODY MASS INDEX: 27.74 KG/M2 | DIASTOLIC BLOOD PRESSURE: 64 MMHG | HEART RATE: 72 BPM | HEIGHT: 68 IN

## 2024-07-01 DIAGNOSIS — E78.2 MIXED HYPERLIPIDEMIA: ICD-10-CM

## 2024-07-01 DIAGNOSIS — M54.50 CHRONIC BILATERAL LOW BACK PAIN WITHOUT SCIATICA: ICD-10-CM

## 2024-07-01 DIAGNOSIS — I71.21 ANEURYSM OF ASCENDING AORTA WITHOUT RUPTURE (HCC): ICD-10-CM

## 2024-07-01 DIAGNOSIS — Z00.00 HEALTH CARE MAINTENANCE: Primary | ICD-10-CM

## 2024-07-01 DIAGNOSIS — G89.29 CHRONIC BILATERAL LOW BACK PAIN WITHOUT SCIATICA: ICD-10-CM

## 2024-07-01 DIAGNOSIS — N40.1 BENIGN PROSTATIC HYPERPLASIA WITH URINARY HESITANCY: ICD-10-CM

## 2024-07-01 DIAGNOSIS — R53.83 MALAISE AND FATIGUE: ICD-10-CM

## 2024-07-01 DIAGNOSIS — R39.11 BENIGN PROSTATIC HYPERPLASIA WITH URINARY HESITANCY: ICD-10-CM

## 2024-07-01 DIAGNOSIS — R53.81 MALAISE AND FATIGUE: ICD-10-CM

## 2024-07-01 DIAGNOSIS — I49.5 SICK SINUS SYNDROME (HCC): ICD-10-CM

## 2024-07-01 PROCEDURE — 99214 OFFICE O/P EST MOD 30 MIN: CPT | Performed by: FAMILY MEDICINE

## 2024-07-01 PROCEDURE — G0438 PPPS, INITIAL VISIT: HCPCS | Performed by: FAMILY MEDICINE

## 2024-07-01 NOTE — PATIENT INSTRUCTIONS
Medicare Preventive Visit Patient Instructions  Thank you for completing your Welcome to Medicare Visit or Medicare Annual Wellness Visit today. Your next wellness visit will be due in one year (7/2/2025).  The screening/preventive services that you may require over the next 5-10 years are detailed below. Some tests may not apply to you based off risk factors and/or age. Screening tests ordered at today's visit but not completed yet may show as past due. Also, please note that scanned in results may not display below.  Preventive Screenings:  Service Recommendations Previous Testing/Comments   Colorectal Cancer Screening  Colonoscopy    Fecal Occult Blood Test (FOBT)/Fecal Immunochemical Test (FIT)  Fecal DNA/Cologuard Test  Flexible Sigmoidoscopy Age: 45-75 years old   Colonoscopy: every 10 years (May be performed more frequently if at higher risk)  OR  FOBT/FIT: every 1 year  OR  Cologuard: every 3 years  OR  Sigmoidoscopy: every 5 years  Screening may be recommended earlier than age 45 if at higher risk for colorectal cancer. Also, an individualized decision between you and your healthcare provider will decide whether screening between the ages of 76-85 would be appropriate. Colonoscopy: 05/16/2024  FOBT/FIT: Not on file  Cologuard: Not on file  Sigmoidoscopy: Not on file    Screening Current     Prostate Cancer Screening Individualized decision between patient and health care provider in men between ages of 55-69   Medicare will cover every 12 months beginning on the day after your 50th birthday PSA: 2.35 ng/mL     Screening Current     Hepatitis C Screening Once for adults born between 1945 and 1965  More frequently in patients at high risk for Hepatitis C Hep C Antibody: Not on file    Screening Current   Diabetes Screening 1-2 times per year if you're at risk for diabetes or have pre-diabetes Fasting glucose: 96 mg/dL (6/24/2024)  A1C: 5.0 % (1/16/2021)  Screening Current   Cholesterol Screening Once every 5  years if you don't have a lipid disorder. May order more often based on risk factors. Lipid panel: 06/24/2024  Screening Not Indicated  History Lipid Disorder      Other Preventive Screenings Covered by Medicare:  Abdominal Aortic Aneurysm (AAA) Screening: covered once if your at risk. You're considered to be at risk if you have a family history of AAA or a male between the age of 65-75 who smoking at least 100 cigarettes in your lifetime.  Lung Cancer Screening: covers low dose CT scan once per year if you meet all of the following conditions: (1) Age 55-77; (2) No signs or symptoms of lung cancer; (3) Current smoker or have quit smoking within the last 15 years; (4) You have a tobacco smoking history of at least 20 pack years (packs per day x number of years you smoked); (5) You get a written order from a healthcare provider.  Glaucoma Screening: covered annually if you're considered high risk: (1) You have diabetes OR (2) Family history of glaucoma OR (3)  aged 50 and older OR (4)  American aged 65 and older  Osteoporosis Screening: covered every 2 years if you meet one of the following conditions: (1) Have a vertebral abnormality; (2) On glucocorticoid therapy for more than 3 months; (3) Have primary hyperparathyroidism; (4) On osteoporosis medications and need to assess response to drug therapy.  HIV Screening: covered annually if you're between the age of 15-65. Also covered annually if you are younger than 15 and older than 65 with risk factors for HIV infection. For pregnant patients, it is covered up to 3 times per pregnancy.    Immunizations:  Immunization Recommendations   Influenza Vaccine Annual influenza vaccination during flu season is recommended for all persons aged >= 6 months who do not have contraindications   Pneumococcal Vaccine   * Pneumococcal conjugate vaccine = PCV13 (Prevnar 13), PCV15 (Vaxneuvance), PCV20 (Prevnar 20)  * Pneumococcal polysaccharide vaccine = PPSV23  (Pneumovax) Adults 19-65 yo with certain risk factors or if 65+ yo  If never received any pneumonia vaccine: recommend Prevnar 20 (PCV20)  Give PCV20 if previously received 1 dose of PCV13 or PPSV23   Hepatitis B Vaccine 3 dose series if at intermediate or high risk (ex: diabetes, end stage renal disease, liver disease)   Respiratory syncytial virus (RSV) Vaccine - COVERED BY MEDICARE PART D  * RSVPreF3 (Arexvy) CDC recommends that adults 60 years of age and older may receive a single dose of RSV vaccine using shared clinical decision-making (SCDM)   Tetanus (Td) Vaccine - COST NOT COVERED BY MEDICARE PART B Following completion of primary series, a booster dose should be given every 10 years to maintain immunity against tetanus. Td may also be given as tetanus wound prophylaxis.   Tdap Vaccine - COST NOT COVERED BY MEDICARE PART B Recommended at least once for all adults. For pregnant patients, recommended with each pregnancy.   Shingles Vaccine (Shingrix) - COST NOT COVERED BY MEDICARE PART B  2 shot series recommended in those 19 years and older who have or will have weakened immune systems or those 50 years and older     Health Maintenance Due:      Topic Date Due   • Colorectal Cancer Screening  05/16/2027   • Hepatitis C Screening  Addressed     Immunizations Due:      Topic Date Due   • Pneumococcal Vaccine: 65+ Years (2 of 2 - PCV) 12/23/2014   • COVID-19 Vaccine (5 - 2023-24 season) 09/01/2023   • Influenza Vaccine (1) 09/01/2024     Advance Directives   What are advance directives?  Advance directives are legal documents that state your wishes and plans for medical care. These plans are made ahead of time in case you lose your ability to make decisions for yourself. Advance directives can apply to any medical decision, such as the treatments you want, and if you want to donate organs.   What are the types of advance directives?  There are many types of advance directives, and each state has rules about how  to use them. You may choose a combination of any of the following:  Living will:  This is a written record of the treatment you want. You can also choose which treatments you do not want, which to limit, and which to stop at a certain time. This includes surgery, medicine, IV fluid, and tube feedings.   Durable power of  for healthcare (DPAHC):  This is a written record that states who you want to make healthcare choices for you when you are unable to make them for yourself. This person, called a proxy, is usually a family member or a friend. You may choose more than 1 proxy.  Do not resuscitate (DNR) order:  A DNR order is used in case your heart stops beating or you stop breathing. It is a request not to have certain forms of treatment, such as CPR. A DNR order may be included in other types of advance directives.  Medical directive:  This covers the care that you want if you are in a coma, near death, or unable to make decisions for yourself. You can list the treatments you want for each condition. Treatment may include pain medicine, surgery, blood transfusions, dialysis, IV or tube feedings, and a ventilator (breathing machine).  Values history:  This document has questions about your views, beliefs, and how you feel and think about life. This information can help others choose the care that you would choose.  Why are advance directives important?  An advance directive helps you control your care. Although spoken wishes may be used, it is better to have your wishes written down. Spoken wishes can be misunderstood, or not followed. Treatments may be given even if you do not want them. An advance directive may make it easier for your family to make difficult choices about your care.   Fall Prevention    Fall prevention  includes ways to make your home and other areas safer. It also includes ways you can move more carefully to prevent a fall. Health conditions that cause changes in your blood pressure,  vision, or muscle strength and coordination may increase your risk for falls. Medicines may also increase your risk for falls if they make you dizzy, weak, or sleepy.   Fall prevention tips:   Stand or sit up slowly.    Use assistive devices as directed.    Wear shoes that fit well and have soles that .    Wear a personal alarm.    Stay active.    Manage your medical conditions.    Home Safety Tips:  Add items to prevent falls in the bathroom.    Keep paths clear.    Install bright lights in your home.    Keep items you use often on shelves within reach.    Paint or place reflective tape on the edges of your stairs.    Weight Management   Why it is important to manage your weight:  Being overweight increases your risk of health conditions such as heart disease, high blood pressure, type 2 diabetes, and certain types of cancer. It can also increase your risk for osteoarthritis, sleep apnea, and other respiratory problems. Aim for a slow, steady weight loss. Even a small amount of weight loss can lower your risk of health problems.  How to lose weight safely:  A safe and healthy way to lose weight is to eat fewer calories and get regular exercise. You can lose up about 1 pound a week by decreasing the number of calories you eat by 500 calories each day.   Healthy meal plan for weight management:  A healthy meal plan includes a variety of foods, contains fewer calories, and helps you stay healthy. A healthy meal plan includes the following:  Eat whole-grain foods more often.  A healthy meal plan should contain fiber. Fiber is the part of grains, fruits, and vegetables that is not broken down by your body. Whole-grain foods are healthy and provide extra fiber in your diet. Some examples of whole-grain foods are whole-wheat breads and pastas, oatmeal, brown rice, and bulgur.  Eat a variety of vegetables every day.  Include dark, leafy greens such as spinach, kale, zenobia greens, and mustard greens. Eat yellow and  orange vegetables such as carrots, sweet potatoes, and winter squash.   Eat a variety of fruits every day.  Choose fresh or canned fruit (canned in its own juice or light syrup) instead of juice. Fruit juice has very little or no fiber.  Eat low-fat dairy foods.  Drink fat-free (skim) milk or 1% milk. Eat fat-free yogurt and low-fat cottage cheese. Try low-fat cheeses such as mozzarella and other reduced-fat cheeses.  Choose meat and other protein foods that are low in fat.  Choose beans or other legumes such as split peas or lentils. Choose fish, skinless poultry (chicken or turkey), or lean cuts of red meat (beef or pork). Before you cook meat or poultry, cut off any visible fat.   Use less fat and oil.  Try baking foods instead of frying them. Add less fat, such as margarine, sour cream, regular salad dressing and mayonnaise to foods. Eat fewer high-fat foods. Some examples of high-fat foods include french fries, doughnuts, ice cream, and cakes.  Eat fewer sweets.  Limit foods and drinks that are high in sugar. This includes candy, cookies, regular soda, and sweetened drinks.  Exercise:  Exercise at least 30 minutes per day on most days of the week. Some examples of exercise include walking, biking, dancing, and swimming. You can also fit in more physical activity by taking the stairs instead of the elevator or parking farther away from stores. Ask your healthcare provider about the best exercise plan for you.      © Copyright [employer] 2018 Information is for End User's use only and may not be sold, redistributed or otherwise used for commercial purposes. All illustrations and images included in CareNotes® are the copyrighted property of [employer] or [employer]

## 2024-07-01 NOTE — ASSESSMENT & PLAN NOTE
Suggest that he decrease his Flomax to once daily to see if this helps with his dizziness upon standing.  If his prostate symptoms worsen discussed this further with his urologist for further recommendations.

## 2024-07-01 NOTE — PROGRESS NOTES
Ambulatory Visit  Name: Jerry Funk      : 1955      MRN: [id number]  Encounter Provider: Frederic Bush DO  Encounter Date: 2024   Encounter department: [facility] [address]    Assessment & Plan  Health care maintenance         Benign prostatic hyperplasia with urinary hesitancy  Suggest that he decrease his Flomax to once daily to see if this helps with his dizziness upon standing.  If his prostate symptoms worsen discussed this further with his urologist for further recommendations.       Mixed hyperlipidemia  Continue atorvastatin.       Aneurysm of ascending aorta without rupture (HCC)  Recent CAT scan was unremarkable, will continue to monitor       Sick sinus syndrome (HCC)  Rate controlled, follow-up with his cardiologist as scheduled.       Malaise and fatigue    Orders:    TSH, 3rd generation; Future    Chronic bilateral low back pain without sciatica    Orders:    Ambulatory Referral to Physical Therapy; Future          Preventive health issues were discussed with patient, and age appropriate screening tests were ordered as noted in patient's After Visit Summary. Personalized health advice and appropriate referrals for health education or preventive services given if needed, as noted in patient's After Visit Summary.    History of Present Illness     Patient comes in today for checkup, he does complain of some increasing fatigue.  He also notes some dizziness when he stands up quickly from a lying or seated position.       Patient Care Team:  Frederic Bush DO as PCP - General (Family Medicine)  MD Ruchi Pickard PA-C as Physician Assistant (Physician Assistant)    Review of Systems   Constitutional:  Positive for fatigue. Negative for chills and fever.   HENT:  Negative for congestion, ear pain, hearing loss, postnasal drip, rhinorrhea and sore throat.    Eyes:  Negative for pain and visual disturbance.   Respiratory:  Negative for  chest tightness, shortness of breath and wheezing.    Cardiovascular:  Negative for chest pain and leg swelling.   Gastrointestinal:  Negative for abdominal distention, abdominal pain, constipation, diarrhea and vomiting.   Endocrine: Negative for cold intolerance and heat intolerance.   Genitourinary:  Negative for difficulty urinating, frequency and urgency.   Musculoskeletal:  Negative for arthralgias and gait problem.   Skin:  Negative for color change.   Neurological:  Positive for dizziness. Negative for tremors, syncope, numbness and headaches.   Hematological:  Negative for adenopathy.   Psychiatric/Behavioral:  Negative for agitation, confusion and sleep disturbance. The patient is not nervous/anxious.      Medical History Reviewed by provider this encounter:       Annual Wellness Visit Questionnaire   Jerry is here for his Subsequent Wellness visit. Last Medicare Wellness visit information reviewed, patient interviewed and updates made to the record today.      Health Risk Assessment:   Patient rates overall health as good. Patient feels that their physical health rating is same. Patient is satisfied with their life. Eyesight was rated as same. Hearing was rated as same. Patient feels that their emotional and mental health rating is same. Patients states they are never, rarely angry. Patient states they are often unusually tired/fatigued. Pain experienced in the last 7 days has been none. Patient states that he has experienced no weight loss or gain in last 6 months.     Depression Screening:   PHQ-2 Score: 0      Fall Risk Screening:   In the past year, patient has experienced: history of falling in past year    Number of falls: 2 or more  Injured during fall?: No    Feels unsteady when standing or walking?: Yes    Worried about falling?: Yes      Home Safety:  Patient has trouble with stairs inside or outside of their home. Patient has working smoke alarms and has no working carbon monoxide detector.  Home safety hazards include: medications that cause fatigue.     Nutrition:   Current diet is Regular.     Medications:   Patient is currently taking over-the-counter supplements. OTC medications include: see medication list. Patient is able to manage medications.     Activities of Daily Living (ADLs)/Instrumental Activities of Daily Living (IADLs):   Walk and transfer into and out of bed and chair?: Yes  Dress and groom yourself?: Yes    Bathe or shower yourself?: Yes    Feed yourself? Yes  Do your laundry/housekeeping?: Yes  Manage your money, pay your bills and track your expenses?: Yes  Make your own meals?: Yes    Do your own shopping?: Yes    Previous Hospitalizations:   Any hospitalizations or ED visits within the last 12 months?: Yes    How many hospitalizations have you had in the last year?: 1-2    Advance Care Planning:   Living will: Yes    Durable POA for healthcare: No    Advanced directive: Yes      Cognitive Screening:   Provider or family/friend/caregiver concerned regarding cognition?: No    PREVENTIVE SCREENINGS      Cardiovascular Screening:    General: Screening Not Indicated and History Lipid Disorder      Diabetes Screening:     General: Screening Current      Colorectal Cancer Screening:     General: Screening Current      Prostate Cancer Screening:    General: Screening Current      Osteoporosis Screening:    General: Screening Not Indicated      Abdominal Aortic Aneurysm (AAA) Screening:    Risk factors include: age between 65-74 yo        Lung Cancer Screening:     General: Screening Not Indicated      Hepatitis C Screening:    General: Screening Current    Screening, Brief Intervention, and Referral to Treatment (SBIRT)    Screening  Typical number of drinks in a day: 0  Typical number of drinks in a week: 0  Interpretation: Low risk drinking behavior.    AUDIT-C Screenin) How often did you have a drink containing alcohol in the past year? monthly or less  2) How many drinks did you  have on a typical day when you were drinking in the past year? 0  3) How often did you have 6 or more drinks on one occasion in the past year? never    AUDIT-C Score: 1  Interpretation: Score 0-3 (male): Negative screen for alcohol misuse    Single Item Drug Screening:  How often have you used an illegal drug (including marijuana) or a prescription medication for non-medical reasons in the past year? never    Single Item Drug Screen Score: 0  Interpretation: Negative screen for possible drug use disorder    Social Determinants of Health     Food Insecurity: No Food Insecurity (6/28/2024)    Hunger Vital Sign     Worried About Running Out of Food in the Last Year: Never true     Ran Out of Food in the Last Year: Never true   Transportation Needs: No Transportation Needs (6/28/2024)    PRAPARE - Transportation     Lack of Transportation (Medical): No     Lack of Transportation (Non-Medical): No   Housing Stability: Low Risk  (6/28/2024)    Housing Stability Vital Sign     Unable to Pay for Housing in the Last Year: No     Number of Times Moved in the Last Year: 0     Homeless in the Last Year: No   Utilities: Not At Risk (6/28/2024)    [facility] Utilities     Threatened with loss of utilities: No     No results found.    Objective     There were no vitals taken for this visit.    Physical Exam  Constitutional:       Appearance: He is well-developed.   HENT:      Head: Normocephalic.      Right Ear: External ear normal.      Left Ear: External ear normal.      Nose: Nose normal.   Eyes:      Extraocular Movements: Extraocular movements intact.      Conjunctiva/sclera: Conjunctivae normal.      Pupils: Pupils are equal, round, and reactive to light.   Neck:      Thyroid: No thyromegaly.   Cardiovascular:      Rate and Rhythm: Normal rate and regular rhythm.      Heart sounds: Normal heart sounds.   Pulmonary:      Effort: Pulmonary effort is normal.      Breath sounds: Normal breath sounds.   Abdominal:      General: Bowel  sounds are normal.      Palpations: Abdomen is soft.   Musculoskeletal:         General: Normal range of motion.      Cervical back: Normal range of motion.   Skin:     General: Skin is warm and dry.   Neurological:      Mental Status: He is alert and oriented to person, place, and time.   Psychiatric:         Mood and Affect: Mood normal.         Behavior: Behavior normal.       Administrative Statements

## 2024-07-02 ENCOUNTER — APPOINTMENT (OUTPATIENT)
Age: 69
End: 2024-07-02
Payer: MEDICARE

## 2024-07-02 DIAGNOSIS — R53.81 MALAISE AND FATIGUE: ICD-10-CM

## 2024-07-02 DIAGNOSIS — R53.83 MALAISE AND FATIGUE: ICD-10-CM

## 2024-07-02 LAB — TSH SERPL DL<=0.05 MIU/L-ACNC: 0.76 UIU/ML (ref 0.45–4.5)

## 2024-07-02 PROCEDURE — 36415 COLL VENOUS BLD VENIPUNCTURE: CPT

## 2024-07-02 PROCEDURE — 84443 ASSAY THYROID STIM HORMONE: CPT

## 2024-07-22 DIAGNOSIS — E78.5 HYPERLIPIDEMIA, UNSPECIFIED HYPERLIPIDEMIA TYPE: ICD-10-CM

## 2024-07-22 RX ORDER — ATORVASTATIN CALCIUM 20 MG/1
20 TABLET, FILM COATED ORAL DAILY
Qty: 100 TABLET | Refills: 1 | Status: SHIPPED | OUTPATIENT
Start: 2024-07-22

## 2024-08-01 ENCOUNTER — OFFICE VISIT (OUTPATIENT)
Dept: FAMILY MEDICINE CLINIC | Facility: CLINIC | Age: 69
End: 2024-08-01
Payer: MEDICARE

## 2024-08-01 VITALS
HEART RATE: 74 BPM | HEIGHT: 68 IN | WEIGHT: 196.6 LBS | DIASTOLIC BLOOD PRESSURE: 72 MMHG | TEMPERATURE: 97.5 F | OXYGEN SATURATION: 98 % | BODY MASS INDEX: 29.8 KG/M2 | SYSTOLIC BLOOD PRESSURE: 118 MMHG
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
## 2024-08-01 DIAGNOSIS — S90.862A INSECT BITE OF LEFT FOOT, INITIAL ENCOUNTER: Primary | ICD-10-CM

## 2024-08-01 PROCEDURE — 99213 OFFICE O/P EST LOW 20 MIN: CPT | Performed by: FAMILY MEDICINE

## 2024-08-01 PROCEDURE — G2211 COMPLEX E/M VISIT ADD ON: HCPCS | Performed by: FAMILY MEDICINE

## 2024-08-01 NOTE — PROGRESS NOTES
Ambulatory Visit  Name: Jerry Funk      : 1955      MRN: [id number]  Encounter Provider: BABS Woodruff  Encounter Date: 2024   Encounter department: [facility] [address]    Assessment & Plan   1. Insect bite of left foot, initial encounter  Comments:  unk causative factor , would continue to monitor   wound care instructions       History of Present Illness     Complaining of saira/wound/bite  Noted approx   Denies any tenderness , swelling , neg fever , chill   No other lesion /rash   Thought to be a spider bite ? But admits was wearing socks and shoes and does not go bare foot outdoors     Insect Bite  Pertinent negatives include no abdominal pain, arthralgias, chest pain, chills, congestion, coughing, fever, headaches, joint swelling, myalgias, nausea, numbness, rash, sore throat, vomiting or weakness.       Review of Systems   Constitutional:  Negative for appetite change, chills, fever and unexpected weight change.   HENT:  Negative for congestion, dental problem, ear pain, hearing loss, postnasal drip, rhinorrhea, sinus pressure, sinus pain, sneezing, sore throat, tinnitus and voice change.    Eyes:  Negative for visual disturbance.   Respiratory:  Negative for apnea, cough, chest tightness and shortness of breath.    Cardiovascular:  Negative for chest pain, palpitations and leg swelling.   Gastrointestinal:  Negative for abdominal pain, blood in stool, constipation, diarrhea, nausea and vomiting.   Endocrine: Negative for cold intolerance, heat intolerance, polydipsia, polyphagia and polyuria.   Genitourinary:  Negative for decreased urine volume, difficulty urinating, dysuria, frequency and hematuria.   Musculoskeletal:  Negative for arthralgias, back pain, gait problem, joint swelling and myalgias.   Skin:  Negative for color change, rash and wound.   Allergic/Immunologic: Negative for environmental allergies and food allergies.   Neurological:   "Negative for dizziness, syncope, weakness, light-headedness, numbness and headaches.   Hematological:  Negative for adenopathy. Does not bruise/bleed easily.   Psychiatric/Behavioral:  Negative for sleep disturbance and suicidal ideas. The patient is not nervous/anxious.        Objective     /72 (BP Location: Left arm, Patient Position: Sitting)   Pulse 74   Temp 97.5 °F (36.4 °C)   Ht 5' 8\" (1.727 m)   Wt 89.2 kg (196 lb 9.6 oz)   SpO2 98%   BMI 29.89 kg/m²     Physical Exam  Constitutional:       General: He is not in acute distress.     Appearance: He is not ill-appearing or toxic-appearing.   HENT:      Head: Normocephalic and atraumatic.   Eyes:      Conjunctiva/sclera: Conjunctivae normal.   Cardiovascular:      Rate and Rhythm: Normal rate.   Pulmonary:      Effort: Pulmonary effort is normal.   Musculoskeletal:      Cervical back: Normal range of motion.   Skin:     Comments: Left foot   Top of foot , 2 mm area healing wound, superficial ,with surrounding discoloration/ blanching  Neg FB          Administrative Statements           "

## 2024-08-23 DIAGNOSIS — K21.9 GASTROESOPHAGEAL REFLUX DISEASE: ICD-10-CM

## 2024-08-23 RX ORDER — OMEPRAZOLE 40 MG/1
CAPSULE, DELAYED RELEASE ORAL
Qty: 90 CAPSULE | Refills: 0 | Status: SHIPPED | OUTPATIENT
Start: 2024-08-23

## 2024-09-26 ENCOUNTER — IN-CLINIC DEVICE VISIT (OUTPATIENT)
Dept: CARDIOLOGY CLINIC | Facility: CLINIC | Age: 69
End: 2024-09-26
Payer: MEDICARE

## 2024-09-26 DIAGNOSIS — Z95.0 PRESENCE OF PERMANENT CARDIAC PACEMAKER: Primary | ICD-10-CM

## 2024-09-26 PROCEDURE — 93280 PM DEVICE PROGR EVAL DUAL: CPT | Performed by: INTERNAL MEDICINE

## 2024-09-26 NOTE — PROGRESS NOTES
MDT DC PM/ACTIVE SYSTEM IS MRI CONDITIONAL   DEVICE INTERROGATED IN THE [place] OFFICE:  BATTERY VOLTAGE ADEQUATE (11.1 YR.).  AP 56.5%  2.1%.  ALL LEAD PARAMETERS WITHIN NORMAL LIMITS.  1 SVT-ST EPISODE AND 1 VT-NS EPISODE WITH BOTH EGMS SHOWING SVT WITH -160 BPM.  PATIENT TAKES ASA AND SOTALOL.  NO PROGRAMMING CHANGES MADE TO DEVICE PARAMETERS.  NORMAL DEVICE FUNCTION.  RG

## 2024-10-21 ENCOUNTER — TELEPHONE (OUTPATIENT)
Dept: FAMILY MEDICINE CLINIC | Facility: CLINIC | Age: 69
End: 2024-10-21

## 2024-10-21 ENCOUNTER — TELEPHONE (OUTPATIENT)
Age: 69
End: 2024-10-21

## 2024-10-21 DIAGNOSIS — F41.9 ANXIETY: ICD-10-CM

## 2024-10-21 RX ORDER — LORAZEPAM 0.5 MG/1
0.5 TABLET ORAL EVERY 6 HOURS PRN
Qty: 90 TABLET | Refills: 0 | Status: SHIPPED | OUTPATIENT
Start: 2024-10-21

## 2024-10-21 NOTE — TELEPHONE ENCOUNTER
Patient called wanting to schedule an appt. With PCP for anxiety and depression. Upon looking at the schedule the next availability isn't until March 2025. Please call patient back if he can be offered anything sooner. Thank you.         Patient can be reached at [phone]

## 2024-10-21 NOTE — TELEPHONE ENCOUNTER
Patient requesting a 90 Day Supply.     Reason for call:   [x] Refill   [] Prior Auth  [] Other:     Office:   [x] PCP/Provider -   [] Specialty/Provider -     Medication: LORazepam (ATIVAN) 0.5 mg tablet / Take 1 tablet (0.5 mg total) by mouth every 6 (six) hours as needed for anxiety    Pharmacy: [facility]/pharmacy #[id number] [address]     Does the patient have enough for 3 days?   [x] Yes   [] No - Send as HP to POD

## 2024-10-21 NOTE — TELEPHONE ENCOUNTER
----- Message from Victoria RAMIREZ sent at 10/21/2024  2:06 PM EDT -----  Regarding: Sooner Appointment  Patient called to reschedule, he forgot her had an appointment at 10 on 10/25. First found was 11/6, he is requesting a sooner appointment if available. Added to wait list.

## 2024-10-23 ENCOUNTER — RA CDI HCC (OUTPATIENT)
Dept: OTHER | Facility: HOSPITAL | Age: 69
End: 2024-10-23

## 2024-10-24 DIAGNOSIS — I48.91 ATRIAL FIBRILLATION (HCC): Primary | ICD-10-CM

## 2024-10-25 ENCOUNTER — TELEPHONE (OUTPATIENT)
Dept: CARDIOLOGY CLINIC | Facility: CLINIC | Age: 69
End: 2024-10-25

## 2024-10-25 NOTE — TELEPHONE ENCOUNTER
----- Message from Gerardo Jasmine MD sent at 10/24/2024  1:44 PM EDT -----  Regarding: FW: pacemaker patient with VT (25 @ 200 bpm)  Good afternoon,    Can we reach out to this patient to see if he is symptomatic with this episode of NSVT and have him follow up with our clinical in the next few days?     I will also order a repeat TTE, BMP, Magnesium for him.  ----- Message -----  From: Suman Godinez  Sent: 10/23/2024  10:51 AM EDT  To: Gerardo Jasmine MD  Subject: pacemaker patient with VT (25 @ 200 bpm)         For your review.  Thank you, Suman BERGT DC PM/ACTIVE SYSTEM IS MRI CONDITIONAL   NB [employer] ALERT TRANSMISSION:  BATTERY VOLTAGE ADEQUATE (11.0 YR.).  AP 58.0%  1.7%.  ALL LEAD PARAMETERS WITHIN NORMAL LIMITS.  1 EPISODE OF VT (25 @ 200 BPM).  EF 60%  (ECHO 04/2023).  PATIENT TAKES ASA AND SOTALOL.  TASK TO DR ARVIZU.  NORMAL DEVICE FUNCTION.  RG

## 2024-10-28 ENCOUNTER — TELEPHONE (OUTPATIENT)
Dept: CARDIOLOGY CLINIC | Facility: CLINIC | Age: 69
End: 2024-10-28

## 2024-10-28 DIAGNOSIS — I47.20 VENTRICULAR TACHYCARDIA (HCC): Primary | ICD-10-CM

## 2024-10-28 NOTE — TELEPHONE ENCOUNTER
----- Message from Dhaval Yarbrough MD sent at 10/28/2024  8:48 AM EDT -----  BMP, Mg, EKG   As soon as possible  ----- Message -----  From: Fiona Cadena MA  Sent: 10/28/2024   8:31 AM EDT  To: Dhaval Yarbrough MD    Please advise on which blood test you want ordered and when you want the EKG done.     Thank you  ----- Message -----  From: Dhaval Yarbrough MD  Sent: 10/28/2024   8:22 AM EDT  To: Suman Godinez; Cardiology Ep Clincal    Normal Device Function  Episode of VT noted  Follow monthly for 3 times   Check electrolytes, QTc and EKG

## 2024-10-28 NOTE — TELEPHONE ENCOUNTER
Called and left voice mail. Lab orders are in chart and pt needs an EKG. Planning to bring patient in for NV for EKG.

## 2024-10-29 ENCOUNTER — APPOINTMENT (OUTPATIENT)
Age: 69
End: 2024-10-29
Payer: MEDICARE

## 2024-10-29 DIAGNOSIS — I48.91 ATRIAL FIBRILLATION (HCC): ICD-10-CM

## 2024-10-29 DIAGNOSIS — I47.20 VENTRICULAR TACHYCARDIA (HCC): ICD-10-CM

## 2024-10-29 LAB
ANION GAP SERPL CALCULATED.3IONS-SCNC: 8 MMOL/L (ref 4–13)
BUN SERPL-MCNC: 15 MG/DL (ref 5–25)
CALCIUM SERPL-MCNC: 9.1 MG/DL (ref 8.4–10.2)
CHLORIDE SERPL-SCNC: 104 MMOL/L (ref 96–108)
CO2 SERPL-SCNC: 27 MMOL/L (ref 21–32)
CREAT SERPL-MCNC: 0.8 MG/DL (ref 0.6–1.3)
GFR SERPL CREATININE-BSD FRML MDRD: 92 ML/MIN/1.73SQ M
GLUCOSE P FAST SERPL-MCNC: 95 MG/DL (ref 65–99)
MAGNESIUM SERPL-MCNC: 2.1 MG/DL (ref 1.9–2.7)
POTASSIUM SERPL-SCNC: 4.1 MMOL/L (ref 3.5–5.3)
SODIUM SERPL-SCNC: 139 MMOL/L (ref 135–147)

## 2024-10-29 PROCEDURE — 83735 ASSAY OF MAGNESIUM: CPT

## 2024-10-29 PROCEDURE — 80048 BASIC METABOLIC PNL TOTAL CA: CPT

## 2024-10-29 PROCEDURE — 36415 COLL VENOUS BLD VENIPUNCTURE: CPT

## 2024-10-30 ENCOUNTER — OFFICE VISIT (OUTPATIENT)
Dept: FAMILY MEDICINE CLINIC | Facility: CLINIC | Age: 69
End: 2024-10-30
Payer: MEDICARE

## 2024-10-30 VITALS
SYSTOLIC BLOOD PRESSURE: 110 MMHG | OXYGEN SATURATION: 96 % | TEMPERATURE: 98.6 F | WEIGHT: 194 LBS | DIASTOLIC BLOOD PRESSURE: 68 MMHG | HEIGHT: 68 IN | BODY MASS INDEX: 29.4 KG/M2 | HEART RATE: 71 BPM

## 2024-10-30 DIAGNOSIS — K86.2 PANCREATIC CYST: Primary | ICD-10-CM

## 2024-10-30 DIAGNOSIS — F41.1 ANXIETY, GENERALIZED: ICD-10-CM

## 2024-10-30 DIAGNOSIS — M54.16 LUMBAR RADICULOPATHY: ICD-10-CM

## 2024-10-30 PROCEDURE — G2211 COMPLEX E/M VISIT ADD ON: HCPCS | Performed by: FAMILY MEDICINE

## 2024-10-30 PROCEDURE — 99214 OFFICE O/P EST MOD 30 MIN: CPT | Performed by: FAMILY MEDICINE

## 2024-10-30 RX ORDER — CITALOPRAM HYDROBROMIDE 20 MG/1
20 TABLET ORAL DAILY
Start: 2024-10-30

## 2024-10-30 RX ORDER — CITALOPRAM HYDROBROMIDE 40 MG/1
40 TABLET ORAL DAILY
Qty: 30 TABLET | Refills: 3 | Status: SHIPPED | OUTPATIENT
Start: 2024-10-30 | End: 2024-10-30

## 2024-10-30 NOTE — TELEPHONE ENCOUNTER
Patient is not symptomatic . Patient stated he has an EKG and echo nov 1 and his lab were done yesterday

## 2024-10-30 NOTE — PROGRESS NOTES
Ambulatory Visit  Name: Jerry Funk      : 1955      MRN: [id number]  Encounter Provider: Frederic Bush DO  Encounter Date: 10/30/2024   Encounter department: [facility] [address]      Assessment & Plan  Pancreatic cyst  Will repeat a CAT scan instead of the MRI with his pacemaker.  Orders:    CT abdomen pelvis wo contrast; Future    Anxiety, generalized  Initially discussed increasing the Celexa however we will hold off on this as he is currently taking the sotalol.  He is to continue the lorazepam as needed.  Orders:    citalopram (CeleXA) 20 mg tablet; Take 1 tablet (20 mg total) by mouth daily    Lumbar radiculopathy  He is going to restart physical therapy, he is already established with them.  I suggested to him that he asked them to further evaluate his back.            History of Present Illness     Patient comes in today for 2 reasons.  First he states that his anxiety has been getting worse.  He has been taking the lorazepam and is questioning how often he can take this.  He was on a higher dose of citalopram in the past however this was decreased when he was started on sotalol.  He is currently taking 20 mg.  His second concern is of pain and stiffness in his legs.  He has been doing physical therapy that was directed at his legs but not his back.  He now complains of pain shooting down the back of his left leg.      Review of Systems   Constitutional:  Negative for chills, fatigue and fever.   HENT:  Negative for congestion, ear pain, hearing loss, postnasal drip, rhinorrhea and sore throat.    Eyes:  Negative for pain and visual disturbance.   Respiratory:  Negative for chest tightness, shortness of breath and wheezing.    Cardiovascular:  Negative for chest pain and leg swelling.   Gastrointestinal:  Negative for abdominal distention, abdominal pain, constipation, diarrhea and vomiting.   Endocrine: Negative for cold intolerance and heat intolerance.  "  Genitourinary:  Negative for difficulty urinating, frequency and urgency.   Musculoskeletal:  Positive for back pain. Negative for arthralgias and gait problem.   Skin:  Negative for color change.   Neurological:  Negative for dizziness, tremors, syncope, numbness and headaches.   Hematological:  Negative for adenopathy.   Psychiatric/Behavioral:  Negative for agitation, confusion and sleep disturbance. The patient is nervous/anxious.      Objective     /68 (BP Location: Left arm, Patient Position: Sitting, Cuff Size: Standard)   Pulse 71   Temp 98.6 °F (37 °C) (Temporal)   Ht 5' 8\" (1.727 m)   Wt 88 kg (194 lb)   SpO2 96%   BMI 29.50 kg/m²     Physical Exam  Constitutional:       Appearance: He is well-developed.   HENT:      Head: Normocephalic.      Right Ear: External ear normal.      Left Ear: External ear normal.      Nose: Nose normal.   Eyes:      Extraocular Movements: Extraocular movements intact.      Conjunctiva/sclera: Conjunctivae normal.      Pupils: Pupils are equal, round, and reactive to light.   Neck:      Thyroid: No thyromegaly.   Cardiovascular:      Rate and Rhythm: Normal rate and regular rhythm.      Heart sounds: Normal heart sounds.   Pulmonary:      Effort: Pulmonary effort is normal.      Breath sounds: Normal breath sounds.   Abdominal:      General: Bowel sounds are normal.      Palpations: Abdomen is soft.   Musculoskeletal:         General: Normal range of motion.      Cervical back: Normal range of motion.   Skin:     General: Skin is warm and dry.   Neurological:      Mental Status: He is alert and oriented to person, place, and time.   Psychiatric:         Mood and Affect: Mood normal.         Behavior: Behavior normal.         Frederic Bush DO     "

## 2024-10-30 NOTE — ASSESSMENT & PLAN NOTE
Will repeat a CAT scan instead of the MRI with his pacemaker.  Orders:    CT abdomen pelvis wo contrast; Future

## 2024-10-30 NOTE — ASSESSMENT & PLAN NOTE
Initially discussed increasing the Celexa however we will hold off on this as he is currently taking the sotalol.  He is to continue the lorazepam as needed.  Orders:    citalopram (CeleXA) 20 mg tablet; Take 1 tablet (20 mg total) by mouth daily

## 2024-10-31 ENCOUNTER — TELEPHONE (OUTPATIENT)
Dept: CARDIOLOGY CLINIC | Facility: CLINIC | Age: 69
End: 2024-10-31

## 2024-10-31 NOTE — TELEPHONE ENCOUNTER
----- Message from Gerardo Jasmine MD sent at 10/31/2024  1:47 PM EDT -----  Please call the patient and let him know that his lab work did not show any significant abnormality.  I will follow-up with the results of his echocardiogram after it has been completed.

## 2024-11-01 ENCOUNTER — HOSPITAL ENCOUNTER (OUTPATIENT)
Dept: NON INVASIVE DIAGNOSTICS | Facility: CLINIC | Age: 69
Discharge: HOME | End: 2024-11-01
Payer: MEDICARE

## 2024-11-01 ENCOUNTER — CLINICAL SUPPORT (OUTPATIENT)
Dept: CARDIOLOGY CLINIC | Facility: CLINIC | Age: 69
End: 2024-11-01
Payer: MEDICARE

## 2024-11-01 VITALS
DIASTOLIC BLOOD PRESSURE: 68 MMHG | BODY MASS INDEX: 29.4 KG/M2 | SYSTOLIC BLOOD PRESSURE: 110 MMHG | HEART RATE: 71 BPM | HEIGHT: 68 IN | WEIGHT: 194 LBS

## 2024-11-01 DIAGNOSIS — I48.91 ATRIAL FIBRILLATION (HCC): ICD-10-CM

## 2024-11-01 DIAGNOSIS — I47.19 ATRIAL TACHYCARDIA (HCC): Primary | ICD-10-CM

## 2024-11-01 LAB
AORTIC ROOT: 3.4 CM
APICAL FOUR CHAMBER EJECTION FRACTION: 56 %
ASCENDING AORTA: 3.5 CM
BSA FOR ECHO PROCEDURE: 2.02 M2
E WAVE DECELERATION TIME: 190 MS
E/A RATIO: 0.8
FRACTIONAL SHORTENING: 27 (ref 28–44)
INTERVENTRICULAR SEPTUM IN DIASTOLE (PARASTERNAL SHORT AXIS VIEW): 1.2 CM
INTERVENTRICULAR SEPTUM: 1.2 CM (ref 0.6–1.1)
LAAS-AP2: 17.1 CM2
LAAS-AP4: 18.9 CM2
LEFT ATRIUM SIZE: 4.3 CM
LEFT ATRIUM VOLUME (MOD BIPLANE): 55 ML
LEFT ATRIUM VOLUME INDEX (MOD BIPLANE): 27.2 ML/M2
LEFT INTERNAL DIMENSION IN SYSTOLE: 3.3 CM (ref 2.1–4)
LEFT VENTRICULAR INTERNAL DIMENSION IN DIASTOLE: 4.5 CM (ref 3.5–6)
LEFT VENTRICULAR POSTERIOR WALL IN END DIASTOLE: 1.4 CM
LEFT VENTRICULAR STROKE VOLUME: 48 ML
LVSV (TEICH): 48 ML
MV E'TISSUE VEL-SEP: 7 CM/S
MV PEAK A VEL: 0.54 M/S
MV PEAK E VEL: 43 CM/S
MV STENOSIS PRESSURE HALF TIME: 55 MS
MV VALVE AREA P 1/2 METHOD: 4
RIGHT ATRIUM AREA SYSTOLE A4C: 12.6 CM2
RIGHT VENTRICLE ID DIMENSION: 3.8 CM
SL CV LEFT ATRIUM LENGTH A2C: 4.6 CM
SL CV LV EF: 56
SL CV PED ECHO LEFT VENTRICLE DIASTOLIC VOLUME (MOD BIPLANE) 2D: 92 ML
SL CV PED ECHO LEFT VENTRICLE SYSTOLIC VOLUME (MOD BIPLANE) 2D: 44 ML
TR MAX PG: 30 MMHG
TR PEAK VELOCITY: 2.7 M/S
TRICUSPID ANNULAR PLANE SYSTOLIC EXCURSION: 1.8 CM
TRICUSPID VALVE PEAK REGURGITATION VELOCITY: 2.72 M/S

## 2024-11-01 PROCEDURE — 99211 OFF/OP EST MAY X REQ PHY/QHP: CPT

## 2024-11-01 PROCEDURE — 93306 TTE W/DOPPLER COMPLETE: CPT | Performed by: INTERNAL MEDICINE

## 2024-11-01 PROCEDURE — 93306 TTE W/DOPPLER COMPLETE: CPT

## 2024-11-01 PROCEDURE — 93000 ELECTROCARDIOGRAM COMPLETE: CPT

## 2024-11-01 NOTE — PROGRESS NOTES
Patient was in for EKG as per Dr Yarbrough because of device  report     Suman Godinez; Cardiology Ep Clincal   Normal Device Function  Episode of VT noted  Follow monthly for 3 times   Check electrolytes, QTc and EKG      Patient EKG was discussed with Dr Elam no changes in medication     EKG scanned and forward to Dr Yarbrough

## 2024-11-04 ENCOUNTER — TELEPHONE (OUTPATIENT)
Dept: CARDIOLOGY CLINIC | Facility: CLINIC | Age: 69
End: 2024-11-04

## 2024-11-04 NOTE — TELEPHONE ENCOUNTER
----- Message from Gerardo Jasmine MD sent at 11/4/2024 12:36 PM EST -----  Please call the patient to let them know that echocardiogram did not show any significant abnormalities compared to prior.  He has an upcoming appointment with Dr. Yarbrough, please let us know see if he has any symptoms of palpitations, chest pain, dizziness, or lightheadedness.

## 2024-11-04 NOTE — TELEPHONE ENCOUNTER
Spoke with patient and he verbally understood result .Patient states he is not symptomatic . Patient had an  EKG and echo nov 1 and his lab were done and he do not want to go to [facility] to see Dr Yarbrough . Patient want to know can he just  keep his appt with Dr Contreras and not go to EP doc because  is not needed

## 2024-11-07 ENCOUNTER — HOSPITAL ENCOUNTER (OUTPATIENT)
Dept: CT IMAGING | Facility: CLINIC | Age: 69
Discharge: HOME | End: 2024-11-07
Payer: MEDICARE

## 2024-11-07 DIAGNOSIS — K86.2 PANCREATIC CYST: ICD-10-CM

## 2024-11-07 PROCEDURE — 74176 CT ABD & PELVIS W/O CONTRAST: CPT

## 2024-11-13 ENCOUNTER — RESULTS FOLLOW-UP (OUTPATIENT)
Dept: FAMILY MEDICINE CLINIC | Facility: CLINIC | Age: 69
End: 2024-11-13

## 2024-11-20 PROBLEM — I47.20 VENTRICULAR TACHYCARDIA (HCC): Status: ACTIVE | Noted: 2024-11-20

## 2024-11-20 NOTE — ASSESSMENT & PLAN NOTE
Latest 10/23/2024 pacemaker interrogation showed 25 beat run of VT at 200bpm  10/2024 lab evaluation with stable renal function and no significant electrolyte deficiency  Repeat 11/1/2024 ECHO - EF 56%, normall wall motion, mild concentric hypertrophy, no significant valvular disease  Maintained on sotalol 80mg BID given his AT hx   Not on AV emre blocking agent

## 2024-11-20 NOTE — PROGRESS NOTES
EPS Consultation/New Patient Evaluation   [facility] Center  [facility] [address], [place] [address]    Name: Jerry Funk  : 1955  MRN: [id number]    Assessment & Plan  Ventricular tachycardia (HCC)  Latest 10/23/2024 pacemaker interrogation showed 25 beat run of VT at 200bpm  10/2024 lab evaluation with stable renal function and no significant electrolyte deficiency  Repeat 2024 ECHO - EF 56%, normall wall motion, mild concentric hypertrophy, no significant valvular disease  Maintained on sotalol 80mg BID given his AT hx   Not on AV emre blocking agent  Atrial tachycardia (HCC)  Maintained on sotalol as above given his atrial tachycardia history  Sick sinus syndrome (HCC)  History of bifascicular block (RBBB + LAFB) + was having Wenckebach + 2:1 heart block  S/p MDT DC PPM  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HCC)  S/p MDT DC PPM  Chest discomfort  Intermittent, ordered NM stress test        Discussion/Plan:    Patient's latest 10/23/2024 pacemaker interrogation showed a 25 beat run of monomorphic VT at 200 bpm    He reports he was asymptomatic during this episode    He notes some lightheadedness when standing too quickly, but otherwise denies significant lightheaded/dizzy episodes and has not passed out    Repeat lab evaluation 10/2024 showed stable renal function without significant electrolyte deficiency    Repeat echo 2024 showed EF 56%, normal wall motion, mild concentric hypertrophy, no significant valvular disease    He is currently maintained on sotalol 80mg BID given his history of paroxysmal atrial tachycardia without AV emre blocking agent     EKG in office today showing sinus rhythm with intermittent A-pacing w/ ventricular rate 67 bpm and QTC 488ms    His device was reprogrammed from DDD 60bpm to DDDR 70bpm (VT detection set to 150bpm)    Plan for monthly checks for the next 6 months     Plan to check stress test for further evaluation      Will also start metoprolol succinate 12.5mg daily (patient instructed to monitor BP closely at home and update us if his lightheaded symptoms/faitgue get worse on this medicine)     Patient has been instructed to follow up in our EP office in 6 months or as needed. He will call our office with any questions or concerns in the meantime.    Rhythm History:   Atrial fibrillation:      Atrial flutter:      SVT:      VT/VF/PVC:     Device history:   Pacemaker:     Defibrillator:     BIV PPM:     BIV ICD:     ILR:    Chief Complaint: Ventricular tachycardia    HPI:   Jerry Funk is a 69 y.o. male with a PMH of VT, AT, SSS, and tachybradycardia s/p PPM.     Patient was last seen by Dr. Yarbrough 06/1/2021 given his history of atrial tachycardia, sick sinus syndrome, and bifascicular block s/p Medtronic pacemaker.  Since then he been following with his general cardiology team.    On routine device interrogation 10/23/2024 he was noted to have an episode of VT and given this has returned back to the EP office for further discussion. He reports he was asymptomatic in regards to his VT episode.  He does note occasional lightheadedness when standing too quickly and has some intermittent fatigue.  He denies any other significant dizzy/lightheaded episodes and has not passed out at home.      EKG: sinus rhythm with intermittent A-pacing w/ ventricular rate 67 bpm and QTC 488ms      Review of Systems   Constitutional:  Negative for activity change, appetite change, chills, fatigue and fever.   HENT:  Negative for nosebleeds.    Respiratory:  Negative for chest tightness and shortness of breath.    Cardiovascular:  Negative for chest pain, palpitations and leg swelling.   Neurological:  Negative for dizziness, syncope, weakness and light-headedness.       Objective:     Vitals: There were no vitals taken for this visit., There is no height or weight on file to calculate BMI.,        Physical Exam:   Physical  Exam  Constitutional:       General: He is not in acute distress.     Appearance: Normal appearance. He is not toxic-appearing.   HENT:      Head: Normocephalic and atraumatic.   Eyes:      General:         Right eye: No discharge.         Left eye: No discharge.   Cardiovascular:      Rate and Rhythm: Normal rate and regular rhythm.      Pulses: Normal pulses.   Pulmonary:      Effort: Pulmonary effort is normal.      Breath sounds: Normal breath sounds.   Musculoskeletal:      Right lower leg: No edema.      Left lower leg: No edema.   Skin:     General: Skin is warm and dry.      Capillary Refill: Capillary refill takes less than 2 seconds.   Neurological:      Mental Status: He is alert.          Medications:      Current Outpatient Medications:     aspirin 81 MG tablet, Take by mouth, Disp: , Rfl:     atorvastatin (LIPITOR) 20 mg tablet, Take 1 tablet (20 mg total) by mouth daily, Disp: 100 tablet, Rfl: 1    citalopram (CeleXA) 20 mg tablet, Take 1 tablet (20 mg total) by mouth daily, Disp: , Rfl:     finasteride (PROSCAR) 5 mg tablet, Take 1 tablet (5 mg total) by mouth daily Do not start before August 17, 2023., Disp: 30 tablet, Rfl: 0    fluticasone (Arnuity Ellipta) 100 MCG/ACT AEPB inhaler, Inhale 1 puff daily Rinse mouth after use., Disp: 90 blister, Rfl: 2    fluticasone (FLONASE) 50 mcg/act nasal spray, 1 spray into each nostril daily, Disp: , Rfl:     ipratropium (ATROVENT) 0.06 % nasal spray, 1 SPRAY INTO EACH NOSTRIL IN THE MORNING AND 1 SPRAY BEFORE BEDTIME., Disp: 15 mL, Rfl: 5    LORazepam (ATIVAN) 0.5 mg tablet, Take 1 tablet (0.5 mg total) by mouth every 6 (six) hours as needed for anxiety, Disp: 90 tablet, Rfl: 0    montelukast (SINGULAIR) 10 mg tablet, TAKE 1 TABLET BY MOUTH EVERY DAY, Disp: 90 tablet, Rfl: 3    omeprazole (PriLOSEC) 40 MG capsule, TAKE 1 CAPSULE BY MOUTH EVERY DAY, Disp: 90 capsule, Rfl: 0    sotalol (BETAPACE) 80 mg tablet, TAKE 1 TABLET BY MOUTH EVERY 12 HOURS, Disp: 180  tablet, Rfl: 3    tamsulosin (FLOMAX) 0.4 mg, Take 0.4 mg by mouth 2 (two) times a day, Disp: , Rfl:        Historical Information   Past Medical History:   Diagnosis Date    Allergic     Anxiety     Asthma     allergy induced    Basal cell carcinoma     Colon polyp     Fatty liver     H/O nonmelanoma skin cancer     last assessed-8/22/2017    Heart murmur     Hyperlipidemia     Inflamed seborrheic keratosis     Malignant neoplasm of skin     last assessed-1/21/2014    Neoplasm of uncertain behavior of skin     Nocturia     Pneumothorax, left     Seasonal allergies     Sebaceous cyst     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/01/2022    SCCIS- mid scalp    Squamous cell carcinoma of skin of neck     Testicular hypofunction     Viral warts        Past Surgical History:   Procedure Laterality Date    CARDIAC CATHETERIZATION      CARDIAC PACEMAKER PLACEMENT  05/05/2021    CARDIAC PACEMAKER PLACEMENT      CARDIOVASCULAR STRESS TEST  08/27/2010    COLONOSCOPY  10/08/2008    IR OTHER  04/15/2021    LUNG SURGERY      for pneumothorax    MOHS SURGERY  02/15/2022    SCCIS mid scalp- Dr Leigh    PARTIAL HIP ARTHROPLASTY      RHINOPLASTY         Social History     Substance and Sexual Activity   Alcohol Use Not Currently    Comment: quit march 2020     Social History     Substance and Sexual Activity   Drug Use No     Social History     Tobacco Use   Smoking Status Never   Smokeless Tobacco Never       Family History   Problem Relation Age of Onset    Colon cancer Father     Coronary artery disease Father     Asthma Father     Breast cancer additional onset Mother          Labs & Results:  Below is the patient's most recent value for Albumin, ALT, AST, BUN, Calcium, Chloride, Cholesterol, CO2, Creatinine, GFR, Glucose, HDL, Hematocrit, Hemoglobin, Hemoglobin A1C, LDL, Magnesium, Phosphorus, Platelets, Potassium, PSA, Sodium, Triglycerides, and WBC.   Lab Results   Component Value Date    ALT 20 06/24/2024    AST 17 06/24/2024     BUN 15 10/29/2024    CALCIUM 9.1 10/29/2024     10/29/2024    CHOL 188 05/08/2017    CO2 27 10/29/2024    CREATININE 0.80 10/29/2024    HDL 46 06/24/2024    HCT 44.3 06/24/2024    HGB 14.8 06/24/2024    HGBA1C 5.0 01/16/2021    MG 2.1 10/29/2024     06/24/2024    K 4.1 10/29/2024    PSA 2.35 04/19/2024     05/08/2017    TRIG 157 (H) 06/24/2024    WBC 7.07 06/24/2024     Note: for a comprehensive list of the patient's lab results, access the Results Review activity.

## 2024-11-20 NOTE — ASSESSMENT & PLAN NOTE
History of bifascicular block (RBBB + LAFB) + was having Wenckebach + 2:1 heart block  S/p MDT DC PPM

## 2024-11-20 NOTE — H&P (VIEW-ONLY)
EPS Consultation/New Patient Evaluation   Heart & Vascular Center  [facility] [address], [place] [address]    Name: Jerry Funk  : 1955  MRN: [id number]    Assessment & Plan  Ventricular tachycardia (HCC)  Latest 10/23/2024 pacemaker interrogation showed 25 beat run of VT at 200bpm  10/2024 lab evaluation with stable renal function and no significant electrolyte deficiency  Repeat 2024 ECHO - EF 56%, normall wall motion, mild concentric hypertrophy, no significant valvular disease  Maintained on sotalol 80mg BID given his AT hx   Not on AV emre blocking agent  Atrial tachycardia (HCC)  Maintained on sotalol as above given his atrial tachycardia history  Sick sinus syndrome (HCC)  History of bifascicular block (RBBB + LAFB) + was having Wenckebach + 2:1 heart block  S/p MDT DC PPM  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HCC)  S/p MDT DC PPM  Chest discomfort  Intermittent, ordered NM stress test        Discussion/Plan:    Patient's latest 10/23/2024 pacemaker interrogation showed a 25 beat run of monomorphic VT at 200 bpm    He reports he was asymptomatic during this episode    He notes some lightheadedness when standing too quickly, but otherwise denies significant lightheaded/dizzy episodes and has not passed out    Repeat lab evaluation 10/2024 showed stable renal function without significant electrolyte deficiency    Repeat echo 2024 showed EF 56%, normal wall motion, mild concentric hypertrophy, no significant valvular disease    He is currently maintained on sotalol 80mg BID given his history of paroxysmal atrial tachycardia without AV emre blocking agent     EKG in office today showing sinus rhythm with intermittent A-pacing w/ ventricular rate 67 bpm and QTC 488ms    His device was reprogrammed from DDD 60bpm to DDDR 70bpm (VT detection set to 150bpm)    Plan for monthly checks for the next 6 months     Plan to check stress test for further evaluation      Will also start metoprolol succinate 12.5mg daily (patient instructed to monitor BP closely at home and update us if his lightheaded symptoms/faitgue get worse on this medicine)     Patient has been instructed to follow up in our EP office in 6 months or as needed. He will call our office with any questions or concerns in the meantime.    Rhythm History:   Atrial fibrillation:      Atrial flutter:      SVT:      VT/VF/PVC:     Device history:   Pacemaker:     Defibrillator:     BIV PPM:     BIV ICD:     ILR:    Chief Complaint: Ventricular tachycardia    HPI:   Jerry Funk is a 69 y.o. male with a PMH of VT, AT, SSS, and tachybradycardia s/p PPM.     Patient was last seen by Dr. Yarbrough 06/1/2021 given his history of atrial tachycardia, sick sinus syndrome, and bifascicular block s/p Medtronic pacemaker.  Since then he been following with his general cardiology team.    On routine device interrogation 10/23/2024 he was noted to have an episode of VT and given this has returned back to the EP office for further discussion. He reports he was asymptomatic in regards to his VT episode.  He does note occasional lightheadedness when standing too quickly and has some intermittent fatigue.  He denies any other significant dizzy/lightheaded episodes and has not passed out at home.      EKG: sinus rhythm with intermittent A-pacing w/ ventricular rate 67 bpm and QTC 488ms      Review of Systems   Constitutional:  Negative for activity change, appetite change, chills, fatigue and fever.   HENT:  Negative for nosebleeds.    Respiratory:  Negative for chest tightness and shortness of breath.    Cardiovascular:  Negative for chest pain, palpitations and leg swelling.   Neurological:  Negative for dizziness, syncope, weakness and light-headedness.       Objective:     Vitals: There were no vitals taken for this visit., There is no height or weight on file to calculate BMI.,        Physical Exam:   Physical  Exam  Constitutional:       General: He is not in acute distress.     Appearance: Normal appearance. He is not toxic-appearing.   HENT:      Head: Normocephalic and atraumatic.   Eyes:      General:         Right eye: No discharge.         Left eye: No discharge.   Cardiovascular:      Rate and Rhythm: Normal rate and regular rhythm.      Pulses: Normal pulses.   Pulmonary:      Effort: Pulmonary effort is normal.      Breath sounds: Normal breath sounds.   Musculoskeletal:      Right lower leg: No edema.      Left lower leg: No edema.   Skin:     General: Skin is warm and dry.      Capillary Refill: Capillary refill takes less than 2 seconds.   Neurological:      Mental Status: He is alert.          Medications:      Current Outpatient Medications:     aspirin 81 MG tablet, Take by mouth, Disp: , Rfl:     atorvastatin (LIPITOR) 20 mg tablet, Take 1 tablet (20 mg total) by mouth daily, Disp: 100 tablet, Rfl: 1    citalopram (CeleXA) 20 mg tablet, Take 1 tablet (20 mg total) by mouth daily, Disp: , Rfl:     finasteride (PROSCAR) 5 mg tablet, Take 1 tablet (5 mg total) by mouth daily Do not start before August 17, 2023., Disp: 30 tablet, Rfl: 0    fluticasone (Arnuity Ellipta) 100 MCG/ACT AEPB inhaler, Inhale 1 puff daily Rinse mouth after use., Disp: 90 blister, Rfl: 2    fluticasone (FLONASE) 50 mcg/act nasal spray, 1 spray into each nostril daily, Disp: , Rfl:     ipratropium (ATROVENT) 0.06 % nasal spray, 1 SPRAY INTO EACH NOSTRIL IN THE MORNING AND 1 SPRAY BEFORE BEDTIME., Disp: 15 mL, Rfl: 5    LORazepam (ATIVAN) 0.5 mg tablet, Take 1 tablet (0.5 mg total) by mouth every 6 (six) hours as needed for anxiety, Disp: 90 tablet, Rfl: 0    montelukast (SINGULAIR) 10 mg tablet, TAKE 1 TABLET BY MOUTH EVERY DAY, Disp: 90 tablet, Rfl: 3    omeprazole (PriLOSEC) 40 MG capsule, TAKE 1 CAPSULE BY MOUTH EVERY DAY, Disp: 90 capsule, Rfl: 0    sotalol (BETAPACE) 80 mg tablet, TAKE 1 TABLET BY MOUTH EVERY 12 HOURS, Disp: 180  tablet, Rfl: 3    tamsulosin (FLOMAX) 0.4 mg, Take 0.4 mg by mouth 2 (two) times a day, Disp: , Rfl:        Historical Information   Past Medical History:   Diagnosis Date    Allergic     Anxiety     Asthma     allergy induced    Basal cell carcinoma     Colon polyp     Fatty liver     H/O nonmelanoma skin cancer     last assessed-8/22/2017    Heart murmur     Hyperlipidemia     Inflamed seborrheic keratosis     Malignant neoplasm of skin     last assessed-1/21/2014    Neoplasm of uncertain behavior of skin     Nocturia     Pneumothorax, left     Seasonal allergies     Sebaceous cyst     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/01/2022    SCCIS- mid scalp    Squamous cell carcinoma of skin of neck     Testicular hypofunction     Viral warts        Past Surgical History:   Procedure Laterality Date    CARDIAC CATHETERIZATION      CARDIAC PACEMAKER PLACEMENT  05/05/2021    CARDIAC PACEMAKER PLACEMENT      CARDIOVASCULAR STRESS TEST  08/27/2010    COLONOSCOPY  10/08/2008    IR OTHER  04/15/2021    LUNG SURGERY      for pneumothorax    MOHS SURGERY  02/15/2022    SCCIS mid scalp- Dr Leigh    PARTIAL HIP ARTHROPLASTY      RHINOPLASTY         Social History     Substance and Sexual Activity   Alcohol Use Not Currently    Comment: quit march 2020     Social History     Substance and Sexual Activity   Drug Use No     Social History     Tobacco Use   Smoking Status Never   Smokeless Tobacco Never       Family History   Problem Relation Age of Onset    Colon cancer Father     Coronary artery disease Father     Asthma Father     Breast cancer additional onset Mother          Labs & Results:  Below is the patient's most recent value for Albumin, ALT, AST, BUN, Calcium, Chloride, Cholesterol, CO2, Creatinine, GFR, Glucose, HDL, Hematocrit, Hemoglobin, Hemoglobin A1C, LDL, Magnesium, Phosphorus, Platelets, Potassium, PSA, Sodium, Triglycerides, and WBC.   Lab Results   Component Value Date    ALT 20 06/24/2024    AST 17 06/24/2024     BUN 15 10/29/2024    CALCIUM 9.1 10/29/2024     10/29/2024    CHOL 188 05/08/2017    CO2 27 10/29/2024    CREATININE 0.80 10/29/2024    HDL 46 06/24/2024    HCT 44.3 06/24/2024    HGB 14.8 06/24/2024    HGBA1C 5.0 01/16/2021    MG 2.1 10/29/2024     06/24/2024    K 4.1 10/29/2024    PSA 2.35 04/19/2024     05/08/2017    TRIG 157 (H) 06/24/2024    WBC 7.07 06/24/2024     Note: for a comprehensive list of the patient's lab results, access the Results Review activity.

## 2024-11-25 ENCOUNTER — OFFICE VISIT (OUTPATIENT)
Dept: CARDIOLOGY CLINIC | Facility: CLINIC | Age: 69
End: 2024-11-25
Payer: MEDICARE

## 2024-11-25 VITALS
DIASTOLIC BLOOD PRESSURE: 60 MMHG | HEART RATE: 67 BPM | WEIGHT: 193 LBS | SYSTOLIC BLOOD PRESSURE: 100 MMHG | HEIGHT: 68 IN | BODY MASS INDEX: 29.25 KG/M2

## 2024-11-25 DIAGNOSIS — I49.5 TACHY-BRADY SYNDROME (HCC): ICD-10-CM

## 2024-11-25 DIAGNOSIS — I49.5 SICK SINUS SYNDROME (HCC): ICD-10-CM

## 2024-11-25 DIAGNOSIS — I47.20 VENTRICULAR TACHYCARDIA (HCC): Primary | ICD-10-CM

## 2024-11-25 DIAGNOSIS — R07.89 CHEST DISCOMFORT: ICD-10-CM

## 2024-11-25 DIAGNOSIS — I47.19 ATRIAL TACHYCARDIA (HCC): ICD-10-CM

## 2024-11-25 PROCEDURE — 93000 ELECTROCARDIOGRAM COMPLETE: CPT

## 2024-11-25 PROCEDURE — 99214 OFFICE O/P EST MOD 30 MIN: CPT

## 2024-11-25 RX ORDER — METOPROLOL SUCCINATE 25 MG/1
12.5 TABLET, EXTENDED RELEASE ORAL DAILY
Qty: 45 TABLET | Refills: 1 | Status: SHIPPED | OUTPATIENT
Start: 2024-11-25

## 2024-11-26 ENCOUNTER — TELEPHONE (OUTPATIENT)
Dept: CARDIOLOGY CLINIC | Facility: CLINIC | Age: 69
End: 2024-11-26

## 2024-11-26 NOTE — TELEPHONE ENCOUNTER
Received a call from pt stating that he went to  his Metoprolol prescription at the pharmacy and the staff asked him if the provider is aware and is Ok for him to take both medication? Informed pt from Franky's note from his visit yesterday pt to stay taking Sotalol and start Metoprolo 12.5 mg. Pt just want me to double check with Franky if is save for him to be on both meds so he can inform his pharmacy that information.

## 2024-11-27 ENCOUNTER — REMOTE DEVICE CLINIC VISIT (OUTPATIENT)
Dept: CARDIOLOGY CLINIC | Facility: CLINIC | Age: 69
End: 2024-11-27

## 2024-11-27 DIAGNOSIS — Z95.0 PRESENCE OF PERMANENT CARDIAC PACEMAKER: Primary | ICD-10-CM

## 2024-11-27 PROCEDURE — RECHECK: Performed by: INTERNAL MEDICINE

## 2024-11-27 NOTE — PROGRESS NOTES
MDT DC PM/ACTIVE SYSTEM IS MRI CONDITIONAL   NB CARELINK TRANSMISSION:  VT EPISODE CHECK.  BATTERY VOLTAGE ADEQUATE (10.9 YR.).  AP 73.9%  19.5%.  ALL LEAD PARAMETERS WITHIN NORMAL LIMITS.  NO HIGH RATE EPISODES.  NORMAL DEVICE FUNCTION.  RG

## 2024-11-28 ENCOUNTER — RESULTS FOLLOW-UP (OUTPATIENT)
Dept: CARDIOLOGY CLINIC | Facility: CLINIC | Age: 69
End: 2024-11-28

## 2024-12-03 ENCOUNTER — OFFICE VISIT (OUTPATIENT)
Age: 69
End: 2024-12-03
Payer: MEDICARE

## 2024-12-03 VITALS
WEIGHT: 194 LBS | HEART RATE: 78 BPM | TEMPERATURE: 97.9 F | HEIGHT: 68 IN | OXYGEN SATURATION: 93 % | DIASTOLIC BLOOD PRESSURE: 82 MMHG | BODY MASS INDEX: 29.4 KG/M2 | SYSTOLIC BLOOD PRESSURE: 122 MMHG

## 2024-12-03 DIAGNOSIS — L57.0 KERATOSIS, ACTINIC: ICD-10-CM

## 2024-12-03 DIAGNOSIS — D18.01 CHERRY ANGIOMA: ICD-10-CM

## 2024-12-03 DIAGNOSIS — Z13.89 SCREENING FOR SKIN CONDITION: Primary | ICD-10-CM

## 2024-12-03 DIAGNOSIS — L82.1 SEBORRHEIC KERATOSIS: ICD-10-CM

## 2024-12-03 DIAGNOSIS — D22.9 MULTIPLE NEVI: ICD-10-CM

## 2024-12-03 DIAGNOSIS — Z85.828 HISTORY OF SKIN CANCER: ICD-10-CM

## 2024-12-03 PROCEDURE — 17000 DESTRUCT PREMALG LESION: CPT | Performed by: DERMATOLOGY

## 2024-12-03 PROCEDURE — 17003 DESTRUCT PREMALG LES 2-14: CPT | Performed by: DERMATOLOGY

## 2024-12-03 PROCEDURE — 99214 OFFICE O/P EST MOD 30 MIN: CPT | Performed by: DERMATOLOGY

## 2024-12-03 NOTE — PROGRESS NOTES
"[facility] Note     Patient Name: Jerry Funk  Encounter Date: December 3, 2024.     Have you been cared for by a [facility] Dermatologist in the last 3 years and, if so, which description applies to you?    Yes.  I have been here within the last 3 years, and my medical history has NOT changed since that time.  I am MALE/not capable of bearing children.    REVIEW OF SYSTEMS:  Have you recently had or currently have any of the following? No changes in my recent health.   PAST MEDICAL HISTORY:  Have you personally ever had or currently have any of the following?  If \"YES,\" then please provide more detail. No changes in my medical history.   HISTORY OF IMMUNOSUPPRESSION: Do you have a history of any of the following:  Systemic Immunosuppression such as Diabetes, Biologic or Immunotherapy, Chemotherapy, Organ Transplantation, Bone Marrow Transplantation or Prednisone?  No     Answering \"YES\" requires the addition of the dotphrase \"IMMUNOSUPPRESSED\" as the first diagnosis of the patient's visit.   FAMILY HISTORY:  Any \"first degree relatives\" (parent, brother, sister, or child) with the following?    No changes in my family's known health.   PATIENT EXPERIENCE:    Do you want the Dermatologist to perform a COMPLETE skin exam today including a clinical examination under the \"bra and underwear\" areas?  Yes  If necessary, do we have your permission to call and leave a detailed message on your Preferred Phone number that includes your specific medical information?  Yes      Allergies   Allergen Reactions    Dust Mite Extract     Molds & Smuts     Pollen Extract     Short Ragweed Pollen Ext     Tree Extract       Current Outpatient Medications:     aspirin 81 MG tablet, Take by mouth, Disp: , Rfl:     atorvastatin (LIPITOR) 20 mg tablet, Take 1 tablet (20 mg total) by mouth daily, Disp: 100 tablet, Rfl: 1    citalopram (CeleXA) 20 mg tablet, Take 1 tablet (20 mg total) by mouth daily, Disp: , Rfl:     " finasteride (PROSCAR) 5 mg tablet, Take 1 tablet (5 mg total) by mouth daily Do not start before August 17, 2023., Disp: 30 tablet, Rfl: 0    fluticasone (Arnuity Ellipta) 100 MCG/ACT AEPB inhaler, Inhale 1 puff daily Rinse mouth after use., Disp: 90 blister, Rfl: 2    fluticasone (FLONASE) 50 mcg/act nasal spray, 1 spray into each nostril daily, Disp: , Rfl:     ipratropium (ATROVENT) 0.06 % nasal spray, 1 SPRAY INTO EACH NOSTRIL IN THE MORNING AND 1 SPRAY BEFORE BEDTIME., Disp: 15 mL, Rfl: 5    LORazepam (ATIVAN) 0.5 mg tablet, Take 1 tablet (0.5 mg total) by mouth every 6 (six) hours as needed for anxiety, Disp: 90 tablet, Rfl: 0    metoprolol succinate (TOPROL-XL) 25 mg 24 hr tablet, Take 0.5 tablets (12.5 mg total) by mouth daily, Disp: 45 tablet, Rfl: 1    montelukast (SINGULAIR) 10 mg tablet, Take 1 tablet (10 mg total) by mouth daily, Disp: 90 tablet, Rfl: 0    omeprazole (PriLOSEC) 40 MG capsule, TAKE 1 CAPSULE BY MOUTH EVERY DAY, Disp: 90 capsule, Rfl: 0    sotalol (BETAPACE) 80 mg tablet, TAKE 1 TABLET BY MOUTH EVERY 12 HOURS, Disp: 180 tablet, Rfl: 3    tamsulosin (FLOMAX) 0.4 mg, Take 0.4 mg by mouth 2 (two) times a day, Disp: , Rfl:           Whom besides the patient is providing clinical information about today's encounter?   NO ADDITIONAL HISTORIAN (patient alone provided history)    Physical Exam and Assessment/Plan by Diagnosis:    Chief complaint: Patient is a 68 y/o male with history of skin cancer, present today for a routine skin exam, with a few spots of concern on the forehead.       HISTORY OF SQUAMOUS CELL CARCINOMA     Physical Exam:  Anatomic Location Affected:  right frontal scalp 12/2023  Mid scalp 2022  Morphological Description of Scar: well healed  Suspected Recurrence: no  Regional adenopathy: no    Additional History of Present Condition:  history of SCC, treated.     Assessment and Plan:  Based on a thorough discussion of this condition and the management approach to it (including  "a comprehensive discussion of the known risks, side effects and potential benefits of treatment), the patient (family) agrees to implement the following specific plan:  Monitor for changes.     ACTINIC KERATOSIS    Physical Exam:  Anatomic Location: face and scalp  Morphologic Description: Scaly pink papules  Pertinent Positives:  Pertinent Negatives:  Physical Exam  HENT:      Head:            Additional History of Present Condition:  present on exam    Plan:  Cryotherapy performed in the office (See Procedure Note). We discussed that a hypopigmentation or scar may form in the region following cryotherapy.  We discussed the pre-cancerous nature of the condition. Actinic keratosis is found on sun-damaged skin and there is small risk that the condition could turn into a skin cancer called squamous cell carcinoma. There is no risk of actinic keratosis turning into melanoma.  We discussed sun protection measures, including using sunscreen with an SPF 50+ year round, avoiding the sun, and wearing protective clothing such as long sleeves and pants when out in the sun.  Continue to monitor clinically for signs of recurrence. Discussed with patient the importance of keeping up to date with full body skin exams.     PROCEDURES PERFORMED TODAY ASSOCIATED WITH THIS CONDITION:          Cryotherapy: PROCEDURE:  DESTRUCTION OF PRE-MALIGNANT LESIONS  After a thorough discussion of treatment options and risk/benefits/alternatives (including but not limited to local pain, scarring, dyspigmentation, blistering, and possible superinfection), verbal and written consent were obtained and the aforementioned lesions were treated on with cryotherapy using liquid nitrogen x 1 cycle for 5-10 seconds.    TOTAL NUMBER of 6 pre-malignant lesions were treated today on the ANATOMIC LOCATION: face and scalp.     The patient tolerated the procedure well, and after-care instructions were provided.          MELANOCYTIC NEVI (\"Moles\")    Physical " "Exam:  Anatomic Location Affected: Mostly on sun-exposed areas of the trunk and extremities  Morphological Description:  Scattered, 1-4mm round to ovoid, symmetrical-appearing, even bordered, skin colored to dark brown macules/papules, mostly in sun-exposed areas  Pertinent Positives:  Pertinent Negatives:    Additional History of Present Condition:  present on exam     Assessment and Plan:  Based on a thorough discussion of this condition and the management approach to it (including a comprehensive discussion of the known risks, side effects and potential benefits of treatment), the patient (family) agrees to implement the following specific plan:  Provided handout with information regarding the ABCDE's of moles   Recommend routine skin exams every 6 months   Sun avoidance, protective clothing (known as UPF clothing), and the use of at least SPF 30 sunscreens is advised. Sunscreen should be reapplied every two hours when outside.     SEBORRHEIC KERATOSIS; NON-INFLAMED    Physical Exam:  Anatomic Location Affected:  scattered across trunk, extremities, face  Morphological Description:  Flat and raised, waxy, smooth to warty textured, yellow to brownish-grey to dark brown to blackish, discrete, \"stuck-on\" appearing papules.  Pertinent Positives:  Pertinent Negatives:    Additional History of Present Condition:  Patient reports new bumps on the skin.  Denies itch, burn, pain, bleeding or ulceration.  Present constantly; nothing seems to make it worse or better.  No prior treatment.      Assessment and Plan:  Based on a thorough discussion of this condition and the management approach to it (including a comprehensive discussion of the known risks, side effects and potential benefits of treatment), the patient (family) agrees to implement the following specific plan:  Reassured benign    ANGIOMA (\"CHERRY ANGIOMA\")    Physical Exam:  Anatomic Location: scattered across sun exposed areas of the trunk and extremities "   Morphologic Description: Firm red to reddish-blue discrete papules  Pertinent Positives:  Pertinent Negatives:    Additional History of Present Condition:  Present on exam.     Assessment and Plan:  Reassured benign    Scribe Attestation      I,:  Tayler Hawthorne MA am acting as a scribe while in the presence of the attending physician.:       I,:  Alphonso Marcus MD personally performed the services described in this documentation    as scribed in my presence.:

## 2024-12-03 NOTE — PATIENT INSTRUCTIONS
"Treatment with Cryotherapy    The doctor has treated your skin with nitrogen, which is 320 degrees Fahrenheit below zero.  He has given the treated area \"frostbite.\"    Stinging should subside within a few hours.  You can take Tylenol for pain, if needed.    Over the next few days, it is normal if the area becomes reddened, a blood blister, or swollen with fluid.  If the lesion treated was near the eye - you could get a swollen eye over the next few days.  Do not panic!  This is all temporary, and will resolve with time.    There is no special treatment - just keep the area clean.  Makeup and BandAids can be used, if preferred.    When the area starts to dry up and peel off, using Vaseline can help healing.    It usually takes up to a month for it to heal.  Some lesions are recurrent and may require repeat treatments.  If a lesion has not healed in one month, please don't hesitate to contact us.      If you have any further questions that are not answered here, please call us.  [phone].    Thank you for allowing us to care for you.   "

## 2024-12-05 DIAGNOSIS — E78.5 HYPERLIPIDEMIA, UNSPECIFIED HYPERLIPIDEMIA TYPE: ICD-10-CM

## 2024-12-05 DIAGNOSIS — K21.9 GASTROESOPHAGEAL REFLUX DISEASE: ICD-10-CM

## 2024-12-05 DIAGNOSIS — I47.19 ATRIAL TACHYCARDIA (HCC): ICD-10-CM

## 2024-12-05 DIAGNOSIS — I49.5 TACHY-BRADY SYNDROME (HCC): ICD-10-CM

## 2024-12-05 RX ORDER — OMEPRAZOLE 40 MG/1
40 CAPSULE, DELAYED RELEASE ORAL DAILY
Qty: 90 CAPSULE | Refills: 0 | Status: SHIPPED | OUTPATIENT
Start: 2024-12-05

## 2024-12-05 RX ORDER — ATORVASTATIN CALCIUM 20 MG/1
20 TABLET, FILM COATED ORAL DAILY
Qty: 90 TABLET | Refills: 2 | Status: SHIPPED | OUTPATIENT
Start: 2024-12-05

## 2024-12-09 RX ORDER — SOTALOL HYDROCHLORIDE 80 MG/1
80 TABLET ORAL EVERY 12 HOURS
Qty: 180 TABLET | Refills: 3 | Status: SHIPPED | OUTPATIENT
Start: 2024-12-09

## 2024-12-10 ENCOUNTER — HOSPITAL ENCOUNTER (OUTPATIENT)
Dept: NON INVASIVE DIAGNOSTICS | Facility: CLINIC | Age: 69
Discharge: HOME | End: 2024-12-10
Payer: MEDICARE

## 2024-12-10 VITALS
SYSTOLIC BLOOD PRESSURE: 130 MMHG | HEART RATE: 75 BPM | BODY MASS INDEX: 29.4 KG/M2 | HEIGHT: 68 IN | WEIGHT: 194 LBS | DIASTOLIC BLOOD PRESSURE: 84 MMHG | OXYGEN SATURATION: 96 %

## 2024-12-10 DIAGNOSIS — R07.89 CHEST DISCOMFORT: ICD-10-CM

## 2024-12-10 DIAGNOSIS — I47.20 VENTRICULAR TACHYCARDIA (HCC): ICD-10-CM

## 2024-12-10 LAB
RATE PRESSURE PRODUCT: 9920
SL CV REST NUCLEAR ISOTOPE DOSE: 10.8 MCI
SL CV STRESS NUCLEAR ISOTOPE DOSE: 32.31 MCI
SL CV STRESS RECOVERY BP: NORMAL MMHG
SL CV STRESS RECOVERY HR: 70 BPM
SL CV STRESS RECOVERY O2 SAT: 99 %
STRESS ANGINA INDEX: 0
STRESS BASELINE BP: NORMAL MMHG
STRESS BASELINE HR: 75 BPM
STRESS O2 SAT REST: 96 %
STRESS PEAK HR: 80 BPM
STRESS POST O2 SAT PEAK: 97 %
STRESS POST PEAK BP: 124 MMHG

## 2024-12-10 PROCEDURE — 93018 CV STRESS TEST I&R ONLY: CPT | Performed by: INTERNAL MEDICINE

## 2024-12-10 PROCEDURE — 93016 CV STRESS TEST SUPVJ ONLY: CPT | Performed by: INTERNAL MEDICINE

## 2024-12-10 PROCEDURE — 93017 CV STRESS TEST TRACING ONLY: CPT

## 2024-12-10 PROCEDURE — A9502 TC99M TETROFOSMIN: HCPCS

## 2024-12-10 PROCEDURE — 78452 HT MUSCLE IMAGE SPECT MULT: CPT

## 2024-12-10 PROCEDURE — 78452 HT MUSCLE IMAGE SPECT MULT: CPT | Performed by: INTERNAL MEDICINE

## 2024-12-10 RX ORDER — REGADENOSON 0.08 MG/ML
0.4 INJECTION, SOLUTION INTRAVENOUS ONCE
Status: COMPLETED | OUTPATIENT
Start: 2024-12-10 | End: 2024-12-10

## 2024-12-10 RX ORDER — AMINOPHYLLINE 25 MG/ML
50 INJECTION, SOLUTION INTRAVENOUS ONCE
Status: COMPLETED | OUTPATIENT
Start: 2024-12-10 | End: 2024-12-10

## 2024-12-10 RX ADMIN — REGADENOSON 0.4 MG: 0.08 INJECTION, SOLUTION INTRAVENOUS at 13:24

## 2024-12-10 RX ADMIN — AMINOPHYLLINE 50 MG: 25 INJECTION, SOLUTION INTRAVENOUS at 13:30

## 2024-12-11 ENCOUNTER — PREP FOR PROCEDURE (OUTPATIENT)
Dept: CARDIOLOGY CLINIC | Facility: CLINIC | Age: 69
End: 2024-12-11

## 2024-12-11 ENCOUNTER — TELEPHONE (OUTPATIENT)
Age: 69
End: 2024-12-11

## 2024-12-11 DIAGNOSIS — R94.39 ABNORMAL STRESS TEST: Primary | ICD-10-CM

## 2024-12-11 LAB
CHEST PAIN STATEMENT: NORMAL
MAX DIASTOLIC BP: 84 MMHG
MAX PREDICTED HEART RATE: 153 BPM
PROTOCOL NAME: NORMAL
REASON FOR TERMINATION: NORMAL
STRESS POST EXERCISE DUR MIN: 3 MIN
STRESS POST EXERCISE DUR SEC: 0 SEC
STRESS POST PEAK HR: 80 BPM
STRESS POST PEAK SYSTOLIC BP: 130 MMHG
TARGET HR FORMULA: NORMAL

## 2024-12-11 NOTE — TELEPHONE ENCOUNTER
Spoke with patient who lives in [facility] and he prefer to have the cath procedure done out there.  Ruchi can you please call patient to set him up for this procedure.  Thank you.

## 2024-12-11 NOTE — TELEPHONE ENCOUNTER
Caller: Jerry Funk    Doctor: Dr Yarbrough    Reason for call: Patient called stating he had his stress test done yesterday. He then received a phone call last night stating he may need a cardiac cath. I don't see any notes or orders in here. Patient is concerned if this is urgent or not. Please advise patient.     Call back#: [phone]

## 2024-12-12 ENCOUNTER — PREP FOR PROCEDURE (OUTPATIENT)
Dept: CARDIOLOGY CLINIC | Facility: CLINIC | Age: 69
End: 2024-12-12

## 2024-12-12 ENCOUNTER — APPOINTMENT (OUTPATIENT)
Age: 69
End: 2024-12-12
Payer: MEDICARE

## 2024-12-12 ENCOUNTER — TELEPHONE (OUTPATIENT)
Dept: CARDIOLOGY CLINIC | Facility: CLINIC | Age: 69
End: 2024-12-12

## 2024-12-12 DIAGNOSIS — I47.19 ATRIAL TACHYCARDIA (HCC): ICD-10-CM

## 2024-12-12 DIAGNOSIS — I49.5 SICK SINUS SYNDROME (HCC): ICD-10-CM

## 2024-12-12 DIAGNOSIS — I49.5 TACHY-BRADY SYNDROME (HCC): Primary | ICD-10-CM

## 2024-12-12 DIAGNOSIS — I49.5 TACHY-BRADY SYNDROME (HCC): ICD-10-CM

## 2024-12-12 LAB
ANION GAP SERPL CALCULATED.3IONS-SCNC: 9 MMOL/L (ref 4–13)
BUN SERPL-MCNC: 14 MG/DL (ref 5–25)
CALCIUM SERPL-MCNC: 9.7 MG/DL (ref 8.4–10.2)
CHLORIDE SERPL-SCNC: 102 MMOL/L (ref 96–108)
CO2 SERPL-SCNC: 28 MMOL/L (ref 21–32)
CREAT SERPL-MCNC: 0.98 MG/DL (ref 0.6–1.3)
ERYTHROCYTE [DISTWIDTH] IN BLOOD BY AUTOMATED COUNT: 12.6 % (ref 11.6–15.1)
GFR SERPL CREATININE-BSD FRML MDRD: 79 ML/MIN/1.73SQ M
GLUCOSE P FAST SERPL-MCNC: 104 MG/DL (ref 65–99)
HCT VFR BLD AUTO: 45.8 % (ref 36.5–49.3)
HGB BLD-MCNC: 15.2 G/DL (ref 12–17)
INR PPP: 1.03 (ref 0.85–1.19)
MCH RBC QN AUTO: 30.4 PG (ref 26.8–34.3)
MCHC RBC AUTO-ENTMCNC: 33.2 G/DL (ref 31.4–37.4)
MCV RBC AUTO: 92 FL (ref 82–98)
PLATELET # BLD AUTO: 199 THOUSANDS/UL (ref 149–390)
PMV BLD AUTO: 10.8 FL (ref 8.9–12.7)
POTASSIUM SERPL-SCNC: 4.2 MMOL/L (ref 3.5–5.3)
PROTHROMBIN TIME: 13.8 SECONDS (ref 12.3–15)
RBC # BLD AUTO: 5 MILLION/UL (ref 3.88–5.62)
SODIUM SERPL-SCNC: 139 MMOL/L (ref 135–147)
WBC # BLD AUTO: 7.52 THOUSAND/UL (ref 4.31–10.16)

## 2024-12-12 PROCEDURE — 80048 BASIC METABOLIC PNL TOTAL CA: CPT

## 2024-12-12 PROCEDURE — 85027 COMPLETE CBC AUTOMATED: CPT

## 2024-12-12 PROCEDURE — 85610 PROTHROMBIN TIME: CPT

## 2024-12-12 PROCEDURE — 36415 COLL VENOUS BLD VENIPUNCTURE: CPT

## 2024-12-12 NOTE — TELEPHONE ENCOUNTER
S/w pt. Advised of card cath scheduled for 12/18. Pt advised to hold no meds morning of procedure. Pt advised to obtain labs ordered ASAP. Message sent to pt via Checkr with instructions provided over the phone. Pt verbalized understanding

## 2024-12-18 ENCOUNTER — HOSPITAL ENCOUNTER (OUTPATIENT)
Facility: HOSPITAL | Age: 69
Setting detail: OUTPATIENT SURGERY
Discharge: HOME | End: 2024-12-18
Attending: STUDENT IN AN ORGANIZED HEALTH CARE EDUCATION/TRAINING PROGRAM | Admitting: STUDENT IN AN ORGANIZED HEALTH CARE EDUCATION/TRAINING PROGRAM
Payer: MEDICARE

## 2024-12-18 VITALS
SYSTOLIC BLOOD PRESSURE: 111 MMHG | TEMPERATURE: 98.6 F | RESPIRATION RATE: 16 BRPM | DIASTOLIC BLOOD PRESSURE: 71 MMHG | HEIGHT: 68 IN | BODY MASS INDEX: 29.27 KG/M2 | OXYGEN SATURATION: 96 % | WEIGHT: 193.12 LBS | HEART RATE: 70 BPM

## 2024-12-18 DIAGNOSIS — R94.39 ABNORMAL STRESS TEST: ICD-10-CM

## 2024-12-18 LAB
KCT BLD-ACNC: 281 SEC (ref 89–137)
SPECIMEN SOURCE: ABNORMAL

## 2024-12-18 PROCEDURE — 99152 MOD SED SAME PHYS/QHP 5/>YRS: CPT | Performed by: STUDENT IN AN ORGANIZED HEALTH CARE EDUCATION/TRAINING PROGRAM

## 2024-12-18 PROCEDURE — C1887 CATHETER, GUIDING: HCPCS | Performed by: STUDENT IN AN ORGANIZED HEALTH CARE EDUCATION/TRAINING PROGRAM

## 2024-12-18 PROCEDURE — 93571 IV DOP VEL&/PRESS C FLO 1ST: CPT | Performed by: STUDENT IN AN ORGANIZED HEALTH CARE EDUCATION/TRAINING PROGRAM

## 2024-12-18 PROCEDURE — C1769 GUIDE WIRE: HCPCS | Performed by: STUDENT IN AN ORGANIZED HEALTH CARE EDUCATION/TRAINING PROGRAM

## 2024-12-18 PROCEDURE — 93458 L HRT ARTERY/VENTRICLE ANGIO: CPT | Performed by: STUDENT IN AN ORGANIZED HEALTH CARE EDUCATION/TRAINING PROGRAM

## 2024-12-18 PROCEDURE — 99153 MOD SED SAME PHYS/QHP EA: CPT | Performed by: STUDENT IN AN ORGANIZED HEALTH CARE EDUCATION/TRAINING PROGRAM

## 2024-12-18 PROCEDURE — 93799 UNLISTED CV SVC/PROCEDURE: CPT | Performed by: STUDENT IN AN ORGANIZED HEALTH CARE EDUCATION/TRAINING PROGRAM

## 2024-12-18 PROCEDURE — C1894 INTRO/SHEATH, NON-LASER: HCPCS | Performed by: STUDENT IN AN ORGANIZED HEALTH CARE EDUCATION/TRAINING PROGRAM

## 2024-12-18 PROCEDURE — 85347 COAGULATION TIME ACTIVATED: CPT

## 2024-12-18 RX ORDER — ASPIRIN 81 MG/1
TABLET, CHEWABLE ORAL CODE/TRAUMA/SEDATION MEDICATION
Status: DISCONTINUED | OUTPATIENT
Start: 2024-12-18 | End: 2024-12-18 | Stop reason: HOSPADM

## 2024-12-18 RX ORDER — LIDOCAINE WITH 8.4% SOD BICARB 0.9%(10ML)
SYRINGE (ML) INJECTION CODE/TRAUMA/SEDATION MEDICATION
Status: DISCONTINUED | OUTPATIENT
Start: 2024-12-18 | End: 2024-12-18 | Stop reason: HOSPADM

## 2024-12-18 RX ORDER — HEPARIN SODIUM 1000 [USP'U]/ML
INJECTION, SOLUTION INTRAVENOUS; SUBCUTANEOUS CODE/TRAUMA/SEDATION MEDICATION
Status: DISCONTINUED | OUTPATIENT
Start: 2024-12-18 | End: 2024-12-18 | Stop reason: HOSPADM

## 2024-12-18 RX ORDER — NITROGLYCERIN 20 MG/100ML
INJECTION INTRAVENOUS CODE/TRAUMA/SEDATION MEDICATION
Status: DISCONTINUED | OUTPATIENT
Start: 2024-12-18 | End: 2024-12-18 | Stop reason: HOSPADM

## 2024-12-18 RX ORDER — MIDAZOLAM HYDROCHLORIDE 2 MG/2ML
INJECTION, SOLUTION INTRAMUSCULAR; INTRAVENOUS CODE/TRAUMA/SEDATION MEDICATION
Status: DISCONTINUED | OUTPATIENT
Start: 2024-12-18 | End: 2024-12-18 | Stop reason: HOSPADM

## 2024-12-18 RX ORDER — FENTANYL CITRATE 50 UG/ML
INJECTION, SOLUTION INTRAMUSCULAR; INTRAVENOUS CODE/TRAUMA/SEDATION MEDICATION
Status: DISCONTINUED | OUTPATIENT
Start: 2024-12-18 | End: 2024-12-18 | Stop reason: HOSPADM

## 2024-12-18 RX ORDER — SODIUM CHLORIDE 9 MG/ML
75 INJECTION, SOLUTION INTRAVENOUS CONTINUOUS
Status: DISCONTINUED | OUTPATIENT
Start: 2024-12-18 | End: 2024-12-18 | Stop reason: HOSPADM

## 2024-12-18 RX ORDER — SODIUM CHLORIDE 9 MG/ML
75 INJECTION, SOLUTION INTRAVENOUS CONTINUOUS
Status: DISCONTINUED | OUTPATIENT
Start: 2024-12-18 | End: 2024-12-18

## 2024-12-18 RX ADMIN — SODIUM CHLORIDE 75 ML/HR: 0.9 INJECTION, SOLUTION INTRAVENOUS at 08:39

## 2024-12-18 NOTE — DISCHARGE INSTR - AVS FIRST PAGE
1. Please see the post cardiac catheterization dishcarge instructions.   No heavy lifting, greater than 10 lbs. or strenuous  activity for 48 hrs.    2.Remove band aid tomorrow.  Shower and wash area- wrist gently with soap and water- beginning tomorrow. Rinse and pat dry.  Apply new water seal band aid.  Repeat this process for 5 days. No powders, creams lotions or antibiotic ointments  for 5 days.  No tub baths, hot tubs or swimming for 5 days.     3. Please call our office ([phone]) if you have any fever, redness, swelling, discharge from your wrist access site.    4.No driving for 2 days

## 2024-12-18 NOTE — INTERVAL H&P NOTE
H&P reviewed. After examining the patient I find no changes in the patients condition since the H&P had been written.    Vitals:    12/18/24 0829   BP: 100/71   Pulse: 75   Resp: 19   Temp: 98.6 °F (37 °C)   SpO2: 96%

## 2024-12-27 ENCOUNTER — REMOTE DEVICE CLINIC VISIT (OUTPATIENT)
Dept: CARDIOLOGY CLINIC | Facility: CLINIC | Age: 69
End: 2024-12-27

## 2024-12-27 DIAGNOSIS — Z95.0 CARDIAC PACEMAKER IN SITU: Primary | ICD-10-CM

## 2024-12-27 PROCEDURE — RECHECK: Performed by: INTERNAL MEDICINE

## 2024-12-27 NOTE — PROGRESS NOTES
Results for orders placed or performed in visit on 12/27/24   Cardiac EP device report    Narrative    MDT DC PM/ACTIVE SYSTEM IS MRI CONDITIONAL  1 MO CARELINK TRANSMISSION TO CHECK EPISODES PER DR. - NB: BATTERY VOLTAGE ADEQUATE (10.8 YRS). AP-83%, -19%. ALL AVAILABLE LEAD PARAMETERS WITHIN NORMAL LIMITS. NO SIGNIFICANT HIGH RATE EPISODES. NORMAL DEVICE FUNCTION. GV

## 2024-12-28 ENCOUNTER — RESULTS FOLLOW-UP (OUTPATIENT)
Dept: CARDIOLOGY CLINIC | Facility: CLINIC | Age: 69
End: 2024-12-28

## 2024-12-30 ENCOUNTER — RA CDI HCC (OUTPATIENT)
Dept: OTHER | Facility: HOSPITAL | Age: 69
End: 2024-12-30

## 2025-01-06 ENCOUNTER — OFFICE VISIT (OUTPATIENT)
Dept: FAMILY MEDICINE CLINIC | Facility: CLINIC | Age: 70
End: 2025-01-06
Payer: MEDICARE

## 2025-01-06 VITALS
RESPIRATION RATE: 14 BRPM | BODY MASS INDEX: 29.25 KG/M2 | WEIGHT: 193 LBS | DIASTOLIC BLOOD PRESSURE: 70 MMHG | SYSTOLIC BLOOD PRESSURE: 118 MMHG | HEIGHT: 68 IN | HEART RATE: 76 BPM | OXYGEN SATURATION: 96 % | TEMPERATURE: 97.6 F

## 2025-01-06 DIAGNOSIS — I47.20 VENTRICULAR TACHYCARDIA (HCC): ICD-10-CM

## 2025-01-06 DIAGNOSIS — E78.5 HYPERLIPIDEMIA, UNSPECIFIED HYPERLIPIDEMIA TYPE: ICD-10-CM

## 2025-01-06 DIAGNOSIS — E78.2 MIXED HYPERLIPIDEMIA: ICD-10-CM

## 2025-01-06 DIAGNOSIS — F41.1 ANXIETY, GENERALIZED: ICD-10-CM

## 2025-01-06 DIAGNOSIS — I25.10 CORONARY ARTERY DISEASE INVOLVING NATIVE CORONARY ARTERY OF NATIVE HEART WITHOUT ANGINA PECTORIS: Primary | ICD-10-CM

## 2025-01-06 DIAGNOSIS — I71.21 ANEURYSM OF ASCENDING AORTA WITHOUT RUPTURE (HCC): ICD-10-CM

## 2025-01-06 PROCEDURE — 99214 OFFICE O/P EST MOD 30 MIN: CPT | Performed by: FAMILY MEDICINE

## 2025-01-06 PROCEDURE — G2211 COMPLEX E/M VISIT ADD ON: HCPCS | Performed by: FAMILY MEDICINE

## 2025-01-06 RX ORDER — PREDNISOLONE ACETATE 10 MG/ML
SUSPENSION/ DROPS OPHTHALMIC
COMMUNITY
Start: 2024-12-30

## 2025-01-06 RX ORDER — ATORVASTATIN CALCIUM 40 MG/1
40 TABLET, FILM COATED ORAL DAILY
Qty: 90 TABLET | Refills: 1 | Status: SHIPPED | OUTPATIENT
Start: 2025-01-06

## 2025-01-06 NOTE — PROGRESS NOTES
Name: Jerry Funk      : 1955      MRN: [id number]  Encounter Provider: Frederic Bush DO  Encounter Date: 2025   Encounter department: [facility] [address]      Assessment & Plan  Coronary artery disease involving native coronary artery of native heart without angina pectoris  We will increase his a atorvastatin to 40 mg daily to optimize his statin therapy.       Anxiety, generalized  Continue Celexa       Mixed hyperlipidemia  Increase the atorvastatin as above, check CMP, lipid profile and 6 months       Hyperlipidemia, unspecified hyperlipidemia type    Orders:  •  atorvastatin (LIPITOR) 40 mg tablet; Take 1 tablet (40 mg total) by mouth daily  •  Comprehensive metabolic panel; Future  •  Lipid panel; Future    Aneurysm of ascending aorta without rupture (HCC)  Stable, managed by cardiology.       Ventricular tachycardia (HCC)  Controlled, continue his metoprolol and sotalol            History of Present Illness     Patient present today for checkup, since his last visit he did have a cardiac catheterization done for an abnormal stress test.  It did show 60 to 70% stenosis of his LAD.  He was started on metoprolol in addition to his current medications.      Review of Systems   Constitutional:  Negative for chills, fatigue and fever.   HENT:  Negative for congestion, ear pain, hearing loss, postnasal drip, rhinorrhea and sore throat.    Eyes:  Negative for pain and visual disturbance.   Respiratory:  Negative for chest tightness, shortness of breath and wheezing.    Cardiovascular:  Negative for chest pain and leg swelling.   Gastrointestinal:  Negative for abdominal distention, abdominal pain, constipation, diarrhea and vomiting.   Endocrine: Negative for cold intolerance and heat intolerance.   Genitourinary:  Negative for difficulty urinating, frequency and urgency.   Musculoskeletal:  Negative for arthralgias and gait problem.   Skin:  Negative for  "color change.   Neurological:  Negative for dizziness, tremors, syncope, numbness and headaches.   Hematological:  Negative for adenopathy.   Psychiatric/Behavioral:  Negative for agitation, confusion and sleep disturbance. The patient is not nervous/anxious.      Objective     /70 (BP Location: Left arm)   Pulse 76   Temp 97.6 °F (36.4 °C)   Resp 14   Ht 5' 8\" (1.727 m)   Wt 87.5 kg (193 lb)   SpO2 96%   BMI 29.35 kg/m²     Physical Exam  Constitutional:       Appearance: He is well-developed.   HENT:      Head: Normocephalic.      Right Ear: External ear normal.      Left Ear: External ear normal.      Nose: Nose normal.   Eyes:      Extraocular Movements: Extraocular movements intact.      Conjunctiva/sclera: Conjunctivae normal.      Pupils: Pupils are equal, round, and reactive to light.   Neck:      Thyroid: No thyromegaly.   Cardiovascular:      Rate and Rhythm: Normal rate and regular rhythm.      Heart sounds: Normal heart sounds.   Pulmonary:      Effort: Pulmonary effort is normal.      Breath sounds: Normal breath sounds.   Abdominal:      General: Bowel sounds are normal.      Palpations: Abdomen is soft.   Musculoskeletal:         General: Normal range of motion.      Cervical back: Normal range of motion.   Skin:     General: Skin is warm and dry.   Neurological:      Mental Status: He is alert and oriented to person, place, and time.   Psychiatric:         Mood and Affect: Mood normal.         Behavior: Behavior normal.         Frederic Bush,      "

## 2025-01-09 ENCOUNTER — REMOTE DEVICE CLINIC VISIT (OUTPATIENT)
Dept: CARDIOLOGY CLINIC | Facility: CLINIC | Age: 70
End: 2025-01-09
Payer: MEDICARE

## 2025-01-09 DIAGNOSIS — Z95.0 CARDIAC PACEMAKER IN SITU: Primary | ICD-10-CM

## 2025-01-09 PROCEDURE — 93296 REM INTERROG EVL PM/IDS: CPT | Performed by: INTERNAL MEDICINE

## 2025-01-09 PROCEDURE — 93294 REM INTERROG EVL PM/LDLS PM: CPT | Performed by: INTERNAL MEDICINE

## 2025-01-09 NOTE — PROGRESS NOTES
Results for orders placed or performed in visit on 01/09/25   Cardiac EP device report    Narrative    MDT DC PM/ACTIVE SYSTEM IS MRI CONDITIONAL  CARELINK TRANSMISSION: BATTERY VOLTAGE ADEQUATE (10.7 YRS). AP-84%, -19%. ALL AVAILABLE LEAD PARAMETERS WITHIN NORMAL LIMITS. NO SIGNIFICANT HIGH RATE EPISODES. NORMAL DEVICE FUNCTION. GV

## 2025-01-13 ENCOUNTER — RESULTS FOLLOW-UP (OUTPATIENT)
Dept: CARDIOLOGY CLINIC | Facility: CLINIC | Age: 70
End: 2025-01-13

## 2025-01-23 ENCOUNTER — OFFICE VISIT (OUTPATIENT)
Dept: CARDIOLOGY CLINIC | Facility: CLINIC | Age: 70
End: 2025-01-23
Payer: MEDICARE

## 2025-01-23 VITALS
HEART RATE: 92 BPM | OXYGEN SATURATION: 97 % | DIASTOLIC BLOOD PRESSURE: 72 MMHG | HEIGHT: 68 IN | SYSTOLIC BLOOD PRESSURE: 106 MMHG | BODY MASS INDEX: 29.1 KG/M2 | RESPIRATION RATE: 16 BRPM | WEIGHT: 192 LBS

## 2025-01-23 DIAGNOSIS — I25.10 CORONARY ARTERY DISEASE INVOLVING NATIVE CORONARY ARTERY OF NATIVE HEART WITHOUT ANGINA PECTORIS: ICD-10-CM

## 2025-01-23 DIAGNOSIS — E78.2 MIXED HYPERLIPIDEMIA: Primary | ICD-10-CM

## 2025-01-23 DIAGNOSIS — I47.19 ATRIAL TACHYCARDIA (HCC): ICD-10-CM

## 2025-01-23 DIAGNOSIS — I47.20 VENTRICULAR TACHYCARDIA (HCC): ICD-10-CM

## 2025-01-23 PROCEDURE — 99214 OFFICE O/P EST MOD 30 MIN: CPT | Performed by: STUDENT IN AN ORGANIZED HEALTH CARE EDUCATION/TRAINING PROGRAM

## 2025-01-23 NOTE — ASSESSMENT & PLAN NOTE
Lower rate increased to 70 given recent episode of VT, underwent ischemic evaluation with no stentable lesions.  Also placed metoprolol, currently asymptomatic.  Okay to continue with current regimen.

## 2025-01-23 NOTE — ASSESSMENT & PLAN NOTE
Lab Results   Component Value Date    LDLCALC 119 (H) 06/24/2024     Patient recently had his atorvastatin increased, will repeat lipid panel in 3 months.  Given his coronary artery disease of LAD we will be a little bit more strict with his LDL control.

## 2025-01-23 NOTE — ASSESSMENT & PLAN NOTE
Currently on aspirin, statin.  Will repeat lipid panel in 3 months.  Advised on lifestyle changes.  Has a childhood injury from 18 which limits his ability to exert himself, he is given referral rehab which he will consider.

## 2025-01-23 NOTE — ASSESSMENT & PLAN NOTE
PATIENT NAME:JEANNINE ARANA                           MEDICAL RECORD: [id number]
: 62                                              LOCATION:D.M2 D.2133
ADMIT DATE: 17                                       ACCOUNT: [id number]
CONSULTING PHYSICIAN:    MICHAEL BACH MD          
                                               
REFERRING PHYSICIAN:     MARII NORIEGA MD          
 
 
DATE OF CONSULTATION:  2017
 
HISTORY OF PRESENT ILLNESS:  A 59-year-old female with previous history of
hypertension, was started on medication at that time. By her report engaged in
lifestyle modification including exercise and adjustment in diet and was
actually off the medications, has been off for quite some time.  Admitted
yesterday  with hypertension and left shoulder pain.  Shoulder pain is more
musculoskeletal.  She also reports of intermittent palpitations, flutter,
occasional PVC on exam, suspect this is just simply increased sensation from
compensatory pause loading in the face of elevated blood pressure.  We are asked
to see her concerning her cardiovascular status.
 
PAST MEDICAL HISTORY:  Includes:
1.  History of hypertension, on no medications currently.
2.  Anxiety.
3.  Bipolar disorder.
4.  Chronic obstructive pulmonary disease.
5.  Hypothyroidism, on replacement.
 
MEDICATIONS:  Typically include:
1.  Synthroid 137 mcg every day.
2.  Prilosec 20 mg p.o. every day.
3.  Zoloft 50 every day.
4.  Ativan 0.5 q.6 p.r.n.
5.  Norco 10/325 q.6 hours p.r.n.
6.  Neurontin 600 t.i.d.
7.  Valium 10 p.o. b.i.d. p.r.n.
8.  Abilify 30 q.h.s.
9.  Duoneb q.4. 
10.  ProAir 2 puffs q.6 hours p.r.n.
 
ALLERGIES:  KEFLEX, TRAMADOL.  
 
SOCIAL HISTORY:  She is a nondrinker, does smoke less than a pack a day.  She is
able to take care of her ADLs.  No set exercise program.
 
REVIEW OF SYSTEMS:  The patient reports easy bruising but reports no swollen
glands. The patient reports no fever, no night sweats, no significant weight
gain, no significant weight loss.  No significant exercise tolerance.  The
patient reports no dry eyes, no irritation, no vision change.  Patient reports
no difficulty hearing and no ear pain.  Patient reports no frequent nose bleeds
or nose and sinus problems.  Patient reports on arm pain on exertion.   No
shortness of breath while lying down.  No history of heart murmur.  Patient
reports no cough, no wheezing or coughing up blood.  Patient reports no
abdominal pain, no vomiting.  Normal appetite.  No diarrhea and not vomiting
blood.  No nausea and no constipation.  Patient reports no incontinence.  No
difficulty urinating.  No hematuria.  No increased frequency.  Patient reports
no muscle aches.  No weakness, no arthralgias, no back pain.  No swelling of the
extremities.  Patient reports no abnormal mole, no jaundice, no rashes.  Reports
no loss of consciousness.  No weakness and no numbness.  No seizures, dizziness,
 
 
 
CONSULT REPORT                                 [id number]    NAPOLEON,JEANNINE NOHEMI         
 
 
or headaches.  The patient reports no depression, no sleep disturbance, feeling
safe in a relationship and no alcohol abuse.  Patient reports on fatigue. 
Reports no runny nose or sinus pressure.  No itching, no hives, and no frequent
sneezing.  
 
PHYSICAL EXAMINATION:
GENERAL:  Pleasant female in no acute distress, somewhat blunted affect.
VITAL SIGNS:  Blood pressure 131/69, pulse 62 and regular.
HEENT:  Normocephalic, atraumatic.
NECK:  No JVD or bruit.
HEART:  Regular.
LUNGS:  Fields are clear.
ABDOMEN:  Soft, nontender.
EXTREMITIES:  Pulse well preserved, 2+ with no edema.
NEUROLOGICAL:  Grossly intact.
 
DIAGNOSTIC DATA:  ECG is normal.  Serial cardiac enzymes are negative.
 
IMPRESSION:  Recurrent hypertension.  At this point in time, I would start low
dose beta blockade for both hypertensive and antiarrhythmic effect.  We would
check echocardiographic study to check for left ventricular mass to help guide
therapy.  A long discussion readjusting lifestyle to minimize medications,
although from talking to Ms. Arana not sure this is going to be successful
long-term.  I would consider outpatient nuclear study at some point.  Okay to
discharge from a cardiovascular standpoint.
 
TRANSINT:[id number] Voice Confirmation ID: [id number] DOCUMENT ID: [id number]
                                           
                                           MICHAEL BACH MD          
 
 
 
Electronically Signed by MICHAEL BACH on 17 at 1436
 
 
 
 
 
 
 
 
 
 
 
 
 
 
CC:                                                             [id number]
DICTATION DATE: 17     :     17 1032      ADM IN  
                                                                              
[id number] [place] [address] Patient currently on sotalol, and metoprolol.  Okay to continue with current medications.

## 2025-01-23 NOTE — PROGRESS NOTES
[facility] Cardiology  Follow up note  Jerry Funk 69 y.o. male MRN: [id number]        1. Mixed hyperlipidemia  -     Lipid Panel with Direct LDL reflex; Future  2. Atrial tachycardia (HCC)  3. Coronary artery disease involving native coronary artery of native heart without angina pectoris  4. Ventricular tachycardia (HCC)      Assessment & Plan  Mixed hyperlipidemia  Lab Results   Component Value Date    LDLCALC 119 (H) 06/24/2024     Patient recently had his atorvastatin increased, will repeat lipid panel in 3 months.  Given his coronary artery disease of LAD we will be a little bit more strict with his LDL control.  Atrial tachycardia (HCC)  Patient currently on sotalol, and metoprolol.  Okay to continue with current medications.  Coronary artery disease involving native coronary artery of native heart without angina pectoris  Currently on aspirin, statin.  Will repeat lipid panel in 3 months.  Advised on lifestyle changes.  Has a childhood injury from 18 which limits his ability to exert himself, he is given referral rehab which he will consider.  Ventricular tachycardia (HCC)  Lower rate increased to 70 given recent episode of VT, underwent ischemic evaluation with no stentable lesions.  Also placed metoprolol, currently asymptomatic.  Okay to continue with current regimen.      HPI:   Jerry Funk is a 69 y.o. year old male history of AT on sotalol, 25 beat run of VT in 10/2024 underwent NST -> LHC with FFR negative LAD disease, SSS s/p MDT DC PPM, last saw EP for which his device was changed from DDD to DDDR at 70 bpm and started on metoprolol.  Patient has chart history of ascending aortic aneurysm however CT chest abdomen pelvis from 8/12/2023 showed no aneurysm.    Currently denies any fever, chills, fatigue, new visual changes, lightheadedness, syncope, chest pain, palpitations, shortness of breath at rest or with exertion, orthopnea, PND, nausea, vomiting, diarrhea, dark or bright red blood in  stools, lower extremity swelling, leg claudication.       Review of Systems    Past Medical History:   Diagnosis Date    Allergic     Anxiety     Asthma     allergy induced    Basal cell carcinoma     Colon polyp     Fatty liver     H/O nonmelanoma skin cancer     last assessed-8/22/2017    Heart murmur     Hyperlipidemia     Inflamed seborrheic keratosis     Malignant neoplasm of skin     last assessed-1/21/2014    Neoplasm of uncertain behavior of skin     Nocturia     Pneumothorax, left     Seasonal allergies     Sebaceous cyst     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/01/2022    SCCIS- mid scalp    Squamous cell carcinoma of skin of neck     Testicular hypofunction     Viral warts      Social History     Substance and Sexual Activity   Alcohol Use Not Currently    Comment: quit march 2020     Social History     Substance and Sexual Activity   Drug Use No     Social History     Tobacco Use   Smoking Status Never   Smokeless Tobacco Never       Allergies:  Allergies   Allergen Reactions    Dust Mite Extract     Molds & Smuts     Pollen Extract     Short Ragweed Pollen Ext     Tree Extract     Wound Dressing Adhesive Rash       Medications:     Current Outpatient Medications:     aspirin 81 MG tablet, Take by mouth, Disp: , Rfl:     atorvastatin (LIPITOR) 40 mg tablet, Take 1 tablet (40 mg total) by mouth daily, Disp: 90 tablet, Rfl: 1    citalopram (CeleXA) 20 mg tablet, Take 1 tablet (20 mg total) by mouth daily, Disp: , Rfl:     finasteride (PROSCAR) 5 mg tablet, Take 1 tablet (5 mg total) by mouth daily Do not start before August 17, 2023., Disp: 30 tablet, Rfl: 0    fluticasone (Arnuity Ellipta) 100 MCG/ACT AEPB inhaler, Inhale 1 puff daily Rinse mouth after use., Disp: 90 blister, Rfl: 2    fluticasone (FLONASE) 50 mcg/act nasal spray, 1 spray into each nostril daily, Disp: , Rfl:     ipratropium (ATROVENT) 0.06 % nasal spray, 1 SPRAY INTO EACH NOSTRIL IN THE MORNING AND 1 SPRAY BEFORE BEDTIME., Disp: 15 mL,  Rfl: 5    LORazepam (ATIVAN) 0.5 mg tablet, Take 1 tablet (0.5 mg total) by mouth every 6 (six) hours as needed for anxiety, Disp: 90 tablet, Rfl: 0    metoprolol succinate (TOPROL-XL) 25 mg 24 hr tablet, Take 0.5 tablets (12.5 mg total) by mouth daily, Disp: 45 tablet, Rfl: 1    montelukast (SINGULAIR) 10 mg tablet, Take 1 tablet (10 mg total) by mouth daily, Disp: 90 tablet, Rfl: 0    omeprazole (PriLOSEC) 40 MG capsule, TAKE 1 CAPSULE BY MOUTH EVERY DAY, Disp: 90 capsule, Rfl: 0    sotalol (BETAPACE) 80 mg tablet, TAKE 1 TABLET BY MOUTH EVERY 12 HOURS, Disp: 180 tablet, Rfl: 3    tamsulosin (FLOMAX) 0.4 mg, Take 0.4 mg by mouth 2 (two) times a day, Disp: , Rfl:       Vitals:    01/23/25 1050   BP: 106/72   Pulse: 92   Resp: 16   SpO2: 97%     Weight (last 2 days)       Date/Time Weight    01/23/25 1050 87.1 (192)          Physical Exam  Vitals and nursing note reviewed.   Constitutional:       General: He is not in acute distress.     Appearance: He is well-developed.   HENT:      Head: Normocephalic and atraumatic.   Eyes:      Conjunctiva/sclera: Conjunctivae normal.   Cardiovascular:      Rate and Rhythm: Normal rate and regular rhythm.      Heart sounds: No murmur heard.  Pulmonary:      Effort: Pulmonary effort is normal. No respiratory distress.      Breath sounds: Normal breath sounds.   Abdominal:      Palpations: Abdomen is soft.      Tenderness: There is no abdominal tenderness.   Musculoskeletal:         General: No swelling.      Cervical back: Neck supple.   Skin:     General: Skin is warm and dry.      Capillary Refill: Capillary refill takes less than 2 seconds.   Neurological:      Mental Status: He is alert.      Gait: Gait abnormal.   Psychiatric:         Mood and Affect: Mood normal.         Laboratory Studies:    Laboratory studies personally reviewed  Lab Results   Component Value Date    WBC 7.52 12/12/2024    HGB 15.2 12/12/2024    HCT 45.8 12/12/2024    MCV 92 12/12/2024      "12/12/2024     Lab Results   Component Value Date    SODIUM 139 12/12/2024    K 4.2 12/12/2024     12/12/2024    CO2 28 12/12/2024    BUN 14 12/12/2024    CREATININE 0.98 12/12/2024    GLUC 99 08/16/2023    CALCIUM 9.7 12/12/2024     Lab Results   Component Value Date    HGBA1C 5.0 01/16/2021     Lab Results   Component Value Date    LDLCALC 119 (H) 06/24/2024       Cardiac testing:     Echocardiogram:  11/1/24: LVEF 55. LVH, LAE, Mild TR,    Stress tests:  12/10/24: NST - Left ventricular perfusion defect medium size in apical inferior, apical lateral, and apical cap     Catheterization:  12/18/24: mLAD 60-70%, nonobstructive CAD of RCA/LCx    Device Check 1/9/2025: AP 84%/ 19%, no VT noted     Gerardo Jasmine MD    Portions of the record may have been created with voice recognition software.  Occasional wrong word or \"sound a like\" substitutions may have occurred due to the inherent limitations of voice recognition software.  Read the chart carefully and recognize, using context, where substitutions have occurred.    "

## 2025-02-06 DIAGNOSIS — F41.1 ANXIETY, GENERALIZED: ICD-10-CM

## 2025-02-07 RX ORDER — CITALOPRAM HYDROBROMIDE 20 MG/1
20 TABLET ORAL DAILY
Qty: 90 TABLET | Refills: 1 | Status: SHIPPED | OUTPATIENT
Start: 2025-02-07

## 2025-02-20 DIAGNOSIS — I47.20 VENTRICULAR TACHYCARDIA (HCC): ICD-10-CM

## 2025-02-21 RX ORDER — METOPROLOL SUCCINATE 25 MG/1
12.5 TABLET, EXTENDED RELEASE ORAL DAILY
Qty: 45 TABLET | Refills: 1 | Status: SHIPPED | OUTPATIENT
Start: 2025-02-21

## 2025-03-07 ENCOUNTER — TELEPHONE (OUTPATIENT)
Age: 70
End: 2025-03-07

## 2025-03-07 DIAGNOSIS — K21.9 GASTROESOPHAGEAL REFLUX DISEASE: ICD-10-CM

## 2025-03-07 RX ORDER — OMEPRAZOLE 40 MG/1
40 CAPSULE, DELAYED RELEASE ORAL DAILY
Qty: 90 CAPSULE | Refills: 1 | Status: SHIPPED | OUTPATIENT
Start: 2025-03-07

## 2025-03-07 NOTE — TELEPHONE ENCOUNTER
Patient called asking for a sooner apt at [place] for a growth that was removed last visit and it came back and growing fast.  Offered next week appointments at different locations/patient advised he can't do those days also [facility]  Triaged call/Lelo

## 2025-03-12 ENCOUNTER — OFFICE VISIT (OUTPATIENT)
Dept: DERMATOLOGY | Facility: CLINIC | Age: 70
End: 2025-03-12
Payer: MEDICARE

## 2025-03-12 VITALS — BODY MASS INDEX: 28.04 KG/M2 | WEIGHT: 185 LBS | TEMPERATURE: 97.5 F | HEIGHT: 68 IN

## 2025-03-12 DIAGNOSIS — L57.0 KERATOSIS, ACTINIC: Primary | ICD-10-CM

## 2025-03-12 DIAGNOSIS — L82.1 SEBORRHEIC KERATOSES: ICD-10-CM

## 2025-03-12 PROCEDURE — 17000 DESTRUCT PREMALG LESION: CPT | Performed by: DERMATOLOGY

## 2025-03-12 PROCEDURE — 17003 DESTRUCT PREMALG LES 2-14: CPT | Performed by: DERMATOLOGY

## 2025-03-12 PROCEDURE — 99214 OFFICE O/P EST MOD 30 MIN: CPT | Performed by: DERMATOLOGY

## 2025-03-12 NOTE — PROGRESS NOTES
"[facility] Dermatology Clinic Note     Patient Name: Jerry Funk  Encounter Date: 03/12/2025     Have you been cared for by a [facility] Dermatologist in the last 3 years and, if so, which description applies to you?    Yes.  I have been here within the last 3 years, and my medical history has NOT changed since that time.  I am FEMALE/of child-bearing potential.    REVIEW OF SYSTEMS:  Have you recently had or currently have any of the following? No changes in my recent health.   PAST MEDICAL HISTORY:  Have you personally ever had or currently have any of the following?  If \"YES,\" then please provide more detail. No changes in my medical history.   HISTORY OF IMMUNOSUPPRESSION: Do you have a history of any of the following:  Systemic Immunosuppression such as Diabetes, Biologic or Immunotherapy, Chemotherapy, Organ Transplantation, Bone Marrow Transplantation or Prednisone?  No     Answering \"YES\" requires the addition of the dotphrase \"IMMUNOSUPPRESSED\" as the first diagnosis of the patient's visit.   FAMILY HISTORY:  Any \"first degree relatives\" (parent, brother, sister, or child) with the following?    No changes in my family's known health.   PATIENT EXPERIENCE:    Do you want the Dermatologist to perform a COMPLETE skin exam today including a clinical examination under the \"bra and underwear\" areas?  NO  If necessary, do we have your permission to call and leave a detailed message on your Preferred Phone number that includes your specific medical information?  Yes      Allergies   Allergen Reactions    Dust Mite Extract     Molds & Smuts     Pollen Extract     Short Ragweed Pollen Ext     Tree Extract     Wound Dressing Adhesive Rash      Current Outpatient Medications:     aspirin 81 MG tablet, Take by mouth, Disp: , Rfl:     atorvastatin (LIPITOR) 40 mg tablet, Take 1 tablet (40 mg total) by mouth daily, Disp: 90 tablet, Rfl: 1    citalopram (CeleXA) 20 mg tablet, TAKE 1 TABLET BY MOUTH EVERY DAY, Disp: 90 " tablet, Rfl: 1    finasteride (PROSCAR) 5 mg tablet, Take 1 tablet (5 mg total) by mouth daily Do not start before August 17, 2023., Disp: 30 tablet, Rfl: 0    fluticasone (Arnuity Ellipta) 100 MCG/ACT AEPB inhaler, Inhale 1 puff daily Rinse mouth after use., Disp: 90 blister, Rfl: 2    fluticasone (FLONASE) 50 mcg/act nasal spray, 1 spray into each nostril daily, Disp: , Rfl:     ipratropium (ATROVENT) 0.06 % nasal spray, 1 SPRAY INTO EACH NOSTRIL IN THE MORNING AND 1 SPRAY BEFORE BEDTIME., Disp: 15 mL, Rfl: 5    LORazepam (ATIVAN) 0.5 mg tablet, Take 1 tablet (0.5 mg total) by mouth every 6 (six) hours as needed for anxiety, Disp: 90 tablet, Rfl: 0    metoprolol succinate (TOPROL-XL) 25 mg 24 hr tablet, TAKE 1/2 TABLET BY MOUTH EVERY DAY, Disp: 45 tablet, Rfl: 1    montelukast (SINGULAIR) 10 mg tablet, Take 1 tablet (10 mg total) by mouth daily, Disp: 90 tablet, Rfl: 3    omeprazole (PriLOSEC) 40 MG capsule, Take 1 capsule (40 mg total) by mouth daily, Disp: 90 capsule, Rfl: 1    sotalol (BETAPACE) 80 mg tablet, TAKE 1 TABLET BY MOUTH EVERY 12 HOURS, Disp: 180 tablet, Rfl: 3    tamsulosin (FLOMAX) 0.4 mg, Take 0.4 mg by mouth 2 (two) times a day, Disp: , Rfl:           Whom besides the patient is providing clinical information about today's encounter?   NO ADDITIONAL HISTORIAN (patient alone provided history)    Physical Exam and Assessment/Plan by Diagnosis:    ACTINIC KERATOSIS    Physical Exam:  Anatomic Location Affected:  right front of scalp, right eyebrow,, nose  Morphological Description:  Scaly pink papules  Pertinent Positives:  Pertinent Negatives:      Assessment and Plan:  Based on a thorough discussion of this condition and the management approach to it (including a comprehensive discussion of the known risks, side effects and potential benefits of treatment), the patient (family) agrees to implement the following specific plan:  When outside we recommend using a wide brim hat, sunglasses, long  "sleeve and pants, sunscreen with SPF 30+ with reapplication every 2 hours, or SPF specific clothing   liquid nitrogen to treat areas. Consent obtained. Expect area to blister, crust, and then fall off within 2 weeks. Please use vaseline.                                     PROCEDURE:  DESTRUCTION OF PRE-MALIGNANT LESIONS  After a thorough discussion of treatment options and risk/benefits/alternatives (including but not limited to local pain, scarring, dyspigmentation, blistering, and possible superinfection), verbal and written consent were obtained and the aforementioned lesions were treated on with cryotherapy using liquid nitrogen x 1 cycle for 5-10 seconds.    TOTAL NUMBER of 4 pre-malignant lesions were treated today on the ANATOMIC LOCATION: right front of scalp, right eyebrow, , nose.     The patient tolerated the procedure well, and after-care instructions were provided.          SEBORRHEIC KERATOSIS; NON-INFLAMED    Physical Exam:  Anatomic Location Affected:  face and back  Morphological Description:  Flat and raised, waxy, smooth to warty textured, yellow to brownish-grey to dark brown to blackish, discrete, \"stuck-on\" appearing papules.  Pertinent Positives:  Pertinent Negatives:    Additional History of Present Condition:  Patient reports new bumps on the skin.  Denies itch, burn, pain, bleeding or ulceration.  Present constantly; nothing seems to make it worse or better.  No prior treatment.      Assessment and Plan:  Based on a thorough discussion of this condition and the management approach to it (including a comprehensive discussion of the known risks, side effects and potential benefits of treatment), the patient (family) agrees to implement the following specific plan:  Benign; reassured     Seborrheic Keratosis  A seborrheic keratosis is a harmless warty spot that appears during adult life as a common sign of skin aging.  Seborrheic keratoses can arise on any area of skin, covered or uncovered, " "with the usual exception of the palms and soles. They do not arise from mucous membranes. Seborrheic keratoses can have highly variable appearance.      Seborrheic keratoses are extremely common. It has been estimated that over 90% of adults over the age of 60 years have one or more of them. They occur in males and females of all races, typically beginning to erupt in the 30s or 40s. They are uncommon under the age of 20 years.  The precise cause of seborrhoeic keratoses is not known.  Seborrhoeic keratoses are considered degenerative in nature. As time goes by, seborrheic keratoses tend to become more numerous. Some people inherit a tendency to develop a very large number of them; some people may have hundreds of them.    The name \"seborrheic keratosis\" is misleading, because these lesions are not limited to a seborrhoeic distribution (scalp, mid-face, chest, upper back), nor are they formed from sebaceous glands, nor are they associated with sebum -- which is greasy.  Seborrheic keratosis may also be called \"SK,\" \"Seb K,\" \"basal cell papilloma,\" \"senile wart,\" or \"barnacle.\"      Researchers have noted:  Eruptive seborrhoeic keratoses can follow sunburn or dermatitis  Skin friction may be the reason they appear in body folds  Viral cause (e.g., human papillomavirus) seems unlikely  Stable and clonal mutations or activation of FRFR3, PIK3CA, JENNA, AKT1 and EGFR genes are found in seborrhoeic keratoses  Seborrhoeic keratosis can arise from solar lentigo  FRFR3 mutations also arise in solar lentigines. These mutations are associated with increased age and location on the head and neck, suggesting a role of ultraviolet radiation in these lesions  Seborrheic keratoses do not harbour tumour suppressor gene mutations  Epidermal growth factor receptor inhibitors, which are used to treat some cancers, often result in an increase in verrucal (warty) keratoses.    There is no easy way to remove multiple lesions on a single " "occasion.  Unless a specific lesion is \"inflamed\" and is causing pain or stinging/burning or is bleeding, most insurance companies do not authorize treatment.     Scribe Attestation      I,:  Rossy Rivas MA am acting as a scribe while in the presence of the attending physician.:       I,:  Loren Garner MD personally performed the services described in this documentation    as scribed in my presence.:            "

## 2025-03-12 NOTE — PATIENT INSTRUCTIONS
ACTINIC KERATOSIS    Physical Exam:  Anatomic Location Affected:  right front of scalp, right eyebrow, forehead, nose      Assessment and Plan:  Based on a thorough discussion of this condition and the management approach to it (including a comprehensive discussion of the known risks, side effects and potential benefits of treatment), the patient (family) agrees to implement the following specific plan:  When outside we recommend using a wide brim hat, sunglasses, long sleeve and pants, sunscreen with SPF 30+ with reapplication every 2 hours, or SPF specific clothing   liquid nitrogen to treat areas. Consent obtained. Expect area to blister, crust, and then fall off within 2 weeks. Please use vaseline.                                     PROCEDURE:  DESTRUCTION OF PRE-MALIGNANT LESIONS  After a thorough discussion of treatment options and risk/benefits/alternatives (including but not limited to local pain, scarring, dyspigmentation, blistering, and possible superinfection), verbal and written consent were obtained and the aforementioned lesions were treated on with cryotherapy using liquid nitrogen x 1 cycle for 5-10 seconds.      SEBORRHEIC KERATOSIS; NON-INFLAMED    Physical Exam:  Anatomic Location Affected:  face and back    Assessment and Plan:  Based on a thorough discussion of this condition and the management approach to it (including a comprehensive discussion of the known risks, side effects and potential benefits of treatment), the patient (family) agrees to implement the following specific plan:  Benign; reassured     Seborrheic Keratosis  A seborrheic keratosis is a harmless warty spot that appears during adult life as a common sign of skin aging.  Seborrheic keratoses can arise on any area of skin, covered or uncovered, with the usual exception of the palms and soles. They do not arise from mucous membranes. Seborrheic keratoses can have highly variable appearance.      Seborrheic keratoses are  "extremely common. It has been estimated that over 90% of adults over the age of 60 years have one or more of them. They occur in males and females of all races, typically beginning to erupt in the 30s or 40s. They are uncommon under the age of 20 years.  The precise cause of seborrhoeic keratoses is not known.  Seborrhoeic keratoses are considered degenerative in nature. As time goes by, seborrheic keratoses tend to become more numerous. Some people inherit a tendency to develop a very large number of them; some people may have hundreds of them.    The name \"seborrheic keratosis\" is misleading, because these lesions are not limited to a seborrhoeic distribution (scalp, mid-face, chest, upper back), nor are they formed from sebaceous glands, nor are they associated with sebum -- which is greasy.  Seborrheic keratosis may also be called \"SK,\" \"Seb K,\" \"basal cell papilloma,\" \"senile wart,\" or \"barnacle.\"      Researchers have noted:  Eruptive seborrhoeic keratoses can follow sunburn or dermatitis  Skin friction may be the reason they appear in body folds  Viral cause (e.g., human papillomavirus) seems unlikely  Stable and clonal mutations or activation of FRFR3, PIK3CA, JENNA, AKT1 and EGFR genes are found in seborrhoeic keratoses  Seborrhoeic keratosis can arise from solar lentigo  FRFR3 mutations also arise in solar lentigines. These mutations are associated with increased age and location on the head and neck, suggesting a role of ultraviolet radiation in these lesions  Seborrheic keratoses do not harbour tumour suppressor gene mutations  Epidermal growth factor receptor inhibitors, which are used to treat some cancers, often result in an increase in verrucal (warty) keratoses.    There is no easy way to remove multiple lesions on a single occasion.  Unless a specific lesion is \"inflamed\" and is causing pain or stinging/burning or is bleeding, most insurance companies do not authorize treatment.   "

## 2025-04-10 ENCOUNTER — REMOTE DEVICE CLINIC VISIT (OUTPATIENT)
Dept: CARDIOLOGY CLINIC | Facility: CLINIC | Age: 70
End: 2025-04-10
Payer: MEDICARE

## 2025-04-10 DIAGNOSIS — Z95.0 CARDIAC PACEMAKER IN SITU: Primary | ICD-10-CM

## 2025-04-10 PROCEDURE — 93296 REM INTERROG EVL PM/IDS: CPT | Performed by: INTERNAL MEDICINE

## 2025-04-10 PROCEDURE — 93294 REM INTERROG EVL PM/LDLS PM: CPT | Performed by: INTERNAL MEDICINE

## 2025-04-10 NOTE — PROGRESS NOTES
Results for orders placed or performed in visit on 04/10/25   Cardiac EP device report    Narrative    MDT DC PM/ACTIVE SYSTEM IS MRI CONDITIONAL  CARELINK TRANSMISSION: BATTERY VOLTAGE ADEQUATE (10.4 YRS). AP-92%, -23%. ALL AVAILABLE LEAD PARAMETERS WITHIN NORMAL LIMITS. NO SIGNIFICANT HIGH RATE EPISODES. NORMAL DEVICE FUNCTION. GV

## 2025-04-13 ENCOUNTER — RESULTS FOLLOW-UP (OUTPATIENT)
Dept: NON INVASIVE DIAGNOSTICS | Facility: HOSPITAL | Age: 70
End: 2025-04-13

## 2025-05-09 DIAGNOSIS — I47.20 VENTRICULAR TACHYCARDIA (HCC): ICD-10-CM

## 2025-05-09 RX ORDER — METOPROLOL SUCCINATE 25 MG/1
12.5 TABLET, EXTENDED RELEASE ORAL DAILY
Qty: 45 TABLET | Refills: 1 | Status: SHIPPED | OUTPATIENT
Start: 2025-05-09

## 2025-06-05 ENCOUNTER — OFFICE VISIT (OUTPATIENT)
Dept: CARDIOLOGY CLINIC | Facility: CLINIC | Age: 70
End: 2025-06-05
Payer: MEDICARE

## 2025-06-05 VITALS
HEIGHT: 68 IN | BODY MASS INDEX: 28.87 KG/M2 | WEIGHT: 190.5 LBS | DIASTOLIC BLOOD PRESSURE: 62 MMHG | SYSTOLIC BLOOD PRESSURE: 102 MMHG | HEART RATE: 79 BPM

## 2025-06-05 DIAGNOSIS — I49.5 SICK SINUS SYNDROME (HCC): Primary | ICD-10-CM

## 2025-06-05 DIAGNOSIS — I45.2 BIFASCICULAR BLOCK: ICD-10-CM

## 2025-06-05 DIAGNOSIS — I47.19 ATRIAL TACHYCARDIA (HCC): ICD-10-CM

## 2025-06-05 DIAGNOSIS — I71.21 ANEURYSM OF ASCENDING AORTA WITHOUT RUPTURE (HCC): ICD-10-CM

## 2025-06-05 DIAGNOSIS — E78.2 MIXED HYPERLIPIDEMIA: ICD-10-CM

## 2025-06-05 DIAGNOSIS — I49.5 TACHY-BRADY SYNDROME (HCC): ICD-10-CM

## 2025-06-05 DIAGNOSIS — I25.10 CORONARY ARTERY DISEASE INVOLVING NATIVE CORONARY ARTERY OF NATIVE HEART WITHOUT ANGINA PECTORIS: ICD-10-CM

## 2025-06-05 DIAGNOSIS — I47.20 VENTRICULAR TACHYCARDIA (HCC): ICD-10-CM

## 2025-06-05 LAB
ATRIAL RATE: 79 BPM
P AXIS: 32 DEGREES
PR INTERVAL: 212 MS
QRS AXIS: -69 DEGREES
QRSD INTERVAL: 156 MS
QT INTERVAL: 400 MS
QTC INTERVAL: 459 MS
T WAVE AXIS: 18 DEGREES
VENTRICULAR RATE: 79 BPM

## 2025-06-05 PROCEDURE — 93000 ELECTROCARDIOGRAM COMPLETE: CPT | Performed by: INTERNAL MEDICINE

## 2025-06-05 PROCEDURE — 99214 OFFICE O/P EST MOD 30 MIN: CPT | Performed by: INTERNAL MEDICINE
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
## 2025-06-13 ENCOUNTER — OFFICE VISIT (OUTPATIENT)
Age: 70
End: 2025-06-13
Payer: MEDICARE

## 2025-06-13 VITALS
WEIGHT: 189 LBS | HEART RATE: 79 BPM | OXYGEN SATURATION: 99 % | SYSTOLIC BLOOD PRESSURE: 114 MMHG | BODY MASS INDEX: 28.64 KG/M2 | DIASTOLIC BLOOD PRESSURE: 80 MMHG | HEIGHT: 68 IN | TEMPERATURE: 97.7 F

## 2025-06-13 DIAGNOSIS — Z85.828 HISTORY OF SKIN CANCER: ICD-10-CM

## 2025-06-13 DIAGNOSIS — D18.01 CHERRY ANGIOMA: ICD-10-CM

## 2025-06-13 DIAGNOSIS — D22.9 MULTIPLE NEVI: ICD-10-CM

## 2025-06-13 DIAGNOSIS — Z13.89 SCREENING FOR SKIN CONDITION: Primary | ICD-10-CM

## 2025-06-13 DIAGNOSIS — D48.9 NEOPLASM OF UNCERTAIN BEHAVIOR: ICD-10-CM

## 2025-06-13 DIAGNOSIS — L82.1 SEBORRHEIC KERATOSIS: ICD-10-CM

## 2025-06-13 PROCEDURE — 11102 TANGNTL BX SKIN SINGLE LES: CPT | Performed by: STUDENT IN AN ORGANIZED HEALTH CARE EDUCATION/TRAINING PROGRAM

## 2025-06-13 PROCEDURE — 88305 TISSUE EXAM BY PATHOLOGIST: CPT | Performed by: STUDENT IN AN ORGANIZED HEALTH CARE EDUCATION/TRAINING PROGRAM

## 2025-06-13 PROCEDURE — 99214 OFFICE O/P EST MOD 30 MIN: CPT | Performed by: STUDENT IN AN ORGANIZED HEALTH CARE EDUCATION/TRAINING PROGRAM

## 2025-06-13 NOTE — PROGRESS NOTES
"[facility] Dermatology Clinic Note     Patient Name: Jerry Funk  Encounter Date: 06/13/25       Have you been cared for by a [facility] Dermatologist in the last 3 years and, if so, which description applies to you? Yes. I have been here within the last 3 years, and my medical history has NOT changed since that time. I am not of child-bearing potential.     REVIEW OF SYSTEMS:  Have you recently had or currently have any of the following? No changes in my recent health.   PAST MEDICAL HISTORY:  Have you personally ever had or currently have any of the following?  If \"YES,\" then please provide more detail. No changes in my medical history.   HISTORY OF IMMUNOSUPPRESSION: Do you have a history of any of the following:  Systemic Immunosuppression such as Diabetes, Biologic or Immunotherapy, Chemotherapy, Organ Transplantation, Bone Marrow Transplantation or Prednisone?  No     Answering \"YES\" requires the addition of the dotphrase \"IMMUNOSUPPRESSED\" as the first diagnosis of the patient's visit.   FAMILY HISTORY:  Any \"first degree relatives\" (parent, brother, sister, or child) with the following?    No changes in my family's known health.   PATIENT EXPERIENCE:    Do you want the Dermatologist to perform a COMPLETE skin exam today including a clinical examination under the \"bra and underwear\" areas?  Yes  If necessary, do we have your permission to call and leave a detailed message on your Preferred Phone number that includes your specific medical information?  Yes      Allergies[1] Current Medications[2]              Whom besides the patient is providing clinical information about today's encounter?   NO ADDITIONAL HISTORIAN (patient alone provided history)    Physical Exam and Assessment/Plan by Diagnosis:    Chief complaint: Patient is a 68 y/o male present for routine 6m skin check    HISTORY OF SQUAMOUS CELL CARCINOMA      Physical Exam:  Anatomic Location Affected:  right frontal scalp 12/2023  Mid scalp " "2022  Morphological Description of Scar: well healed  Suspected Recurrence: no  Regional adenopathy: no     Additional History of Present Condition:  history of SCC, treated.      Assessment and Plan:  Based on a thorough discussion of this condition and the management approach to it (including a comprehensive discussion of the known risks, side effects and potential benefits of treatment), the patient (family) agrees to implement the following specific plan:  Monitor for changes.     MELANOCYTIC NEVI (\"Moles\")    Physical Exam:  Anatomic Location Affected: Mostly on sun-exposed areas of the trunk and extremities  Morphological Description:  Scattered, 1-4mm round to ovoid, symmetrical-appearing, even bordered, skin colored to dark brown macules/papules, mostly in sun-exposed areas  Pertinent Positives:  Pertinent Negatives:    Additional History of Present Condition:  present on exam    Assessment and Plan:  Based on a thorough discussion of this condition and the management approach to it (including a comprehensive discussion of the known risks, side effects and potential benefits of treatment), the patient (family) agrees to implement the following specific plan:  Provided handout with information regarding the ABCDE's of moles   Recommend routine skin exams every year   Sun avoidance, protective clothing (known as UPF clothing), and the use of at least SPF 30 sunscreens is advised. Sunscreen should be reapplied every two hours when outside.     SEBORRHEIC KERATOSIS; NON-INFLAMED    Physical Exam:  Anatomic Location Affected:  scattered across trunk, extremities, face  Morphological Description:  Flat and raised, waxy, smooth to warty textured, yellow to brownish-grey to dark brown to blackish, discrete, \"stuck-on\" appearing papules.  Pertinent Positives:  Pertinent Negatives:    Additional History of Present Condition:  Patient reports new bumps on the skin.  Denies itch, burn, pain, bleeding or ulceration.  " "Present constantly; nothing seems to make it worse or better.  No prior treatment.      Assessment and Plan:  Based on a thorough discussion of this condition and the management approach to it (including a comprehensive discussion of the known risks, side effects and potential benefits of treatment), the patient (family) agrees to implement the following specific plan:  Reassured benign    ANGIOMA (\"CHERRY ANGIOMA\")    Physical Exam:  Anatomic Location: scattered across sun exposed areas of the trunk and extremities   Morphologic Description: Firm red to reddish-blue discrete papules  Pertinent Positives:  Pertinent Negatives:    Additional History of Present Condition:  Present on exam.     Assessment and Plan:  Reassured benign    NEOPLASM OF UNCERTAIN BEHAVIOR OF SKIN    Physical Exam:  (Anatomic Location); (Size and Morphological Description); (Differential Diagnosis):  Specimen A; Right Frontal Scalp; 0.5cm scaly erythematous papule; DDX.: R/O Hypertrophic Actinic Keratosis vs Verrucous Keratosis vs Squamous Cell Carcinoma  Pertinent Positives:  Pertinent Negatives:    Additional History of Present Condition:  present on exam    Assessment and Plan:  I have discussed with the patient that a sample of skin via a \"skin biopsy” would be potentially helpful to further make a specific diagnosis under the microscope.  Based on a thorough discussion of this condition and the management approach to it (including a comprehensive discussion of the known risks, side effects and potential benefits of treatment), the patient (family) agrees to implement the following specific plan:    Procedure:  Skin Biopsy.  After a thorough discussion of treatment options and risk/benefits/alternatives (including but not limited to local pain, scarring, dyspigmentation, blistering, possible superinfection, and inability to confirm a diagnosis via histopathology), verbal and written consent were obtained and portion of the rash was " biopsied for tissue sample.  See below for consent that was obtained from patient and subsequent Procedure Note.      PROCEDURE TANGENTIAL (SHAVE) BIOPSY NOTE:    Performing Physician:   Anatomic Location; Clinical Description with size (cm); Pre-Op Diagnosis:   Specimen A; Right Frontal Scalp; 0.5cm scaly erythematous papule; DDX.: R/O Hypertrophic Actinic Keratosis vs Verrucous Keratosis vs Squamous Cell Carcinoma  Post-op diagnosis: Same     Local anesthesia: 1% xylocaine with epi      Topical anesthesia: None    Hemostasis: Aluminum chloride     After obtaining informed consent  at which time there was a discussion about the purpose of biopsy  and low risks of infection and bleeding.  The area was prepped and draped in the usual fashion. Anesthesia was obtained with 1% lidocaine with epinephrine. A shave biopsy to an appropriate sampling depth was obtained by Shave (Dermablade or 15 blade) The resulting wound was covered with surgical ointment and bandaged appropriately.     The patient tolerated the procedure well without complications and was without signs of functional compromise.      Specimen has been sent for review by Dermatopathology.    Standard post-procedure care has been explained and has been included in written form within the patient's copy of Informed Consent.    Scribe Attestation    I,:  Tayler Hawthorne MA am acting as a scribe while in the presence of the attending physician.:       I,:  Edwin Mojica DO personally performed the services described in this documentation    as scribed in my presence.:                    [1]  Allergies  Allergen Reactions   • Dust Mite Extract    • Molds & Smuts    • Pollen Extract    • Short Ragweed Pollen Ext    • Tree Extract    • Wound Dressing Adhesive Rash   [2]    Current Outpatient Medications:   •  aspirin 81 MG tablet, Take by mouth, Disp: , Rfl:   •  atorvastatin (LIPITOR) 40 mg tablet, Take 1 tablet (40 mg total) by mouth daily, Disp: 90  tablet, Rfl: 1  •  citalopram (CeleXA) 20 mg tablet, TAKE 1 TABLET BY MOUTH EVERY DAY, Disp: 90 tablet, Rfl: 1  •  finasteride (PROSCAR) 5 mg tablet, Take 1 tablet (5 mg total) by mouth daily Do not start before August 17, 2023., Disp: 30 tablet, Rfl: 0  •  fluticasone (Arnuity Ellipta) 100 MCG/ACT AEPB inhaler, Inhale 1 puff daily Rinse mouth after use., Disp: 90 blister, Rfl: 0  •  fluticasone (FLONASE) 50 mcg/act nasal spray, 1 spray into each nostril in the morning., Disp: , Rfl:   •  ipratropium (ATROVENT) 0.06 % nasal spray, 1 spray into each nostril 2 (two) times a day, Disp: 15 mL, Rfl: 3  •  LORazepam (ATIVAN) 0.5 mg tablet, Take 1 tablet (0.5 mg total) by mouth every 6 (six) hours as needed for anxiety, Disp: 90 tablet, Rfl: 0  •  metoprolol succinate (TOPROL-XL) 25 mg 24 hr tablet, Take 0.5 tablets (12.5 mg total) by mouth daily, Disp: 45 tablet, Rfl: 1  •  montelukast (SINGULAIR) 10 mg tablet, Take 1 tablet (10 mg total) by mouth daily, Disp: 90 tablet, Rfl: 0  •  omeprazole (PriLOSEC) 40 MG capsule, Take 1 capsule (40 mg total) by mouth daily, Disp: 90 capsule, Rfl: 1  •  sotalol (BETAPACE) 80 mg tablet, TAKE 1 TABLET BY MOUTH EVERY 12 HOURS, Disp: 180 tablet, Rfl: 3  •  tamsulosin (FLOMAX) 0.4 mg, Take 0.4 mg by mouth in the morning and 0.4 mg in the evening., Disp: , Rfl:

## 2025-06-13 NOTE — PATIENT INSTRUCTIONS
"INFORMED CONSENT DISCUSSION AND POST-OPERATIVE INSTRUCTIONS FOR PATIENT    I.  RATIONALE FOR PROCEDURE  I understand that a skin biopsy allows the Dermatologist to test a lesion or rash under the microscope to obtain a diagnosis.  It usually involves numbing the area with numbing medication and removing a small piece of skin; sometimes the area will be closed with sutures. In this specific procedure, sutures are not usually needed.  If any sutures are placed, then they are usually need to be removed in 2 weeks or less.    I understand that my Dermatologist recommends that a skin \"shave\" biopsy be performed today.  A local anesthetic, similar to the kind that a dentist uses when filling a cavity, will be injected with a very small needle into the skin area to be sampled.  The injected skin and tissue underneath \"will go to sleep” and become numb so no pain should be felt afterwards.  An instrument shaped like a tiny \"razor blade\" (shave biopsy instrument) will be used to cut a small piece of tissue and skin from the area so that a sample of tissue can be taken and examined more closely under the microscope.  A slight amount of bleeding will occur, but it will be stopped with direct pressure and a pressure bandage and any other appropriate methods.  I understands that a scar will form where the wound was created.  Surgical ointment will be applied to help protect the wound.  Sutures are not usually needed.    II.  RISKS AND POTENTIAL COMPLICATIONS   I understand the risks and potential complications of a skin biopsy include but are not limited to the following:  Bleeding  Infection  Pain  Scar/keloid  Skin discoloration  Incomplete Removal  Recurrence  Nerve Damage/Numbness/Loss of Function  Allergic Reaction to Anesthesia  Biopsies are diagnostic procedures and based on findings additional treatment or evaluation may be required  Loss or destruction of specimen resulting in no additional findings    My Dermatologist " "has explained to me the nature of the condition, the nature of the procedure, and the benefits to be reasonably expected compared with alternative approaches.  My Dermatologist has discussed the likelihood of major risks or complications of this procedure including the specific risks listed above, such as bleeding, infection, and scarring/keloid.  I understand that a scar is expected after this procedure.  I understand that my physician cannot predict if the scar will form a \"keloid,\" which extends beyond the borders of the wound that is created.  A keloid is a thick, painful, and bumpy scar.  A keloid can be difficult to treat, as it does not always respond well to therapy, which includes injecting cortisone directly into the keloid every few weeks.  While this usually reduces the pain and size of the scar, it does not eliminate it.      I understand that photographs may be taken before and after the procedure.  These will be maintained as part of the medical providers confidential records and may not be made available to me.  I further authorize the medical provider to use the photographs for teaching purposes or to illustrate scientific papers, books, or lectures if in his/her judgment, medical research, education, or science may benefit from its use.    I have had an opportunity to fully inquire about the risks and benefits of this procedure and its alternatives.   I have been given ample time and opportunity to ask questions and to seek a second opinion if I wished to do so.  I acknowledge that there have specifically been no guarantees as to the cosmetic results from the procedure.  I am aware that with any procedure there is always the possibility of an unexpected complication.    III. POST-PROCEDURAL CARE (WHAT YOU WILL NEED TO DO \"AFTER THE BIOPSY\" TO OPTIMIZE HEALING)    Keep the area clean and dry.  Try NOT to remove the bandage or get it wet for the first 24 hours.    Gently clean the area and apply " "surgical ointment (such as Vaseline petrolatum ointment, which is available \"over the counter\" and not a prescription) to the biopsy site for up to 2 weeks straight.  This acts to protect the wound from the outside world.      Sutures are not usually placed in this procedure.  If any sutures were placed, return for suture removal as instructed (generally 1 week for the face, 2 weeks for the body).      Take Acetaminophen (Tylenol) for discomfort, if no contraindications.  Ibuprofen or aspirin could make bleeding worse.    Call our office immediately for signs of infection: fever, chills, increased redness, warmth, tenderness, discomfort/pain, or pus or foul smell coming from the wound.    WHAT TO DO IF THERE IS ANY BLEEDING?  If a small amount of bleeding is noticed, place a clean cloth over the area and apply firm pressure for ten minutes.  Check the wound after 10 minutes of direct pressure.  If bleeding persists, try one more time for an additional 10 minutes of direct pressure on the area.  If the bleeding becomes heavier or does not stop after the second attempt, or if you have any other questions about this procedure, then please call your [facility]'s Dermatologist by calling [phone] (SKIN).     I hereby acknowledge that I have reviewed and verified the site with my Dermatologist and have requested and authorized my Dermatologist to proceed with the procedure.  "

## 2025-06-15 ENCOUNTER — OFFICE VISIT (OUTPATIENT)
Age: 70
End: 2025-06-15
Payer: MEDICARE

## 2025-06-15 VITALS
SYSTOLIC BLOOD PRESSURE: 106 MMHG | DIASTOLIC BLOOD PRESSURE: 78 MMHG | BODY MASS INDEX: 28.68 KG/M2 | RESPIRATION RATE: 18 BRPM | WEIGHT: 188.6 LBS | HEART RATE: 85 BPM | OXYGEN SATURATION: 97 % | TEMPERATURE: 98.2 F

## 2025-06-15 DIAGNOSIS — J02.9 SORE THROAT: Primary | ICD-10-CM

## 2025-06-15 LAB — S PYO AG THROAT QL: NEGATIVE

## 2025-06-15 PROCEDURE — 99203 OFFICE O/P NEW LOW 30 MIN: CPT | Performed by: PHYSICIAN ASSISTANT

## 2025-06-15 PROCEDURE — 87880 STREP A ASSAY W/OPTIC: CPT | Performed by: PHYSICIAN ASSISTANT

## 2025-06-15 PROCEDURE — G0463 HOSPITAL OUTPT CLINIC VISIT: HCPCS | Performed by: PHYSICIAN ASSISTANT

## 2025-06-15 NOTE — PROGRESS NOTES
[facility]        NAME: Jerry Funk is a 69 y.o. male  : 1955    MRN: [id number]  DATE: Lakeisha 15, 2025  TIME: 3:20 PM    Assessment and Plan   Sore throat [J02.9]  1. Sore throat  POCT rapid ANTIGEN strepA          Supportive measures encouraged as below for sore throat    The patient and/or parent/legal guardian verbalized understanding of exam findings and   Treatment plan. We engaged in the shared decision-making process and treatment options were   discussed at length with the patient.  All questions, concerns and complaints were answered and   addressed to the patient's' and/or parent/legal guardians's satisfaction.    Patient Instructions     Patient Instructions   Most upper respiratory infections are viral and resolve on their own within 10-14 days. Antibiotics are not indicated for the viral infection, and are only prescribed if there is evidence for a bacterial infection. Sometimes an upper respiratory infection may lead to secondary bacterial infection, such as bacterial sinusitis, in which case antibiotics would be indicated at that time. If your symptoms continue beyond 10-14 days or if you experience ongoing fevers, productive cough with green, brown, bloody phlegm production, you may have developed a bacterial infection. For the uncomplicated viral upper respiratory infection conservative management includes:    Fever and pain control:  Ibuprofen (Motrin) 600mg every 6 hours for fever, headaches, body aches   Ibuprofen is an NSAID. Please stop medication if you experience stomach/abdominal pain and report to your primary care provider.   Ask your primary care provider before you take NSAIDs if you are on any blood thinners, or if you have a history of heart disease, kidney disease, gastric bypass surgery, GI bleed, or poorly controlled high blood pressure.   May use acetaminophen (Tylenol) as directed on the bottle between doses of ibuprofen. Do not exceed 3,000mg of Tylenol a day.    Cough & Congestion:  Guaifenesin (Mucinex) as directed on the bottle for congestion and mucous-y cough.   Dextromethorphan (Delsym, Robitussin) for dry cough and cough suppression   Pseudoephedrine (Sudafed) for congestion and sinus pressure   Sudafed may cause increased heart rate, irregular heart rate, and an increase in blood pressure. Please do not take Sudafed if you have a history of heart disease or high blood pressure.   Sudafed should not be taken if you are on anti-depressants such as those belonging to the class MAOIs or tricyclics.  Coricidin HBP (chlorpheniramine maleate) can be used as a decongestant in place of other options for those unable to take Sudafed.   Combination cough and cold such as Dimetapp and Mucinex DM also available  Sudafed PE Head Congestion +Flu Severe contains a combination of Sudafed, Tylenol, Mucinex, and Delsym  If prescribed, take Tessalon Pearles or Bromfed/Phenergan DM as directed  Avoid taking prescription cough/congestion medication and OTC options at the same time  Sore Throat:  Cepacol lozenges  Chloraseptic spray  Throat Coat tea  Warm salt water gargles   Raw honey  Vitamin/Minerals:  Vitamin D3 2,000 IU daily  Vitamin C 1000mg twice a day  Some studies suggest that Zinc 12.5-15mg every 2 hours while awake for 5 days may shorten symptom duration by 1-2 days  Other:   Plenty of fluids and rest  Cool mist humidifiers  Nasal sinus rinses such as NettiPot, Neimed, or Navage can be used to help flush out sinuses  Please only use distilled/sterile water that can be purchased at your local pharmacy  Nasal spray options:  Nasal steroid sprays such as Flonase, Nasonex, Nasacort may help with sinus congestion, itchy/watery eyes, clogged ears  These options must be used consistently for at least 2 weeks for full effect  Afrin nasal spray for quick acting congestion relief (DO NOT USE FOR MORE THAN 3 DAYS IN A ROW)  Saline nasal spray for dry nose, irritation of the nasal  passages  Follow up with PCP in 3-5 days  Proceed to the ED if symptoms worsen    Follow up with PCP in 3-5 days.  Proceed to  ER if symptoms worsen.    If tests are performed, our office will contact you with results only if   changes need to made to the care plan discussed with you at the visit.   You can review your full results on [facility].     Chief Complaint     Chief Complaint   Patient presents with   • Sore Throat     Sore throat, L earache X 1 day         History of Present Illness       HPI  Patient presents with sore throat and left earache for the past day. No fever or chills. No cough or nasal congestion. No chest pain or SOB. Had eaten a food hot in temperature.     Review of Systems   Review of Systems  All other related systems reviewed with patient or accompanying historian and are negative except as noted in HPI    Current Medications     Current Medications[1]    Current Allergies     Allergies as of 06/15/2025 - Reviewed 06/15/2025   Allergen Reaction Noted   • Dust mite extract  10/13/2014   • Molds & smuts  10/13/2014   • Pollen extract  10/13/2014   • Short ragweed pollen ext  10/13/2014   • Tree extract  10/13/2014   • Wound dressing adhesive Rash 01/06/2025            The following portions of the patient's history were reviewed and updated as appropriate: allergies, current medications, past family history, past medical history, past social history, past surgical history and problem list.     Past Medical History[2]    Past Surgical History[3]    Family History[4]      Medications have been verified.        Objective   /78 (BP Location: Right arm, Patient Position: Sitting, Cuff Size: Adult)   Pulse 85   Temp 98.2 °F (36.8 °C) (Tympanic)   Resp 18   Wt 85.5 kg (188 lb 9.6 oz)   SpO2 97%   BMI 28.68 kg/m²   No LMP for male patient.       Physical Exam     Physical Exam  Constitutional:       General: He is not in acute distress.     Appearance: He is well-developed.  "  HENT:      Head: Normocephalic and atraumatic.      Right Ear: Tympanic membrane normal.      Left Ear: Tympanic membrane normal.      Mouth/Throat:      Mouth: Mucous membranes are moist.      Pharynx: Uvula midline. Posterior oropharyngeal erythema present. No pharyngeal swelling or oropharyngeal exudate.      Tonsils: No tonsillar exudate or tonsillar abscesses. 0 on the right. 0 on the left.     Eyes:      General: No scleral icterus.     Conjunctiva/sclera: Conjunctivae normal.     Pulmonary:      Effort: Pulmonary effort is normal. No respiratory distress.      Breath sounds: No stridor.     Musculoskeletal:      Cervical back: Normal range of motion and neck supple.     Skin:     General: Skin is warm and dry.     Neurological:      Mental Status: He is alert and oriented to person, place, and time.     Psychiatric:         Behavior: Behavior normal.         Ortho Exam        Procedures  No Procedures performed today        Note: Portions of this record may have been created with voice recognition software. Occasional wrong word or \"sound a like\" substitutions may have occurred due to the inherent limitations of voice recognition software. Please read the chart carefully and recognize, using context, where substitutions have occurred.*             [1]    Current Outpatient Medications:   •  aspirin 81 MG tablet, Take by mouth, Disp: , Rfl:   •  atorvastatin (LIPITOR) 40 mg tablet, Take 1 tablet (40 mg total) by mouth daily, Disp: 90 tablet, Rfl: 1  •  citalopram (CeleXA) 20 mg tablet, TAKE 1 TABLET BY MOUTH EVERY DAY, Disp: 90 tablet, Rfl: 1  •  fluticasone (Arnuity Ellipta) 100 MCG/ACT AEPB inhaler, Inhale 1 puff daily Rinse mouth after use., Disp: 90 blister, Rfl: 0  •  fluticasone (FLONASE) 50 mcg/act nasal spray, 1 spray into each nostril in the morning., Disp: , Rfl:   •  ipratropium (ATROVENT) 0.06 % nasal spray, 1 spray into each nostril 2 (two) times a day, Disp: 15 mL, Rfl: 3  •  LORazepam (ATIVAN) " 0.5 mg tablet, Take 1 tablet (0.5 mg total) by mouth every 6 (six) hours as needed for anxiety, Disp: 90 tablet, Rfl: 0  •  metoprolol succinate (TOPROL-XL) 25 mg 24 hr tablet, Take 0.5 tablets (12.5 mg total) by mouth daily, Disp: 45 tablet, Rfl: 1  •  montelukast (SINGULAIR) 10 mg tablet, Take 1 tablet (10 mg total) by mouth daily, Disp: 90 tablet, Rfl: 0  •  omeprazole (PriLOSEC) 40 MG capsule, Take 1 capsule (40 mg total) by mouth daily, Disp: 90 capsule, Rfl: 1  •  sotalol (BETAPACE) 80 mg tablet, TAKE 1 TABLET BY MOUTH EVERY 12 HOURS, Disp: 180 tablet, Rfl: 3  •  tamsulosin (FLOMAX) 0.4 mg, Take 0.4 mg by mouth in the morning and 0.4 mg in the evening., Disp: , Rfl:   •  finasteride (PROSCAR) 5 mg tablet, Take 1 tablet (5 mg total) by mouth daily Do not start before August 17, 2023., Disp: 30 tablet, Rfl: 0[2]  Past Medical History:  Diagnosis Date   • Allergic    • Anxiety    • Asthma     allergy induced   • Basal cell carcinoma    • Colon polyp    • Fatty liver    • H/O nonmelanoma skin cancer     last assessed-8/22/2017   • Heart murmur    • Hyperlipidemia    • Inflamed seborrheic keratosis    • Malignant neoplasm of skin     last assessed-1/21/2014   • Neoplasm of uncertain behavior of skin    • Nocturia    • Pneumothorax, left    • Seasonal allergies    • Sebaceous cyst    • 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/01/2022    SCCIS- mid scalp   • Squamous cell carcinoma of skin of neck    • Testicular hypofunction    • Viral warts    [3]  Past Surgical History:  Procedure Laterality Date   • CARDIAC CATHETERIZATION Left 12/18/2024    Procedure: Cardiac Left Heart Cath;  Surgeon: Ezekiel Merrill MD;  Location: [facility];  Service: Cardiology   • CARDIAC CATHETERIZATION N/A 12/18/2024    Procedure: Cardiac Coronary Angiogram;  Surgeon: Ezekiel Merrill MD;  Location: [facility];  Service: Cardiology   • CARDIAC CATHETERIZATION  12/18/2024    Procedure: Left Heart Catherization;  Surgeon:  Ezekiel Merrill MD;  Location: [facility];  Service: Cardiology   • CARDIAC CATHETERIZATION N/A 12/18/2024    Procedure: Cardiac FFR/IFR;  Surgeon: Ezekiel Merrill MD;  Location: [facility];  Service: Cardiology   • CARDIAC PACEMAKER PLACEMENT  05/05/2021   • CARDIOVASCULAR STRESS TEST  08/27/2010   • COLONOSCOPY  10/08/2008   • IR OTHER  04/15/2021   • LUNG SURGERY      for pneumothorax   • MOHS SURGERY  02/15/2022    SCCIS mid scalp- Dr Leigh   • NECK SURGERY      brocken neck   • PARTIAL HIP ARTHROPLASTY     • RHINOPLASTY     [4]  Family History  Problem Relation Name Age of Onset   • Colon cancer Father Matty    • Coronary artery disease Father Matty    • Asthma Father Matty    • Breast cancer additional onset Mother Thuy

## 2025-06-18 ENCOUNTER — APPOINTMENT (OUTPATIENT)
Age: 70
End: 2025-06-18
Payer: MEDICARE

## 2025-06-18 DIAGNOSIS — E78.5 HYPERLIPIDEMIA, UNSPECIFIED HYPERLIPIDEMIA TYPE: ICD-10-CM

## 2025-06-18 LAB
ALBUMIN SERPL BCG-MCNC: 4.4 G/DL (ref 3.5–5)
ALP SERPL-CCNC: 69 U/L (ref 34–104)
ALT SERPL W P-5'-P-CCNC: 18 U/L (ref 7–52)
ANION GAP SERPL CALCULATED.3IONS-SCNC: 9 MMOL/L (ref 4–13)
AST SERPL W P-5'-P-CCNC: 17 U/L (ref 13–39)
BILIRUB SERPL-MCNC: 0.51 MG/DL (ref 0.2–1)
BUN SERPL-MCNC: 19 MG/DL (ref 5–25)
CALCIUM SERPL-MCNC: 9.3 MG/DL (ref 8.4–10.2)
CHLORIDE SERPL-SCNC: 104 MMOL/L (ref 96–108)
CHOLEST SERPL-MCNC: 170 MG/DL (ref ?–200)
CO2 SERPL-SCNC: 27 MMOL/L (ref 21–32)
CREAT SERPL-MCNC: 0.82 MG/DL (ref 0.6–1.3)
GFR SERPL CREATININE-BSD FRML MDRD: 91 ML/MIN/1.73SQ M
GLUCOSE P FAST SERPL-MCNC: 96 MG/DL (ref 65–99)
HDLC SERPL-MCNC: 40 MG/DL
LDLC SERPL CALC-MCNC: 99 MG/DL (ref 0–100)
NONHDLC SERPL-MCNC: 130 MG/DL
POTASSIUM SERPL-SCNC: 4 MMOL/L (ref 3.5–5.3)
PROT SERPL-MCNC: 6.6 G/DL (ref 6.4–8.4)
SODIUM SERPL-SCNC: 140 MMOL/L (ref 135–147)
TRIGL SERPL-MCNC: 153 MG/DL (ref ?–150)

## 2025-06-18 PROCEDURE — 36415 COLL VENOUS BLD VENIPUNCTURE: CPT

## 2025-06-18 PROCEDURE — 80053 COMPREHEN METABOLIC PANEL: CPT

## 2025-06-18 PROCEDURE — 80061 LIPID PANEL: CPT

## 2025-06-20 PROCEDURE — 88305 TISSUE EXAM BY PATHOLOGIST: CPT | Performed by: STUDENT IN AN ORGANIZED HEALTH CARE EDUCATION/TRAINING PROGRAM

## 2025-06-24 ENCOUNTER — TELEPHONE (OUTPATIENT)
Dept: DERMATOLOGY | Facility: CLINIC | Age: 70
End: 2025-06-24

## 2025-06-24 DIAGNOSIS — C44.92 SQUAMOUS CELL CARCINOMA OF SKIN: Primary | ICD-10-CM

## 2025-06-24 NOTE — LETTER
Jerry Funk     1955    [address] [facility] [address]    Dear Jerry Funk,    You are scheduled to have the Mohs procedure on July 25, 2025 at 9:30 am for scalp with Dr. Aaron Thornton. Your surgery will be located in [facility] the [facility] our address is [address] [place] [address]. Once you arrive please check in with our front staff in [address] and then wait Mohs waiting room [address].  If you have someone bringing you to your appointment they may wait in the waiting room or accompany you in your visit.     Below you will find some pre-op instructions along with some information regarding the Mohs procedure.     If you have any questions please call our office at [phone].     Thank you,    [facility] Mohs Department         PRE-OPERATIVE INSTRUCTIONS - MOHS    Before your scheduled surgery, there are a number of important precautions and positive steps you should take to help prepare yourself for a successful treatment and speedy recovery.    Some of the steps, which are listed below, may seem unnecessary and inconvenient, but they are important. For example, when you stop smoking, you increase your ability to heal. Occasionally, there may be valid reasons, personal or medical, why you can't comply. In such cases, please call the office so we can discuss possible ways to overcome any obstacles you may be encountering.    If you have any questions about the surgery, or remember additional medical information that you forgot to mention to our staff, please contact the office prior to your surgery.    GENERAL INFORMATION REGARDING MOHS MICROGRAPHIC SURGERY    Mohs surgery is a specialized technique for the removal of skin cancer developed by Dr. Frederick Mohs over 50 years ago to improve the cure rates of skin cancer. Traditionally, skin cancers are treated by destructive methods (radiation, freezing, scraping, and  burning) or excision (cutting out the tissue with standards margins and sending it to an outside laboratory for testing). These methods all yield cure rates between 65%-94%. However, for cancers located in cosmetically sensitive areas, large tumors, or tumors unsuccessfully treated by other means, Mohs surgery offers a higher cure rate. In most cases, Mohs surgery provides you with a 99% cure rate for primary (previously untreated) basal cell cancer and a 95% cure rate for primary squamous cell cancer. In Mohs surgery, tissue is removed and processed in a way that we are able to check 100% of the margins, giving the highest cure rate for any method of treating skin cancers while providing maximal preservation of normal skin. This allows the surgeon to produce an optimal cosmetic result for the patient by maximizing the amount of tissue removed yielding as small a scar as possible    On the day of surgery, you will be given local anesthesia only (similar to what was given to you during your initial biopsy). You will remain awake. You will verify the location of the skin cancer prior to the onset of the surgery. Once the area is numb, the tissue containing the skin cancer will be removed, taking a small safety margin. This margin is usually smaller than what would be taken with a standard excision. Once the tissue is removed, it is marked and oriented. The first layer (“Stage I”) will be processed in our laboratory. The wound will be treated for bleeding and a bandage will be placed to keep you comfortable while you wait an approximate 45 minutes-1 hour (for the processing of the tissue) in your room. Your Mohs surgeon will examine the pathology in the lab, checking all the margins. If any tumor remains, you will need to take a second layer of skin (“Stage 2”). The area will be re-anesthetized and your Mohs surgeon will remove more skin only in the area where the tumor exists. This process will continue until all the  skin cancer is removed. Unfortunately, there is no method to predict how many layers or stages will be taken.    Once the tumor has been removed completely, we will discuss the best ways to close the defect. Most wounds may be closed with stitches. A larger wound may require a skin graft or a flap. In rare instances, especially for cancers around the eye or for larger cancers, we may work with another surgeon (oculoplastic, ENT, plastics) with special skills to assist with reconstruction.  If the surgery is coordinated with another specialist, you will have the Mohs portion of the procedure first and see the coordinated specialist after the skin cancer has been removed.  Always follow the pre-operative instructions of the surgeon doing the closure.    Medications: Please take all your normal medications the morning of your surgery. If you are a diabetic, please bring your insulin or medications with you, as well as a snack to avoid having low blood sugar during your day with us.    1) Blood Thinners  VERY IMPORTANT: We do NOT stop or hold prescribed blood thinners (such as Coumadin/Warfarin, Plavix, Eliquis, Pradaxa, Brilinta, Apixaban, Xarelto, Lovonox, Rivaroxaban, or Aggrenox) before Mohs surgery. Additionally, If you take aspirin because you have had a stroke, heart attack, heart disease, other condition, or your physician has prescribed you to take it, please continue your aspirin.    Although there is a risk of increased bruising and bleeding, we are still able to safely perform surgery while continuing these medications. Please NEVER stop your prescribed blood thinner without the managing doctor's (the doctor that prescribed the medication) permission or knowledge. If you have any concerns about not holding your blood thinner, please address these with your Mohs surgeon.    Most people should stop all non-steroidal anti-inflammatory medications (Motrin, Naproxen, Advil, Midol, Aleve, etc.) for 7 days prior to  your scheduled surgery and 2 days after (unless instructed otherwise after surgery).  You may take Tylenol for pain.     Vitamins and Supplements  Avoid taking any supplements with Vitamin E, Fish Oil, Gingko, Ginseng, and Garlic for 2 weeks before and 2 days after your surgery. These thin your blood.    Lidocaine Patches  Avoid wearing any over the counter or prescribed Lidocaine patches the day of the surgery.    Alcohol: Avoid drinking alcohol for 2 days prior to your surgery, and for 2 days afterwards (it thins the blood and causes more bruising and swelling).    Smoking: Try to STOP or reduce smoking significantly the week before your surgery, and especially the week afterwards (it greatly improves how well you heal). Tobacco smoke deprives the blood of oxygen, which is urgently needed by the wound during the healing process.    Contact Lenses: Do not wear them on the day of the surgery. Instead, wear glasses and bring your case, in case we need to remove them.    Clothing: Do not wear your nicest clothing on your surgery day. We recommend wearing a button down shirt that will not disrupt your post-operative dressing when changing later that night.    Bathing: On the morning of your surgery, you may bathe or shower normally. If you get your hair done on a weekly basis, remember to get your hair washed the day before surgery.   - You will need to keep your surgical site dry for a minimum of 48 hours after surgery.    Makeup: If your surgery is on the face, please do not wear any makeup on the day of the surgery.    Jewelry: Please try to avoid wearing jewelry on the day of surgery.    Food: On the morning of surgery, have breakfast but limit your intake of caffeinated beverages. They are diuretic and may inconvenience you during surgery. If you are following up with another surgeon the same day as your Mohs surgery, you must receive permission to eat breakfast from that surgeon.    What to bring with you on the  day of your surgery:  Bring snacks - Since you could be at the office long, you may bring snacks and/or lunch with you. Some snacks and drinks are available at the office as well.   Bring a sweater - Bring a sweater or jacket that buttons or zips down the front and will not disturb your wound dressing during removal.  Bring something to do - You will be spending much of the day in our office. There will be 45-60 minute waiting periods  between layers/stages, and while there is a television with cable in every room, it is nice to have something to keep you occupied such as books, magazines, knitting, music, or work.     Planning Ahead:  Appointment Length - Understand this procedure length is unpredictable, therefore, plan to be in the office for at least half a day. Depending on procedures scheduled prior to yours, there may be waiting prior to the start of your procedure.  Other Appointments - It is important to realize that no matter how small the skin cancer appears to be, looks can be deceiving. Since your surgery may last the entire day, you should not schedule any other appointments that day.  Special Occasions - Surgery often creates swelling and bruising. Also, the post-op dressing may be rather large and obvious. Keep this in mind as you arrange your social/work schedule. If an important event is already planned, please check with your referring physician or your Mohs surgeon to see if the surgery can be postponed.  Activity Limits after Surgery - If surgery was performed on your face, we recommend that you keep your activity level to a minimum for 2-3 days (the blood pressure elevation related to exercise can lead to bleeding). If you have stitches in an area that will be under tension or significant movement (neck, back, arms, legs), you will need to avoid heavy lifting (anything over 5 lbs) or exercise for at least 2 weeks and possibly longer. We also advise that you limit out of town travel for the  first 7 days after surgery. You should also wait at least 7 days before going into a pool or the ocean.  Housework - Since you will need to minimize activity after surgery, plan to do your groceries, laundry, gardening, and other heavy household chores prior to your surgery. Please make arrangements for assistance during the post-op period. If surgery is around your mouth area, you may need to eat soft foods, such as soup, milkshakes, or yogurt for 48 hours.    Purchasing bandage supplies: Prior to surgery, please purchase the following items to care for your surgical wound properly.  Cotton swabs (Q-tips)  Vaseline or Aquaphor  Telfa pads (or any non-stick dressing)  Paper tape or Hypafix tape  Gauze pads (3x3)    Transportation: It is often reassuring and comforting to have a  drive you to and from the surgery. He or she is welcome to wait in the office during the surgery. If you do not have a , you may drive to and from your procedure (unless stated otherwise). If the site being treated is near your eye, be aware that the final bandage may cause some obstruction of vision.     Rescheduling: If you need to reschedule your surgery, please notify the office as soon as possible.

## 2025-06-25 ENCOUNTER — OFFICE VISIT (OUTPATIENT)
Dept: CARDIOLOGY CLINIC | Facility: CLINIC | Age: 70
End: 2025-06-25
Payer: MEDICARE

## 2025-06-25 VITALS
RESPIRATION RATE: 16 BRPM | HEART RATE: 90 BPM | HEIGHT: 68 IN | OXYGEN SATURATION: 97 % | DIASTOLIC BLOOD PRESSURE: 78 MMHG | SYSTOLIC BLOOD PRESSURE: 120 MMHG | WEIGHT: 190 LBS | BODY MASS INDEX: 28.79 KG/M2

## 2025-06-25 DIAGNOSIS — I47.19 ATRIAL TACHYCARDIA (HCC): ICD-10-CM

## 2025-06-25 DIAGNOSIS — I47.20 VENTRICULAR TACHYCARDIA (HCC): ICD-10-CM

## 2025-06-25 DIAGNOSIS — E78.2 MIXED HYPERLIPIDEMIA: Primary | ICD-10-CM

## 2025-06-25 PROBLEM — I71.21 ANEURYSM OF ASCENDING AORTA WITHOUT RUPTURE (HCC): Status: RESOLVED | Noted: 2023-03-27 | Resolved: 2025-06-25

## 2025-06-25 PROCEDURE — 99214 OFFICE O/P EST MOD 30 MIN: CPT | Performed by: STUDENT IN AN ORGANIZED HEALTH CARE EDUCATION/TRAINING PROGRAM

## 2025-06-25 RX ORDER — ROSUVASTATIN CALCIUM 40 MG/1
40 TABLET, COATED ORAL DAILY
Qty: 90 TABLET | Refills: 3 | Status: SHIPPED | OUTPATIENT
Start: 2025-06-25

## 2025-06-25 NOTE — ASSESSMENT & PLAN NOTE
Given known LAD disease we will have a stricter goal for the patient's LDL, he has made some progress with lifestyle changes but we will transition from atorvastatin 40 mg to rosuvastatin 40 mg.  He will finish up his atorvastatin by increasing it to 80 mg, and then transition to the 40 mg.  Once this is done we will repeat the lipid panel.  He was also reeducated on diet.

## 2025-06-25 NOTE — ASSESSMENT & PLAN NOTE
Patient without any recurrence on his last few device interrogations, is currently on metoprolol to sotalol.  Patient had an ischemic evaluation with IFR -60 to 70% LAD disease.  Will continue to monitor device and continue with beta-blockers.

## 2025-06-25 NOTE — PROGRESS NOTES
[facility] Cardiology  Follow up note  Jerry Funk 69 y.o. male MRN: [id number]        1. Mixed hyperlipidemia  -     rosuvastatin (CRESTOR) 40 MG tablet; Take 1 tablet (40 mg total) by mouth daily  -     Lipid panel; Future; Expected date: Collect anytime      Assessment & Plan  Mixed hyperlipidemia  Given known LAD disease we will have a stricter goal for the patient's LDL, he has made some progress with lifestyle changes but we will transition from atorvastatin 40 mg to rosuvastatin 40 mg.  He will finish up his atorvastatin by increasing it to 80 mg, and then transition to the 40 mg.  Once this is done we will repeat the lipid panel.  He was also reeducated on diet.  Ventricular tachycardia (HCC)  Patient without any recurrence on his last few device interrogations, is currently on metoprolol to sotalol.  Patient had an ischemic evaluation with IFR -60 to 70% LAD disease.  Will continue to monitor device and continue with beta-blockers.  Atrial tachycardia (HCC)  S/p MDT PPM on sotalol and metoprolol. QTC recently checked with EP. C/w surveillance EKGs for QTc.            HPI:   Jerry Funk is a 69 y.o. year old male hx AT on sotalol, 25 beat run of VT in 10/2024 underwent NST -> LHC with FFR negative LAD disease, SSS s/p MDT DC PPM. Since his  last visit the patient followed up with electrophysiology, noted to have an EKG with a QTc of 480 with intermittent a pacing.  Recent device interrogation showed no recent VT.Patient has chart history of ascending aortic aneurysm however CT chest abdomen pelvis from 8/12/2023 showed no aneurysm. Repeat echocardiograms showed no dilatation        Currently denies any fever, chills, fatigue, new visual changes, lightheadedness, syncope, chest pain, palpitations, shortness of breath at rest or with exertion, orthopnea, PND, nausea, vomiting, diarrhea, dark or bright red blood in stools, lower extremity swelling, leg claudication.       Lab Results   Component Value  Date    LDLCALC 99 06/18/2025     Lab Results   Component Value Date    CHOLESTEROL 170 06/18/2025    CHOLESTEROL 196 06/24/2024    CHOLESTEROL 200 07/10/2023     Lab Results   Component Value Date    HDL 40 06/18/2025    HDL 46 06/24/2024    HDL 48 07/10/2023     Lab Results   Component Value Date    TRIG 153 (H) 06/18/2025    TRIG 157 (H) 06/24/2024    TRIG 167 (H) 07/10/2023     Lab Results   Component Value Date    NONHDLC 130 06/18/2025    NONHDLC 150 06/24/2024    NONHDLC 152 07/10/2023         Recent cadiac testing:    Echocardiogram:  11/1/24: LVEF 55. LVH, LAE, Mild TR,  Stress tests:  12/10/24: NST - Left ventricular perfusion defect medium size in apical inferior, apical lateral, and apical cap   Catheterization:  12/18/24: mLAD 60-70% iFR 0.96, nonobstructive CAD of RCA/LCx  Device Check/10/2025: A-paced 90%, V paced 23%, no significant arrhythmias noted  Review of Systems    Past Medical History[1]  Social History     Substance and Sexual Activity   Alcohol Use Not Currently    Comment: quit march 2020     Social History     Substance and Sexual Activity   Drug Use No     Tobacco Use History[2]    Allergies:  Allergies[3]    Medications:   Current Medications[4]      Vitals:    06/25/25 1321   BP: 120/78   Pulse: 90   Resp: 16   SpO2: 97%     Weight (last 2 days)       Date/Time Weight    06/25/25 1321 86.2 (190)          Physical Exam  Vitals and nursing note reviewed.   Constitutional:       General: He is not in acute distress.     Appearance: He is well-developed.   HENT:      Head: Normocephalic and atraumatic.     Eyes:      Conjunctiva/sclera: Conjunctivae normal.       Cardiovascular:      Rate and Rhythm: Normal rate and regular rhythm.      Heart sounds: No murmur heard.  Pulmonary:      Effort: Pulmonary effort is normal. No respiratory distress.      Breath sounds: Normal breath sounds.     Musculoskeletal:         General: No swelling.      Cervical back: Neck supple.     Skin:     General:  "Skin is warm and dry.      Capillary Refill: Capillary refill takes less than 2 seconds.     Neurological:      Mental Status: He is alert.      Gait: Gait abnormal.     Psychiatric:         Mood and Affect: Mood normal.         Laboratory Studies:    Laboratory studies personally reviewed    Cardiac testing:     See above     Gerardo Jasmine MD    Portions of the record may have been created with voice recognition software.  Occasional wrong word or \"sound a like\" substitutions may have occurred due to the inherent limitations of voice recognition software.  Read the chart carefully and recognize, using context, where substitutions have occurred.          [1]   Past Medical History:  Diagnosis Date    Allergic     Anxiety     Asthma     allergy induced    Basal cell carcinoma     Colon polyp     Fatty liver     H/O nonmelanoma skin cancer     last assessed-8/22/2017    Heart murmur     Hyperlipidemia     Inflamed seborrheic keratosis     Malignant neoplasm of skin     last assessed-1/21/2014    Neoplasm of uncertain behavior of skin     Nocturia     Pneumothorax, left     Seasonal allergies     Sebaceous cyst     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/01/2022    SCCIS- mid scalp    Squamous cell carcinoma of skin of neck     Testicular hypofunction     Viral warts    [2]   Social History  Tobacco Use   Smoking Status Never   Smokeless Tobacco Never   [3]   Allergies  Allergen Reactions    Dust Mite Extract     Molds & Smuts     Pollen Extract     Short Ragweed Pollen Ext     Tree Extract     Wound Dressing Adhesive Rash   [4]   Current Outpatient Medications:     aspirin 81 MG tablet, Take by mouth, Disp: , Rfl:     citalopram (CeleXA) 20 mg tablet, TAKE 1 TABLET BY MOUTH EVERY DAY, Disp: 90 tablet, Rfl: 1    finasteride (PROSCAR) 5 mg tablet, Take 1 tablet (5 mg total) by mouth daily Do not start before August 17, 2023., Disp: 30 tablet, Rfl: 0    fluticasone (Arnuity Ellipta) 100 MCG/ACT AEPB inhaler, Inhale 1 puff " daily Rinse mouth after use., Disp: 90 blister, Rfl: 0    fluticasone (FLONASE) 50 mcg/act nasal spray, 1 spray into each nostril in the morning., Disp: , Rfl:     ipratropium (ATROVENT) 0.06 % nasal spray, 1 spray into each nostril 2 (two) times a day, Disp: 15 mL, Rfl: 3    LORazepam (ATIVAN) 0.5 mg tablet, Take 1 tablet (0.5 mg total) by mouth every 6 (six) hours as needed for anxiety, Disp: 90 tablet, Rfl: 0    metoprolol succinate (TOPROL-XL) 25 mg 24 hr tablet, Take 0.5 tablets (12.5 mg total) by mouth daily, Disp: 45 tablet, Rfl: 1    montelukast (SINGULAIR) 10 mg tablet, Take 1 tablet (10 mg total) by mouth daily, Disp: 90 tablet, Rfl: 0    rosuvastatin (CRESTOR) 40 MG tablet, Take 1 tablet (40 mg total) by mouth daily, Disp: 90 tablet, Rfl: 3    sotalol (BETAPACE) 80 mg tablet, TAKE 1 TABLET BY MOUTH EVERY 12 HOURS, Disp: 180 tablet, Rfl: 3    tamsulosin (FLOMAX) 0.4 mg, Take 0.4 mg by mouth in the morning and 0.4 mg in the evening., Disp: , Rfl:     omeprazole (PriLOSEC) 40 MG capsule, Take 1 capsule (40 mg total) by mouth daily, Disp: 90 capsule, Rfl: 1

## 2025-06-25 NOTE — ASSESSMENT & PLAN NOTE
S/p MDT PPM on sotalol and metoprolol. QTC recently checked with EP. C/w surveillance EKGs for QTc.

## 2025-06-25 NOTE — PATIENT INSTRUCTIONS
1) Continue with diet  2) Increase atorvastatin 40 mg to 80 mg (2 pills) eventual transition to rosuvastatin 40 mg  (Lipid panel 3 months)

## 2025-06-26 NOTE — TELEPHONE ENCOUNTER
Pre- operative Mohs Telephone Scheduling Note    Do you have a pacemaker or defibrillator? yes: pacemaker    Do you have a cochlear implant, spinal or brain stimulator? no    Do you take antibiotics before skin or dental procedures? no  If yes, will likely require pre-operative antibiotics. Ask  the patient why they take the antibiotics (usually because of joint replacement).    Do you have a history of a joint replacements within the past 2 years? no   If yes, will likely require pre-operative antibiotics. Ask if orthopaedic surgeon has prescribed pre-operative antibiotics to take before procedures/dental work?    Do you take any OTC medications that thin your blood (Aspirin, Aleve, Ibuprofen) or supplements that thin your blood (fish oil, garlic, vitamin E, Ginko Biloba)? yes: asa    Do you take any prescribed medications that thin your blood (Coumadin, Plavix, Xarelto, Eliquis or another prescribed blood thinner)? no    Do you have an allergy to lidocaine or epinephrine? no    Do you have allergies to Iodine? no    Do you wear a lidocaine patch? no    Have you ever been diagnosed with HIV, AIDS, Hep B and Hep C? no    Do you use a cane, walker or wheelchair? no    Is the patient from a nursing home? no If yes, Is there any special accommodations that is needed for patient n/a    Do you smoke? no      If yes,  patient to try and stop 2 days before surgery and 7 days after the surgery. Minimizing smoking as much as possible during this time will improve healing and the cosmetic result after surgery.    Do you use supplemental oxygen? If so, how many liters and can you be off it for a short period of time? N/a    Date scheduled: 7/25/25 at 9:30 with Dr Thornton    Coordination of Care with other provider (Oculoplastics, Plastics, ENT) required? no   IF YES, PLEASE FORWARD TO APPROPRIATE PERSONNEL TO HELP COORDINATE.    Are there remaining tumors to be scheduled? no    Was Prior Authorization obtained? No  (please use [email or web address] to document prior auth)

## 2025-06-26 NOTE — TELEPHONE ENCOUNTER
Received call from patient stating he was retuning Mary's call to sch MOHS.    Call was warm transferred to Mary to further assist him

## 2025-07-07 ENCOUNTER — OFFICE VISIT (OUTPATIENT)
Dept: FAMILY MEDICINE CLINIC | Facility: CLINIC | Age: 70
End: 2025-07-07
Payer: MEDICARE

## 2025-07-07 VITALS
SYSTOLIC BLOOD PRESSURE: 102 MMHG | HEART RATE: 93 BPM | OXYGEN SATURATION: 95 % | WEIGHT: 189 LBS | BODY MASS INDEX: 28.64 KG/M2 | HEIGHT: 68 IN | RESPIRATION RATE: 18 BRPM | DIASTOLIC BLOOD PRESSURE: 70 MMHG

## 2025-07-07 DIAGNOSIS — E78.2 MIXED HYPERLIPIDEMIA: ICD-10-CM

## 2025-07-07 DIAGNOSIS — Z00.00 MEDICARE ANNUAL WELLNESS VISIT, SUBSEQUENT: Primary | ICD-10-CM

## 2025-07-07 DIAGNOSIS — F41.1 ANXIETY, GENERALIZED: ICD-10-CM

## 2025-07-07 DIAGNOSIS — M54.50 ACUTE BILATERAL LOW BACK PAIN WITHOUT SCIATICA: ICD-10-CM

## 2025-07-07 DIAGNOSIS — I49.5 TACHY-BRADY SYNDROME (HCC): ICD-10-CM

## 2025-07-07 DIAGNOSIS — Z12.5 PROSTATE CANCER SCREENING: ICD-10-CM

## 2025-07-07 PROCEDURE — G0439 PPPS, SUBSEQ VISIT: HCPCS | Performed by: FAMILY MEDICINE

## 2025-07-07 PROCEDURE — 99214 OFFICE O/P EST MOD 30 MIN: CPT | Performed by: FAMILY MEDICINE

## 2025-07-07 PROCEDURE — G2211 COMPLEX E/M VISIT ADD ON: HCPCS | Performed by: FAMILY MEDICINE

## 2025-07-07 NOTE — PROGRESS NOTES
Name: Jerry Funk      : 1955      MRN: [id number]  Encounter Provider: Frederic Bush DO  Encounter Date: 2025   Encounter department: [facility] [address]  :  Assessment & Plan  Medicare annual wellness visit, subsequent         Mixed hyperlipidemia  Continue rosuvastatin as prescribed by his cardiologist, will follow his lipid profile.  Orders:  •  CBC; Future  •  Comprehensive metabolic panel; Future    Acute bilateral low back pain without sciatica  Refer to physical therapy.  Orders:  •  Ambulatory Referral to Physical Therapy; Future    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HCC)  Continue the sotalol and metoprolol, follow-up with cardiology as scheduled.       Prostate cancer screening    Orders:  •  PSA, Total Screen; Future    Anxiety, generalized  Continue citalopram at current dose.  Will not increase as per cardiology.  Did discuss with the patient that if he would like to try to decrease this he is to let us know and we can gradually wean him off of this.          Preventive health issues were discussed with patient, and age appropriate screening tests were ordered as noted in patient's After Visit Summary. Personalized health advice and appropriate referrals for health education or preventive services given if needed, as noted in patient's After Visit Summary.    History of Present Illness     Patient comes in today for checkup.  He states that he would like to start physical therapy for his back.  He did not do this last year.  He is up-to-date with his cardiologist and recently had his atorvastatin discontinued and started rosuvastatin for better cholesterol control.  He continues to take the Celexa 20 mg daily, he is not sure if this is helping with respect to his anxiety.       Patient Care Team:  Frederic Bush DO as PCP - General (Family Medicine)  MD Ruchi Pickard PA-C as Physician Assistant (Physician Assistant)    Review of Systems    Constitutional:  Negative for chills, fatigue and fever.   HENT:  Negative for congestion, ear pain, hearing loss, postnasal drip, rhinorrhea and sore throat.    Eyes:  Negative for pain and visual disturbance.   Respiratory:  Negative for chest tightness, shortness of breath and wheezing.    Cardiovascular:  Negative for chest pain and leg swelling.   Gastrointestinal:  Negative for abdominal distention, abdominal pain, constipation, diarrhea and vomiting.   Endocrine: Negative for cold intolerance and heat intolerance.   Genitourinary:  Negative for difficulty urinating, frequency and urgency.   Musculoskeletal:  Negative for arthralgias and gait problem.   Skin:  Negative for color change.   Neurological:  Negative for dizziness, tremors, syncope, numbness and headaches.   Hematological:  Negative for adenopathy.   Psychiatric/Behavioral:  Negative for agitation, confusion and sleep disturbance. The patient is not nervous/anxious.      Medical History Reviewed by provider this encounter:  Tobacco  Allergies  Meds  Problems  Med Hx  Surg Hx  Fam Hx       Annual Wellness Visit Questionnaire   Jerry is here for his Subsequent Wellness visit. Last Medicare Wellness visit information reviewed, patient interviewed and updates made to the record today.      Health Risk Assessment:   Patient rates overall health as good. Patient feels that their physical health rating is same. Patient is satisfied with their life. Eyesight was rated as slightly worse. Hearing was rated as same. Patient feels that their emotional and mental health rating is same. Patients states they are never, rarely angry. Patient states they are often unusually tired/fatigued. Pain experienced in the last 7 days has been none. Patient states that he has experienced no weight loss or gain in last 6 months.     Depression Screening:   PHQ-2 Score: 1      Fall Risk Screening:   In the past year, patient has experienced: no history of falling in  past year      Home Safety:  Patient has trouble with stairs inside or outside of their home. Patient has working smoke alarms and has no working carbon monoxide detector. Home safety hazards include: none.     Nutrition:   Current diet is Regular, Low Cholesterol and Low Saturated Fat.     Medications:   Patient is currently taking over-the-counter supplements. OTC medications include: see medication list. Patient is able to manage medications.     Activities of Daily Living (ADLs)/Instrumental Activities of Daily Living (IADLs):   Walk and transfer into and out of bed and chair?: Yes  Dress and groom yourself?: Yes    Bathe or shower yourself?: Yes    Feed yourself? Yes  Do your laundry/housekeeping?: Yes  Manage your money, pay your bills and track your expenses?: Yes  Make your own meals?: Yes    Do your own shopping?: Yes    Previous Hospitalizations:   Any hospitalizations or ED visits within the last 12 months?: No      Advance Care Planning:   Living will: Yes    Durable POA for healthcare: No    Advanced directive: Yes      Cognitive Screening:   Provider or family/friend/caregiver concerned regarding cognition?: No    Preventive Screenings      Cardiovascular Screening:    General: Screening Not Indicated and History Lipid Disorder      Diabetes Screening:     General: Screening Current      Colorectal Cancer Screening:     General: Screening Current      Prostate Cancer Screening:    General: Screening Current      Osteoporosis Screening:    General: Screening Not Indicated      Abdominal Aortic Aneurysm (AAA) Screening:    Risk factors include: age between 65-74 yo        Lung Cancer Screening:     General: Screening Not Indicated      Hepatitis C Screening:    General: Screening Current    Immunizations:  - Immunizations due: Prevnar 20 and Zoster (Shingrix)    Screening, Brief Intervention, and Referral to Treatment (SBIRT)     Screening  Typical number of drinks in a day: 0  Typical number of drinks  "in a week: 0  Interpretation: Low risk drinking behavior.    AUDIT-C Screenin) How often did you have a drink containing alcohol in the past year? never  2) How many drinks did you have on a typical day when you were drinking in the past year? 0  3) How often did you have 6 or more drinks on one occasion in the past year? never    AUDIT-C Score: 0  Interpretation: Score 0-3 (male): Negative screen for alcohol misuse    Single Item Drug Screening:  How often have you used an illegal drug (including marijuana) or a prescription medication for non-medical reasons in the past year? never    Single Item Drug Screen Score: 0  Interpretation: Negative screen for possible drug use disorder    Social Drivers of Health     Food Insecurity: Unknown (2025)    Nursing - Inadequate Food Risk Classification    • Worried About Running Out of Food in the Last Year: Never true   Transportation Needs: No Transportation Needs (2025)    PRAPARE - Transportation    • Lack of Transportation (Medical): No    • Lack of Transportation (Non-Medical): No   Housing Stability: Low Risk  (2025)    Housing Stability Vital Sign    • Unable to Pay for Housing in the Last Year: No    • Number of Times Moved in the Last Year: 0    • Homeless in the Last Year: No   Utilities: Not At Risk (2025)    [facility] Utilities    • Threatened with loss of utilities: No     No results found.    Objective   /70 (BP Location: Left arm, Patient Position: Sitting)   Pulse 93   Resp 18   Ht 5' 8\" (1.727 m)   Wt 85.7 kg (189 lb)   SpO2 95%   BMI 28.74 kg/m²     Physical Exam  Constitutional:       Appearance: He is well-developed.   HENT:      Head: Normocephalic.      Right Ear: External ear normal.      Left Ear: External ear normal.      Nose: Nose normal.     Eyes:      Extraocular Movements: Extraocular movements intact.      Conjunctiva/sclera: Conjunctivae normal.      Pupils: Pupils are equal, round, and reactive to light.     Neck: "      Thyroid: No thyromegaly.     Cardiovascular:      Rate and Rhythm: Normal rate and regular rhythm.      Heart sounds: Normal heart sounds.   Pulmonary:      Effort: Pulmonary effort is normal.      Breath sounds: Normal breath sounds.   Abdominal:      General: Bowel sounds are normal.      Palpations: Abdomen is soft.     Musculoskeletal:         General: Normal range of motion.      Cervical back: Normal range of motion.     Skin:     General: Skin is warm and dry.     Neurological:      Mental Status: He is alert and oriented to person, place, and time.     Psychiatric:         Mood and Affect: Mood normal.         Behavior: Behavior normal.

## 2025-07-07 NOTE — ASSESSMENT & PLAN NOTE
Continue rosuvastatin as prescribed by his cardiologist, will follow his lipid profile.  Orders:  •  CBC; Future  •  Comprehensive metabolic panel; Future

## 2025-07-07 NOTE — PATIENT INSTRUCTIONS
Medicare Preventive Visit Patient Instructions  Thank you for completing your Welcome to Medicare Visit or Medicare Annual Wellness Visit today. Your next wellness visit will be due in one year (7/8/2026).  The screening/preventive services that you may require over the next 5-10 years are detailed below. Some tests may not apply to you based off risk factors and/or age. Screening tests ordered at today's visit but not completed yet may show as past due. Also, please note that scanned in results may not display below.  Preventive Screenings:  Service Recommendations Previous Testing/Comments   Colorectal Cancer Screening  Colonoscopy    Fecal Occult Blood Test (FOBT)/Fecal Immunochemical Test (FIT)  Fecal DNA/Cologuard Test  Flexible Sigmoidoscopy Age: 45-75 years old   Colonoscopy: every 10 years (May be performed more frequently if at higher risk)  OR  FOBT/FIT: every 1 year  OR  Cologuard: every 3 years  OR  Sigmoidoscopy: every 5 years  Screening may be recommended earlier than age 45 if at higher risk for colorectal cancer. Also, an individualized decision between you and your healthcare provider will decide whether screening between the ages of 76-85 would be appropriate. Colonoscopy: 05/16/2024  FOBT/FIT: Not on file  Cologuard: Not on file  Sigmoidoscopy: Not on file    Screening Current     Prostate Cancer Screening Individualized decision between patient and health care provider in men between ages of 55-69   Medicare will cover every 12 months beginning on the day after your 50th birthday PSA: 2.35 ng/mL           Hepatitis C Screening Once for adults born between 1945 and 1965  More frequently in patients at high risk for Hepatitis C Hep C Antibody: Not on file    Screening Current   Diabetes Screening 1-2 times per year if you're at risk for diabetes or have pre-diabetes Fasting glucose: 96 mg/dL (6/18/2025)  A1C: 5.0 % (1/16/2021)  Screening Current   Cholesterol Screening Once every 5 years if you  don't have a lipid disorder. May order more often based on risk factors. Lipid panel: 06/18/2025  Screening Not Indicated  History Lipid Disorder      Other Preventive Screenings Covered by Medicare:  Abdominal Aortic Aneurysm (AAA) Screening: covered once if your at risk. You're considered to be at risk if you have a family history of AAA or a male between the age of 65-75 who smoking at least 100 cigarettes in your lifetime.  Lung Cancer Screening: covers low dose CT scan once per year if you meet all of the following conditions: (1) Age 55-77; (2) No signs or symptoms of lung cancer; (3) Current smoker or have quit smoking within the last 15 years; (4) You have a tobacco smoking history of at least 20 pack years (packs per day x number of years you smoked); (5) You get a written order from a healthcare provider.  Glaucoma Screening: covered annually if you're considered high risk: (1) You have diabetes OR (2) Family history of glaucoma OR (3)  aged 50 and older OR (4)  American aged 65 and older  Osteoporosis Screening: covered every 2 years if you meet one of the following conditions: (1) Have a vertebral abnormality; (2) On glucocorticoid therapy for more than 3 months; (3) Have primary hyperparathyroidism; (4) On osteoporosis medications and need to assess response to drug therapy.  HIV Screening: covered annually if you're between the age of 15-65. Also covered annually if you are younger than 15 and older than 65 with risk factors for HIV infection. For pregnant patients, it is covered up to 3 times per pregnancy.    Immunizations:  Immunization Recommendations   Influenza Vaccine Annual influenza vaccination during flu season is recommended for all persons aged >= 6 months who do not have contraindications   Pneumococcal Vaccine   * Pneumococcal conjugate vaccine = PCV13 (Prevnar 13), PCV15 (Vaxneuvance), PCV20 (Prevnar 20)  * Pneumococcal polysaccharide vaccine = PPSV23 (Pneumovax)  Adults 19-63 yo with certain risk factors or if 65+ yo  If never received any pneumonia vaccine: recommend Prevnar 20 (PCV20)  Give PCV20 if previously received 1 dose of PCV13 or PPSV23   Hepatitis B Vaccine 3 dose series if at intermediate or high risk (ex: diabetes, end stage renal disease, liver disease)   Respiratory syncytial virus (RSV) Vaccine - COVERED BY MEDICARE PART D  * RSVPreF3 (Arexvy) CDC recommends that adults 60 years of age and older may receive a single dose of RSV vaccine using shared clinical decision-making (SCDM)   Tetanus (Td) Vaccine - COST NOT COVERED BY MEDICARE PART B Following completion of primary series, a booster dose should be given every 10 years to maintain immunity against tetanus. Td may also be given as tetanus wound prophylaxis.   Tdap Vaccine - COST NOT COVERED BY MEDICARE PART B Recommended at least once for all adults. For pregnant patients, recommended with each pregnancy.   Shingles Vaccine (Shingrix) - COST NOT COVERED BY MEDICARE PART B  2 shot series recommended in those 19 years and older who have or will have weakened immune systems or those 50 years and older     Health Maintenance Due:      Topic Date Due   • Colorectal Cancer Screening  05/16/2027   • Hepatitis C Screening  Addressed     Immunizations Due:      Topic Date Due   • Pneumococcal Vaccine: 50+ Years (2 of 2 - PCV) 12/23/2014   • COVID-19 Vaccine (6 - 2024-25 season) 03/23/2025   • Influenza Vaccine (1) 09/01/2025     Advance Directives   What are advance directives?  Advance directives are legal documents that state your wishes and plans for medical care. These plans are made ahead of time in case you lose your ability to make decisions for yourself. Advance directives can apply to any medical decision, such as the treatments you want, and if you want to donate organs.   What are the types of advance directives?  There are many types of advance directives, and each state has rules about how to use  them. You may choose a combination of any of the following:  Living will:  This is a written record of the treatment you want. You can also choose which treatments you do not want, which to limit, and which to stop at a certain time. This includes surgery, medicine, IV fluid, and tube feedings.   Durable power of  for healthcare (DPAHC):  This is a written record that states who you want to make healthcare choices for you when you are unable to make them for yourself. This person, called a proxy, is usually a family member or a friend. You may choose more than 1 proxy.  Do not resuscitate (DNR) order:  A DNR order is used in case your heart stops beating or you stop breathing. It is a request not to have certain forms of treatment, such as CPR. A DNR order may be included in other types of advance directives.  Medical directive:  This covers the care that you want if you are in a coma, near death, or unable to make decisions for yourself. You can list the treatments you want for each condition. Treatment may include pain medicine, surgery, blood transfusions, dialysis, IV or tube feedings, and a ventilator (breathing machine).  Values history:  This document has questions about your views, beliefs, and how you feel and think about life. This information can help others choose the care that you would choose.  Why are advance directives important?  An advance directive helps you control your care. Although spoken wishes may be used, it is better to have your wishes written down. Spoken wishes can be misunderstood, or not followed. Treatments may be given even if you do not want them. An advance directive may make it easier for your family to make difficult choices about your care.   Weight Management   Why it is important to manage your weight:  Being overweight increases your risk of health conditions such as heart disease, high blood pressure, type 2 diabetes, and certain types of cancer. It can also  increase your risk for osteoarthritis, sleep apnea, and other respiratory problems. Aim for a slow, steady weight loss. Even a small amount of weight loss can lower your risk of health problems.  How to lose weight safely:  A safe and healthy way to lose weight is to eat fewer calories and get regular exercise. You can lose up about 1 pound a week by decreasing the number of calories you eat by 500 calories each day.   Healthy meal plan for weight management:  A healthy meal plan includes a variety of foods, contains fewer calories, and helps you stay healthy. A healthy meal plan includes the following:  Eat whole-grain foods more often.  A healthy meal plan should contain fiber. Fiber is the part of grains, fruits, and vegetables that is not broken down by your body. Whole-grain foods are healthy and provide extra fiber in your diet. Some examples of whole-grain foods are whole-wheat breads and pastas, oatmeal, brown rice, and bulgur.  Eat a variety of vegetables every day.  Include dark, leafy greens such as spinach, kale, zenobia greens, and mustard greens. Eat yellow and orange vegetables such as carrots, sweet potatoes, and winter squash.   Eat a variety of fruits every day.  Choose fresh or canned fruit (canned in its own juice or light syrup) instead of juice. Fruit juice has very little or no fiber.  Eat low-fat dairy foods.  Drink fat-free (skim) milk or 1% milk. Eat fat-free yogurt and low-fat cottage cheese. Try low-fat cheeses such as mozzarella and other reduced-fat cheeses.  Choose meat and other protein foods that are low in fat.  Choose beans or other legumes such as split peas or lentils. Choose fish, skinless poultry (chicken or turkey), or lean cuts of red meat (beef or pork). Before you cook meat or poultry, cut off any visible fat.   Use less fat and oil.  Try baking foods instead of frying them. Add less fat, such as margarine, sour cream, regular salad dressing and mayonnaise to foods. Eat  fewer high-fat foods. Some examples of high-fat foods include french fries, doughnuts, ice cream, and cakes.  Eat fewer sweets.  Limit foods and drinks that are high in sugar. This includes candy, cookies, regular soda, and sweetened drinks.  Exercise:  Exercise at least 30 minutes per day on most days of the week. Some examples of exercise include walking, biking, dancing, and swimming. You can also fit in more physical activity by taking the stairs instead of the elevator or parking farther away from stores. Ask your healthcare provider about the best exercise plan for you.    © Copyright [employer] 2018 Information is for End User's use only and may not be sold, redistributed or otherwise used for commercial purposes. All illustrations and images included in CareNotes® are the copyrighted property of [employer] or [employer]

## 2025-07-07 NOTE — ASSESSMENT & PLAN NOTE
Continue citalopram at current dose.  Will not increase as per cardiology.  Did discuss with the patient that if he would like to try to decrease this he is to let us know and we can gradually wean him off of this.

## 2025-07-10 ENCOUNTER — REMOTE DEVICE CLINIC VISIT (OUTPATIENT)
Dept: CARDIOLOGY CLINIC | Facility: CLINIC | Age: 70
End: 2025-07-10
Payer: MEDICARE

## 2025-07-10 DIAGNOSIS — Z95.0 PRESENCE OF PERMANENT CARDIAC PACEMAKER: Primary | ICD-10-CM

## 2025-07-10 PROCEDURE — 93296 REM INTERROG EVL PM/IDS: CPT | Performed by: INTERNAL MEDICINE

## 2025-07-10 PROCEDURE — 93294 REM INTERROG EVL PM/LDLS PM: CPT | Performed by: INTERNAL MEDICINE

## 2025-07-10 NOTE — PROGRESS NOTES
Results for orders placed or performed in visit on 07/10/25   Cardiac EP device report    Narrative    MDT DC PM/ACTIVE SYSTEM IS MRI CONDITIONAL  CARELINK TRANSMISSION: BATTERY VOLTAGE ADEQUATE. (10 YRS) AP 98%  41%. ALL AVAILABLE LEAD PARAMETERS WITHIN NORMAL LIMITS. NO SIGNIFICANT HIGH RATE EPISODES. SINGLE PVC COUNT 0.2/HR. NORMAL DEVICE FUNCTION.---MATTHEWS

## 2025-07-25 ENCOUNTER — PROCEDURE VISIT (OUTPATIENT)
Dept: DERMATOLOGY | Facility: CLINIC | Age: 70
End: 2025-07-25
Attending: STUDENT IN AN ORGANIZED HEALTH CARE EDUCATION/TRAINING PROGRAM

## 2025-07-25 VITALS
SYSTOLIC BLOOD PRESSURE: 102 MMHG | WEIGHT: 188 LBS | HEART RATE: 89 BPM | TEMPERATURE: 98.7 F | DIASTOLIC BLOOD PRESSURE: 58 MMHG | BODY MASS INDEX: 28.49 KG/M2 | HEIGHT: 68 IN | OXYGEN SATURATION: 98 %

## 2025-07-25 DIAGNOSIS — C44.92 SQUAMOUS CELL CARCINOMA OF SKIN: ICD-10-CM

## 2025-07-25 NOTE — PATIENT INSTRUCTIONS
"Mohs Microscopic Surgery After Care    Your wound will heal via secondary intention, which means no additional surgery was performed after removal of your skin cancer. The wound was left open to heal gradually over time using wound care alone.     Wounds left to heal secondarily can take 2-3 months on average to heal.  The healing occurs step by step. You will notice that over the first three weeks the area will drain straw colored fluid that may have some blood within it. This is normal. Over the first three weeks, you form a nice healing base called fibrin that serves as the scaffold or map for the rest of your body's healing process. The fibrin is a thick yellow tissue. People often worry that it is pus given the yellow color. Unlike pus, this is a thick layer that cannot be rubbed off. After this, you should expect the wound to \"fill in\" to the surface of your skin over the course of several weeks. Lastly, a new layer of skin will form over the wound.    Until your body forms a good fibrin base (which takes ~3 weeks) you should avoid immersing the wound in water (such as in baths, whirlpools, swimming pools, hot tubs, lakes and oceans). You however CAN and should wash the area daily, as instructed below.     After healing, the scar will initially be red (and often times a deep red to purple color on the legs) but will gradually fade over the course of 1 year to a round scar lighter than the skin surrounding it.    We advise you follow the wound care as noted below the entire time it takes for the areas to heal completely.    WOUND CARE AFTER YOUR PROCEDURE    Try NOT to remove the pressure bandage for 48 hours. Keep the area clean and dry while this bandage is on.     After removing the bandage for the first time, gently clean the area with soap and water. If the bandage is difficult to remove, getting the bandage wet in the shower will sometimes help soften the adhesive and allow it to be removed more easily. "     You will now need to cleanse this area daily in the shower with gentle soap. There is no need to scrub the area. Apply plain Vaseline ointment (this is over the counter and not a prescription) to the site followed by a clean appropriately sized bandage to area.  You will need to care for the area until it has healed over completely.  Non stick dressing and paper tape (or Hypafix) are recommended for sensitive skin but a bandaid is fine if it covers the area well.      MANAGING YOUR PAIN AFTER SURGERY     You can expect to have some pain after surgery. This is normal. The pain is typically worse the first two days after surgery, and quickly begins to get better.     The best strategy for controlling your pain after surgery is around the clock pain control. You can take over the counter Acetaminophen (Tylenol) for discomfort, if no contraindications.     If you are taking this at the maximum dose, you can alternate this with Motrin (ibuprofen or Advil) as well. Alternating these medications with each other allows you to maximize your pain control. In addition to Tylenol and Motrin, you can use heating pads or ice packs on your incisions to help reduce your pain.     How will I alternate your regular strength over-the-counter pain medication?  You will take a dose of pain medication every three hours.   Start by taking 650 mg of Tylenol (2 pills of 325 mg)   3 hours later take 600 mg of Motrin (3 pills of 200 mg)   3 hours after taking the Motrin take 650 mg of Tylenol   3 hours after that take 600 mg of Motrin.    See example - if your first dose of Tylenol is at 12:00 PM     12:00 PM  Tylenol 650 mg (2 pills of 325 mg)    3:00 PM  Motrin 600 mg (3 pills of 200 mg)    6:00 PM  Tylenol 650 mg (2 pills of 325 mg)    9:00 PM  Motrin 600 mg (3 pills of 200 mg)    Continue alternating every 3 hours      Important:   Do not take more than 4000mg of Tylenol or 3200mg of Motrin in a 24-hour period.     What if I still  have pain?   If you have pain that is not controlled with the over-the-counter pain medications (Tylenol and Motrin or Advil), don't hesitate to call our staff using the number provided. We will help make sure you are managing your pain in the best way possible, and if necessary, we can provide a prescription for additional pain medication.     CALL OUR OFFICE IMMEDIATELY FOR ANY SIGNS OF INFECTION:    This includes fever, chills, increased redness, warmth, tenderness, severe discomfort/pain, or pus or foul smell coming from the wound. If you are experiencing any of the above, please call [facility] Mohs Department directly at [phone].    IF BLEEDING IS NOTICED:    Place a clean cloth over the area and apply firm pressure for thirty minutes.  Check the wound ONLY after 30 minutes of direct pressure; do not cheat and sneak a peak, as that does not count.  If bleeding persists after 30 minutes of legitimate direct pressure, then try one more round of direct pressure to the area.  Should the bleeding become heavier or not stop after the second attempt, call [facility] directly at [phone]. Your call will get routed to the dermatology surgeon on call even after hours.

## 2025-07-25 NOTE — PROGRESS NOTES
Mohs Procedure Note    Patient: Jerry Funk  : 1955  MRN: [id number]  Date: 2025    History of Present Illness: The patient is a 69 y.o. male who presents with complaints of squamous cell carcinoma, on right frontal scalp.    Past Medical History[1]    Past Surgical History[1]    Current Medications[1]    Allergies[1]    Physical Exam:   Vitals:    25 0924   BP: 102/58   Pulse: 89   Temp: 98.7 °F (37.1 °C)   SpO2: 98%       Skin: 0.3 x 0.3 pink atropic macule on right frontal scalp.     Regional lymph nodes: negative    Assessment: Biopsy proven to be positive for squamous cell carcinoma, on right frontal scalp.    Plan: Mohs    Mohs Procedure Timeout    Flowsheet Row Most Recent Value   Timeout: 940   Patient Identity Verified: Yes   Correct Site Verified: Yes   Correct Procedure Verified: Yes          Mohs Diagnosis/Indication/Location/ID    Flowsheet Row Most Recent Value   Pathology Type Squamous cell carcinoma   Anatomic Site right frontal scalp   Indications for Mohs tumor location   Mohs ID [id number]          Mohs Site/Accession/Pre-Post    Flowsheet Row Most Recent Value   Original Site Identified (as submitted by referring clinician) Referral, Other (please specify)   Biopsy Accession/Specimen # (as submitted by referring clincian) [id number]   Pre-Mohs Size Length (cm) 0.3   Pre-Mohs Size Width (cm): 0.3   Post-Mohs Size-Length (cm) 0.7   Post Mohs Size-Width (cm) 0.7   Repair Type Second intention   Anesthetic Used 1% Lidocaine with epinephrine  [buffered]          Cancer staging: T1    Mohs Tumor Stage 1 Information    Flowsheet Row Most Recent Value   Tissue Sections (blocks) 2   Microscopic Exam Section 1: No tumor identified in section.   Microscopic Exam Section 2: No tumor identified in section.   Tumor Clear After Stage I? Yes                Patient identified, procedure verified, site identified and verified. Time out completed. Surgical removal of the lesion discussed with  the patient (risks and benefits, including possibility of scarring, infection, recurrence or potential for further treatment).    I have specifically identified the site with the patient. I have discussed the fact that the patient will have a scar after the procedure regardless of granulation or repair with sutures. I have discussed that the repair options can range from granulation in some cases to linear or curvilinear closures to larger flaps or grafts.  There are sometimes flaps or grafts used that require multiples stages of surgery and will not be completed today, rather be completed over a series of appointments. I have discussed that occasionally due to location, size or depth of the lesion I may recommend consultation with and transfer of care for further removal or the reconstruction to another provider such as ophthalmology surgery, plastic surgery, ENT surgery, or surgical oncology. There are cases in which other testing such as imaging with MRI or CT scan or testing of lymph nodes is recommended because of the nature/depth/location of tumor seen during the removal. There is a risk of injury to nerves causing temporary or permanent numbness or the inability to move muscles full such as the inability to lift eyebrows. Questions answered and verbal and written consent was obtained.    The tumor qualifies for Mohs based on AUC criteria. Dr. Bustamante served as the surgeon and pathologist during the procedure.      Scribe Attestation    I,:  Anneliese Kay MA am acting as a scribe while in the presence of the attending physician.:       I,:  Aaron Thornton MD personally performed the services described in this documentation    as scribed in my presence.:                   [1]  Past Medical History:  Diagnosis Date   • Allergic    • Anxiety    • Asthma     allergy induced   • Basal cell carcinoma    • Colon polyp    • Fatty liver    • H/O nonmelanoma skin cancer     last assessed-8/22/2017   • Heart  murmur    • Hyperlipidemia    • Inflamed seborrheic keratosis    • Malignant neoplasm of skin     last assessed-1/21/2014   • Neoplasm of uncertain behavior of skin    • Nocturia    • Pneumothorax, left    • Seasonal allergies    • Sebaceous cyst    • 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/01/2022    SCCIS- mid scalp   • Squamous cell carcinoma of skin of neck    • Squamous cell skin cancer 06/13/2025    right frontal scalp-Mohs   • Testicular hypofunction    • Viral warts    [1]  Past Surgical History:  Procedure Laterality Date   • CARDIAC CATHETERIZATION Left 12/18/2024    Procedure: Cardiac Left Heart Cath;  Surgeon: Ezekiel Merrill MD;  Location: [facility];  Service: Cardiology   • CARDIAC CATHETERIZATION N/A 12/18/2024    Procedure: Cardiac Coronary Angiogram;  Surgeon: Ezekiel Merrill MD;  Location: [facility];  Service: Cardiology   • CARDIAC CATHETERIZATION  12/18/2024    Procedure: Left Heart Catherization;  Surgeon: Ezekiel Merrill MD;  Location: [facility];  Service: Cardiology   • CARDIAC CATHETERIZATION N/A 12/18/2024    Procedure: Cardiac FFR/IFR;  Surgeon: Ezekiel Merrill MD;  Location: [facility];  Service: Cardiology   • CARDIAC PACEMAKER PLACEMENT  05/05/2021   • CARDIOVASCULAR STRESS TEST  08/27/2010   • COLONOSCOPY  10/08/2008   • IR OTHER  04/15/2021   • LUNG SURGERY      for pneumothorax   • MOHS SURGERY  02/15/2022    SCCIS mid scalp- Dr Leigh   • MOHS SURGERY Right 07/25/2025    SCC right frontal scalp;    • NECK SURGERY      brocken neck   • PARTIAL HIP ARTHROPLASTY     • RHINOPLASTY     [1]    Current Outpatient Medications:   •  aspirin 81 MG tablet, Take by mouth, Disp: , Rfl:   •  citalopram (CeleXA) 20 mg tablet, TAKE 1 TABLET BY MOUTH EVERY DAY, Disp: 90 tablet, Rfl: 1  •  fluticasone (Arnuity Ellipta) 100 MCG/ACT AEPB inhaler, Inhale 1 puff daily Rinse mouth after use., Disp: 90 blister, Rfl: 0  •  fluticasone (FLONASE) 50 mcg/act  nasal spray, 1 spray into each nostril in the morning., Disp: , Rfl:   •  ipratropium (ATROVENT) 0.06 % nasal spray, 1 spray into each nostril 2 (two) times a day, Disp: 15 mL, Rfl: 3  •  LORazepam (ATIVAN) 0.5 mg tablet, Take 1 tablet (0.5 mg total) by mouth every 6 (six) hours as needed for anxiety, Disp: 90 tablet, Rfl: 0  •  metoprolol succinate (TOPROL-XL) 25 mg 24 hr tablet, Take 0.5 tablets (12.5 mg total) by mouth daily, Disp: 45 tablet, Rfl: 1  •  montelukast (SINGULAIR) 10 mg tablet, Take 1 tablet (10 mg total) by mouth daily, Disp: 90 tablet, Rfl: 0  •  omeprazole (PriLOSEC) 40 MG capsule, Take 1 capsule (40 mg total) by mouth daily, Disp: 90 capsule, Rfl: 1  •  rosuvastatin (CRESTOR) 40 MG tablet, Take 1 tablet (40 mg total) by mouth daily, Disp: 90 tablet, Rfl: 3  •  sotalol (BETAPACE) 80 mg tablet, TAKE 1 TABLET BY MOUTH EVERY 12 HOURS, Disp: 180 tablet, Rfl: 3  •  tamsulosin (FLOMAX) 0.4 mg, Take 0.4 mg by mouth in the morning and 0.4 mg in the evening., Disp: , Rfl:   •  finasteride (PROSCAR) 5 mg tablet, Take 1 tablet (5 mg total) by mouth daily Do not start before August 17, 2023., Disp: 30 tablet, Rfl: 0[1]  Allergies  Allergen Reactions   • Dust Mite Extract    • Molds & Smuts    • Pollen Extract    • Short Ragweed Pollen Ext    • Tree Extract    • Wound Dressing Adhesive Rash

## 2025-07-28 ENCOUNTER — TELEPHONE (OUTPATIENT)
Age: 70
End: 2025-07-28

## (undated) DEVICE — TR BAND RADIAL ARTERY COMPRESSION DEVICE: Brand: TR BAND

## (undated) DEVICE — GLIDESHEATH SLENDER STAINLESS STEEL KIT: Brand: GLIDESHEATH SLENDER

## (undated) DEVICE — RADIFOCUS OPTITORQUE ANGIOGRAPHIC CATHETER: Brand: OPTITORQUE

## (undated) DEVICE — DGW .035 FC J3MM 260CM TEF: Brand: EMERALD

## (undated) DEVICE — CATH DIAG 5FR IMPULSE 100CM FL3.5

## (undated) DEVICE — Device: Brand: OMNIWIRE PRESSURE GUIDE WIRE

## (undated) DEVICE — CATH GUIDE LAUNCHER 6FR EBU 3.5